# Patient Record
Sex: MALE | Race: WHITE | NOT HISPANIC OR LATINO | URBAN - METROPOLITAN AREA
[De-identification: names, ages, dates, MRNs, and addresses within clinical notes are randomized per-mention and may not be internally consistent; named-entity substitution may affect disease eponyms.]

---

## 2021-06-20 ENCOUNTER — INPATIENT (INPATIENT)
Facility: HOSPITAL | Age: 86
LOS: 3 days | Discharge: HOME HEALTH | DRG: 689 | End: 2021-06-24
Attending: INTERNAL MEDICINE | Admitting: INTERNAL MEDICINE
Payer: MEDICARE

## 2021-06-20 VITALS
HEIGHT: 67 IN | OXYGEN SATURATION: 96 % | HEART RATE: 117 BPM | TEMPERATURE: 98 F | SYSTOLIC BLOOD PRESSURE: 98 MMHG | RESPIRATION RATE: 18 BRPM | WEIGHT: 149.91 LBS | DIASTOLIC BLOOD PRESSURE: 64 MMHG

## 2021-06-20 DIAGNOSIS — J18.9 PNEUMONIA, UNSPECIFIED ORGANISM: ICD-10-CM

## 2021-06-20 DIAGNOSIS — Z29.9 ENCOUNTER FOR PROPHYLACTIC MEASURES, UNSPECIFIED: ICD-10-CM

## 2021-06-20 DIAGNOSIS — I50.9 HEART FAILURE, UNSPECIFIED: ICD-10-CM

## 2021-06-20 DIAGNOSIS — N39.0 URINARY TRACT INFECTION, SITE NOT SPECIFIED: ICD-10-CM

## 2021-06-20 DIAGNOSIS — I63.9 CEREBRAL INFARCTION, UNSPECIFIED: ICD-10-CM

## 2021-06-20 DIAGNOSIS — I48.91 UNSPECIFIED ATRIAL FIBRILLATION: ICD-10-CM

## 2021-06-20 DIAGNOSIS — N40.0 BENIGN PROSTATIC HYPERPLASIA WITHOUT LOWER URINARY TRACT SYMPTOMS: ICD-10-CM

## 2021-06-20 LAB
ALBUMIN SERPL ELPH-MCNC: 3.5 G/DL — SIGNIFICANT CHANGE UP (ref 3.3–5)
ALP SERPL-CCNC: 72 U/L — SIGNIFICANT CHANGE UP (ref 40–120)
ALT FLD-CCNC: 21 U/L — SIGNIFICANT CHANGE UP (ref 10–45)
ANION GAP SERPL CALC-SCNC: 11 MMOL/L — SIGNIFICANT CHANGE UP (ref 5–17)
ANION GAP SERPL CALC-SCNC: 12 MMOL/L — SIGNIFICANT CHANGE UP (ref 5–17)
APPEARANCE UR: ABNORMAL
APTT BLD: 46.1 SEC — HIGH (ref 27.5–35.5)
AST SERPL-CCNC: 76 U/L — HIGH (ref 10–40)
BACTERIA # UR AUTO: NEGATIVE — SIGNIFICANT CHANGE UP
BASE EXCESS BLDV CALC-SCNC: 1.3 MMOL/L — SIGNIFICANT CHANGE UP (ref -2–2)
BASE EXCESS BLDV CALC-SCNC: 1.8 MMOL/L — SIGNIFICANT CHANGE UP (ref -2–2)
BASOPHILS # BLD AUTO: 0 K/UL — SIGNIFICANT CHANGE UP (ref 0–0.2)
BASOPHILS NFR BLD AUTO: 0 % — SIGNIFICANT CHANGE UP (ref 0–2)
BILIRUB SERPL-MCNC: 1.8 MG/DL — HIGH (ref 0.2–1.2)
BILIRUB UR-MCNC: NEGATIVE — SIGNIFICANT CHANGE UP
BLD GP AB SCN SERPL QL: NEGATIVE — SIGNIFICANT CHANGE UP
BUN SERPL-MCNC: 16 MG/DL — SIGNIFICANT CHANGE UP (ref 7–23)
BUN SERPL-MCNC: 17 MG/DL — SIGNIFICANT CHANGE UP (ref 7–23)
CA-I SERPL-SCNC: 1.13 MMOL/L — SIGNIFICANT CHANGE UP (ref 1.12–1.3)
CA-I SERPL-SCNC: 1.14 MMOL/L — SIGNIFICANT CHANGE UP (ref 1.12–1.3)
CALCIUM SERPL-MCNC: 8.7 MG/DL — SIGNIFICANT CHANGE UP (ref 8.4–10.5)
CALCIUM SERPL-MCNC: 8.9 MG/DL — SIGNIFICANT CHANGE UP (ref 8.4–10.5)
CHLORIDE BLDV-SCNC: 103 MMOL/L — SIGNIFICANT CHANGE UP (ref 96–108)
CHLORIDE BLDV-SCNC: 105 MMOL/L — SIGNIFICANT CHANGE UP (ref 96–108)
CHLORIDE SERPL-SCNC: 101 MMOL/L — SIGNIFICANT CHANGE UP (ref 96–108)
CHLORIDE SERPL-SCNC: 98 MMOL/L — SIGNIFICANT CHANGE UP (ref 96–108)
CO2 BLDV-SCNC: 29 MMOL/L — SIGNIFICANT CHANGE UP (ref 22–30)
CO2 BLDV-SCNC: 30 MMOL/L — SIGNIFICANT CHANGE UP (ref 22–30)
CO2 SERPL-SCNC: 22 MMOL/L — SIGNIFICANT CHANGE UP (ref 22–31)
CO2 SERPL-SCNC: 23 MMOL/L — SIGNIFICANT CHANGE UP (ref 22–31)
COLOR SPEC: SIGNIFICANT CHANGE UP
CREAT SERPL-MCNC: 0.99 MG/DL — SIGNIFICANT CHANGE UP (ref 0.5–1.3)
CREAT SERPL-MCNC: 1 MG/DL — SIGNIFICANT CHANGE UP (ref 0.5–1.3)
DIFF PNL FLD: ABNORMAL
EOSINOPHIL # BLD AUTO: 0 K/UL — SIGNIFICANT CHANGE UP (ref 0–0.5)
EOSINOPHIL NFR BLD AUTO: 0 % — SIGNIFICANT CHANGE UP (ref 0–6)
EPI CELLS # UR: 1 — SIGNIFICANT CHANGE UP
GAS PNL BLDV: 132 MMOL/L — LOW (ref 135–145)
GAS PNL BLDV: 134 MMOL/L — LOW (ref 135–145)
GAS PNL BLDV: SIGNIFICANT CHANGE UP
GLUCOSE BLDV-MCNC: 130 MG/DL — HIGH (ref 70–99)
GLUCOSE BLDV-MCNC: 153 MG/DL — HIGH (ref 70–99)
GLUCOSE SERPL-MCNC: 126 MG/DL — HIGH (ref 70–99)
GLUCOSE SERPL-MCNC: 147 MG/DL — HIGH (ref 70–99)
GLUCOSE UR QL: NEGATIVE — SIGNIFICANT CHANGE UP
HCO3 BLDV-SCNC: 28 MMOL/L — SIGNIFICANT CHANGE UP (ref 21–29)
HCO3 BLDV-SCNC: 28 MMOL/L — SIGNIFICANT CHANGE UP (ref 21–29)
HCT VFR BLD CALC: 43.2 % — SIGNIFICANT CHANGE UP (ref 39–50)
HCT VFR BLDA CALC: 42 % — SIGNIFICANT CHANGE UP (ref 39–50)
HCT VFR BLDA CALC: 47 % — SIGNIFICANT CHANGE UP (ref 39–50)
HGB BLD CALC-MCNC: 13.5 G/DL — SIGNIFICANT CHANGE UP (ref 13–17)
HGB BLD CALC-MCNC: 15.5 G/DL — SIGNIFICANT CHANGE UP (ref 13–17)
HGB BLD-MCNC: 13.9 G/DL — SIGNIFICANT CHANGE UP (ref 13–17)
HYALINE CASTS # UR AUTO: 0 /LPF — SIGNIFICANT CHANGE UP (ref 0–2)
INR BLD: 4.09 RATIO — HIGH (ref 0.88–1.16)
KETONES UR-MCNC: NEGATIVE — SIGNIFICANT CHANGE UP
LACTATE BLDV-MCNC: 2.6 MMOL/L — HIGH (ref 0.7–2)
LACTATE BLDV-MCNC: 3 MMOL/L — HIGH (ref 0.7–2)
LEUKOCYTE ESTERASE UR-ACNC: ABNORMAL
LYMPHOCYTES # BLD AUTO: 0.63 K/UL — LOW (ref 1–3.3)
LYMPHOCYTES # BLD AUTO: 7 % — LOW (ref 13–44)
MAGNESIUM SERPL-MCNC: 1.9 MG/DL — SIGNIFICANT CHANGE UP (ref 1.6–2.6)
MANUAL SMEAR VERIFICATION: SIGNIFICANT CHANGE UP
MCHC RBC-ENTMCNC: 32 PG — SIGNIFICANT CHANGE UP (ref 27–34)
MCHC RBC-ENTMCNC: 32.2 GM/DL — SIGNIFICANT CHANGE UP (ref 32–36)
MCV RBC AUTO: 99.3 FL — SIGNIFICANT CHANGE UP (ref 80–100)
MONOCYTES # BLD AUTO: 0.7 K/UL — SIGNIFICANT CHANGE UP (ref 0–0.9)
MONOCYTES NFR BLD AUTO: 7.8 % — SIGNIFICANT CHANGE UP (ref 2–14)
NEUTROPHILS # BLD AUTO: 7.67 K/UL — HIGH (ref 1.8–7.4)
NEUTROPHILS NFR BLD AUTO: 79.1 % — HIGH (ref 43–77)
NEUTS BAND # BLD: 6.1 % — SIGNIFICANT CHANGE UP (ref 0–8)
NITRITE UR-MCNC: NEGATIVE — SIGNIFICANT CHANGE UP
OB PNL STL: NEGATIVE — SIGNIFICANT CHANGE UP
OTHER CELLS CSF MANUAL: 7 ML/DL — LOW (ref 18–22)
PCO2 BLDV: 50 MMHG — SIGNIFICANT CHANGE UP (ref 35–50)
PCO2 BLDV: 55 MMHG — HIGH (ref 35–50)
PH BLDV: 7.33 — LOW (ref 7.35–7.45)
PH BLDV: 7.36 — SIGNIFICANT CHANGE UP (ref 7.35–7.45)
PH UR: 6 — SIGNIFICANT CHANGE UP (ref 5–8)
PLAT MORPH BLD: NORMAL — SIGNIFICANT CHANGE UP
PLATELET # BLD AUTO: 101 K/UL — LOW (ref 150–400)
PO2 BLDV: 25 MMHG — SIGNIFICANT CHANGE UP (ref 25–45)
PO2 BLDV: 25 MMHG — SIGNIFICANT CHANGE UP (ref 25–45)
POTASSIUM BLDV-SCNC: 3.9 MMOL/L — SIGNIFICANT CHANGE UP (ref 3.5–5.3)
POTASSIUM BLDV-SCNC: 4 MMOL/L — SIGNIFICANT CHANGE UP (ref 3.5–5.3)
POTASSIUM SERPL-MCNC: 3.7 MMOL/L — SIGNIFICANT CHANGE UP (ref 3.5–5.3)
POTASSIUM SERPL-MCNC: 7.4 MMOL/L — CRITICAL HIGH (ref 3.5–5.3)
POTASSIUM SERPL-SCNC: 3.7 MMOL/L — SIGNIFICANT CHANGE UP (ref 3.5–5.3)
POTASSIUM SERPL-SCNC: 7.4 MMOL/L — CRITICAL HIGH (ref 3.5–5.3)
PROT SERPL-MCNC: 6.9 G/DL — SIGNIFICANT CHANGE UP (ref 6–8.3)
PROT UR-MCNC: ABNORMAL
PROTHROM AB SERPL-ACNC: 45.9 SEC — HIGH (ref 10.6–13.6)
RBC # BLD: 4.35 M/UL — SIGNIFICANT CHANGE UP (ref 4.2–5.8)
RBC # FLD: 11.9 % — SIGNIFICANT CHANGE UP (ref 10.3–14.5)
RBC BLD AUTO: NORMAL — SIGNIFICANT CHANGE UP
RBC CASTS # UR COMP ASSIST: >50 /HPF — HIGH (ref 0–4)
RH IG SCN BLD-IMP: POSITIVE — SIGNIFICANT CHANGE UP
SAO2 % BLDV: 36 % — LOW (ref 67–88)
SAO2 % BLDV: 40 % — LOW (ref 67–88)
SARS-COV-2 RNA SPEC QL NAA+PROBE: SIGNIFICANT CHANGE UP
SODIUM SERPL-SCNC: 131 MMOL/L — LOW (ref 135–145)
SODIUM SERPL-SCNC: 136 MMOL/L — SIGNIFICANT CHANGE UP (ref 135–145)
SP GR SPEC: 1.02 — SIGNIFICANT CHANGE UP (ref 1.01–1.02)
UROBILINOGEN FLD QL: ABNORMAL
WBC # BLD: 9 K/UL — SIGNIFICANT CHANGE UP (ref 3.8–10.5)
WBC # FLD AUTO: 9 K/UL — SIGNIFICANT CHANGE UP (ref 3.8–10.5)
WBC UR QL: >50 /HPF — HIGH (ref 0–5)

## 2021-06-20 PROCEDURE — 74177 CT ABD & PELVIS W/CONTRAST: CPT | Mod: 26

## 2021-06-20 PROCEDURE — 99285 EMERGENCY DEPT VISIT HI MDM: CPT

## 2021-06-20 PROCEDURE — 71260 CT THORAX DX C+: CPT | Mod: 26

## 2021-06-20 PROCEDURE — 71045 X-RAY EXAM CHEST 1 VIEW: CPT | Mod: 26

## 2021-06-20 RX ORDER — PIPERACILLIN AND TAZOBACTAM 4; .5 G/20ML; G/20ML
3.38 INJECTION, POWDER, LYOPHILIZED, FOR SOLUTION INTRAVENOUS EVERY 8 HOURS
Refills: 0 | Status: DISCONTINUED | OUTPATIENT
Start: 2021-06-20 | End: 2021-06-22

## 2021-06-20 RX ORDER — SODIUM CHLORIDE 9 MG/ML
1000 INJECTION INTRAMUSCULAR; INTRAVENOUS; SUBCUTANEOUS
Refills: 0 | Status: DISCONTINUED | OUTPATIENT
Start: 2021-06-20 | End: 2021-06-21

## 2021-06-20 RX ORDER — SODIUM CHLORIDE 9 MG/ML
1000 INJECTION INTRAMUSCULAR; INTRAVENOUS; SUBCUTANEOUS ONCE
Refills: 0 | Status: COMPLETED | OUTPATIENT
Start: 2021-06-20 | End: 2021-06-20

## 2021-06-20 RX ORDER — TAMSULOSIN HYDROCHLORIDE 0.4 MG/1
0.4 CAPSULE ORAL AT BEDTIME
Refills: 0 | Status: DISCONTINUED | OUTPATIENT
Start: 2021-06-20 | End: 2021-06-24

## 2021-06-20 RX ORDER — VANCOMYCIN HCL 1 G
1000 VIAL (EA) INTRAVENOUS EVERY 12 HOURS
Refills: 0 | Status: DISCONTINUED | OUTPATIENT
Start: 2021-06-20 | End: 2021-06-22

## 2021-06-20 RX ORDER — METOPROLOL TARTRATE 50 MG
5 TABLET ORAL ONCE
Refills: 0 | Status: COMPLETED | OUTPATIENT
Start: 2021-06-20 | End: 2021-06-20

## 2021-06-20 RX ORDER — SIMVASTATIN 20 MG/1
20 TABLET, FILM COATED ORAL AT BEDTIME
Refills: 0 | Status: DISCONTINUED | OUTPATIENT
Start: 2021-06-20 | End: 2021-06-24

## 2021-06-20 RX ORDER — AZITHROMYCIN 500 MG/1
500 TABLET, FILM COATED ORAL ONCE
Refills: 0 | Status: COMPLETED | OUTPATIENT
Start: 2021-06-20 | End: 2021-06-20

## 2021-06-20 RX ORDER — SODIUM CHLORIDE 9 MG/ML
500 INJECTION INTRAMUSCULAR; INTRAVENOUS; SUBCUTANEOUS ONCE
Refills: 0 | Status: COMPLETED | OUTPATIENT
Start: 2021-06-20 | End: 2021-06-20

## 2021-06-20 RX ORDER — ASPIRIN/CALCIUM CARB/MAGNESIUM 324 MG
81 TABLET ORAL DAILY
Refills: 0 | Status: DISCONTINUED | OUTPATIENT
Start: 2021-06-20 | End: 2021-06-24

## 2021-06-20 RX ORDER — CEFTRIAXONE 500 MG/1
1000 INJECTION, POWDER, FOR SOLUTION INTRAMUSCULAR; INTRAVENOUS ONCE
Refills: 0 | Status: COMPLETED | OUTPATIENT
Start: 2021-06-20 | End: 2021-06-20

## 2021-06-20 RX ORDER — METOPROLOL TARTRATE 50 MG
100 TABLET ORAL DAILY
Refills: 0 | Status: DISCONTINUED | OUTPATIENT
Start: 2021-06-20 | End: 2021-06-24

## 2021-06-20 RX ADMIN — SODIUM CHLORIDE 70 MILLILITER(S): 9 INJECTION INTRAMUSCULAR; INTRAVENOUS; SUBCUTANEOUS at 22:22

## 2021-06-20 RX ADMIN — SODIUM CHLORIDE 1000 MILLILITER(S): 9 INJECTION INTRAMUSCULAR; INTRAVENOUS; SUBCUTANEOUS at 13:17

## 2021-06-20 RX ADMIN — Medication 5 MILLIGRAM(S): at 15:58

## 2021-06-20 RX ADMIN — SODIUM CHLORIDE 166.67 MILLILITER(S): 9 INJECTION INTRAMUSCULAR; INTRAVENOUS; SUBCUTANEOUS at 18:41

## 2021-06-20 RX ADMIN — CEFTRIAXONE 100 MILLIGRAM(S): 500 INJECTION, POWDER, FOR SOLUTION INTRAMUSCULAR; INTRAVENOUS at 13:37

## 2021-06-20 RX ADMIN — AZITHROMYCIN 250 MILLIGRAM(S): 500 TABLET, FILM COATED ORAL at 15:39

## 2021-06-20 RX ADMIN — SIMVASTATIN 20 MILLIGRAM(S): 20 TABLET, FILM COATED ORAL at 22:22

## 2021-06-20 RX ADMIN — TAMSULOSIN HYDROCHLORIDE 0.4 MILLIGRAM(S): 0.4 CAPSULE ORAL at 22:22

## 2021-06-20 RX ADMIN — PIPERACILLIN AND TAZOBACTAM 25 GRAM(S): 4; .5 INJECTION, POWDER, LYOPHILIZED, FOR SOLUTION INTRAVENOUS at 22:22

## 2021-06-20 RX ADMIN — Medication 5 MILLIGRAM(S): at 22:22

## 2021-06-20 NOTE — H&P ADULT - HISTORY OF PRESENT ILLNESS
patient is a 87 y/o male with PMHx of HCHF, a-fib on coumadin, CVA with L side weakness, BPH, presenting to the ER from home for B/L lower ext weakness.     patient is a very poor historian called abdiel douglass in the chart no answer.  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 in the ED he was foudn ot have elevated lactate, positive UA nad ? pnemonia, broad spectrum IV antibitoics were given. on one ot one for agitation and dementia

## 2021-06-20 NOTE — ED ADULT NURSE REASSESSMENT NOTE - NS ED NURSE REASSESS COMMENT FT1
Pt placed on constant observation due to multiple attempts to get out of bed, pt unable to be verbally redirected. 1:1 at bedside at this time.

## 2021-06-20 NOTE — ED ADULT NURSE REASSESSMENT NOTE - NS ED NURSE REASSESS COMMENT FT1
Pt resting comfortably. lungs sounds clear on ausculation. vitals stable 1:1 maintained. pt in afib on monitor, -130s. MD galvan aware metoprolol to be ordered. pt admitted clicked RTM

## 2021-06-20 NOTE — ED PROVIDER NOTE - CARE PLAN
Principal Discharge DX:	Pneumonia  Secondary Diagnosis:	Urinary tract infection  Secondary Diagnosis:	Supratherapeutic INR

## 2021-06-20 NOTE — ED PROVIDER NOTE - PHYSICAL EXAMINATION
Attn - alert, NAD, no pallor or jaundice, PERRL 3 mm, dry mm, skin - warm and dry, Lungs - clear, no w/r/r, good BS bilaterally, Cor - rr, no M, no rub, Abdo - ND, soft, NT, no HSM, no CVAT, no guarding or rebound. Extremities - pitting edema ankles bilat  no calf tenderness, distal pulses intact and symmetrical, Neuro - no facial asymmetry, weakness on left - leg weaker than arm.

## 2021-06-20 NOTE — ED PROVIDER NOTE - OBJECTIVE STATEMENT
Attn - pt seen in G8 - pt BIB EMS from home - was able to walk and talk yesterday and not today.  Yi  used. pt denies any pain, except when voids and c/o bilat leg weakness.  NO: fever, chills, chest pain, abdo pain, ha.  pt has hx of CVA with weakness on left.  Pt has hx of AFib on warfarin, CHF, BPH.

## 2021-06-20 NOTE — ED ADULT NURSE NOTE - OBJECTIVE STATEMENT
88 year old male arrival by EMS for increasing lethargy, unable to ambulate, diarrhea x1week, and bloody urine. Pt alert and oriented x1-2 to self and place. pt unable to give history due to condition, Pakistani  attempted. Pt verbalizes difficulty ambulating since yesterday, pt states "my legs hurt". Upon exam, lungs clear, s1 s2 heart sounds auscultated, abdomen soft nontender, skin intact, green pasty stool noted, hematuria noted in diaper, bilateral lower extremity +2 edema present. Neuro exam intact, pupils equal and responsive, no facial droop noted.  Pt. denies pain, headache, chest pain, shortness of breath, abdominal pain, nausea, vomiting, and diarrhea. Labs drawn, IV #20g bilateral forearm placed, on cardiac monitor AFib in 110-120s, afebrile in ER at this time, rectal temp obtained 99.6F. Straight cath under sterile technique for UA/UC 2 RN at bedside, hematuria with sediment, cloudy, and clots noted approximately 200cc of urine removed. Fall socks in place, appropriate side rails up, bed in low position and doors open visible from RN station.

## 2021-06-20 NOTE — H&P ADULT - ASSESSMENT
patient is a very poor historian called abdiel douglass in the chart no answer.  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 in the ED he was foudn ot have elevated lactate, positive UA nad ? pnemonia, broad spectrum IV antibitoics were given. on one ot one for agitation and dementia

## 2021-06-20 NOTE — ED ADULT NURSE REASSESSMENT NOTE - NS ED NURSE REASSESS COMMENT FT1
repeat BMP drawn for hemolysis with elevated potassium and VBG redrawn for elevated lactate after normal saline bolus. 1:1 maintained

## 2021-06-20 NOTE — H&P ADULT - NSICDXPASTMEDICALHX_GEN_ALL_CORE_FT
PAST MEDICAL HISTORY:  Atrial fibrillation     BPH (benign prostatic hyperplasia)     CHF (congestive heart failure)     CVA (cerebral vascular accident)

## 2021-06-20 NOTE — ED PROVIDER NOTE - CLINICAL SUMMARY MEDICAL DECISION MAKING FREE TEXT BOX
Attn - change in walking and talking since yesterday, c/o weakness and dysuria - RRx - infection v bleed v lytes   labs, INR, EKG, UA

## 2021-06-20 NOTE — H&P ADULT - NSHPPHYSICALEXAM_GEN_ALL_CORE
Vital Signs Last 24 Hrs  T(C): 36.8 (20 Jun 2021 16:55), Max: 37.6 (20 Jun 2021 11:35)  T(F): 98.2 (20 Jun 2021 16:55), Max: 99.6 (20 Jun 2021 11:35)  HR: 114 (20 Jun 2021 18:28) (103 - 124)  BP: 125/82 (20 Jun 2021 16:55) (98/64 - 146/87)  BP(mean): --  RR: 16 (20 Jun 2021 18:28) (15 - 20)  SpO2: 98% (20 Jun 2021 18:28) (96% - 98%) Vital Signs Last 24 Hrs  T(C): 36.8 (20 Jun 2021 16:55), Max: 37.6 (20 Jun 2021 11:35)  T(F): 98.2 (20 Jun 2021 16:55), Max: 99.6 (20 Jun 2021 11:35)  HR: 114 (20 Jun 2021 18:28) (103 - 124)  BP: 125/82 (20 Jun 2021 16:55) (98/64 - 146/87)  BP(mean): --  RR: 16 (20 Jun 2021 18:28) (15 - 20)  SpO2: 98% (20 Jun 2021 18:28) (96% - 98%)    Appearance: awake, demented, follows simple commands   HEENT:   Normal oral mucosa, PERRL, EOMI	  Lymphatic: No lymphadenopathy , no edema  Cardiovascular: Normal S1 S2  Respiratory: Lungs clear to auscultation, normal effort 	  Gastrointestinal:  Soft, diapers   Skin: No rashes, No ecchymoses, No cyanosis, warm to touch  Musculoskeletal: Normal range of motion, normal strength  Psychiatry:  Mood & affect appropriate  Ext: No edema

## 2021-06-20 NOTE — ED PROVIDER NOTE - PMH
Atrial fibrillation    BPH (benign prostatic hyperplasia)    CHF (congestive heart failure)    CVA (cerebral vascular accident)

## 2021-06-21 LAB
A1C WITH ESTIMATED AVERAGE GLUCOSE RESULT: 5.2 % — SIGNIFICANT CHANGE UP (ref 4–5.6)
ALBUMIN SERPL ELPH-MCNC: 2.9 G/DL — LOW (ref 3.3–5)
ALP SERPL-CCNC: 70 U/L — SIGNIFICANT CHANGE UP (ref 40–120)
ALT FLD-CCNC: 12 U/L — SIGNIFICANT CHANGE UP (ref 10–45)
ANION GAP SERPL CALC-SCNC: 11 MMOL/L — SIGNIFICANT CHANGE UP (ref 5–17)
APTT BLD: 46.9 SEC — HIGH (ref 27.5–35.5)
AST SERPL-CCNC: 20 U/L — SIGNIFICANT CHANGE UP (ref 10–40)
BASOPHILS # BLD AUTO: 0 K/UL — SIGNIFICANT CHANGE UP (ref 0–0.2)
BASOPHILS NFR BLD AUTO: 0 % — SIGNIFICANT CHANGE UP (ref 0–2)
BILIRUB SERPL-MCNC: 1.3 MG/DL — HIGH (ref 0.2–1.2)
BUN SERPL-MCNC: 10 MG/DL — SIGNIFICANT CHANGE UP (ref 7–23)
CALCIUM SERPL-MCNC: 8.4 MG/DL — SIGNIFICANT CHANGE UP (ref 8.4–10.5)
CHLORIDE SERPL-SCNC: 105 MMOL/L — SIGNIFICANT CHANGE UP (ref 96–108)
CHOLEST SERPL-MCNC: 114 MG/DL — SIGNIFICANT CHANGE UP
CO2 SERPL-SCNC: 21 MMOL/L — LOW (ref 22–31)
COVID-19 SPIKE DOMAIN AB INTERP: POSITIVE
COVID-19 SPIKE DOMAIN ANTIBODY RESULT: 50.1 U/ML — HIGH
CREAT SERPL-MCNC: 0.75 MG/DL — SIGNIFICANT CHANGE UP (ref 0.5–1.3)
EOSINOPHIL # BLD AUTO: 0 K/UL — SIGNIFICANT CHANGE UP (ref 0–0.5)
EOSINOPHIL NFR BLD AUTO: 0 % — SIGNIFICANT CHANGE UP (ref 0–6)
ESTIMATED AVERAGE GLUCOSE: 103 MG/DL — SIGNIFICANT CHANGE UP (ref 68–114)
GLUCOSE SERPL-MCNC: 102 MG/DL — HIGH (ref 70–99)
HCT VFR BLD CALC: 39.1 % — SIGNIFICANT CHANGE UP (ref 39–50)
HDLC SERPL-MCNC: 40 MG/DL — LOW
HGB BLD-MCNC: 12.8 G/DL — LOW (ref 13–17)
INR BLD: 3.93 RATIO — HIGH (ref 0.88–1.16)
LACTATE SERPL-SCNC: 0.9 MMOL/L — SIGNIFICANT CHANGE UP (ref 0.7–2)
LACTATE SERPL-SCNC: 1.1 MMOL/L — SIGNIFICANT CHANGE UP (ref 0.7–2)
LIPID PNL WITH DIRECT LDL SERPL: 59 MG/DL — SIGNIFICANT CHANGE UP
LYMPHOCYTES # BLD AUTO: 0.3 K/UL — LOW (ref 1–3.3)
LYMPHOCYTES # BLD AUTO: 7 % — LOW (ref 13–44)
MCHC RBC-ENTMCNC: 32.5 PG — SIGNIFICANT CHANGE UP (ref 27–34)
MCHC RBC-ENTMCNC: 32.7 GM/DL — SIGNIFICANT CHANGE UP (ref 32–36)
MCV RBC AUTO: 99.2 FL — SIGNIFICANT CHANGE UP (ref 80–100)
MONOCYTES # BLD AUTO: 0.3 K/UL — SIGNIFICANT CHANGE UP (ref 0–0.9)
MONOCYTES NFR BLD AUTO: 7 % — SIGNIFICANT CHANGE UP (ref 2–14)
NEUTROPHILS # BLD AUTO: 3.74 K/UL — SIGNIFICANT CHANGE UP (ref 1.8–7.4)
NEUTROPHILS NFR BLD AUTO: 86 % — HIGH (ref 43–77)
NON HDL CHOLESTEROL: 73 MG/DL — SIGNIFICANT CHANGE UP
PLATELET # BLD AUTO: 88 K/UL — LOW (ref 150–400)
POTASSIUM SERPL-MCNC: 3.6 MMOL/L — SIGNIFICANT CHANGE UP (ref 3.5–5.3)
POTASSIUM SERPL-SCNC: 3.6 MMOL/L — SIGNIFICANT CHANGE UP (ref 3.5–5.3)
PROT SERPL-MCNC: 5.6 G/DL — LOW (ref 6–8.3)
PROTHROM AB SERPL-ACNC: 44.2 SEC — HIGH (ref 10.6–13.6)
RBC # BLD: 3.94 M/UL — LOW (ref 4.2–5.8)
RBC # FLD: 11.9 % — SIGNIFICANT CHANGE UP (ref 10.3–14.5)
SARS-COV-2 IGG+IGM SERPL QL IA: 50.1 U/ML — HIGH
SARS-COV-2 IGG+IGM SERPL QL IA: POSITIVE
SODIUM SERPL-SCNC: 137 MMOL/L — SIGNIFICANT CHANGE UP (ref 135–145)
TRIGL SERPL-MCNC: 74 MG/DL — SIGNIFICANT CHANGE UP
TSH SERPL-MCNC: 2.39 UIU/ML — SIGNIFICANT CHANGE UP (ref 0.27–4.2)
TSH SERPL-MCNC: 2.48 UIU/ML — SIGNIFICANT CHANGE UP (ref 0.27–4.2)
WBC # BLD: 4.35 K/UL — SIGNIFICANT CHANGE UP (ref 3.8–10.5)
WBC # FLD AUTO: 4.35 K/UL — SIGNIFICANT CHANGE UP (ref 3.8–10.5)

## 2021-06-21 RX ORDER — LANOLIN ALCOHOL/MO/W.PET/CERES
3 CREAM (GRAM) TOPICAL ONCE
Refills: 0 | Status: COMPLETED | OUTPATIENT
Start: 2021-06-21 | End: 2021-06-21

## 2021-06-21 RX ORDER — ACETAMINOPHEN 500 MG
650 TABLET ORAL ONCE
Refills: 0 | Status: COMPLETED | OUTPATIENT
Start: 2021-06-21 | End: 2021-06-21

## 2021-06-21 RX ADMIN — Medication 81 MILLIGRAM(S): at 11:25

## 2021-06-21 RX ADMIN — PIPERACILLIN AND TAZOBACTAM 25 GRAM(S): 4; .5 INJECTION, POWDER, LYOPHILIZED, FOR SOLUTION INTRAVENOUS at 06:24

## 2021-06-21 RX ADMIN — Medication 100 MILLIGRAM(S): at 06:24

## 2021-06-21 RX ADMIN — PIPERACILLIN AND TAZOBACTAM 25 GRAM(S): 4; .5 INJECTION, POWDER, LYOPHILIZED, FOR SOLUTION INTRAVENOUS at 13:50

## 2021-06-21 RX ADMIN — Medication 5 MILLIGRAM(S): at 06:24

## 2021-06-21 RX ADMIN — Medication 250 MILLIGRAM(S): at 18:48

## 2021-06-21 RX ADMIN — Medication 650 MILLIGRAM(S): at 21:20

## 2021-06-21 RX ADMIN — TAMSULOSIN HYDROCHLORIDE 0.4 MILLIGRAM(S): 0.4 CAPSULE ORAL at 21:19

## 2021-06-21 RX ADMIN — Medication 3 MILLIGRAM(S): at 21:20

## 2021-06-21 RX ADMIN — PIPERACILLIN AND TAZOBACTAM 25 GRAM(S): 4; .5 INJECTION, POWDER, LYOPHILIZED, FOR SOLUTION INTRAVENOUS at 21:20

## 2021-06-21 RX ADMIN — SIMVASTATIN 20 MILLIGRAM(S): 20 TABLET, FILM COATED ORAL at 21:19

## 2021-06-21 RX ADMIN — Medication 250 MILLIGRAM(S): at 06:24

## 2021-06-21 RX ADMIN — Medication 650 MILLIGRAM(S): at 21:50

## 2021-06-21 NOTE — CHART NOTE - NSCHARTNOTEFT_GEN_A_CORE
Patient with suspicious lesions on CT scan.  Patient will require a workup for these findings as an outpatient.  He may follow up at the [facility], [phone]

## 2021-06-22 LAB
-  AMIKACIN: SIGNIFICANT CHANGE UP
-  AMOXICILLIN/CLAVULANIC ACID: SIGNIFICANT CHANGE UP
-  AMPICILLIN/SULBACTAM: SIGNIFICANT CHANGE UP
-  AMPICILLIN: SIGNIFICANT CHANGE UP
-  AZTREONAM: SIGNIFICANT CHANGE UP
-  CEFAZOLIN: SIGNIFICANT CHANGE UP
-  CEFEPIME: SIGNIFICANT CHANGE UP
-  CEFOXITIN: SIGNIFICANT CHANGE UP
-  CEFTRIAXONE: SIGNIFICANT CHANGE UP
-  CIPROFLOXACIN: SIGNIFICANT CHANGE UP
-  ERTAPENEM: SIGNIFICANT CHANGE UP
-  GENTAMICIN: SIGNIFICANT CHANGE UP
-  IMIPENEM: SIGNIFICANT CHANGE UP
-  LEVOFLOXACIN: SIGNIFICANT CHANGE UP
-  MEROPENEM: SIGNIFICANT CHANGE UP
-  NITROFURANTOIN: SIGNIFICANT CHANGE UP
-  PIPERACILLIN/TAZOBACTAM: SIGNIFICANT CHANGE UP
-  TIGECYCLINE: SIGNIFICANT CHANGE UP
-  TOBRAMYCIN: SIGNIFICANT CHANGE UP
-  TRIMETHOPRIM/SULFAMETHOXAZOLE: SIGNIFICANT CHANGE UP
CULTURE RESULTS: SIGNIFICANT CHANGE UP
INR BLD: 3.41 RATIO — HIGH (ref 0.88–1.16)
METHOD TYPE: SIGNIFICANT CHANGE UP
ORGANISM # SPEC MICROSCOPIC CNT: SIGNIFICANT CHANGE UP
ORGANISM # SPEC MICROSCOPIC CNT: SIGNIFICANT CHANGE UP
PROTHROM AB SERPL-ACNC: 38.6 SEC — HIGH (ref 10.6–13.6)
SPECIMEN SOURCE: SIGNIFICANT CHANGE UP

## 2021-06-22 RX ORDER — CEFTRIAXONE 500 MG/1
1000 INJECTION, POWDER, FOR SOLUTION INTRAMUSCULAR; INTRAVENOUS EVERY 24 HOURS
Refills: 0 | Status: DISCONTINUED | OUTPATIENT
Start: 2021-06-22 | End: 2021-06-24

## 2021-06-22 RX ADMIN — Medication 250 MILLIGRAM(S): at 06:36

## 2021-06-22 RX ADMIN — PIPERACILLIN AND TAZOBACTAM 25 GRAM(S): 4; .5 INJECTION, POWDER, LYOPHILIZED, FOR SOLUTION INTRAVENOUS at 13:45

## 2021-06-22 RX ADMIN — Medication 100 MILLIGRAM(S): at 06:36

## 2021-06-22 RX ADMIN — TAMSULOSIN HYDROCHLORIDE 0.4 MILLIGRAM(S): 0.4 CAPSULE ORAL at 21:44

## 2021-06-22 RX ADMIN — Medication 250 MILLIGRAM(S): at 17:30

## 2021-06-22 RX ADMIN — Medication 5 MILLIGRAM(S): at 06:35

## 2021-06-22 RX ADMIN — PIPERACILLIN AND TAZOBACTAM 25 GRAM(S): 4; .5 INJECTION, POWDER, LYOPHILIZED, FOR SOLUTION INTRAVENOUS at 06:36

## 2021-06-22 RX ADMIN — CEFTRIAXONE 100 MILLIGRAM(S): 500 INJECTION, POWDER, FOR SOLUTION INTRAMUSCULAR; INTRAVENOUS at 21:44

## 2021-06-22 RX ADMIN — SIMVASTATIN 20 MILLIGRAM(S): 20 TABLET, FILM COATED ORAL at 21:42

## 2021-06-22 RX ADMIN — Medication 81 MILLIGRAM(S): at 12:36

## 2021-06-22 NOTE — PHYSICAL THERAPY INITIAL EVALUATION ADULT - ADDITIONAL COMMENTS
Pt. lives in apt. with spouse, 16 steps to enter.  Patient ambulated with straight cane + one person assist.

## 2021-06-22 NOTE — CONSULT NOTE ADULT - ASSESSMENT
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 Weakness  -likely 2/2 infectious process  -on abx for PNA and UTI   -t/t per primary team    2. Afib  -INR 3.41  -continue to hold coumadin     3. HTN  -controlled  -c/w enalapril and metoprolol  -continue to monitor BP     4. 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 Weakness  -likely 2/2 infectious process  -on abx for PNA and UTI   -t/t per primary team  - ? h/o CHF get echo     2. Afib  -INR 3.41  -continue to hold coumadin     3. HTN  -controlled  -c/w enalapril and metoprolol  -continue to monitor BP     4. DVT prophylaxis  -INR >2

## 2021-06-22 NOTE — CONSULT NOTE ADULT - SUBJECTIVE AND OBJECTIVE BOX
Huan Cunningham MD  Interventional Cardiology / Endovascular Specialist  [place] Office : [address] [place] [address] [place]. [address]  Tel:   [place] Office : [address] [facility] [place] 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 In the ED he was found to have elevated lactate,on abx for PNA and UTI.  	  MEDICATIONS:  aspirin enteric coated 81 milliGRAM(s) Oral daily  enalapril 5 milliGRAM(s) Oral daily  metoprolol succinate  milliGRAM(s) Oral daily  tamsulosin 0.4 milliGRAM(s) Oral at bedtime    piperacillin/tazobactam IVPB.. 3.375 Gram(s) IV Intermittent every 8 hours  vancomycin  IVPB 1000 milliGRAM(s) IV Intermittent every 12 hours          simvastatin 20 milliGRAM(s) Oral at bedtime        PAST MEDICAL/SURGICAL HISTORY  PAST MEDICAL & SURGICAL HISTORY:  CVA (cerebral vascular accident)    CHF (congestive heart failure)    Atrial fibrillation    BPH (benign prostatic hyperplasia)        SOCIAL HISTORY: Substance Use (street drugs): ( x ) never used  (  ) other:    FAMILY HISTORY:      REVIEW OF SYSTEMS:  unable to obtain    PHYSICAL EXAM:  T(C): 36.2 (06-22-21 @ 09:24), Max: 37.3 (06-21-21 @ 20:42)  HR: 86 (06-22-21 @ 09:24) (78 - 92)  BP: 148/91 (06-22-21 @ 09:24) (133/63 - 148/95)  RR: 18 (06-22-21 @ 09:24) (18 - 18)  SpO2: 98% (06-22-21 @ 09:24) (92% - 98%)  Wt(kg): --  I&O's Summary    21 Jun 2021 07:01  -  22 Jun 2021 07:00  --------------------------------------------------------  IN: 1640 mL / OUT: 0 mL / NET: 1640 mL    22 Jun 2021 07:01  -  22 Jun 2021 13:52  --------------------------------------------------------  IN: 240 mL / OUT: 1 mL / NET: 239 mL          GENERAL: NAD,  EYES: EOMI, PERRLA, conjunctiva and sclera clear  ENMT: No tonsillar erythema, exudates, or enlargement;  Cardiovascular: Normal S1 S2, No JVD, No murmurs, No edema  Respiratory: Lungs clear to auscultation	  Gastrointestinal:  Soft, Non-tender, + BS	  Extremities: No edema                                      12.8   4.35  )-----------( 88       ( 21 Jun 2021 07:04 )             39.1     06-21    137  |  105  |  10  ----------------------------<  102<H>  3.6   |  21<L>  |  0.75    Ca    8.4      21 Jun 2021 07:04  Mg     1.9     06-20    TPro  5.6<L>  /  Alb  2.9<L>  /  TBili  1.3<H>  /  DBili  x   /  AST  20  /  ALT  12  /  AlkPhos  70  06-21    proBNP:   Lipid Profile:   HgA1c:   TSH:     Consultant(s) Notes Reviewed:  [x ] YES  [ ] NO    Care Discussed with Consultants/Other Providers [ x] YES  [ ] NO    Imaging Personally Reviewed independently:  [x] YES  [ ] NO    All labs, radiologic studies, vitals, orders and medications list reviewed. Patient is seen and examined at bedside. Case discussed with medical team.                 Huan Cunningham MD  Interventional Cardiology / Endovascular Specialist  [place] Office : [address] [place] [address] [place]. [address]  Tel:   [place] Office : [address] [facility] [place] 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 In the ED he was found to have elevated lactate,on abx for PNA and UTI.  	  MEDICATIONS:  aspirin enteric coated 81 milliGRAM(s) Oral daily  enalapril 5 milliGRAM(s) Oral daily  metoprolol succinate  milliGRAM(s) Oral daily  tamsulosin 0.4 milliGRAM(s) Oral at bedtime  piperacillin/tazobactam IVPB.. 3.375 Gram(s) IV Intermittent every 8 hours  vancomycin  IVPB 1000 milliGRAM(s) IV Intermittent every 12 hours  simvastatin 20 milliGRAM(s) Oral at bedtime      PAST MEDICAL/SURGICAL HISTORY  PAST MEDICAL & SURGICAL HISTORY:  CVA (cerebral vascular accident)    CHF (congestive heart failure)    Atrial fibrillation    BPH (benign prostatic hyperplasia)        SOCIAL HISTORY: Substance Use (street drugs): ( x ) never used  (  ) other:    FAMILY HISTORY:      REVIEW OF SYSTEMS:  unable to obtain    PHYSICAL EXAM:  T(C): 36.2 (06-22-21 @ 09:24), Max: 37.3 (06-21-21 @ 20:42)  HR: 86 (06-22-21 @ 09:24) (78 - 92)  BP: 148/91 (06-22-21 @ 09:24) (133/63 - 148/95)  RR: 18 (06-22-21 @ 09:24) (18 - 18)  SpO2: 98% (06-22-21 @ 09:24) (92% - 98%)  Wt(kg): --  I&O's Summary    21 Jun 2021 07:01  -  22 Jun 2021 07:00  --------------------------------------------------------  IN: 1640 mL / OUT: 0 mL / NET: 1640 mL    22 Jun 2021 07:01  -  22 Jun 2021 13:52  --------------------------------------------------------  IN: 240 mL / OUT: 1 mL / NET: 239 mL          GENERAL: NAD,  EYES: EOMI, PERRLA, conjunctiva and sclera clear  ENMT: No tonsillar erythema, exudates, or enlargement;  Cardiovascular: Normal S1 S2, No JVD, No murmurs, No edema  Respiratory: Lungs clear to auscultation	  Gastrointestinal:  Soft, Non-tender, + BS	  Extremities: No edema                                      12.8   4.35  )-----------( 88       ( 21 Jun 2021 07:04 )             39.1     06-21    137  |  105  |  10  ----------------------------<  102<H>  3.6   |  21<L>  |  0.75    Ca    8.4      21 Jun 2021 07:04  Mg     1.9     06-20    TPro  5.6<L>  /  Alb  2.9<L>  /  TBili  1.3<H>  /  DBili  x   /  AST  20  /  ALT  12  /  AlkPhos  70  06-21    proBNP:   Lipid Profile:   HgA1c:   TSH:     Consultant(s) Notes Reviewed:  [x ] YES  [ ] NO    Care Discussed with Consultants/Other Providers [ x] YES  [ ] NO    Imaging Personally Reviewed independently:  [x] YES  [ ] NO    All labs, radiologic studies, vitals, orders and medications list reviewed. Patient is seen and examined at bedside. Case discussed with medical team.

## 2021-06-22 NOTE — PHYSICAL THERAPY INITIAL EVALUATION ADULT - PERTINENT HX OF CURRENT PROBLEM, REHAB EVAL
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 in the ED he was found to have elevated lactate, positive UA and ? pneumonia, broad spectrum IV antibitoics were given.

## 2021-06-22 NOTE — PHYSICAL THERAPY INITIAL EVALUATION ADULT - STANDING BALANCE: STATIC
History  Chief Complaint   Patient presents with    Chest Pain     Pt reports left sided CP beginning about ten minutes ago and "feeling hot"  Pt c/o left arm heaviness  History provided by:  Patient  Chest Pain   Pain location:  L chest  Pain quality: aching    Pain radiates to:  Does not radiate  Pain severity:  Moderate  Onset quality:  Gradual  Timing:  Intermittent  Progression:  Waxing and waning  Chronicity:  New  Relieved by:  Nothing  Worsened by:  Nothing tried  Ineffective treatments:  None tried  Associated symptoms: numbness    Associated symptoms: no abdominal pain, no cough, no dizziness, no dysphagia, no fever, no headache, no nausea, no shortness of breath and no weakness    Associated symptoms comment:  Patient also noted with some left arm and left leg tingling sensation that started earlier today has been on and off  Patient has some noted pain in the left upper thoracic area  Prior to Admission Medications   Prescriptions Last Dose Informant Patient Reported? Taking? cetirizine (ZyrTEC) oral solution   No No   Sig: Take 5 mL (5 mg total) by mouth daily   dextromethorphan-guaifenesin (MUCINEX DM)  MG per 12 hr tablet   No No   Sig: Take 1 tablet by mouth every 12 (twelve) hours as needed for cough   fluticasone (FLONASE) 50 mcg/act nasal spray   No No   Si spray into each nostril daily      Facility-Administered Medications: None       History reviewed  No pertinent past medical history  Past Surgical History:   Procedure Laterality Date    EYE SURGERY      HAND SURGERY      HYSTERECTOMY         History reviewed  No pertinent family history  I have reviewed and agree with the history as documented      Social History   Substance Use Topics    Smoking status: Current Every Day Smoker     Packs/day: 0 50     Types: Cigarettes    Smokeless tobacco: Never Used    Alcohol use Yes      Comment: "1 or 2" after work        Review of Systems   Constitutional: Negative for chills and fever  HENT: Negative for rhinorrhea, sore throat and trouble swallowing  Eyes: Negative for pain  Respiratory: Negative for cough, shortness of breath, wheezing and stridor  Cardiovascular: Positive for chest pain  Negative for leg swelling  Gastrointestinal: Negative for abdominal pain, diarrhea and nausea  Endocrine: Negative for polyuria  Genitourinary: Negative for dysuria, flank pain and urgency  Musculoskeletal: Negative for joint swelling, myalgias and neck stiffness  Skin: Negative for rash  Allergic/Immunologic: Negative for immunocompromised state  Neurological: Positive for numbness  Negative for dizziness, syncope, weakness and headaches  Psychiatric/Behavioral: Negative for confusion and suicidal ideas  All other systems reviewed and are negative  Physical Exam  Physical Exam   Constitutional: She is oriented to person, place, and time  She appears well-developed and well-nourished  HENT:   Head: Normocephalic and atraumatic  Eyes: Pupils are equal, round, and reactive to light  EOM are normal    Neck: Normal range of motion  Neck supple  Cardiovascular: Normal rate and regular rhythm  Exam reveals no friction rub  No murmur heard  Pulmonary/Chest: Breath sounds normal  No respiratory distress  She has no wheezes  She has no rales  Abdominal: Soft  Bowel sounds are normal  She exhibits no distension  There is no tenderness  Musculoskeletal: Normal range of motion  She exhibits no edema or tenderness  Neurological: She is alert and oriented to person, place, and time  GCS 15  AAOx4  No focal neuro deficits  CN II-XII intact  PERRL  EOMI  [place] No pronator drift   strength 5/5 bilaterally  B/L UE strength 5/5 throughout  Finger to nose normal  Cerebellar function normal  Ambulates without difficulty  B/L LE strength 5/5 throughout  Gross sensation to b/l upper and lower extremities intact  Skin: Skin is warm  No rash noted  Psychiatric: She has a normal mood and affect  Nursing note and vitals reviewed        Vital Signs  ED Triage Vitals [12/27/18 1442]   Temperature Pulse Respirations Blood Pressure SpO2   97 5 °F (36 4 °C) 91 18 (!) 184/109 99 %      Temp Source Heart Rate Source Patient Position - Orthostatic VS BP Location FiO2 (%)   Tympanic Monitor Sitting Right arm --      Pain Score       5           Vitals:    12/27/18 1732 12/27/18 1919 12/27/18 1943 12/27/18 2013   BP: (!) 176/101 (!) 182/106 (!) 168/112 152/100   Pulse: 80 83 81 69   Patient Position - Orthostatic VS: Sitting Sitting Sitting Lying       Visual Acuity      ED Medications  Medications   aspirin chewable tablet 324 mg (324 mg Oral Given 12/27/18 1454)   sodium chloride 0 9 % bolus 1,000 mL (0 mL Intravenous Stopped 12/27/18 1553)   Gadobutrol injection (SINGLE-DOSE) SOLN 10 mL (9 mL Intravenous Given 12/27/18 1854)   labetalol (NORMODYNE) injection 5 mg (5 mg Intravenous Given 12/27/18 1943)       Diagnostic Studies  Results Reviewed     Procedure Component Value Units Date/Time    Troponin I [[id number]]  (Normal) Collected:  12/27/18 1747    Lab Status:  Final result Specimen:  Blood from Arm, Left Updated:  12/27/18 1817     Troponin I 0 02 ng/mL     NT-BNP PRO [[id number]]  (Normal) Collected:  12/27/18 1455    Lab Status:  Final result Specimen:  Blood from Arm, Right Updated:  12/27/18 1534     NT-proBNP 29 4 pg/mL     Troponin I [[id number]]  (Normal) Collected:  12/27/18 1455    Lab Status:  Final result Specimen:  Blood from Arm, Right Updated:  12/27/18 1534     Troponin I 0 02 ng/mL     APTT [[id number]]  (Normal) Collected:  12/27/18 1455    Lab Status:  Final result Specimen:  Blood from Arm, Right Updated:  12/27/18 1519     PTT 29 seconds     Narrative:       PTT:      Protime-INR [[id number]]  (Normal) Collected:  12/27/18 1455    Lab Status:  Final result Specimen:  Blood from Arm, Right Updated:  12/27/18 1519     Protime 10 7 seconds      INR 1 01 Narrative:       INR:  ,PROTIME:      D-Dimer [[id number]]  (Normal) Collected:  12/27/18 1455    Lab Status:  Final result Specimen:  Blood from Arm, Right Updated:  12/27/18 1519     D-DIMER <0 19 mg/L     Narrative:       D-DIMER:      Basic metabolic panel [[id number]]  (Abnormal) Collected:  12/27/18 1455    Lab Status:  Final result Specimen:  Blood from Arm, Right Updated:  12/27/18 1515     Sodium 139 mmol/L      Potassium 4 3 mmol/L      Chloride 102 mmol/L      CO2 27 mmol/L      ANION GAP 10 mmol/L      BUN 13 mg/dL      Creatinine 0 78 mg/dL      Glucose 105 (H) mg/dL      Calcium 9 8 mg/dL      eGFR 90 ml/min/1 73sq m     Narrative:         National Kidney Disease Education Program recommendations are as follows:  GFR calculation is accurate only with a steady state creatinine  Chronic Kidney disease less than 60 ml/min/1 73 sq  meters  Kidney failure less than 15 ml/min/1 73 sq  meters      Lipase [[id number]]  (Normal) Collected:  12/27/18 1455    Lab Status:  Final result Specimen:  Blood from Arm, Right Updated:  12/27/18 1515     Lipase 73 u/L     CBC and differential [[id number]]  (Abnormal) Collected:  12/27/18 1454    Lab Status:  Final result Specimen:  Blood from Arm, Right Updated:  12/27/18 1503     WBC 9 80 Thousand/uL      RBC 5 13 Million/uL      Hemoglobin 16 2 (H) g/dL      Hematocrit 47 4 (H) %      MCV 93 fL      MCH 31 6 pg      MCHC 34 2 g/dL      RDW 13 0 %      MPV 7 9 (L) fL      Platelets 139 Thousands/uL      Neutrophils Relative 55 %      Lymphocytes Relative 36 %      Monocytes Relative 5 %      Eosinophils Relative 2 %      Basophils Relative 2 (H) %      Neutrophils Absolute 5 40 Thousands/µL      Lymphocytes Absolute 3 50 Thousands/µL      Monocytes Absolute 0 50 Thousand/µL      Eosinophils Absolute 0 20 Thousand/µL      Basophils Absolute 0 20 (H) Thousands/µL                  MRI brain w wo contrast   Final Result by Viola Eddy MD (12/27 1926)      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  No intracranial hemorrhage or vasogenic edema  Consultation with the [id number] [facility] and catheter angiography recommended for further    evaluation  The study was marked in [facility] for immediate notification  Workstation performed: [id number]         CT head without contrast   Final Result by Skye Allison MD (12/27 1656)      Right frontal hyperdensity  This could represent acute blood versus a brain lesion such as vascular malformation  A follow-up contrast-enhanced MRI is recommended  I personally discussed this study with RA TERAN on 12/27/2018 at 4:48 PM                         Workstation performed: [id number]         XR chest portable   Final Result by Angela Dumas MD (12/27 1513)      Elevation of the right hemidiaphragm with bibasilar atelectasis  Workstation performed: [id number]                    Procedures  Procedures       Phone Contacts  ED Phone Contact    ED Course  ED Course as of Dec 30 0056   Thu Dec 27, 2018   1653 Spoke with the radiologist at this point time there is an on defined lesion in the right frontal lobe  Recommend MRI with IV contrast    1822 2nd trop negative  Pt at [address] now       1927 Patient back from MRI, noted increased BP elevated  Labetalol to be given    1958 Spoke with neuroSX on call   Recommendation need follow up,           HEART Risk Score      Most Recent Value   History  1 Filed at: 12/27/2018 1928   ECG  0 Filed at: 12/27/2018 1928   Age  1 Filed at: 12/27/2018 1928   Risk Factors  1 Filed at: 12/27/2018 1928   Troponin  0 Filed at: 12/27/2018 1928   Heart Score Risk Calculator   History  1 Filed at: 12/27/2018 1928   ECG  0 Filed at: 12/27/2018 1928   Age  1 Filed at: 12/27/2018 1928   Risk Factors  1 Filed at: 12/27/2018 1928   Troponin  0 Filed at: 12/27/2018 1928   HEART Score  3 Filed at: 12/27/2018 1928   HEART Score  3 Filed at: 12/27/2018 1928                            [facility]  Number of Diagnoses or Management Options  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  Chest pain: new and requires workup  HTN (hypertension): new and requires workup  Nicotine dependence: new and requires workup  Diagnosis management comments: 55-year-old female with a history of un diagnosed hypertension as well as smoking history presents with noted left-sided chest discomfort intermittent that started earlier today as well as left arm tingling and left leg tingling  There is no objective findings on neurological examination  CT scan was performed of the head for these evaluations symptoms and noted with the possibility of abnormality  The recommendation was to get MRI with contrast   The MRI with contrast noted an AV fistula  I spoke with the neurosurgeon over at [facility] recommended the patient be discharged as an outpatient  Blood pressure is mildly elevated but some what improved with a dose of labetalol informed her that she can follow up as an outpatient with the neurosurgeon  He took down MRN number to make sure the patient has follow-up  Patient's neurological symptoms have since improved the plan will be for outpatient management followup given strict instructions when to return back to the emergency department 2 troponins done in the negative in the department with the low heart risk score  Pt re-examined and evaluated after testing and treatment  Spoke with the patient and feeling improved and sxs have resolved  Will discharge home with close f/u with pcp and instructed to return to the ED if sxs worsen or continue  Pt agrees with the plan for discharge and feels comfortable to go home with proper f/u  Advised to return for worsening or additional problems  Diagnostic tests were reviewed and questions answered  Diagnosis, care plan and treatment options were discussed   The patient understand instructions and will follow up as directed  Amount and/or Complexity of Data Reviewed  Clinical lab tests: ordered and reviewed  Tests in the radiology section of CPT®: ordered and reviewed  Review and summarize past medical records: yes  Independent visualization of images, tracings, or specimens: yes (All labs reviewed and utilized in the medical decision making process    All radiology studies independently viewed by me and interpreted by the radiologist )      CritCare Time    Disposition  Final diagnoses:   Chest pain   Cerebrovascular dural AV fistula   HTN (hypertension)   Nicotine dependence     Time reflects when diagnosis was documented in both MDM as applicable and the Disposition within this note     Time User Action Codes Description Comment    12/27/2018  8:30 PM Ronnald Alicea Add [R07 9] Chest pain     12/27/2018  8:30 PM Ronnald Alicea Add [I67 1] Cerebrovascular dural AV fistula     12/27/2018  8:30 PM Sharon PRATHER Add [I10] HTN (hypertension)     12/27/2018  8:30 PM Ronnald Alicea Add [F17 200] Nicotine dependence       ED Disposition     ED Disposition Condition Comment    Discharge  Yasmany Aldana discharge to home/self care      Condition at discharge: Stable        Follow-up Information     Follow up With Specialties Details Why Contact Info Joyce [address] Neurosurgery Call in 2 days If symptoms worsen Wes Hrut [phone]  [address] [place] [address], [address] [place], [place], [phone]          Discharge Medication List as of 12/27/2018  8:31 PM      CONTINUE these medications which have NOT CHANGED    Details   cetirizine (ZyrTEC) oral solution Take 5 mL (5 mg total) by mouth daily, Starting Sat 10/20/2018, Print      dextromethorphan-guaifenesin (MUCINEX DM)  MG per 12 hr tablet Take 1 tablet by mouth every 12 (twelve) hours as needed for cough, Starting Sat 10/20/2018, Print      fluticasone (FLONASE) 50 mcg/act nasal spray 1 spray into each nostril daily, Starting Sat 10/20/2018, Until Sun 10/20/2019, Print           No discharge procedures on file      ED Provider  Electronically Signed by           Samantha Cosme DO  12/30/18 [id number] fair minus

## 2021-06-23 LAB
ANION GAP SERPL CALC-SCNC: 12 MMOL/L — SIGNIFICANT CHANGE UP (ref 5–17)
BUN SERPL-MCNC: 9 MG/DL — SIGNIFICANT CHANGE UP (ref 7–23)
CALCIUM SERPL-MCNC: 9 MG/DL — SIGNIFICANT CHANGE UP (ref 8.4–10.5)
CHLORIDE SERPL-SCNC: 106 MMOL/L — SIGNIFICANT CHANGE UP (ref 96–108)
CO2 SERPL-SCNC: 20 MMOL/L — LOW (ref 22–31)
CREAT SERPL-MCNC: 0.76 MG/DL — SIGNIFICANT CHANGE UP (ref 0.5–1.3)
GLUCOSE SERPL-MCNC: 121 MG/DL — HIGH (ref 70–99)
HCT VFR BLD CALC: 41.1 % — SIGNIFICANT CHANGE UP (ref 39–50)
HGB BLD-MCNC: 13.4 G/DL — SIGNIFICANT CHANGE UP (ref 13–17)
INR BLD: 2.53 RATIO — HIGH (ref 0.88–1.16)
MCHC RBC-ENTMCNC: 32.6 GM/DL — SIGNIFICANT CHANGE UP (ref 32–36)
MCHC RBC-ENTMCNC: 32.7 PG — SIGNIFICANT CHANGE UP (ref 27–34)
MCV RBC AUTO: 100.2 FL — HIGH (ref 80–100)
MRSA PCR RESULT.: SIGNIFICANT CHANGE UP
NRBC # BLD: 0 /100 WBCS — SIGNIFICANT CHANGE UP (ref 0–0)
PLATELET # BLD AUTO: 114 K/UL — LOW (ref 150–400)
POTASSIUM SERPL-MCNC: 3.9 MMOL/L — SIGNIFICANT CHANGE UP (ref 3.5–5.3)
POTASSIUM SERPL-SCNC: 3.9 MMOL/L — SIGNIFICANT CHANGE UP (ref 3.5–5.3)
PROTHROM AB SERPL-ACNC: 29 SEC — HIGH (ref 10.6–13.6)
RBC # BLD: 4.1 M/UL — LOW (ref 4.2–5.8)
RBC # FLD: 11.9 % — SIGNIFICANT CHANGE UP (ref 10.3–14.5)
S AUREUS DNA NOSE QL NAA+PROBE: SIGNIFICANT CHANGE UP
SODIUM SERPL-SCNC: 138 MMOL/L — SIGNIFICANT CHANGE UP (ref 135–145)
WBC # BLD: 3.28 K/UL — LOW (ref 3.8–10.5)
WBC # FLD AUTO: 3.28 K/UL — LOW (ref 3.8–10.5)

## 2021-06-23 RX ORDER — WARFARIN SODIUM 2.5 MG/1
1 TABLET ORAL ONCE
Refills: 0 | Status: COMPLETED | OUTPATIENT
Start: 2021-06-23 | End: 2021-06-23

## 2021-06-23 RX ADMIN — CEFTRIAXONE 100 MILLIGRAM(S): 500 INJECTION, POWDER, FOR SOLUTION INTRAMUSCULAR; INTRAVENOUS at 21:48

## 2021-06-23 RX ADMIN — TAMSULOSIN HYDROCHLORIDE 0.4 MILLIGRAM(S): 0.4 CAPSULE ORAL at 21:48

## 2021-06-23 RX ADMIN — Medication 81 MILLIGRAM(S): at 12:17

## 2021-06-23 RX ADMIN — Medication 5 MILLIGRAM(S): at 05:50

## 2021-06-23 RX ADMIN — WARFARIN SODIUM 1 MILLIGRAM(S): 2.5 TABLET ORAL at 21:48

## 2021-06-23 RX ADMIN — Medication 100 MILLIGRAM(S): at 05:51

## 2021-06-23 RX ADMIN — SIMVASTATIN 20 MILLIGRAM(S): 20 TABLET, FILM COATED ORAL at 21:48

## 2021-06-23 NOTE — PROGRESS NOTE ADULT - PROBLEM SELECTOR PLAN 2
positive UA awaiting pan cx  [place] CT, noted, ? bladder lesions  urology eval called for evlauation, chart noted was writen by the team to follow up outpatient   vanc and zosyn, cont zosyn if negative MRSA   IV hydration
positive UA awaiting pan cx  [facility] CT, noted, ? bladder lesions  urology eval called for evlauation, chart noted was writen by the team to follow up outpatient   zosyn switched to rocephine, pan sensitive, total of 14 days   IV hydration
positive UA awaiting pan cx  [place] CT, noted, ? bladder lesions  urology eval called for evlauation, chart noted was writen by the team to follow up outpatient   zosyn switched to rocephine, pan sensitive, total of 14 days   IV hydration

## 2021-06-23 NOTE — PROGRESS NOTE ADULT - PROBLEM SELECTOR PLAN 4
L Side weakness,   fall precautions

## 2021-06-23 NOTE — PROGRESS NOTE ADULT - PROBLEM SELECTOR PLAN 5
monitor hemodynamics  not in fluid overload

## 2021-06-23 NOTE — PROGRESS NOTE ADULT - PROBLEM SELECTOR PLAN 1
seen on CXR  CT chest noted   vanc and zosyn for now, D/Ronaldo  vanco, switch to oral cipto for total of 14 days  monitor hemodynamics   monitor electrolytes
seen on CXR  CT chest  vanc and zosyn for now, D/C vanco if negativ MRSA on cultures   monitor hemodynamics   monitor electrolytes  likely due to UTI pending full culture results
seen on CXR  CT chest noted   vanc and zosyn for now, D/Ronaldo  vanco  monitor hemodynamics   monitor electrolytes

## 2021-06-23 NOTE — PROGRESS NOTE ADULT - PROBLEM SELECTOR PLAN 3
rate control   elevated INR  hold coumadin  resume BP medications

## 2021-06-24 VITALS
DIASTOLIC BLOOD PRESSURE: 92 MMHG | SYSTOLIC BLOOD PRESSURE: 154 MMHG | RESPIRATION RATE: 18 BRPM | HEART RATE: 108 BPM | OXYGEN SATURATION: 97 % | TEMPERATURE: 97 F

## 2021-06-24 LAB
INR BLD: 2.22 RATIO — HIGH (ref 0.88–1.16)
PROTHROM AB SERPL-ACNC: 25.6 SEC — HIGH (ref 10.6–13.6)

## 2021-06-24 PROCEDURE — 84132 ASSAY OF SERUM POTASSIUM: CPT

## 2021-06-24 PROCEDURE — 87086 URINE CULTURE/COLONY COUNT: CPT

## 2021-06-24 PROCEDURE — 36415 COLL VENOUS BLD VENIPUNCTURE: CPT

## 2021-06-24 PROCEDURE — 71045 X-RAY EXAM CHEST 1 VIEW: CPT

## 2021-06-24 PROCEDURE — 83605 ASSAY OF LACTIC ACID: CPT

## 2021-06-24 PROCEDURE — 82272 OCCULT BLD FECES 1-3 TESTS: CPT

## 2021-06-24 PROCEDURE — 93005 ELECTROCARDIOGRAM TRACING: CPT

## 2021-06-24 PROCEDURE — 83735 ASSAY OF MAGNESIUM: CPT

## 2021-06-24 PROCEDURE — 85025 COMPLETE CBC W/AUTO DIFF WBC: CPT

## 2021-06-24 PROCEDURE — 97162 PT EVAL MOD COMPLEX 30 MIN: CPT

## 2021-06-24 PROCEDURE — 87640 STAPH A DNA AMP PROBE: CPT

## 2021-06-24 PROCEDURE — 82435 ASSAY OF BLOOD CHLORIDE: CPT

## 2021-06-24 PROCEDURE — 84443 ASSAY THYROID STIM HORMONE: CPT

## 2021-06-24 PROCEDURE — 74177 CT ABD & PELVIS W/CONTRAST: CPT

## 2021-06-24 PROCEDURE — 85014 HEMATOCRIT: CPT

## 2021-06-24 PROCEDURE — U0005: CPT

## 2021-06-24 PROCEDURE — 86900 BLOOD TYPING SEROLOGIC ABO: CPT

## 2021-06-24 PROCEDURE — 97110 THERAPEUTIC EXERCISES: CPT

## 2021-06-24 PROCEDURE — 85018 HEMOGLOBIN: CPT

## 2021-06-24 PROCEDURE — 80053 COMPREHEN METABOLIC PANEL: CPT

## 2021-06-24 PROCEDURE — 97116 GAIT TRAINING THERAPY: CPT

## 2021-06-24 PROCEDURE — 85610 PROTHROMBIN TIME: CPT

## 2021-06-24 PROCEDURE — 80048 BASIC METABOLIC PNL TOTAL CA: CPT

## 2021-06-24 PROCEDURE — 82803 BLOOD GASES ANY COMBINATION: CPT

## 2021-06-24 PROCEDURE — 83036 HEMOGLOBIN GLYCOSYLATED A1C: CPT

## 2021-06-24 PROCEDURE — 82947 ASSAY GLUCOSE BLOOD QUANT: CPT

## 2021-06-24 PROCEDURE — 82330 ASSAY OF CALCIUM: CPT

## 2021-06-24 PROCEDURE — U0003: CPT

## 2021-06-24 PROCEDURE — 85027 COMPLETE CBC AUTOMATED: CPT

## 2021-06-24 PROCEDURE — 86901 BLOOD TYPING SEROLOGIC RH(D): CPT

## 2021-06-24 PROCEDURE — 71260 CT THORAX DX C+: CPT

## 2021-06-24 PROCEDURE — 99285 EMERGENCY DEPT VISIT HI MDM: CPT | Mod: 25

## 2021-06-24 PROCEDURE — 86769 SARS-COV-2 COVID-19 ANTIBODY: CPT

## 2021-06-24 PROCEDURE — 84295 ASSAY OF SERUM SODIUM: CPT

## 2021-06-24 PROCEDURE — 87186 SC STD MICRODIL/AGAR DIL: CPT

## 2021-06-24 PROCEDURE — 86850 RBC ANTIBODY SCREEN: CPT

## 2021-06-24 PROCEDURE — 82962 GLUCOSE BLOOD TEST: CPT

## 2021-06-24 PROCEDURE — 87641 MR-STAPH DNA AMP PROBE: CPT

## 2021-06-24 PROCEDURE — 81001 URINALYSIS AUTO W/SCOPE: CPT

## 2021-06-24 PROCEDURE — 85730 THROMBOPLASTIN TIME PARTIAL: CPT

## 2021-06-24 PROCEDURE — 80061 LIPID PANEL: CPT

## 2021-06-24 RX ORDER — LANOLIN ALCOHOL/MO/W.PET/CERES
5 CREAM (GRAM) TOPICAL ONCE
Refills: 0 | Status: DISCONTINUED | OUTPATIENT
Start: 2021-06-24 | End: 2021-06-24

## 2021-06-24 RX ORDER — PYRIDOXINE HCL (VITAMIN B6) 100 MG
0 TABLET ORAL
Qty: 0 | Refills: 0 | DISCHARGE

## 2021-06-24 RX ORDER — PREGABALIN 225 MG/1
0 CAPSULE ORAL
Qty: 0 | Refills: 0 | DISCHARGE

## 2021-06-24 RX ORDER — TAMSULOSIN HYDROCHLORIDE 0.4 MG/1
1 CAPSULE ORAL
Qty: 0 | Refills: 0 | DISCHARGE

## 2021-06-24 RX ORDER — CHOLECALCIFEROL (VITAMIN D3) 125 MCG
0 CAPSULE ORAL
Qty: 0 | Refills: 0 | DISCHARGE

## 2021-06-24 RX ORDER — CIPROFLOXACIN LACTATE 400MG/40ML
1 VIAL (ML) INTRAVENOUS
Qty: 24 | Refills: 0
Start: 2021-06-24 | End: 2021-07-05

## 2021-06-24 RX ORDER — TAMSULOSIN HYDROCHLORIDE 0.4 MG/1
1 CAPSULE ORAL
Qty: 0 | Refills: 0 | DISCHARGE
Start: 2021-06-24

## 2021-06-24 RX ORDER — SIMVASTATIN 20 MG/1
1 TABLET, FILM COATED ORAL
Qty: 0 | Refills: 0 | DISCHARGE
Start: 2021-06-24

## 2021-06-24 RX ORDER — SIMVASTATIN 20 MG/1
0.5 TABLET, FILM COATED ORAL
Qty: 0 | Refills: 0 | DISCHARGE

## 2021-06-24 RX ORDER — FUROSEMIDE 40 MG
1 TABLET ORAL
Qty: 0 | Refills: 0 | DISCHARGE

## 2021-06-24 RX ADMIN — Medication 81 MILLIGRAM(S): at 12:07

## 2021-06-24 RX ADMIN — Medication 5 MILLIGRAM(S): at 05:21

## 2021-06-24 RX ADMIN — Medication 100 MILLIGRAM(S): at 05:22

## 2021-06-24 NOTE — DISCHARGE NOTE NURSING/CASE MANAGEMENT/SOCIAL WORK - NSSCTYPOFSERV_GEN_ALL_CORE
Services Requested: RN, Physical Therapy & Social Work. A nurse will call to set up an appointment for the next day.

## 2021-06-24 NOTE — DISCHARGE NOTE PROVIDER - PROVIDER TOKENS
FREE:[LAST:[Primary care doctor],PHONE:[(   )    -],FAX:[(   )    -],FOLLOWUP:[1 week],ESTABLISHEDPATIENT:[T]]

## 2021-06-24 NOTE — DISCHARGE NOTE PROVIDER - NSDCMRMEDTOKEN_GEN_ALL_CORE_FT
Aspirin Enteric Coated 81 mg oral delayed release tablet: 1 tab(s) orally once a day  Cipro 500 mg oral tablet: 1 tab(s) orally every 12 hours   enalapril 5 mg oral tablet: 1 tab(s) orally once a day  furosemide 20 mg oral tablet: 1 tab(s) orally once a day    Metoprolol Succinate  mg oral tablet, extended release: 1 tab(s) orally once a day  simvastatin 20 mg oral tablet: 1 tab(s) orally once a day (at bedtime)  tamsulosin 0.4 mg oral capsule: 1 cap(s) orally once a day (at bedtime)  warfarin 2 mg oral tablet: 1 tab(s) orally Monday through Friday    NOTE: Pharmacy stated RX is written for Warfarin 5mg 1 tablet d; utd by doctor. Directions above written as per family member.   warfarin 2 mg oral tablet: 0.5 tab(s) orally Saturday and Sunday    NOTE: Pharmacy stated RX above is written for Warfairn 2mg 1 tablet daily;utd by doctor. Directions written as per family member

## 2021-06-24 NOTE — DISCHARGE NOTE PROVIDER - NSDCCPCAREPLAN_GEN_ALL_CORE_FT
PRINCIPAL DISCHARGE DIAGNOSIS  Diagnosis: Pneumonia  Assessment and Plan of Treatment: Complete course of oral antibiotic therapy      SECONDARY DISCHARGE DIAGNOSES  Diagnosis: Escherichia coli urinary tract infection  Assessment and Plan of Treatment: Complete course of oral antibiotic therapy    Diagnosis: Atrial fibrillation  Assessment and Plan of Treatment: Contine with coumadin  Follow up with your doctor for INR check.    Diagnosis: Lesion of urinary bladder  Assessment and Plan of Treatment: Suspicion for bladder lesions on CT scan.  Patient will require a workup for these findings as an outpatient.  Follow up at the [facility], [phone].

## 2021-06-24 NOTE — PROGRESS NOTE ADULT - SUBJECTIVE AND OBJECTIVE BOX
Name of Patient : KACI MERAZ  MRN: [id number]  DATE OF SERVICE: 21 @ 17:02    Subjective: Patient seen and examined. No new events except as noted.   feeling okay   non toxic  pan ct noted  marj mcnair     REVIEW OF SYSTEMS:    CONSTITUTIONAL: No weakness, fevers or chills  EYES/ENT: No visual changes;  No vertigo or throat pain   NECK: No pain or stiffness  RESPIRATORY: No cough, wheezing, hemoptysis; No shortness of breath  CARDIOVASCULAR: No chest pain or palpitations  GASTROINTESTINAL: No abdominal or epigastric pain. No nausea, vomiting, or hematemesis; No diarrhea or constipation. No melena or hematochezia.  GENITOURINARY: No dysuria, frequency or hematuria  NEUROLOGICAL: No numbness or weakness  SKIN: No itching, burning, rashes, or lesions   All other review of systems is negative unless indicated above.    MEDICATIONS:  MEDICATIONS  (STANDING):  aspirin enteric coated 81 milliGRAM(s) Oral daily  enalapril 5 milliGRAM(s) Oral daily  metoprolol succinate  milliGRAM(s) Oral daily  piperacillin/tazobactam IVPB.. 3.375 Gram(s) IV Intermittent every 8 hours  simvastatin 20 milliGRAM(s) Oral at bedtime  tamsulosin 0.4 milliGRAM(s) Oral at bedtime  vancomycin  IVPB 1000 milliGRAM(s) IV Intermittent every 12 hours      PHYSICAL EXAM:  T(C): 37.1 (21 @ 13:16), Max: 37.1 (21 @ 13:16)  HR: 90 (21 @ 10:47) (90 - 114)  BP: 127/82 (21 @ 13:16) (110/68 - 139/87)  RR: 18 (21 @ 13:16) (16 - 18)  SpO2: 92% (21 @ 13:16) (92% - 98%)  Wt(kg): --  I&O's Summary    2021 07:01  -  2021 07:00  --------------------------------------------------------  IN: 1250 mL / OUT: 0 mL / NET: 1250 mL    2021 07:01  -  2021 17:02  --------------------------------------------------------  IN: 700 mL / OUT: 0 mL / NET: 700 mL        Weight (kg): 68.8 ( @ 23:28)    Appearance: Normal	  HEENT:  PERRLA   Lymphatic: No lymphadenopathy   Cardiovascular: Normal S1 S2, no JVD  Respiratory: normal effort , clear  Gastrointestinal:  Soft, Non-tender  Skin: No rashes,  warm to touch  Psychiatry:  Mood & affect appropriate  Musculuskeletal: No edema      All labs, Imaging and EKGs personally reviewed         21 @ 07:01  -  21 @ 07:00  --------------------------------------------------------  IN: 1250 mL / OUT: 0 mL / NET: 1250 mL    21 @ 07:01  -  21 @ 17:02  --------------------------------------------------------  IN: 700 mL / OUT: 0 mL / NET: 700 mL                          12.8   4.35  )-----------( 88       ( 2021 07:04 )             39.1                   137  |  105  |  10  ----------------------------<  102<H>  3.6   |  21<L>  |  0.75    Ca    8.4      2021 07:04  Mg     1.9         TPro  5.6<L>  /  Alb  2.9<L>  /  TBili  1.3<H>  /  DBili  x   /  AST  20  /  ALT  12  /  AlkPhos  70  06-21    PT/INR - ( 2021 07:05 )   PT: 44.2 sec;   INR: 3.93 ratio         PTT - ( 2021 07:05 )  PTT:46.9 sec                   Urinalysis Basic - ( 2021 12:23 )    Color: brown / Appearance: Turbid / S.019 / pH: x  Gluc: x / Ketone: Negative  / Bili: Negative / Urobili: 3 mg/dL   Blood: x / Protein: 100 mg/dL / Nitrite: Negative   Leuk Esterase: Small / RBC: >50 /hpf / WBC >50 /HPF   Sq Epi: x / Non Sq Epi: 1 / Bacteria: Negative              
  Huan Cunningham MD  Interventional Cardiology / Endovascular Specialist  [place] Office : [address] [place] [address]. [address]  Tel:   [place] Office : [address] [facility] [place] [address]  Tel: [phone]  Cell : [phone]    Subjective/Overnight events: Patient lying in bed comfortably. No acute distress.  	  MEDICATIONS:  aspirin enteric coated 81 milliGRAM(s) Oral daily  enalapril 5 milliGRAM(s) Oral daily  metoprolol succinate  milliGRAM(s) Oral daily  tamsulosin 0.4 milliGRAM(s) Oral at bedtime    cefTRIAXone   IVPB 1000 milliGRAM(s) IV Intermittent every 24 hours      melatonin 5 milliGRAM(s) Oral once      simvastatin 20 milliGRAM(s) Oral at bedtime        PAST MEDICAL/SURGICAL HISTORY  PAST MEDICAL & SURGICAL HISTORY:  CVA (cerebral vascular accident)    CHF (congestive heart failure)    Atrial fibrillation    BPH (benign prostatic hyperplasia)        SOCIAL HISTORY: Substance Use (street drugs): ( x ) never used  (  ) other:    FAMILY HISTORY:      REVIEW OF SYSTEMS:  CONSTITUTIONAL: No fever, weight loss, or fatigue  EYES: No eye pain, visual disturbances, or discharge  ENMT:  No difficulty hearing, tinnitus, vertigo; No sinus or throat pain  BREASTS: No pain, masses, or nipple discharge  GASTROINTESTINAL: No abdominal or epigastric pain. No nausea, vomiting, or hematemesis; No diarrhea or constipation. No melena or hematochezia.  GENITOURINARY: No dysuria, frequency, hematuria, or incontinence  NEUROLOGICAL: No headaches, memory loss, loss of strength, numbness, or tremors  ENDOCRINE: No heat or cold intolerance; No hair loss  MUSCULOSKELETAL: No joint pain or swelling; No muscle, back, or extremity pain  PSYCHIATRIC: No depression, anxiety, mood swings, or difficulty sleeping  HEME/LYMPH: No easy bruising, or bleeding gums  All others negative    PHYSICAL EXAM:  T(C): 36.2 (06-24-21 @ 12:35), Max: 36.3 (06-23-21 @ 16:36)  HR: 108 (06-24-21 @ 12:35) (91 - 108)  BP: 154/92 (06-24-21 @ 12:35) (145/92 - 180/108)  RR: 18 (06-24-21 @ 12:35) (18 - 18)  SpO2: 97% (06-24-21 @ 12:35) (96% - 98%)  Wt(kg): --  I&O's Summary    23 Jun 2021 07:01  -  24 Jun 2021 07:00  --------------------------------------------------------  IN: 600 mL / OUT: 0 mL / NET: 600 mL    24 Jun 2021 07:01  -  24 Jun 2021 15:13  --------------------------------------------------------  IN: 240 mL / OUT: 0 mL / NET: 240 mL      GENERAL: NAD,  EYES: EOMI, PERRLA, conjunctiva and sclera clear  ENMT: No tonsillar erythema, exudates, or enlargement;  Cardiovascular: Normal S1 S2, No JVD, No murmurs, No edema  Respiratory: Lungs clear to auscultation	  Gastrointestinal:  Soft, Non-tender, + BS	  Extremities: No edema                                  13.4   3.28  )-----------( 114      ( 23 Jun 2021 07:09 )             41.1     06-23    138  |  106  |  9   ----------------------------<  121<H>  3.9   |  20<L>  |  0.76    Ca    9.0      23 Jun 2021 07:09      proBNP:   Lipid Profile:   HgA1c:   TSH:     Consultant(s) Notes Reviewed:  [x ] YES  [ ] NO    Care Discussed with Consultants/Other Providers [ x] YES  [ ] NO    Imaging Personally Reviewed independently:  [x] YES  [ ] NO    All labs, radiologic studies, vitals, orders and medications list reviewed. Patient is seen and examined at bedside. Case discussed with medical team.              
  Huan Cunningham MD  Interventional Cardiology / Endovascular Specialist  [place] Office : [address] [place] [address]. [address]  Tel:   [place] Office : [address] [facility] [place]. [address]  Tel: [phone]  Cell : [phone]    Subjective/Overnight events: Patient lying in bed comfortably. No acute distress.  	  MEDICATIONS:  aspirin enteric coated 81 milliGRAM(s) Oral daily  enalapril 5 milliGRAM(s) Oral daily  metoprolol succinate  milliGRAM(s) Oral daily  tamsulosin 0.4 milliGRAM(s) Oral at bedtime    cefTRIAXone   IVPB 1000 milliGRAM(s) IV Intermittent every 24 hours          simvastatin 20 milliGRAM(s) Oral at bedtime        PAST MEDICAL/SURGICAL HISTORY  PAST MEDICAL & SURGICAL HISTORY:  CVA (cerebral vascular accident)    CHF (congestive heart failure)    Atrial fibrillation    BPH (benign prostatic hyperplasia)        SOCIAL HISTORY: Substance Use (street drugs): ( x ) never used  (  ) other:    FAMILY HISTORY:      REVIEW OF SYSTEMS:  unable to obtain    PHYSICAL EXAM:  T(C): 36.7 (06-23-21 @ 09:12), Max: 36.9 (06-23-21 @ 00:29)  HR: 86 (06-23-21 @ 09:12) (86 - 90)  BP: 133/77 (06-23-21 @ 09:12) (133/77 - 154/93)  RR: 18 (06-23-21 @ 09:12) (18 - 18)  SpO2: 99% (06-23-21 @ 09:12) (98% - 99%)  Wt(kg): --  I&O's Summary    22 Jun 2021 07:01  -  23 Jun 2021 07:00  --------------------------------------------------------  IN: 710 mL / OUT: 0 mL / NET: 710 mL            GENERAL: NAD,  EYES: EOMI, PERRLA, conjunctiva and sclera clear  ENMT: No tonsillar erythema, exudates, or enlargement;  Cardiovascular: Normal S1 S2, No JVD, No murmurs, No edema  Respiratory: Lungs clear to auscultation	  Gastrointestinal:  Soft, Non-tender, + BS	  Extremities: No edema                                    13.4   3.28  )-----------( 114      ( 23 Jun 2021 07:09 )             41.1     06-23    138  |  106  |  9   ----------------------------<  121<H>  3.9   |  20<L>  |  0.76    Ca    9.0      23 Jun 2021 07:09      proBNP:   Lipid Profile:   HgA1c:   TSH:     Consultant(s) Notes Reviewed:  [x ] YES  [ ] NO    Care Discussed with Consultants/Other Providers [ x] YES  [ ] NO    Imaging Personally Reviewed independently:  [x] YES  [ ] NO    All labs, radiologic studies, vitals, orders and medications list reviewed. Patient is seen and examined at bedside. Case discussed with medical team.              
Name of Patient : KACI MERAZ  MRN: [id number]  DATE OF SERVICE: 06-23-21 @ 19:29    Subjective: Patient seen and examined. No new events except as noted.  doing okay      REVIEW OF SYSTEMS:    CONSTITUTIONAL: No weakness, fevers or chills  EYES/ENT: No visual changes;  No vertigo or throat pain   NECK: No pain or stiffness  RESPIRATORY: No cough, wheezing, hemoptysis; No shortness of breath  CARDIOVASCULAR: No chest pain or palpitations  GASTROINTESTINAL: No abdominal or epigastric pain.   GENITOURINARY: No dysuria, frequency or hematuria  NEUROLOGICAL: No numbness or weakness  SKIN: No itching, burning, rashes, or lesions   All other review of systems is negative unless indicated above.    MEDICATIONS:  MEDICATIONS  (STANDING):  aspirin enteric coated 81 milliGRAM(s) Oral daily  cefTRIAXone   IVPB 1000 milliGRAM(s) IV Intermittent every 24 hours  enalapril 5 milliGRAM(s) Oral daily  metoprolol succinate  milliGRAM(s) Oral daily  simvastatin 20 milliGRAM(s) Oral at bedtime  tamsulosin 0.4 milliGRAM(s) Oral at bedtime      PHYSICAL EXAM:  T(C): 36.3 (06-23-21 @ 16:36), Max: 36.9 (06-23-21 @ 00:29)  HR: 91 (06-23-21 @ 16:36) (86 - 91)  BP: 145/92 (06-23-21 @ 16:36) (133/77 - 148/87)  RR: 18 (06-23-21 @ 16:36) (18 - 18)  SpO2: 98% (06-23-21 @ 16:36) (98% - 99%)  Wt(kg): --  I&O's Summary    22 Jun 2021 07:01  -  23 Jun 2021 07:00  --------------------------------------------------------  IN: 710 mL / OUT: 0 mL / NET: 710 mL    23 Jun 2021 07:01  -  23 Jun 2021 19:29  --------------------------------------------------------  IN: 360 mL / OUT: 0 mL / NET: 360 mL          Appearance: Normal	  HEENT:  PERRLA   Lymphatic: No lymphadenopathy   Cardiovascular: Normal S1 S2, no JVD  Respiratory: normal effort , clear  Gastrointestinal:  Soft, Non-tender  Skin: No rashes,  warm to touch  Psychiatry:  Mood & affect appropriate  Musculuskeletal: No edema      All labs, Imaging and EKGs personally reviewed         06-22-21 @ 07:01  -  06-23-21 @ 07:00  --------------------------------------------------------  IN: 710 mL / OUT: 0 mL / NET: 710 mL    06-23-21 @ 07:01  -  06-23-21 @ 19:29  --------------------------------------------------------  IN: 360 mL / OUT: 0 mL / NET: 360 mL                          13.4   3.28  )-----------( 114      ( 23 Jun 2021 07:09 )             41.1               06-23    138  |  106  |  9   ----------------------------<  121<H>  3.9   |  20<L>  |  0.76    Ca    9.0      23 Jun 2021 07:09      PT/INR - ( 23 Jun 2021 10:10 )   PT: 29.0 sec;   INR: 2.53 ratio                                      
Name of Patient : KACI MERAZ  MRN: [id number]  DATE OF SERVICE: 06-22-21     Subjective: Patient seen and examined. No new events except as noted.   doing okay       REVIEW OF SYSTEMS:    CONSTITUTIONAL: No weakness, fevers or chills  EYES/ENT: No visual changes;  No vertigo or throat pain   NECK: No pain or stiffness  RESPIRATORY: No cough, wheezing, hemoptysis; No shortness of breath  CARDIOVASCULAR: No chest pain or palpitations  GASTROINTESTINAL: No abdominal or epigastric pain. No nausea, vomiting, or hematemesis; No diarrhea or constipation. No melena or hematochezia.  GENITOURINARY: No dysuria, frequency or hematuria  NEUROLOGICAL: No numbness or weakness  SKIN: No itching, burning, rashes, or lesions   All other review of systems is negative unless indicated above.    MEDICATIONS:  MEDICATIONS  (STANDING):  aspirin enteric coated 81 milliGRAM(s) Oral daily  cefTRIAXone   IVPB 1000 milliGRAM(s) IV Intermittent every 24 hours  enalapril 5 milliGRAM(s) Oral daily  metoprolol succinate  milliGRAM(s) Oral daily  simvastatin 20 milliGRAM(s) Oral at bedtime  tamsulosin 0.4 milliGRAM(s) Oral at bedtime      PHYSICAL EXAM:  T(C): 36.4 (06-22-21 @ 19:27), Max: 37.3 (06-21-21 @ 20:42)  HR: 86 (06-22-21 @ 09:24) (78 - 92)  BP: 148/91 (06-22-21 @ 09:24) (133/63 - 148/95)  RR: 18 (06-22-21 @ 19:27) (18 - 18)  SpO2: 98% (06-22-21 @ 19:27) (92% - 98%)  Wt(kg): --  I&O's Summary    21 Jun 2021 07:01  -  22 Jun 2021 07:00  --------------------------------------------------------  IN: 1640 mL / OUT: 0 mL / NET: 1640 mL    22 Jun 2021 07:01  -  22 Jun 2021 19:55  --------------------------------------------------------  IN: 710 mL / OUT: 0 mL / NET: 710 mL          Appearance: Normal	  HEENT:  PERRLA   Lymphatic: No lymphadenopathy   Cardiovascular: Normal S1 S2, no JVD  Respiratory: normal effort , clear  Gastrointestinal:  Soft, Non-tender  Skin: No rashes,  warm to touch  Psychiatry:  Mood & affect appropriate  Musculuskeletal: No edema      All labs, Imaging and EKGs personally reviewed       06-21-21 @ 07:01  -  06-22-21 @ 07:00  --------------------------------------------------------  IN: 1640 mL / OUT: 0 mL / NET: 1640 mL    06-22-21 @ 07:01  -  06-22-21 @ 19:55  --------------------------------------------------------  IN: 710 mL / OUT: 0 mL / NET: 710 mL                          12.8   4.35  )-----------( 88       ( 21 Jun 2021 07:04 )             39.1               06-21    137  |  105  |  10  ----------------------------<  102<H>  3.6   |  21<L>  |  0.75    Ca    8.4      21 Jun 2021 07:04    TPro  5.6<L>  /  Alb  2.9<L>  /  TBili  1.3<H>  /  DBili  x   /  AST  20  /  ALT  12  /  AlkPhos  70  06-21    PT/INR - ( 22 Jun 2021 06:55 )   PT: 38.6 sec;   INR: 3.41 ratio         PTT - ( 21 Jun 2021 07:05 )  PTT:46.9 sec

## 2021-06-24 NOTE — DISCHARGE NOTE PROVIDER - HOSPITAL COURSE
patient is a very poor historian called th modestaber in the chart no answer.  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 in the ED he was foudn ot have elevated lactate, positive UA nad ? pnemonia, broad spectrum IV antibitoics were given. on one ot one for agitation and dementia.     Pneumonia.  seen on CXR  CT chest noted   vanc and zosyn for now, D/Ronaldo  vanco, switch to oral cipto for total of 14 days    Urinary tract infection.  positive UA awaiting pan cx  F[place] CT, noted, ? bladder lesions  urology eval called for evlauation, chart noted was writen by the team to follow up outpatient   Pt medically cleared for discharge to home w/ home PT.

## 2021-06-24 NOTE — DISCHARGE NOTE NURSING/CASE MANAGEMENT/SOCIAL WORK - PATIENT PORTAL LINK FT
You can access the FollowMyHealth Patient Portal offered by [facility] by registering at the following website: [email or web address]. By joining [employer]’s FollowMyHealth portal, you will also be able to view your health information using other applications (apps) compatible with our system.

## 2021-06-24 NOTE — DISCHARGE NOTE PROVIDER - CARE PROVIDER_API CALL
Primary care doctor,   Phone: (   )    -  Fax: (   )    -  Established Patient  Follow Up Time: 1 week

## 2021-06-24 NOTE — CHART NOTE - NSCHARTNOTEFT_GEN_A_CORE
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  Due to Pt's condition, pt has a severe mobility limitation and requires a transport wheelchair to assist in daily ADLS. Patient cannot ambulate safely with a cane or a walker. Patient does not have sufficient upper body strength to self propel a standard wheelchair. Pt has a caregiver to assist with maneuvering the wheelchair. Pt has not expressed an unwillingness to use the wheelchair. Pt has ample room in the home for SAID wheelchair. Use of a transport wheelchair will significantly improve the pat's ability to participate in daily ADL's and the patient will use it on a regular basis in the home.

## 2021-06-24 NOTE — DISCHARGE NOTE PROVIDER - NSDCFUADDAPPT_GEN_ALL_CORE_FT
Follow up with [facility] for [facility], [phone]. Please call to make an appointment.

## 2022-04-20 ENCOUNTER — EMERGENCY (EMERGENCY)
Facility: HOSPITAL | Age: 87
LOS: 1 days | Discharge: HOME | End: 2022-04-20
Attending: EMERGENCY MEDICINE
Payer: MEDICARE

## 2022-04-20 VITALS
OXYGEN SATURATION: 98 % | HEIGHT: 63 IN | WEIGHT: 145.06 LBS | SYSTOLIC BLOOD PRESSURE: 133 MMHG | TEMPERATURE: 97 F | DIASTOLIC BLOOD PRESSURE: 66 MMHG | HEART RATE: 107 BPM | RESPIRATION RATE: 18 BRPM

## 2022-04-20 VITALS
SYSTOLIC BLOOD PRESSURE: 166 MMHG | HEART RATE: 98 BPM | RESPIRATION RATE: 18 BRPM | DIASTOLIC BLOOD PRESSURE: 100 MMHG | TEMPERATURE: 98 F | OXYGEN SATURATION: 96 %

## 2022-04-20 PROBLEM — I48.91 UNSPECIFIED ATRIAL FIBRILLATION: Chronic | Status: ACTIVE | Noted: 2021-06-20

## 2022-04-20 PROBLEM — N40.0 BENIGN PROSTATIC HYPERPLASIA WITHOUT LOWER URINARY TRACT SYMPTOMS: Chronic | Status: ACTIVE | Noted: 2021-06-20

## 2022-04-20 PROBLEM — I63.9 CEREBRAL INFARCTION, UNSPECIFIED: Chronic | Status: ACTIVE | Noted: 2021-06-20

## 2022-04-20 PROBLEM — I50.9 HEART FAILURE, UNSPECIFIED: Chronic | Status: ACTIVE | Noted: 2021-06-20

## 2022-04-20 LAB
APTT BLD: 45.2 SEC — HIGH (ref 27.5–35.5)
HCT VFR BLD CALC: 45.6 % — SIGNIFICANT CHANGE UP (ref 39–50)
HGB BLD-MCNC: 14.6 G/DL — SIGNIFICANT CHANGE UP (ref 13–17)
INR BLD: 3.38 RATIO — HIGH (ref 0.88–1.16)
MCHC RBC-ENTMCNC: 32 GM/DL — SIGNIFICANT CHANGE UP (ref 32–36)
MCHC RBC-ENTMCNC: 32.8 PG — SIGNIFICANT CHANGE UP (ref 27–34)
MCV RBC AUTO: 102.5 FL — HIGH (ref 80–100)
NRBC # BLD: 0 /100 WBCS — SIGNIFICANT CHANGE UP (ref 0–0)
PLATELET # BLD AUTO: 123 K/UL — LOW (ref 150–400)
PROTHROM AB SERPL-ACNC: 39.4 SEC — HIGH (ref 10.5–13.4)
RBC # BLD: 4.45 M/UL — SIGNIFICANT CHANGE UP (ref 4.2–5.8)
RBC # FLD: 12.4 % — SIGNIFICANT CHANGE UP (ref 10.3–14.5)
WBC # BLD: 3.35 K/UL — LOW (ref 3.8–10.5)
WBC # FLD AUTO: 3.35 K/UL — LOW (ref 3.8–10.5)

## 2022-04-20 PROCEDURE — 76377 3D RENDER W/INTRP POSTPROCES: CPT | Mod: 26

## 2022-04-20 PROCEDURE — 85027 COMPLETE CBC AUTOMATED: CPT

## 2022-04-20 PROCEDURE — 85730 THROMBOPLASTIN TIME PARTIAL: CPT

## 2022-04-20 PROCEDURE — 90715 TDAP VACCINE 7 YRS/> IM: CPT

## 2022-04-20 PROCEDURE — 85610 PROTHROMBIN TIME: CPT

## 2022-04-20 PROCEDURE — 99285 EMERGENCY DEPT VISIT HI MDM: CPT

## 2022-04-20 PROCEDURE — 72125 CT NECK SPINE W/O DYE: CPT | Mod: MA

## 2022-04-20 PROCEDURE — 70450 CT HEAD/BRAIN W/O DYE: CPT | Mod: MA

## 2022-04-20 PROCEDURE — 99284 EMERGENCY DEPT VISIT MOD MDM: CPT | Mod: 25

## 2022-04-20 PROCEDURE — 70486 CT MAXILLOFACIAL W/O DYE: CPT | Mod: MA

## 2022-04-20 PROCEDURE — 70450 CT HEAD/BRAIN W/O DYE: CPT | Mod: 26,MA

## 2022-04-20 PROCEDURE — 76377 3D RENDER W/INTRP POSTPROCES: CPT

## 2022-04-20 PROCEDURE — 70486 CT MAXILLOFACIAL W/O DYE: CPT | Mod: 26,MA

## 2022-04-20 PROCEDURE — 90471 IMMUNIZATION ADMIN: CPT

## 2022-04-20 PROCEDURE — 72125 CT NECK SPINE W/O DYE: CPT | Mod: 26,MA

## 2022-04-20 RX ORDER — TETANUS TOXOID, REDUCED DIPHTHERIA TOXOID AND ACELLULAR PERTUSSIS VACCINE, ADSORBED 5; 2.5; 8; 8; 2.5 [IU]/.5ML; [IU]/.5ML; UG/.5ML; UG/.5ML; UG/.5ML
0.5 SUSPENSION INTRAMUSCULAR ONCE
Refills: 0 | Status: COMPLETED | OUTPATIENT
Start: 2022-04-20 | End: 2022-04-20

## 2022-04-20 RX ORDER — ACETAMINOPHEN 500 MG
975 TABLET ORAL ONCE
Refills: 0 | Status: COMPLETED | OUTPATIENT
Start: 2022-04-20 | End: 2022-04-20

## 2022-04-20 RX ADMIN — TETANUS TOXOID, REDUCED DIPHTHERIA TOXOID AND ACELLULAR PERTUSSIS VACCINE, ADSORBED 0.5 MILLILITER(S): 5; 2.5; 8; 8; 2.5 SUSPENSION INTRAMUSCULAR at 17:15

## 2022-04-20 NOTE — ED PROVIDER NOTE - PROGRESS NOTE DETAILS
discussed findings and recs with patient and family at bedside with return precautions and explicit instruction not to take coumadin tonight and must follow up with pmd tomorrow for INR check - Almita Pizarro PA-C

## 2022-04-20 NOTE — ED PROVIDER NOTE - PATIENT PORTAL LINK FT
You can access the FollowMyHealth Patient Portal offered by [facility] by registering at the following website: [email or web address]. By joining [employer]’s FollowMyHealth portal, you will also be able to view your health information using other applications (apps) compatible with our system.

## 2022-04-20 NOTE — ED PROVIDER NOTE - EYES, MLM
Clear bilaterally, pupils equal, round and reactive to light. + left subconjunctival hemorrhage, EOMI, periorbital ecchymosis

## 2022-04-20 NOTE — ED PROVIDER NOTE - OBJECTIVE STATEMENT
89 y/o male accompanied by his daughter who translates ([employer] translation services) PMHx of HCHF, a-fib on coumadin, HTN, HLD, CVA with L side weakness walks with walker, BPH, presenting to the ER from home for a fall yesterday at 8 am. the patient was walking outside with cane, got to some uneven sidewalk and  tripped. fell onto the left  side of the face. no loc. went to PMD yesterday and was advised to put bacitracin on eyebrow abrasion. this morning ecchymosis was significant so returned to PMD and was advised to come to the ED. patient with no significant complaints at this time. denies headache or vomiting. no recent illness. per daughter behavior normal, has been walking since incident. eye now with swelling. no FB sensation.

## 2022-04-20 NOTE — ED PROVIDER NOTE - CLINICAL SUMMARY MEDICAL DECISION MAKING FREE TEXT BOX
Charly: 89 year old male with from at [facility] yesterday. walked on uneven sidewalk. fell on left side of face. will get ct hhead/ct face, labs, reassess.

## 2022-04-20 NOTE — ED PROVIDER NOTE - NS ED ATTENDING STATEMENT MOD
This was a shared visit with the TAHMINA. I reviewed and verified the documentation and independently performed the documented:

## 2022-04-20 NOTE — ED ADULT NURSE REASSESSMENT NOTE - NS ED NURSE REASSESS COMMENT FT1
RN sent into room by PA to provide d/c instructions to patient. Patient's family member at bedside requests to PA to use a Berm[place]ian , but states to RN "I can translate to the patient" and declined Bermudian . Patient discharged from ED. IV removed, discharge paperwork provided. Verbalized understanding of teaching. Vital signs stable, afebrile. Patient leaving unit in wheelchair, free from harm. RN offered to help dress patient, family member declined. RN sent into room by PA to provide d/c instructions to patient. Patient's family member at bedside requests to PA to use a Mauritanian , but states to RN "I can translate to the patient" and declined Mauritanian . Patient discharged from ED, told to not take coumadin tonight. IV removed, discharge paperwork provided. Verbalized understanding of teaching. Vital signs stable, afebrile. Patient leaving unit in wheelchair, free from harm. RN offered to help dress patient, family member declined.

## 2022-04-20 NOTE — ED PROVIDER NOTE - NSFOLLOWUPINSTRUCTIONS_ED_ALL_ED_FT
Follow up with your Primary Care Physician within the next 2-3 days  Bring a copy of your test results with you to your appointment  DO NOT TAKE YOUR COUMADIN TONIGHT - Call your doctor tomorrow for further recs  Return to the Emergency Room if you experience new or worsening symptoms Follow up with your Primary Care Physician within the next 2-3 days  Bring a copy of your test results with you to your appointment  DO NOT TAKE YOUR COUMADIN TONIGHT - Call your doctor tomorrow for further recs  Return to the Emergency Room if you experience new or worsening symptoms - severe headache, change in behavior, vomiting, inability to walk, weakness or trouble speaking

## 2022-04-20 NOTE — ED ADULT TRIAGE NOTE - CHIEF COMPLAINT QUOTE
mechanical fall on pavement yesterday. sent in by pcp, pt has swelling and ecchymosis to left eye, pt coumadin and asa, denies LOC,

## 2022-04-20 NOTE — ED PROVIDER NOTE - CARE PLAN
Principal Discharge DX:	Traumatic periorbital ecchymosis  Secondary Diagnosis:	Supratherapeutic INR   1

## 2022-04-20 NOTE — ED ADULT NURSE NOTE - OBJECTIVE STATEMENT
88 yo male A&OX4 Frisian speaking only family bedside to translate normally ambulatory lives at home presenting to ED s/p fall. Patient was walking and suffered a mechanical trip and fall. Patient fell forward on paving striking head, neg LOC. Patient denies pain, presents with ecchymosis to left orbital area and small ecchymosis to left flank and swelling to left hand. PMS x 4, full ROM. Patient denies SOB, chest pain, dizziness, vision changes. VS stable, patient does take coumadin, labs drawn and sent, Ct obtained, dispo pending results.

## 2022-09-10 ENCOUNTER — EMERGENCY (EMERGENCY)
Facility: HOSPITAL | Age: 87
LOS: 1 days | Discharge: HOME | End: 2022-09-10
Attending: EMERGENCY MEDICINE
Payer: MEDICARE

## 2022-09-10 VITALS
OXYGEN SATURATION: 99 % | SYSTOLIC BLOOD PRESSURE: 120 MMHG | DIASTOLIC BLOOD PRESSURE: 77 MMHG | HEART RATE: 96 BPM | RESPIRATION RATE: 20 BRPM | TEMPERATURE: 98 F | HEIGHT: 63 IN | WEIGHT: 145.06 LBS

## 2022-09-10 VITALS
SYSTOLIC BLOOD PRESSURE: 96 MMHG | RESPIRATION RATE: 20 BRPM | OXYGEN SATURATION: 94 % | DIASTOLIC BLOOD PRESSURE: 62 MMHG | HEART RATE: 106 BPM

## 2022-09-10 LAB
ALBUMIN SERPL ELPH-MCNC: 4.1 G/DL — SIGNIFICANT CHANGE UP (ref 3.3–5)
ALP SERPL-CCNC: 85 U/L — SIGNIFICANT CHANGE UP (ref 40–120)
ALT FLD-CCNC: 17 U/L — SIGNIFICANT CHANGE UP (ref 10–45)
ANION GAP SERPL CALC-SCNC: 15 MMOL/L — SIGNIFICANT CHANGE UP (ref 5–17)
ANISOCYTOSIS BLD QL: SLIGHT — SIGNIFICANT CHANGE UP
APTT BLD: 23.9 SEC — LOW (ref 27.5–35.5)
AST SERPL-CCNC: 29 U/L — SIGNIFICANT CHANGE UP (ref 10–40)
BASOPHILS # BLD AUTO: 0 K/UL — SIGNIFICANT CHANGE UP (ref 0–0.2)
BASOPHILS # BLD AUTO: 0 K/UL — SIGNIFICANT CHANGE UP (ref 0–0.2)
BASOPHILS NFR BLD AUTO: 0 % — SIGNIFICANT CHANGE UP (ref 0–2)
BASOPHILS NFR BLD AUTO: 0 % — SIGNIFICANT CHANGE UP (ref 0–2)
BILIRUB SERPL-MCNC: 1.2 MG/DL — SIGNIFICANT CHANGE UP (ref 0.2–1.2)
BLD GP AB SCN SERPL QL: NEGATIVE — SIGNIFICANT CHANGE UP
BUN SERPL-MCNC: 18 MG/DL — SIGNIFICANT CHANGE UP (ref 7–23)
CALCIUM SERPL-MCNC: 9.5 MG/DL — SIGNIFICANT CHANGE UP (ref 8.4–10.5)
CHLORIDE SERPL-SCNC: 105 MMOL/L — SIGNIFICANT CHANGE UP (ref 96–108)
CO2 SERPL-SCNC: 17 MMOL/L — LOW (ref 22–31)
CREAT SERPL-MCNC: 0.93 MG/DL — SIGNIFICANT CHANGE UP (ref 0.5–1.3)
EGFR: 78 ML/MIN/1.73M2 — SIGNIFICANT CHANGE UP
ELLIPTOCYTES BLD QL SMEAR: SLIGHT — SIGNIFICANT CHANGE UP
EOSINOPHIL # BLD AUTO: 0 K/UL — SIGNIFICANT CHANGE UP (ref 0–0.5)
EOSINOPHIL # BLD AUTO: 0 K/UL — SIGNIFICANT CHANGE UP (ref 0–0.5)
EOSINOPHIL NFR BLD AUTO: 0 % — SIGNIFICANT CHANGE UP (ref 0–6)
EOSINOPHIL NFR BLD AUTO: 0 % — SIGNIFICANT CHANGE UP (ref 0–6)
GIANT PLATELETS BLD QL SMEAR: PRESENT — SIGNIFICANT CHANGE UP
GLUCOSE SERPL-MCNC: 127 MG/DL — HIGH (ref 70–99)
HCT VFR BLD CALC: 48.1 % — SIGNIFICANT CHANGE UP (ref 39–50)
HCT VFR BLD CALC: 50.8 % — HIGH (ref 39–50)
HGB BLD-MCNC: 15.8 G/DL — SIGNIFICANT CHANGE UP (ref 13–17)
HGB BLD-MCNC: 16.5 G/DL — SIGNIFICANT CHANGE UP (ref 13–17)
INR BLD: 2.54 RATIO — HIGH (ref 0.88–1.16)
LYMPHOCYTES # BLD AUTO: 0.53 K/UL — LOW (ref 1–3.3)
LYMPHOCYTES # BLD AUTO: 0.77 K/UL — LOW (ref 1–3.3)
LYMPHOCYTES # BLD AUTO: 12 % — LOW (ref 13–44)
LYMPHOCYTES # BLD AUTO: 6.9 % — LOW (ref 13–44)
MACROCYTES BLD QL: SLIGHT — SIGNIFICANT CHANGE UP
MANUAL SMEAR VERIFICATION: SIGNIFICANT CHANGE UP
MANUAL SMEAR VERIFICATION: SIGNIFICANT CHANGE UP
MCHC RBC-ENTMCNC: 32.5 GM/DL — SIGNIFICANT CHANGE UP (ref 32–36)
MCHC RBC-ENTMCNC: 32.8 GM/DL — SIGNIFICANT CHANGE UP (ref 32–36)
MCHC RBC-ENTMCNC: 33.3 PG — SIGNIFICANT CHANGE UP (ref 27–34)
MCHC RBC-ENTMCNC: 33.4 PG — SIGNIFICANT CHANGE UP (ref 27–34)
MCV RBC AUTO: 101.3 FL — HIGH (ref 80–100)
MCV RBC AUTO: 102.8 FL — HIGH (ref 80–100)
MONOCYTES # BLD AUTO: 0.54 K/UL — SIGNIFICANT CHANGE UP (ref 0–0.9)
MONOCYTES # BLD AUTO: 0.65 K/UL — SIGNIFICANT CHANGE UP (ref 0–0.9)
MONOCYTES NFR BLD AUTO: 10.2 % — SIGNIFICANT CHANGE UP (ref 2–14)
MONOCYTES NFR BLD AUTO: 7 % — SIGNIFICANT CHANGE UP (ref 2–14)
NEUTROPHILS # BLD AUTO: 4.98 K/UL — SIGNIFICANT CHANGE UP (ref 1.8–7.4)
NEUTROPHILS # BLD AUTO: 6.67 K/UL — SIGNIFICANT CHANGE UP (ref 1.8–7.4)
NEUTROPHILS NFR BLD AUTO: 75.2 % — SIGNIFICANT CHANGE UP (ref 43–77)
NEUTROPHILS NFR BLD AUTO: 82.6 % — HIGH (ref 43–77)
NEUTS BAND # BLD: 2.6 % — SIGNIFICANT CHANGE UP (ref 0–8)
NEUTS BAND # BLD: 3.5 % — SIGNIFICANT CHANGE UP (ref 0–8)
PLAT MORPH BLD: NORMAL — SIGNIFICANT CHANGE UP
PLAT MORPH BLD: NORMAL — SIGNIFICANT CHANGE UP
PLATELET # BLD AUTO: 108 K/UL — LOW (ref 150–400)
PLATELET # BLD AUTO: 111 K/UL — LOW (ref 150–400)
POIKILOCYTOSIS BLD QL AUTO: SLIGHT — SIGNIFICANT CHANGE UP
POTASSIUM SERPL-MCNC: 4.7 MMOL/L — SIGNIFICANT CHANGE UP (ref 3.5–5.3)
POTASSIUM SERPL-SCNC: 4.7 MMOL/L — SIGNIFICANT CHANGE UP (ref 3.5–5.3)
PROT SERPL-MCNC: 7.2 G/DL — SIGNIFICANT CHANGE UP (ref 6–8.3)
PROTHROM AB SERPL-ACNC: 29.5 SEC — HIGH (ref 10.5–13.4)
RBC # BLD: 4.75 M/UL — SIGNIFICANT CHANGE UP (ref 4.2–5.8)
RBC # BLD: 4.94 M/UL — SIGNIFICANT CHANGE UP (ref 4.2–5.8)
RBC # FLD: 12.5 % — SIGNIFICANT CHANGE UP (ref 10.3–14.5)
RBC # FLD: 12.6 % — SIGNIFICANT CHANGE UP (ref 10.3–14.5)
RBC BLD AUTO: ABNORMAL
RBC BLD AUTO: SIGNIFICANT CHANGE UP
RH IG SCN BLD-IMP: POSITIVE — SIGNIFICANT CHANGE UP
SARS-COV-2 RNA SPEC QL NAA+PROBE: SIGNIFICANT CHANGE UP
SODIUM SERPL-SCNC: 137 MMOL/L — SIGNIFICANT CHANGE UP (ref 135–145)
WBC # BLD: 6.4 K/UL — SIGNIFICANT CHANGE UP (ref 3.8–10.5)
WBC # BLD: 7.75 K/UL — SIGNIFICANT CHANGE UP (ref 3.8–10.5)
WBC # FLD AUTO: 6.4 K/UL — SIGNIFICANT CHANGE UP (ref 3.8–10.5)
WBC # FLD AUTO: 7.75 K/UL — SIGNIFICANT CHANGE UP (ref 3.8–10.5)

## 2022-09-10 PROCEDURE — 93010 ELECTROCARDIOGRAM REPORT: CPT | Mod: 59

## 2022-09-10 PROCEDURE — 71260 CT THORAX DX C+: CPT | Mod: MA

## 2022-09-10 PROCEDURE — 99285 EMERGENCY DEPT VISIT HI MDM: CPT | Mod: 25

## 2022-09-10 PROCEDURE — 73030 X-RAY EXAM OF SHOULDER: CPT

## 2022-09-10 PROCEDURE — 23500 CLTX CLAVICULAR FX W/O MNPJ: CPT | Mod: 54

## 2022-09-10 PROCEDURE — 74177 CT ABD & PELVIS W/CONTRAST: CPT | Mod: MA

## 2022-09-10 PROCEDURE — 93005 ELECTROCARDIOGRAM TRACING: CPT

## 2022-09-10 PROCEDURE — 86900 BLOOD TYPING SEROLOGIC ABO: CPT

## 2022-09-10 PROCEDURE — 73000 X-RAY EXAM OF COLLAR BONE: CPT

## 2022-09-10 PROCEDURE — 71045 X-RAY EXAM CHEST 1 VIEW: CPT

## 2022-09-10 PROCEDURE — 73030 X-RAY EXAM OF SHOULDER: CPT | Mod: 26,LT

## 2022-09-10 PROCEDURE — 74177 CT ABD & PELVIS W/CONTRAST: CPT | Mod: 26,MA

## 2022-09-10 PROCEDURE — U0005: CPT

## 2022-09-10 PROCEDURE — 23570 CLTX SCAPULAR FX W/O MNPJ: CPT | Mod: 54

## 2022-09-10 PROCEDURE — 73000 X-RAY EXAM OF COLLAR BONE: CPT | Mod: 26,LT

## 2022-09-10 PROCEDURE — 85730 THROMBOPLASTIN TIME PARTIAL: CPT

## 2022-09-10 PROCEDURE — 36415 COLL VENOUS BLD VENIPUNCTURE: CPT

## 2022-09-10 PROCEDURE — 80053 COMPREHEN METABOLIC PANEL: CPT

## 2022-09-10 PROCEDURE — 85025 COMPLETE CBC W/AUTO DIFF WBC: CPT

## 2022-09-10 PROCEDURE — U0003: CPT

## 2022-09-10 PROCEDURE — 23540 CLTX ACROMCLAV DISLC WO MNPJ: CPT | Mod: 54

## 2022-09-10 PROCEDURE — 71260 CT THORAX DX C+: CPT | Mod: 26,MA

## 2022-09-10 PROCEDURE — 99285 EMERGENCY DEPT VISIT HI MDM: CPT | Mod: 25,57

## 2022-09-10 PROCEDURE — 85610 PROTHROMBIN TIME: CPT

## 2022-09-10 PROCEDURE — 86850 RBC ANTIBODY SCREEN: CPT

## 2022-09-10 PROCEDURE — 72125 CT NECK SPINE W/O DYE: CPT | Mod: MA

## 2022-09-10 PROCEDURE — 71045 X-RAY EXAM CHEST 1 VIEW: CPT | Mod: 26

## 2022-09-10 PROCEDURE — 72125 CT NECK SPINE W/O DYE: CPT | Mod: 26,MA

## 2022-09-10 PROCEDURE — 73060 X-RAY EXAM OF HUMERUS: CPT

## 2022-09-10 PROCEDURE — 70450 CT HEAD/BRAIN W/O DYE: CPT | Mod: MA

## 2022-09-10 PROCEDURE — 73060 X-RAY EXAM OF HUMERUS: CPT | Mod: 26,LT

## 2022-09-10 PROCEDURE — 86901 BLOOD TYPING SEROLOGIC RH(D): CPT

## 2022-09-10 PROCEDURE — 70450 CT HEAD/BRAIN W/O DYE: CPT | Mod: 26,MA

## 2022-09-10 PROCEDURE — 96374 THER/PROPH/DIAG INJ IV PUSH: CPT | Mod: XU

## 2022-09-10 RX ORDER — ACETAMINOPHEN 500 MG
1000 TABLET ORAL ONCE
Refills: 0 | Status: COMPLETED | OUTPATIENT
Start: 2022-09-10 | End: 2022-09-10

## 2022-09-10 RX ORDER — SODIUM CHLORIDE 9 MG/ML
250 INJECTION INTRAMUSCULAR; INTRAVENOUS; SUBCUTANEOUS ONCE
Refills: 0 | Status: COMPLETED | OUTPATIENT
Start: 2022-09-10 | End: 2022-09-10

## 2022-09-10 RX ORDER — NYSTATIN CREAM 100000 [USP'U]/G
1 CREAM TOPICAL
Qty: 30 | Refills: 0
Start: 2022-09-10 | End: 2022-09-16

## 2022-09-10 RX ADMIN — Medication 400 MILLIGRAM(S): at 15:43

## 2022-09-10 RX ADMIN — SODIUM CHLORIDE 250 MILLILITER(S): 9 INJECTION INTRAMUSCULAR; INTRAVENOUS; SUBCUTANEOUS at 17:34

## 2022-09-10 NOTE — ED PROVIDER NOTE - CARE PLAN
Principal Discharge DX:	Clavicle fracture  Secondary Diagnosis:	Separation of left acromioclavicular joint  Secondary Diagnosis:	Traumatic hematoma of buttock   1

## 2022-09-10 NOTE — ED ADULT NURSE NOTE - OBJECTIVE STATEMENT
pt fell in the bathroom this morning. Pt on coumadin for afib. Pt went to urgent care where xray showed collar bone fx. Pt with hx: dementia. No distress. Breathing easy and non labored. Pt able to walk with assist. Pt's daughter at bedside.

## 2022-09-10 NOTE — ED PROVIDER NOTE - NS ED ATTENDING STATEMENT MOD
Attending with This was a shared visit with the TAHMINA. I reviewed and verified the documentation and independently performed the documented:

## 2022-09-10 NOTE — ED ADULT NURSE NOTE - NSIMPLEMENTINTERV_GEN_ALL_ED
Implemented All Fall with Harm Risk Interventions:  [place] to call system. Call bell, personal items and telephone within reach. Instruct patient to call for assistance. Room bathroom lighting operational. Non-slip footwear when patient is off stretcher. Physically safe environment: no spills, clutter or unnecessary equipment. Stretcher in lowest position, wheels locked, appropriate side rails in place. Provide visual cue, wrist band, yellow gown, etc. Monitor gait and stability. Monitor for mental status changes and reorient to person, place, and time. Review medications for side effects contributing to fall risk. Reinforce activity limits and safety measures with patient and family. Provide visual clues: red socks.

## 2022-09-10 NOTE — ED PROVIDER NOTE - ATTENDING APP SHARED VISIT CONTRIBUTION OF CARE
Attending Statement (COLEEN Schulte MD):    HPI: 88y/o M with h/o dementia, CHF, Afib (on coumadin), HTN, HLD, prior CVA (w/ residual L sided weakness), presenting s/p fall today; fell in bathroom, primarily c/o left shoulder pain; no reported head injury or LOC (per patient, states slipped and hit onto left shoulder); no neck or back pain, no abdominal pain.  Last TDap-4/2022.    Review of Systems:  -limited (see above)  PSH/PMH as noted above    On Physical Exam:  General: well appearing, in NAD, speaking clearly in full sentences and without difficulty; cooperative with exam  HEENT: PERRL, MMM  Neck: no neck tenderness, no nuchal rigidity  Cardiac: normal s1, s2; RRR; no MGR  Lungs: CTABL  Chest: L deformity along distal clavicle/AC joint but no tenting of skin; lateral aspect of L shoulder nontender  Abdomen: soft nontender/nondistended  : no bladder tenderness or distension  Skin: intact, no rash  Extremities: no peripheral edema, no gross deformities (except as noted above); LUE warm/well-perfused, radial pulse palpable, sensatio intact throughout LUE  Neuro: GCS 15    MDM: 88y/o M with h/o dementia, CHF, Afib (on coumadin), HTN, HLD, prior CVA (w/ residual L sided weakness), presenting s/p fall, likely mechanical per patient; however, given age, question as to reliability given dementia, will check screening labs; cbc (to evaluate for leukocytosis or anemia) CMP (to evaluate for electrolyte abnormalities or renal/liver dysfunction) pt/inr (on coumadin); obtain CT head/c-spine/chest/abdomen/pelvis to evaluate for traumatic injuries.  Disposition pending review of imaging, orthopedic consult if warranted (if solely clavicle fracture or AC separation can likely place in sling and dc with outpatient ortho f/u recommendations). Please see above progress notes above for updates to medical decision making and the patient's clinical course.

## 2022-09-10 NOTE — ED PROVIDER NOTE - CARE PROVIDER_API CALL
Jonah Sanon)  Orthopaedic Surgery  [address] [place] [address], [address]  [place] [address]  Phone: [phone]  Fax: [phone]  Follow Up Time:

## 2022-09-10 NOTE — ED PROVIDER NOTE - PROGRESS NOTE DETAILS
Reassess pelvic/hip area; non tender, full ROMs without pain. No skin lesion or swelling. Will obtain CBC on 1740. Tachycardia with BP-99/55. NAD. Will obtain CBC now. Awaiting for ortho consult. Ortho at bedside. Ortho recommended; -NWB LUE in a sling, pain control, ice/cold compress, no acute ortho surgery at this time, and f/u outpt with Dr. Sanon in 1 week, call office for appt 2nd cbc stable. NAD. Hypotensive/Tachycardia now and checked last adm document; baseline VS (HR: 114 (20 Jun 2021 18:28) (103 - 124) and BP: 125/82 (20 Jun 2021 16:55) (98/64 - 146/87)).

## 2022-09-10 NOTE — ED PROVIDER NOTE - OBJECTIVE STATEMENT
89yo male pt accompanied by his daughter, Shelia, who translates (declines translation services) with PMHx of Dementia, CHF, A-fib on Coumadin (unknown last INR), HTN, HLD, CVA with L side weakness, BPH presents to ED with left shoulder pain s/p unwitnessed fall today. As per the daughter, pt was fell in his bathroom and pt's wife heard his fall. Unknown LOC. The family checked pt immediately and denies LOC and AMS. Pt c/o left shoulder pain only and went to . Pt and the family were told left clavicle fx on x-ray and sent to ED for further evaluation. Denies headache, dizziness, or N/V. Denies neck or lower back pain. Denies CP/SOB/ABD pain or pelvic/hip pain. Denies fever, chills, or recent sickness.  Last TDap-4/2022.

## 2022-09-10 NOTE — ED PROVIDER NOTE - SHIFT CHANGE DETAILS
Attending note (Eris): I have endorsed this patient to the incoming physician, including clinical history, physical exam, current results and assessment/plan.

## 2022-09-10 NOTE — ED PROVIDER NOTE - PATIENT PORTAL LINK FT
You can access the FollowMyHealth Patient Portal offered by [facility] by registering at the following website: [email or web address]. By joining [employer]’s FollowMyHealth portal, you will also be able to view your health information using other applications (apps) compatible with our system.

## 2022-09-10 NOTE — ED PROVIDER NOTE - PHYSICAL EXAMINATION
NAD. A&Ox2 (baseline as per family). VSS. Afebrile. No facial or scalp tender or lesions. Lungs clear. No spinal tender. +Left shoulder tender, deform and diminished ROMs. +Superficial abrasions on left scapular area. +Left upper chest wall tender. No mid chest tender. ABD soft, non tender. No pelvic or hip tender. +Known left weakness.

## 2022-09-10 NOTE — CONSULT NOTE ADULT - SUBJECTIVE AND OBJECTIVE BOX
HPI  89yMale R-hand dominant w/ dementia, CVA (residual L-sided weakness, minimal use of L arm at baseline), afib (on warfarin) c/o L shoulder pain s/p mechanical fall. Denies headstrike or LOC. Denies numbness/tingling in the LUE. Denies any other trauma/injuries at this time. Pt's family stating they would like to take him home.    ROS  Negative unless otherwise specified in HPI.    PAST MEDICAL & SURGICAL Hx  PAST MEDICAL & SURGICAL HISTORY:  CVA (cerebral vascular accident)      CHF (congestive heart failure)      Atrial fibrillation      BPH (benign prostatic hyperplasia)        MEDICATIONS  Home Medications:  Aspirin Enteric Coated 81 mg oral delayed release tablet: 1 tab(s) orally once a day (21 Jun 2021 09:58)  enalapril 5 mg oral tablet: 1 tab(s) orally once a day (24 Jun 2021 10:38)  furosemide 20 mg oral tablet: 1 tab(s) orally once a day   (24 Jun 2021 10:48)  Metoprolol Succinate  mg oral tablet, extended release: 1 tab(s) orally once a day (21 Jun 2021 09:58)  simvastatin 20 mg oral tablet: 1 tab(s) orally once a day (at bedtime) (24 Jun 2021 10:38)  tamsulosin 0.4 mg oral capsule: 1 cap(s) orally once a day (at bedtime) (24 Jun 2021 10:38)  warfarin 2 mg oral tablet: 1 tab(s) orally Monday through Friday    NOTE: Pharmacy stated RX is written for Warfarin 5mg 1 tablet d; utd by doctor. Directions above written as per family member.  (21 Jun 2021 09:58)  warfarin 2 mg oral tablet: 0.5 tab(s) orally Saturday and Sunday    NOTE: Pharmacy stated RX above is written for Warfairn 2mg 1 tablet daily;utd by doctor. Directions written as per family member (21 Jun 2021 09:58)      ALLERGIES  No Known Allergies      VITALS  Vital Signs Last 24 Hrs  T(C): 36.7 (10 Sep 2022 16:54), Max: 36.7 (10 Sep 2022 12:26)  T(F): 98 (10 Sep 2022 16:54), Max: 98 (10 Sep 2022 12:26)  HR: 111 (10 Sep 2022 16:54) (96 - 111)  BP: 99/66 (10 Sep 2022 16:54) (99/66 - 120/77)  RR: 20 (10 Sep 2022 16:54) (20 - 20)  SpO2: 96% (10 Sep 2022 16:54) (96% - 99%)  Parameters below as of 10 Sep 2022 16:54  Patient On (Oxygen Delivery Method): room air      PHYSICAL EXAM  Gen: Lying in bed, NAD  Resp: No increased WOB  LUE:  Skin intact, +edema and +ecchymosis over clavicle, no deformity or skin tenting  +TTP and +bony step-off over distal clavicle, no TTP along remainder of extremity; compartments soft  Limited ROM at shoulder 2/2 pain  Motor: Ax/Musc/Med/Rad/AIN/PIN/U intact  Sensory: Ax/Musc/Med/Rad/U SILT  +Rad pulse, WWP    Secondary survey:  No TTP along spine or other extremities, pelvis grossly stable, SILT and soft compartments throughout    LABS                        16.5   7.75  )-----------( 108      ( 10 Sep 2022 14:35 )             50.8     09-10    137  |  105  |  18  ----------------------------<  127<H>  4.7   |  17<L>  |  0.93    Ca    9.5      10 Sep 2022 14:35    TPro  7.2  /  Alb  4.1  /  TBili  1.2  /  DBili  x   /  AST  29  /  ALT  17  /  AlkPhos  85  09-10    PT/INR - ( 10 Sep 2022 14:35 )   PT: 29.5 sec;   INR: 2.54 ratio    PTT - ( 10 Sep 2022 14:35 )  PTT:23.9 sec    IMAGING  XRs: L distal clavicle fx and associated AC joint separation (personal read)    ASSESSMENT & PLAN  89yMale w/ L distal clavicle fx and associated AC joint separation fx s/p immobilization.  -NWB LUE in a sling  -pain control  -ice/cold compress  -no acute ortho surgery at this time  -f/u outpt with Dr. Sanon in 1 week, call office for appt HPI  89yMale R-hand dominant w/ dementia, CVA (residual L-sided weakness, minimal use of L arm at baseline), afib (on warfarin) c/o L shoulder pain s/p mechanical fall. Denies headstrike or LOC. Denies numbness/tingling in the LUE. Denies any other trauma/injuries at this time. Pt's family stating they would like to take him home. Uses a walker at baseline.    ROS  Negative unless otherwise specified in HPI.    PAST MEDICAL & SURGICAL Hx  PAST MEDICAL & SURGICAL HISTORY:  CVA (cerebral vascular accident)      CHF (congestive heart failure)      Atrial fibrillation      BPH (benign prostatic hyperplasia)        MEDICATIONS  Home Medications:  Aspirin Enteric Coated 81 mg oral delayed release tablet: 1 tab(s) orally once a day (21 Jun 2021 09:58)  enalapril 5 mg oral tablet: 1 tab(s) orally once a day (24 Jun 2021 10:38)  furosemide 20 mg oral tablet: 1 tab(s) orally once a day   (24 Jun 2021 10:48)  Metoprolol Succinate  mg oral tablet, extended release: 1 tab(s) orally once a day (21 Jun 2021 09:58)  simvastatin 20 mg oral tablet: 1 tab(s) orally once a day (at bedtime) (24 Jun 2021 10:38)  tamsulosin 0.4 mg oral capsule: 1 cap(s) orally once a day (at bedtime) (24 Jun 2021 10:38)  warfarin 2 mg oral tablet: 1 tab(s) orally Monday through Friday    NOTE: Pharmacy stated RX is written for Warfarin 5mg 1 tablet d; utd by doctor. Directions above written as per family member.  (21 Jun 2021 09:58)  warfarin 2 mg oral tablet: 0.5 tab(s) orally Saturday and Sunday    NOTE: Pharmacy stated RX above is written for Warfairn 2mg 1 tablet daily;utd by doctor. Directions written as per family member (21 Jun 2021 09:58)      ALLERGIES  No Known Allergies      VITALS  Vital Signs Last 24 Hrs  T(C): 36.7 (10 Sep 2022 16:54), Max: 36.7 (10 Sep 2022 12:26)  T(F): 98 (10 Sep 2022 16:54), Max: 98 (10 Sep 2022 12:26)  HR: 111 (10 Sep 2022 16:54) (96 - 111)  BP: 99/66 (10 Sep 2022 16:54) (99/66 - 120/77)  RR: 20 (10 Sep 2022 16:54) (20 - 20)  SpO2: 96% (10 Sep 2022 16:54) (96% - 99%)  Parameters below as of 10 Sep 2022 16:54  Patient On (Oxygen Delivery Method): room air      PHYSICAL EXAM  Gen: Lying in bed, NAD  Resp: No increased WOB  LUE:  Skin intact, +edema over distal clavicle/AC joint, no deformity or skin tenting  +TTP and +bony step-off over distal clavicle, no TTP along remainder of extremity; compartments soft  Limited ROM at shoulder 2/2 pain  Motor: Ax/Musc/Med/Rad/AIN/PIN/U intact  Sensory: Ax/Musc/Med/Rad/U SILT  +Rad pulse, WWP    Secondary survey:  No TTP along spine or other extremities, pelvis grossly stable, SILT and soft compartments throughout    LABS                        16.5   7.75  )-----------( 108      ( 10 Sep 2022 14:35 )             50.8     09-10    137  |  105  |  18  ----------------------------<  127<H>  4.7   |  17<L>  |  0.93    Ca    9.5      10 Sep 2022 14:35    TPro  7.2  /  Alb  4.1  /  TBili  1.2  /  DBili  x   /  AST  29  /  ALT  17  /  AlkPhos  85  09-10    PT/INR - ( 10 Sep 2022 14:35 )   PT: 29.5 sec;   INR: 2.54 ratio    PTT - ( 10 Sep 2022 14:35 )  PTT:23.9 sec    IMAGING  XRs: L distal clavicle fx and associated AC joint separation (personal read)    ASSESSMENT & PLAN  89yMale w/ L distal clavicle fx and associated AC joint separation fx s/p immobilization.  -NWB LUE in a sling  -pain control  -ice/cold compress  -no acute ortho surgery at this time  -f/u outpt with Dr. Sanon in 1 week, call office for appt

## 2022-09-10 NOTE — ED PROVIDER NOTE - NSFOLLOWUPINSTRUCTIONS_ED_ALL_ED_FT
Please see the information of Clavicle fracture, AC separation, Hematoma, sling instruction, and fall prevention.    Keep the sling.     Ice to pain area; every 2hours for 20minutes.    Keep continue your current medications as prescribed.    Take Tylenol (2 tablets of 500mg every 8hours) for pain.    Nystatin topical cream as prescribed to groin area for fungal infection.    Follow up with orthopedist Dr. Sanon in one week, call Monday for appointment.    Follow up with your primary DrFlynn for reevaluation, call Monday for appointment in 2-3days.    Return for any concerns, fever, altered metal status, bleeding, weakness, or worsening pain.

## 2023-01-05 ENCOUNTER — EMERGENCY (EMERGENCY)
Facility: HOSPITAL | Age: 88
LOS: 1 days | Discharge: HOME | End: 2023-01-05
Attending: EMERGENCY MEDICINE
Payer: COMMERCIAL

## 2023-01-05 VITALS — DIASTOLIC BLOOD PRESSURE: 94 MMHG | SYSTOLIC BLOOD PRESSURE: 159 MMHG | RESPIRATION RATE: 18 BRPM | HEART RATE: 119 BPM

## 2023-01-05 LAB
ALBUMIN SERPL ELPH-MCNC: 3.7 G/DL — SIGNIFICANT CHANGE UP (ref 3.3–5)
ALP SERPL-CCNC: 103 U/L — SIGNIFICANT CHANGE UP (ref 40–120)
ALT FLD-CCNC: 32 U/L — SIGNIFICANT CHANGE UP (ref 10–45)
ANION GAP SERPL CALC-SCNC: 11 MMOL/L — SIGNIFICANT CHANGE UP (ref 5–17)
AST SERPL-CCNC: 100 U/L — HIGH (ref 10–40)
BASE EXCESS BLDV CALC-SCNC: 3.4 MMOL/L — HIGH (ref -2–3)
BASOPHILS # BLD AUTO: 0.02 K/UL — SIGNIFICANT CHANGE UP (ref 0–0.2)
BASOPHILS NFR BLD AUTO: 0.2 % — SIGNIFICANT CHANGE UP (ref 0–2)
BILIRUB SERPL-MCNC: 0.8 MG/DL — SIGNIFICANT CHANGE UP (ref 0.2–1.2)
BLD GP AB SCN SERPL QL: NEGATIVE — SIGNIFICANT CHANGE UP
BUN SERPL-MCNC: 18 MG/DL — SIGNIFICANT CHANGE UP (ref 7–23)
CA-I SERPL-SCNC: 1.2 MMOL/L — SIGNIFICANT CHANGE UP (ref 1.15–1.33)
CALCIUM SERPL-MCNC: 9 MG/DL — SIGNIFICANT CHANGE UP (ref 8.4–10.5)
CHLORIDE BLDV-SCNC: 96 MMOL/L — SIGNIFICANT CHANGE UP (ref 96–108)
CHLORIDE SERPL-SCNC: 99 MMOL/L — SIGNIFICANT CHANGE UP (ref 96–108)
CO2 BLDV-SCNC: 33 MMOL/L — HIGH (ref 22–26)
CO2 SERPL-SCNC: 26 MMOL/L — SIGNIFICANT CHANGE UP (ref 22–31)
CREAT SERPL-MCNC: 0.77 MG/DL — SIGNIFICANT CHANGE UP (ref 0.5–1.3)
EGFR: 86 ML/MIN/1.73M2 — SIGNIFICANT CHANGE UP
EOSINOPHIL # BLD AUTO: 0 K/UL — SIGNIFICANT CHANGE UP (ref 0–0.5)
EOSINOPHIL NFR BLD AUTO: 0 % — SIGNIFICANT CHANGE UP (ref 0–6)
GAS PNL BLDV: 133 MMOL/L — LOW (ref 136–145)
GAS PNL BLDV: SIGNIFICANT CHANGE UP
GAS PNL BLDV: SIGNIFICANT CHANGE UP
GLUCOSE BLDV-MCNC: 133 MG/DL — HIGH (ref 70–99)
GLUCOSE SERPL-MCNC: 121 MG/DL — HIGH (ref 70–99)
HCO3 BLDV-SCNC: 32 MMOL/L — HIGH (ref 22–29)
HCT VFR BLD CALC: 51.6 % — HIGH (ref 39–50)
HCT VFR BLDA CALC: 53 % — HIGH (ref 39–51)
HGB BLD CALC-MCNC: 17.5 G/DL — HIGH (ref 12.6–17.4)
HGB BLD-MCNC: 16.8 G/DL — SIGNIFICANT CHANGE UP (ref 13–17)
IMM GRANULOCYTES NFR BLD AUTO: 0.6 % — SIGNIFICANT CHANGE UP (ref 0–0.9)
LACTATE BLDV-MCNC: 4.3 MMOL/L — CRITICAL HIGH (ref 0.5–2)
LYMPHOCYTES # BLD AUTO: 0.64 K/UL — LOW (ref 1–3.3)
LYMPHOCYTES # BLD AUTO: 7.4 % — LOW (ref 13–44)
MCHC RBC-ENTMCNC: 32.2 PG — SIGNIFICANT CHANGE UP (ref 27–34)
MCHC RBC-ENTMCNC: 32.6 GM/DL — SIGNIFICANT CHANGE UP (ref 32–36)
MCV RBC AUTO: 98.9 FL — SIGNIFICANT CHANGE UP (ref 80–100)
MONOCYTES # BLD AUTO: 0.61 K/UL — SIGNIFICANT CHANGE UP (ref 0–0.9)
MONOCYTES NFR BLD AUTO: 7.1 % — SIGNIFICANT CHANGE UP (ref 2–14)
NEUTROPHILS # BLD AUTO: 7.29 K/UL — SIGNIFICANT CHANGE UP (ref 1.8–7.4)
NEUTROPHILS NFR BLD AUTO: 84.7 % — HIGH (ref 43–77)
NRBC # BLD: 0 /100 WBCS — SIGNIFICANT CHANGE UP (ref 0–0)
OB PNL STL: NEGATIVE — SIGNIFICANT CHANGE UP
PCO2 BLDV: 60 MMHG — HIGH (ref 42–55)
PH BLDV: 7.33 — SIGNIFICANT CHANGE UP (ref 7.32–7.43)
PLATELET # BLD AUTO: 196 K/UL — SIGNIFICANT CHANGE UP (ref 150–400)
PO2 BLDV: 31 MMHG — SIGNIFICANT CHANGE UP (ref 25–45)
POTASSIUM BLDV-SCNC: 3.9 MMOL/L — SIGNIFICANT CHANGE UP (ref 3.5–5.1)
POTASSIUM SERPL-MCNC: 6.9 MMOL/L — CRITICAL HIGH (ref 3.5–5.3)
POTASSIUM SERPL-SCNC: 6.9 MMOL/L — CRITICAL HIGH (ref 3.5–5.3)
PROT SERPL-MCNC: 7.2 G/DL — SIGNIFICANT CHANGE UP (ref 6–8.3)
RBC # BLD: 5.22 M/UL — SIGNIFICANT CHANGE UP (ref 4.2–5.8)
RBC # FLD: 12.8 % — SIGNIFICANT CHANGE UP (ref 10.3–14.5)
RH IG SCN BLD-IMP: POSITIVE — SIGNIFICANT CHANGE UP
SAO2 % BLDV: 44.2 % — LOW (ref 67–88)
SODIUM SERPL-SCNC: 136 MMOL/L — SIGNIFICANT CHANGE UP (ref 135–145)
TROPONIN T, HIGH SENSITIVITY RESULT: 14 NG/L — SIGNIFICANT CHANGE UP (ref 0–51)
WBC # BLD: 8.61 K/UL — SIGNIFICANT CHANGE UP (ref 3.8–10.5)
WBC # FLD AUTO: 8.61 K/UL — SIGNIFICANT CHANGE UP (ref 3.8–10.5)

## 2023-01-05 PROCEDURE — 71045 X-RAY EXAM CHEST 1 VIEW: CPT | Mod: 26

## 2023-01-05 PROCEDURE — 74177 CT ABD & PELVIS W/CONTRAST: CPT | Mod: 26,MA

## 2023-01-05 PROCEDURE — 99285 EMERGENCY DEPT VISIT HI MDM: CPT

## 2023-01-05 RX ORDER — SODIUM CHLORIDE 9 MG/ML
500 INJECTION INTRAMUSCULAR; INTRAVENOUS; SUBCUTANEOUS ONCE
Refills: 0 | Status: COMPLETED | OUTPATIENT
Start: 2023-01-05 | End: 2023-01-05

## 2023-01-05 RX ORDER — PANTOPRAZOLE SODIUM 20 MG/1
40 TABLET, DELAYED RELEASE ORAL ONCE
Refills: 0 | Status: COMPLETED | OUTPATIENT
Start: 2023-01-05 | End: 2023-01-05

## 2023-01-05 RX ADMIN — PANTOPRAZOLE SODIUM 40 MILLIGRAM(S): 20 TABLET, DELAYED RELEASE ORAL at 20:28

## 2023-01-05 NOTE — ED PROVIDER NOTE - QRS
HPI: 7M with 2 days of worsening neck pain/decreased neck ROM and fever x 2 days. Mother reports symptoms started Friday early AM and have continued to worsen. Also reports neck swelling, poor PO intake and an episode of emesis this AM. No SOB, voice change or prior hx of similar symptoms. NPO since last night.    PMHX: multiple dental infections  PsurgHx: denies  Allergies: NKDA    Exam  Vital Signs Last 24 Hrs  T(C): 37.8 (20 Apr 2019 13:34), Max: 38.4 (20 Apr 2019 08:30)  T(F): 100 (20 Apr 2019 13:34), Max: 101.1 (20 Apr 2019 08:30)  HR: 130 (20 Apr 2019 13:34) (127 - 133)  BP: 111/64 (20 Apr 2019 13:34) (108/71 - 115/69)  BP(mean): --  RR: 20 (20 Apr 2019 13:34) (20 - 24)  SpO2: 97% (20 Apr 2019 05:52) (97% - 97%)  NAD, awake  Breathing comfortably on room air, no stridor, no stertor  Nose: nares patent anteriorly  OC/OP: no drooling, tongue wnl, FOM soft/flat, bilateral tonsillar erythema, tonsils 2-3+, OP clear  Neck soft, flat with R cervical LAD with TTP, neck held in neutral position with poor active ROM in all directions, particularly when turning to the right                          12.6   31.02 )-----------( 373      ( 20 Apr 2019 10:15 )             37.2     CT Neck with contrast: There is a hypodense rim-enhancing fluid collection within the   right parapharyngeal space, as (series 3, image 37), measuring 1.4 x 1.3 x   2.4 cm. (AP x TRV x CC) There is extension of edema into the retropharyngeal   space. The aerodigestive tract remains patent. Mild edema extends into the   right submandibular space as well.     Multiple enlarged right-sided cervical lymph nodes are noted which are   likely reactive.       A/P 7M with parapharyngeal/retropharyngeal abscess. Breathing comfortably on room air. Tm 101.1. WBC 31. CT showing 2.4 x 1.4 x 1.3 cm abscess with extension into RP space.   - NPO/IVF for possible OR (add on)  - IV clindamycin  - pain control PRN  - if any signs of respiratory distress or clinical deterioration, notify ORL  - Dr. Card to see   - discussed with attending
64

## 2023-01-05 NOTE — ED ADULT NURSE NOTE - OBJECTIVE STATEMENT
Pt bib EMS from home after he had a few episodes of dark emesis which started overnight.  He is on Coumadin.  Daughter reports that he looker pale earlier, but has returned to his normal color now.  Has hx dementia and is at his baseline behaviour. Pt bib EMS from home after he had a few episodes of dark emesis which started overnight.  He is on Coumadin.  Daughter reports that he looker pale earlier, but has returned to his normal color now.  gestured to ruq as being painful.  Has hx dementia and is at his baseline behaviour.

## 2023-01-05 NOTE — ED PROVIDER NOTE - OBJECTIVE STATEMENT
89 year old male with hx of Afib on Coumadin, CHF, CVA, HTN, dementia comes into the ED for eval of GI bleed. Pt is Kazakh speaking with daughter translating bedside. Yesterday night he had ~two episodes of dark coffee ground vomiting with multiple BM unclear if there was blood in the stool. Over the past ~3 hours he has had two more episodes of dark emesis. He reports some diffuse abd pain, mild chest pain, SOB, and HA. He has not had any bleeding in the past according to daughter. No fevers, chills, syncope, dizziness. 89 year old male with hx of Afib on Coumadin, CHF, CVA, HTN, dementia comes into the ED for eval of GI bleed. Pt is Amharic speaking with daughter translating bedside. Yesterday night he had ~two episodes of dark coffee ground vomiting with multiple BM unclear if there was blood in the stool. Over the past ~3 hours he has had two more episodes of dark emesis. He reports some diffuse abd pain, mild chest pain, SOB, and HA. He has not had any bleeding in the past according to daughter. No fevers, chills, syncope, dizziness. Pt has a healing left clavicular fracture.

## 2023-01-05 NOTE — ED PROVIDER NOTE - NSFOLLOWUPINSTRUCTIONS_ED_ALL_ED_FT
You were seen in the ED for your episodes of dark vomiting. We did not see any signs of GI bleeding on the stool test. Please follow up with your Primary Care Doctor and return to the ED if you have worsening symptoms.    Gastritis    Gastritis is soreness and swelling (inflammation) of the lining of the stomach. Gastritis can develop as a sudden onset (acute) or long-term (chronic) condition. If gastritis is not treated, it can lead to stomach bleeding and ulcers. Causes include viral and bacterial infections, excessive alcohol consumption, tobacco use, or certain medications. Symptoms include nausea, vomiting, or abdominal pain or burning especially after eating. Avoid foods or drinks that make your symptoms worse such as caffeine, chocolate, spicy foods, acidic foods, or alcohol.    SEEK IMMEDIATE MEDICAL CARE IF YOU HAVE ANY OF THE FOLLOWING SYMPTOMS: black or bloody stools, blood or coffee-ground-colored vomitus, worsening abdominal pain, fever, or inability to keep fluids down.

## 2023-01-05 NOTE — ED PROVIDER NOTE - PHYSICAL EXAMINATION
GENERAL: Not in acute distress, non-toxic appearing  HEAD: normocephalic, atraumatic  HEENT: EOMI, normal conjunctiva, oral mucosa moist, neck supple  CARDIAC: regular rate and rhythm, normal S1 and S2,  no appreciable murmurs, cap refill less than 2 seconds  PULM: clear to ascultation bilaterally, no crackles, rales, rhonchi, or wheezing  GI: abdomen nondistended, soft, nontender, no guarding or rebound tenderness  NEURO: alert and oriented x 3, normal speech, no focal motor or sensory deficits, no gross neurologic deficit  MSK: No visible deformities, + 2+ pitting edema to the lower extremities bilat, normal distal pulses to the lower extremities.  SKIN: No visible rashes, dry, well-perfused, lower extremities cool to the touch   PSYCH: appropriate mood and affect

## 2023-01-05 NOTE — ED PROVIDER NOTE - PATIENT PORTAL LINK FT
You can access the FollowMyHealth Patient Portal offered by [facility] by registering at the following website: [email or web address]. By joining [employer]’s FollowMyHealth portal, you will also be able to view your health information using other applications (apps) compatible with our system.

## 2023-01-05 NOTE — ED PROVIDER NOTE - PROGRESS NOTE DETAILS
Updated family on Pt's labs and CT scan. Pt has not had any episodes of emesis in the ED and has not complained of any pain. Discussed plan to PO challenge Pt and reassess for discharge. Pt's daughter and son in law feel comfortable with taking pt home and coming back to the ED if they feel he has any more symptoms. Pt ate an egg sandwich and tolerated. Spoke with daughter who is bedside. on board with DC and following up with PCP and cardiologist. Pt ate an egg sandwich and tolerated. Spoke with daughter who is bedside. on board with DC and following up with PCP and cardiologist. Offered to set up a non emergent transport but the daughter and son in law would rather take the pt themselves so they do not have to wait extra time in the ED.

## 2023-01-05 NOTE — ED ADULT NURSE NOTE - NSICDXPASTMEDICALHX_GEN_ALL_CORE_FT
PAST MEDICAL HISTORY:  Atrial fibrillation     BPH (benign prostatic hyperplasia)     CHF (congestive heart failure)     CVA (cerebral vascular accident)      PAST MEDICAL HISTORY:  Atrial fibrillation     BPH (benign prostatic hyperplasia)     CHF (congestive heart failure)     CVA (cerebral vascular accident)     Dementia

## 2023-01-06 VITALS
DIASTOLIC BLOOD PRESSURE: 81 MMHG | RESPIRATION RATE: 16 BRPM | OXYGEN SATURATION: 98 % | SYSTOLIC BLOOD PRESSURE: 118 MMHG | HEART RATE: 87 BPM | TEMPERATURE: 98 F

## 2023-01-06 LAB
ALBUMIN SERPL ELPH-MCNC: 3.4 G/DL — SIGNIFICANT CHANGE UP (ref 3.3–5)
ALP SERPL-CCNC: 100 U/L — SIGNIFICANT CHANGE UP (ref 40–120)
ALT FLD-CCNC: 26 U/L — SIGNIFICANT CHANGE UP (ref 10–45)
ANION GAP SERPL CALC-SCNC: 10 MMOL/L — SIGNIFICANT CHANGE UP (ref 5–17)
APTT BLD: 37.7 SEC — HIGH (ref 27.5–35.5)
AST SERPL-CCNC: 70 U/L — HIGH (ref 10–40)
BILIRUB SERPL-MCNC: 0.9 MG/DL — SIGNIFICANT CHANGE UP (ref 0.2–1.2)
BUN SERPL-MCNC: 17 MG/DL — SIGNIFICANT CHANGE UP (ref 7–23)
CALCIUM SERPL-MCNC: 8.7 MG/DL — SIGNIFICANT CHANGE UP (ref 8.4–10.5)
CHLORIDE SERPL-SCNC: 100 MMOL/L — SIGNIFICANT CHANGE UP (ref 96–108)
CO2 SERPL-SCNC: 26 MMOL/L — SIGNIFICANT CHANGE UP (ref 22–31)
CREAT SERPL-MCNC: 0.71 MG/DL — SIGNIFICANT CHANGE UP (ref 0.5–1.3)
EGFR: 88 ML/MIN/1.73M2 — SIGNIFICANT CHANGE UP
GLUCOSE SERPL-MCNC: 123 MG/DL — HIGH (ref 70–99)
INR BLD: 3.17 RATIO — HIGH (ref 0.88–1.16)
LACTATE SERPL-SCNC: 3 MMOL/L — HIGH (ref 0.5–2)
POTASSIUM SERPL-MCNC: 5.7 MMOL/L — HIGH (ref 3.5–5.3)
POTASSIUM SERPL-SCNC: 5.7 MMOL/L — HIGH (ref 3.5–5.3)
PROT SERPL-MCNC: 6.7 G/DL — SIGNIFICANT CHANGE UP (ref 6–8.3)
PROTHROM AB SERPL-ACNC: 37.2 SEC — HIGH (ref 10.5–13.4)
SARS-COV-2 RNA SPEC QL NAA+PROBE: SIGNIFICANT CHANGE UP
SODIUM SERPL-SCNC: 136 MMOL/L — SIGNIFICANT CHANGE UP (ref 135–145)

## 2023-01-06 PROCEDURE — 85014 HEMATOCRIT: CPT

## 2023-01-06 PROCEDURE — 86850 RBC ANTIBODY SCREEN: CPT

## 2023-01-06 PROCEDURE — 80053 COMPREHEN METABOLIC PANEL: CPT

## 2023-01-06 PROCEDURE — 84484 ASSAY OF TROPONIN QUANT: CPT

## 2023-01-06 PROCEDURE — 85018 HEMOGLOBIN: CPT

## 2023-01-06 PROCEDURE — U0005: CPT

## 2023-01-06 PROCEDURE — 82330 ASSAY OF CALCIUM: CPT

## 2023-01-06 PROCEDURE — 82947 ASSAY GLUCOSE BLOOD QUANT: CPT

## 2023-01-06 PROCEDURE — 82803 BLOOD GASES ANY COMBINATION: CPT

## 2023-01-06 PROCEDURE — 83605 ASSAY OF LACTIC ACID: CPT

## 2023-01-06 PROCEDURE — 86900 BLOOD TYPING SEROLOGIC ABO: CPT

## 2023-01-06 PROCEDURE — 85610 PROTHROMBIN TIME: CPT

## 2023-01-06 PROCEDURE — 99285 EMERGENCY DEPT VISIT HI MDM: CPT | Mod: 25

## 2023-01-06 PROCEDURE — 82272 OCCULT BLD FECES 1-3 TESTS: CPT

## 2023-01-06 PROCEDURE — 84295 ASSAY OF SERUM SODIUM: CPT

## 2023-01-06 PROCEDURE — 84132 ASSAY OF SERUM POTASSIUM: CPT

## 2023-01-06 PROCEDURE — 85025 COMPLETE CBC W/AUTO DIFF WBC: CPT

## 2023-01-06 PROCEDURE — 71045 X-RAY EXAM CHEST 1 VIEW: CPT

## 2023-01-06 PROCEDURE — 74177 CT ABD & PELVIS W/CONTRAST: CPT | Mod: MA

## 2023-01-06 PROCEDURE — 82435 ASSAY OF BLOOD CHLORIDE: CPT

## 2023-01-06 PROCEDURE — 93005 ELECTROCARDIOGRAM TRACING: CPT

## 2023-01-06 PROCEDURE — U0003: CPT

## 2023-01-06 PROCEDURE — 36415 COLL VENOUS BLD VENIPUNCTURE: CPT

## 2023-01-06 PROCEDURE — 86901 BLOOD TYPING SEROLOGIC RH(D): CPT

## 2023-01-06 PROCEDURE — 85730 THROMBOPLASTIN TIME PARTIAL: CPT

## 2023-01-06 PROCEDURE — 96374 THER/PROPH/DIAG INJ IV PUSH: CPT

## 2023-01-06 RX ADMIN — SODIUM CHLORIDE 500 MILLILITER(S): 9 INJECTION INTRAMUSCULAR; INTRAVENOUS; SUBCUTANEOUS at 00:20

## 2023-10-20 ENCOUNTER — INPATIENT (INPATIENT)
Facility: HOSPITAL | Age: 88
LOS: 4 days | Discharge: HOME HEALTH | DRG: 871 | End: 2023-10-25
Attending: INTERNAL MEDICINE | Admitting: INTERNAL MEDICINE
Payer: MEDICARE

## 2023-10-20 VITALS
RESPIRATION RATE: 27 BRPM | OXYGEN SATURATION: 100 % | SYSTOLIC BLOOD PRESSURE: 133 MMHG | TEMPERATURE: 103 F | DIASTOLIC BLOOD PRESSURE: 89 MMHG | HEART RATE: 144 BPM

## 2023-10-20 DIAGNOSIS — A41.9 SEPSIS, UNSPECIFIED ORGANISM: ICD-10-CM

## 2023-10-20 DIAGNOSIS — G93.41 METABOLIC ENCEPHALOPATHY: ICD-10-CM

## 2023-10-20 DIAGNOSIS — N39.0 URINARY TRACT INFECTION, SITE NOT SPECIFIED: ICD-10-CM

## 2023-10-20 DIAGNOSIS — R41.82 ALTERED MENTAL STATUS, UNSPECIFIED: ICD-10-CM

## 2023-10-20 DIAGNOSIS — I48.20 CHRONIC ATRIAL FIBRILLATION, UNSPECIFIED: ICD-10-CM

## 2023-10-20 DIAGNOSIS — E87.1 HYPO-OSMOLALITY AND HYPONATREMIA: ICD-10-CM

## 2023-10-20 DIAGNOSIS — G30.9 ALZHEIMER'S DISEASE, UNSPECIFIED: ICD-10-CM

## 2023-10-20 LAB
ALBUMIN SERPL ELPH-MCNC: 3.2 G/DL — LOW (ref 3.3–5)
ALBUMIN SERPL ELPH-MCNC: 3.2 G/DL — LOW (ref 3.3–5)
ALP SERPL-CCNC: 104 U/L — SIGNIFICANT CHANGE UP (ref 40–120)
ALP SERPL-CCNC: 104 U/L — SIGNIFICANT CHANGE UP (ref 40–120)
ALT FLD-CCNC: 21 U/L — SIGNIFICANT CHANGE UP (ref 10–45)
ALT FLD-CCNC: 21 U/L — SIGNIFICANT CHANGE UP (ref 10–45)
ANION GAP SERPL CALC-SCNC: 11 MMOL/L — SIGNIFICANT CHANGE UP (ref 5–17)
ANION GAP SERPL CALC-SCNC: 11 MMOL/L — SIGNIFICANT CHANGE UP (ref 5–17)
ANION GAP SERPL CALC-SCNC: 9 MMOL/L — SIGNIFICANT CHANGE UP (ref 5–17)
ANION GAP SERPL CALC-SCNC: 9 MMOL/L — SIGNIFICANT CHANGE UP (ref 5–17)
APPEARANCE UR: CLEAR — SIGNIFICANT CHANGE UP
APPEARANCE UR: CLEAR — SIGNIFICANT CHANGE UP
APTT BLD: 30.6 SEC — SIGNIFICANT CHANGE UP (ref 24.5–35.6)
APTT BLD: 30.6 SEC — SIGNIFICANT CHANGE UP (ref 24.5–35.6)
AST SERPL-CCNC: 67 U/L — HIGH (ref 10–40)
AST SERPL-CCNC: 67 U/L — HIGH (ref 10–40)
BACTERIA # UR AUTO: NEGATIVE — SIGNIFICANT CHANGE UP
BACTERIA # UR AUTO: NEGATIVE — SIGNIFICANT CHANGE UP
BASE EXCESS BLDV CALC-SCNC: -0.6 MMOL/L — SIGNIFICANT CHANGE UP (ref -2–3)
BASE EXCESS BLDV CALC-SCNC: -0.6 MMOL/L — SIGNIFICANT CHANGE UP (ref -2–3)
BASE EXCESS BLDV CALC-SCNC: 2.1 MMOL/L — SIGNIFICANT CHANGE UP (ref -2–3)
BASE EXCESS BLDV CALC-SCNC: 2.1 MMOL/L — SIGNIFICANT CHANGE UP (ref -2–3)
BASOPHILS # BLD AUTO: 0 K/UL — SIGNIFICANT CHANGE UP (ref 0–0.2)
BASOPHILS # BLD AUTO: 0 K/UL — SIGNIFICANT CHANGE UP (ref 0–0.2)
BASOPHILS NFR BLD AUTO: 0 % — SIGNIFICANT CHANGE UP (ref 0–2)
BASOPHILS NFR BLD AUTO: 0 % — SIGNIFICANT CHANGE UP (ref 0–2)
BILIRUB SERPL-MCNC: 0.9 MG/DL — SIGNIFICANT CHANGE UP (ref 0.2–1.2)
BILIRUB SERPL-MCNC: 0.9 MG/DL — SIGNIFICANT CHANGE UP (ref 0.2–1.2)
BILIRUB UR-MCNC: NEGATIVE — SIGNIFICANT CHANGE UP
BILIRUB UR-MCNC: NEGATIVE — SIGNIFICANT CHANGE UP
BUN SERPL-MCNC: 14 MG/DL — SIGNIFICANT CHANGE UP (ref 7–23)
BUN SERPL-MCNC: 14 MG/DL — SIGNIFICANT CHANGE UP (ref 7–23)
BUN SERPL-MCNC: 15 MG/DL — SIGNIFICANT CHANGE UP (ref 7–23)
BUN SERPL-MCNC: 15 MG/DL — SIGNIFICANT CHANGE UP (ref 7–23)
CA-I SERPL-SCNC: 1.17 MMOL/L — SIGNIFICANT CHANGE UP (ref 1.15–1.33)
CA-I SERPL-SCNC: 1.17 MMOL/L — SIGNIFICANT CHANGE UP (ref 1.15–1.33)
CA-I SERPL-SCNC: 1.23 MMOL/L — SIGNIFICANT CHANGE UP (ref 1.15–1.33)
CA-I SERPL-SCNC: 1.23 MMOL/L — SIGNIFICANT CHANGE UP (ref 1.15–1.33)
CALCIUM SERPL-MCNC: 7.7 MG/DL — LOW (ref 8.4–10.5)
CALCIUM SERPL-MCNC: 7.7 MG/DL — LOW (ref 8.4–10.5)
CALCIUM SERPL-MCNC: 9 MG/DL — SIGNIFICANT CHANGE UP (ref 8.4–10.5)
CALCIUM SERPL-MCNC: 9 MG/DL — SIGNIFICANT CHANGE UP (ref 8.4–10.5)
CHLORIDE BLDV-SCNC: 102 MMOL/L — SIGNIFICANT CHANGE UP (ref 96–108)
CHLORIDE BLDV-SCNC: 102 MMOL/L — SIGNIFICANT CHANGE UP (ref 96–108)
CHLORIDE BLDV-SCNC: 99 MMOL/L — SIGNIFICANT CHANGE UP (ref 96–108)
CHLORIDE BLDV-SCNC: 99 MMOL/L — SIGNIFICANT CHANGE UP (ref 96–108)
CHLORIDE SERPL-SCNC: 100 MMOL/L — SIGNIFICANT CHANGE UP (ref 96–108)
CHLORIDE SERPL-SCNC: 100 MMOL/L — SIGNIFICANT CHANGE UP (ref 96–108)
CHLORIDE SERPL-SCNC: 95 MMOL/L — LOW (ref 96–108)
CHLORIDE SERPL-SCNC: 95 MMOL/L — LOW (ref 96–108)
CO2 BLDV-SCNC: 28 MMOL/L — HIGH (ref 22–26)
CO2 BLDV-SCNC: 28 MMOL/L — HIGH (ref 22–26)
CO2 BLDV-SCNC: 29 MMOL/L — HIGH (ref 22–26)
CO2 BLDV-SCNC: 29 MMOL/L — HIGH (ref 22–26)
CO2 SERPL-SCNC: 21 MMOL/L — LOW (ref 22–31)
CO2 SERPL-SCNC: 21 MMOL/L — LOW (ref 22–31)
CO2 SERPL-SCNC: 24 MMOL/L — SIGNIFICANT CHANGE UP (ref 22–31)
CO2 SERPL-SCNC: 24 MMOL/L — SIGNIFICANT CHANGE UP (ref 22–31)
COLOR SPEC: SIGNIFICANT CHANGE UP
COLOR SPEC: SIGNIFICANT CHANGE UP
CREAT SERPL-MCNC: 0.79 MG/DL — SIGNIFICANT CHANGE UP (ref 0.5–1.3)
CREAT SERPL-MCNC: 0.79 MG/DL — SIGNIFICANT CHANGE UP (ref 0.5–1.3)
CREAT SERPL-MCNC: 0.88 MG/DL — SIGNIFICANT CHANGE UP (ref 0.5–1.3)
CREAT SERPL-MCNC: 0.88 MG/DL — SIGNIFICANT CHANGE UP (ref 0.5–1.3)
DIFF PNL FLD: NEGATIVE — SIGNIFICANT CHANGE UP
DIFF PNL FLD: NEGATIVE — SIGNIFICANT CHANGE UP
EGFR: 82 ML/MIN/1.73M2 — SIGNIFICANT CHANGE UP
EGFR: 82 ML/MIN/1.73M2 — SIGNIFICANT CHANGE UP
EGFR: 84 ML/MIN/1.73M2 — SIGNIFICANT CHANGE UP
EGFR: 84 ML/MIN/1.73M2 — SIGNIFICANT CHANGE UP
EOSINOPHIL # BLD AUTO: 0 K/UL — SIGNIFICANT CHANGE UP (ref 0–0.5)
EOSINOPHIL # BLD AUTO: 0 K/UL — SIGNIFICANT CHANGE UP (ref 0–0.5)
EOSINOPHIL NFR BLD AUTO: 0 % — SIGNIFICANT CHANGE UP (ref 0–6)
EOSINOPHIL NFR BLD AUTO: 0 % — SIGNIFICANT CHANGE UP (ref 0–6)
EPI CELLS # UR: 1 /HPF — SIGNIFICANT CHANGE UP
EPI CELLS # UR: 1 /HPF — SIGNIFICANT CHANGE UP
FLUAV AG NPH QL: SIGNIFICANT CHANGE UP
FLUAV AG NPH QL: SIGNIFICANT CHANGE UP
FLUBV AG NPH QL: SIGNIFICANT CHANGE UP
FLUBV AG NPH QL: SIGNIFICANT CHANGE UP
GAS PNL BLDV: 128 MMOL/L — LOW (ref 136–145)
GAS PNL BLDV: 128 MMOL/L — LOW (ref 136–145)
GAS PNL BLDV: 132 MMOL/L — LOW (ref 136–145)
GAS PNL BLDV: 132 MMOL/L — LOW (ref 136–145)
GAS PNL BLDV: SIGNIFICANT CHANGE UP
GLUCOSE BLDV-MCNC: 115 MG/DL — HIGH (ref 70–99)
GLUCOSE BLDV-MCNC: 115 MG/DL — HIGH (ref 70–99)
GLUCOSE BLDV-MCNC: 133 MG/DL — HIGH (ref 70–99)
GLUCOSE BLDV-MCNC: 133 MG/DL — HIGH (ref 70–99)
GLUCOSE SERPL-MCNC: 124 MG/DL — HIGH (ref 70–99)
GLUCOSE SERPL-MCNC: 124 MG/DL — HIGH (ref 70–99)
GLUCOSE SERPL-MCNC: 131 MG/DL — HIGH (ref 70–99)
GLUCOSE SERPL-MCNC: 131 MG/DL — HIGH (ref 70–99)
GLUCOSE UR QL: ABNORMAL
GLUCOSE UR QL: ABNORMAL
HCO3 BLDV-SCNC: 26 MMOL/L — SIGNIFICANT CHANGE UP (ref 22–29)
HCO3 BLDV-SCNC: 26 MMOL/L — SIGNIFICANT CHANGE UP (ref 22–29)
HCO3 BLDV-SCNC: 28 MMOL/L — SIGNIFICANT CHANGE UP (ref 22–29)
HCO3 BLDV-SCNC: 28 MMOL/L — SIGNIFICANT CHANGE UP (ref 22–29)
HCT VFR BLD CALC: 49.2 % — SIGNIFICANT CHANGE UP (ref 39–50)
HCT VFR BLD CALC: 49.2 % — SIGNIFICANT CHANGE UP (ref 39–50)
HCT VFR BLDA CALC: 43 % — SIGNIFICANT CHANGE UP (ref 39–51)
HCT VFR BLDA CALC: 43 % — SIGNIFICANT CHANGE UP (ref 39–51)
HCT VFR BLDA CALC: 45 % — SIGNIFICANT CHANGE UP (ref 39–51)
HCT VFR BLDA CALC: 45 % — SIGNIFICANT CHANGE UP (ref 39–51)
HGB BLD CALC-MCNC: 14.2 G/DL — SIGNIFICANT CHANGE UP (ref 12.6–17.4)
HGB BLD CALC-MCNC: 14.2 G/DL — SIGNIFICANT CHANGE UP (ref 12.6–17.4)
HGB BLD CALC-MCNC: 15 G/DL — SIGNIFICANT CHANGE UP (ref 12.6–17.4)
HGB BLD CALC-MCNC: 15 G/DL — SIGNIFICANT CHANGE UP (ref 12.6–17.4)
HGB BLD-MCNC: 16.8 G/DL — SIGNIFICANT CHANGE UP (ref 13–17)
HGB BLD-MCNC: 16.8 G/DL — SIGNIFICANT CHANGE UP (ref 13–17)
INR BLD: 1.56 RATIO — HIGH (ref 0.85–1.18)
INR BLD: 1.56 RATIO — HIGH (ref 0.85–1.18)
KETONES UR-MCNC: NEGATIVE — SIGNIFICANT CHANGE UP
KETONES UR-MCNC: NEGATIVE — SIGNIFICANT CHANGE UP
LACTATE BLDV-MCNC: 1.6 MMOL/L — SIGNIFICANT CHANGE UP (ref 0.5–2)
LACTATE BLDV-MCNC: 1.6 MMOL/L — SIGNIFICANT CHANGE UP (ref 0.5–2)
LACTATE BLDV-MCNC: 3.1 MMOL/L — HIGH (ref 0.5–2)
LACTATE BLDV-MCNC: 3.1 MMOL/L — HIGH (ref 0.5–2)
LEUKOCYTE ESTERASE UR-ACNC: ABNORMAL
LEUKOCYTE ESTERASE UR-ACNC: ABNORMAL
LYMPHOCYTES # BLD AUTO: 0.19 K/UL — LOW (ref 1–3.3)
LYMPHOCYTES # BLD AUTO: 0.19 K/UL — LOW (ref 1–3.3)
LYMPHOCYTES # BLD AUTO: 3.5 % — LOW (ref 13–44)
LYMPHOCYTES # BLD AUTO: 3.5 % — LOW (ref 13–44)
MANUAL SMEAR VERIFICATION: SIGNIFICANT CHANGE UP
MANUAL SMEAR VERIFICATION: SIGNIFICANT CHANGE UP
MCHC RBC-ENTMCNC: 33.1 PG — SIGNIFICANT CHANGE UP (ref 27–34)
MCHC RBC-ENTMCNC: 33.1 PG — SIGNIFICANT CHANGE UP (ref 27–34)
MCHC RBC-ENTMCNC: 34.1 GM/DL — SIGNIFICANT CHANGE UP (ref 32–36)
MCHC RBC-ENTMCNC: 34.1 GM/DL — SIGNIFICANT CHANGE UP (ref 32–36)
MCV RBC AUTO: 97 FL — SIGNIFICANT CHANGE UP (ref 80–100)
MCV RBC AUTO: 97 FL — SIGNIFICANT CHANGE UP (ref 80–100)
MONOCYTES # BLD AUTO: 0.51 K/UL — SIGNIFICANT CHANGE UP (ref 0–0.9)
MONOCYTES # BLD AUTO: 0.51 K/UL — SIGNIFICANT CHANGE UP (ref 0–0.9)
MONOCYTES NFR BLD AUTO: 9.6 % — SIGNIFICANT CHANGE UP (ref 2–14)
MONOCYTES NFR BLD AUTO: 9.6 % — SIGNIFICANT CHANGE UP (ref 2–14)
NEUTROPHILS # BLD AUTO: 4.61 K/UL — SIGNIFICANT CHANGE UP (ref 1.8–7.4)
NEUTROPHILS # BLD AUTO: 4.61 K/UL — SIGNIFICANT CHANGE UP (ref 1.8–7.4)
NEUTROPHILS NFR BLD AUTO: 82.6 % — HIGH (ref 43–77)
NEUTROPHILS NFR BLD AUTO: 82.6 % — HIGH (ref 43–77)
NEUTS BAND # BLD: 4.3 % — SIGNIFICANT CHANGE UP (ref 0–8)
NEUTS BAND # BLD: 4.3 % — SIGNIFICANT CHANGE UP (ref 0–8)
NITRITE UR-MCNC: NEGATIVE — SIGNIFICANT CHANGE UP
NITRITE UR-MCNC: NEGATIVE — SIGNIFICANT CHANGE UP
PCO2 BLDV: 46 MMHG — SIGNIFICANT CHANGE UP (ref 42–55)
PCO2 BLDV: 46 MMHG — SIGNIFICANT CHANGE UP (ref 42–55)
PCO2 BLDV: 51 MMHG — SIGNIFICANT CHANGE UP (ref 42–55)
PCO2 BLDV: 51 MMHG — SIGNIFICANT CHANGE UP (ref 42–55)
PH BLDV: 7.32 — SIGNIFICANT CHANGE UP (ref 7.32–7.43)
PH BLDV: 7.32 — SIGNIFICANT CHANGE UP (ref 7.32–7.43)
PH BLDV: 7.39 — SIGNIFICANT CHANGE UP (ref 7.32–7.43)
PH BLDV: 7.39 — SIGNIFICANT CHANGE UP (ref 7.32–7.43)
PH UR: 6 — SIGNIFICANT CHANGE UP (ref 5–8)
PH UR: 6 — SIGNIFICANT CHANGE UP (ref 5–8)
PLAT MORPH BLD: NORMAL — SIGNIFICANT CHANGE UP
PLAT MORPH BLD: NORMAL — SIGNIFICANT CHANGE UP
PLATELET # BLD AUTO: 246 K/UL — SIGNIFICANT CHANGE UP (ref 150–400)
PLATELET # BLD AUTO: 246 K/UL — SIGNIFICANT CHANGE UP (ref 150–400)
PO2 BLDV: 22 MMHG — LOW (ref 25–45)
PO2 BLDV: 22 MMHG — LOW (ref 25–45)
PO2 BLDV: 26 MMHG — SIGNIFICANT CHANGE UP (ref 25–45)
PO2 BLDV: 26 MMHG — SIGNIFICANT CHANGE UP (ref 25–45)
POTASSIUM BLDV-SCNC: 4.7 MMOL/L — SIGNIFICANT CHANGE UP (ref 3.5–5.1)
POTASSIUM BLDV-SCNC: 4.7 MMOL/L — SIGNIFICANT CHANGE UP (ref 3.5–5.1)
POTASSIUM BLDV-SCNC: 5 MMOL/L — SIGNIFICANT CHANGE UP (ref 3.5–5.1)
POTASSIUM BLDV-SCNC: 5 MMOL/L — SIGNIFICANT CHANGE UP (ref 3.5–5.1)
POTASSIUM SERPL-MCNC: 5 MMOL/L — SIGNIFICANT CHANGE UP (ref 3.5–5.3)
POTASSIUM SERPL-MCNC: 5 MMOL/L — SIGNIFICANT CHANGE UP (ref 3.5–5.3)
POTASSIUM SERPL-MCNC: 6.8 MMOL/L — CRITICAL HIGH (ref 3.5–5.3)
POTASSIUM SERPL-MCNC: 6.8 MMOL/L — CRITICAL HIGH (ref 3.5–5.3)
POTASSIUM SERPL-SCNC: 5 MMOL/L — SIGNIFICANT CHANGE UP (ref 3.5–5.3)
POTASSIUM SERPL-SCNC: 5 MMOL/L — SIGNIFICANT CHANGE UP (ref 3.5–5.3)
POTASSIUM SERPL-SCNC: 6.8 MMOL/L — CRITICAL HIGH (ref 3.5–5.3)
POTASSIUM SERPL-SCNC: 6.8 MMOL/L — CRITICAL HIGH (ref 3.5–5.3)
PROT SERPL-MCNC: 7.4 G/DL — SIGNIFICANT CHANGE UP (ref 6–8.3)
PROT SERPL-MCNC: 7.4 G/DL — SIGNIFICANT CHANGE UP (ref 6–8.3)
PROT UR-MCNC: NEGATIVE — SIGNIFICANT CHANGE UP
PROT UR-MCNC: NEGATIVE — SIGNIFICANT CHANGE UP
PROTHROM AB SERPL-ACNC: 16.2 SEC — HIGH (ref 9.5–13)
PROTHROM AB SERPL-ACNC: 16.2 SEC — HIGH (ref 9.5–13)
RBC # BLD: 5.07 M/UL — SIGNIFICANT CHANGE UP (ref 4.2–5.8)
RBC # BLD: 5.07 M/UL — SIGNIFICANT CHANGE UP (ref 4.2–5.8)
RBC # FLD: 12.7 % — SIGNIFICANT CHANGE UP (ref 10.3–14.5)
RBC # FLD: 12.7 % — SIGNIFICANT CHANGE UP (ref 10.3–14.5)
RBC BLD AUTO: SIGNIFICANT CHANGE UP
RBC BLD AUTO: SIGNIFICANT CHANGE UP
RBC CASTS # UR COMP ASSIST: 1 /HPF — SIGNIFICANT CHANGE UP (ref 0–4)
RBC CASTS # UR COMP ASSIST: 1 /HPF — SIGNIFICANT CHANGE UP (ref 0–4)
RSV RNA NPH QL NAA+NON-PROBE: SIGNIFICANT CHANGE UP
RSV RNA NPH QL NAA+NON-PROBE: SIGNIFICANT CHANGE UP
SAO2 % BLDV: 25.7 % — LOW (ref 67–88)
SAO2 % BLDV: 25.7 % — LOW (ref 67–88)
SAO2 % BLDV: 33.2 % — LOW (ref 67–88)
SAO2 % BLDV: 33.2 % — LOW (ref 67–88)
SARS-COV-2 RNA SPEC QL NAA+PROBE: SIGNIFICANT CHANGE UP
SARS-COV-2 RNA SPEC QL NAA+PROBE: SIGNIFICANT CHANGE UP
SODIUM SERPL-SCNC: 130 MMOL/L — LOW (ref 135–145)
SP GR SPEC: 1.01 — SIGNIFICANT CHANGE UP (ref 1.01–1.02)
SP GR SPEC: 1.01 — SIGNIFICANT CHANGE UP (ref 1.01–1.02)
TROPONIN T, HIGH SENSITIVITY RESULT: 24 NG/L — SIGNIFICANT CHANGE UP (ref 0–51)
TROPONIN T, HIGH SENSITIVITY RESULT: 24 NG/L — SIGNIFICANT CHANGE UP (ref 0–51)
TSH SERPL-MCNC: 2.14 UIU/ML — SIGNIFICANT CHANGE UP (ref 0.27–4.2)
TSH SERPL-MCNC: 2.14 UIU/ML — SIGNIFICANT CHANGE UP (ref 0.27–4.2)
UROBILINOGEN FLD QL: NEGATIVE — SIGNIFICANT CHANGE UP
UROBILINOGEN FLD QL: NEGATIVE — SIGNIFICANT CHANGE UP
WBC # BLD: 5.3 K/UL — SIGNIFICANT CHANGE UP (ref 3.8–10.5)
WBC # BLD: 5.3 K/UL — SIGNIFICANT CHANGE UP (ref 3.8–10.5)
WBC # FLD AUTO: 5.3 K/UL — SIGNIFICANT CHANGE UP (ref 3.8–10.5)
WBC # FLD AUTO: 5.3 K/UL — SIGNIFICANT CHANGE UP (ref 3.8–10.5)
WBC UR QL: 24 /HPF — HIGH (ref 0–5)
WBC UR QL: 24 /HPF — HIGH (ref 0–5)

## 2023-10-20 PROCEDURE — 99233 SBSQ HOSP IP/OBS HIGH 50: CPT

## 2023-10-20 PROCEDURE — 71045 X-RAY EXAM CHEST 1 VIEW: CPT | Mod: 26

## 2023-10-20 PROCEDURE — 70450 CT HEAD/BRAIN W/O DYE: CPT | Mod: 26,MA

## 2023-10-20 PROCEDURE — 99291 CRITICAL CARE FIRST HOUR: CPT

## 2023-10-20 RX ORDER — METOPROLOL TARTRATE 50 MG
100 TABLET ORAL DAILY
Refills: 0 | Status: DISCONTINUED | OUTPATIENT
Start: 2023-10-20 | End: 2023-10-21

## 2023-10-20 RX ORDER — SODIUM CHLORIDE 9 MG/ML
1000 INJECTION INTRAMUSCULAR; INTRAVENOUS; SUBCUTANEOUS
Refills: 0 | Status: DISCONTINUED | OUTPATIENT
Start: 2023-10-20 | End: 2023-10-22

## 2023-10-20 RX ORDER — VANCOMYCIN HCL 1 G
1000 VIAL (EA) INTRAVENOUS ONCE
Refills: 0 | Status: COMPLETED | OUTPATIENT
Start: 2023-10-20 | End: 2023-10-20

## 2023-10-20 RX ORDER — WARFARIN SODIUM 2.5 MG/1
0.5 TABLET ORAL
Qty: 0 | Refills: 0 | DISCHARGE

## 2023-10-20 RX ORDER — PIPERACILLIN AND TAZOBACTAM 4; .5 G/20ML; G/20ML
3.38 INJECTION, POWDER, LYOPHILIZED, FOR SOLUTION INTRAVENOUS ONCE
Refills: 0 | Status: COMPLETED | OUTPATIENT
Start: 2023-10-20 | End: 2023-10-20

## 2023-10-20 RX ORDER — DAPAGLIFLOZIN 10 MG/1
10 TABLET, FILM COATED ORAL EVERY 24 HOURS
Refills: 0 | Status: DISCONTINUED | OUTPATIENT
Start: 2023-10-20 | End: 2023-10-20

## 2023-10-20 RX ORDER — PANTOPRAZOLE SODIUM 20 MG/1
40 TABLET, DELAYED RELEASE ORAL
Refills: 0 | Status: DISCONTINUED | OUTPATIENT
Start: 2023-10-20 | End: 2023-10-24

## 2023-10-20 RX ORDER — ACETAMINOPHEN 500 MG
650 TABLET ORAL EVERY 6 HOURS
Refills: 0 | Status: DISCONTINUED | OUTPATIENT
Start: 2023-10-20 | End: 2023-10-25

## 2023-10-20 RX ORDER — SODIUM CHLORIDE 9 MG/ML
500 INJECTION INTRAMUSCULAR; INTRAVENOUS; SUBCUTANEOUS ONCE
Refills: 0 | Status: COMPLETED | OUTPATIENT
Start: 2023-10-20 | End: 2023-10-20

## 2023-10-20 RX ORDER — ATORVASTATIN CALCIUM 80 MG/1
40 TABLET, FILM COATED ORAL AT BEDTIME
Refills: 0 | Status: DISCONTINUED | OUTPATIENT
Start: 2023-10-20 | End: 2023-10-25

## 2023-10-20 RX ORDER — ASPIRIN/CALCIUM CARB/MAGNESIUM 324 MG
81 TABLET ORAL DAILY
Refills: 0 | Status: DISCONTINUED | OUTPATIENT
Start: 2023-10-20 | End: 2023-10-25

## 2023-10-20 RX ORDER — FUROSEMIDE 40 MG
1 TABLET ORAL
Qty: 0 | Refills: 0 | DISCHARGE

## 2023-10-20 RX ORDER — WARFARIN SODIUM 2.5 MG/1
2 TABLET ORAL ONCE
Refills: 0 | Status: COMPLETED | OUTPATIENT
Start: 2023-10-20 | End: 2023-10-20

## 2023-10-20 RX ORDER — PYRIDOXINE HCL (VITAMIN B6) 100 MG
100 TABLET ORAL DAILY
Refills: 0 | Status: DISCONTINUED | OUTPATIENT
Start: 2023-10-20 | End: 2023-10-25

## 2023-10-20 RX ORDER — ACETAMINOPHEN 500 MG
650 TABLET ORAL ONCE
Refills: 0 | Status: COMPLETED | OUTPATIENT
Start: 2023-10-20 | End: 2023-10-20

## 2023-10-20 RX ORDER — CHOLECALCIFEROL (VITAMIN D3) 125 MCG
1000 CAPSULE ORAL DAILY
Refills: 0 | Status: DISCONTINUED | OUTPATIENT
Start: 2023-10-20 | End: 2023-10-25

## 2023-10-20 RX ORDER — FINASTERIDE 5 MG/1
5 TABLET, FILM COATED ORAL DAILY
Refills: 0 | Status: DISCONTINUED | OUTPATIENT
Start: 2023-10-20 | End: 2023-10-25

## 2023-10-20 RX ORDER — TAMSULOSIN HYDROCHLORIDE 0.4 MG/1
0.4 CAPSULE ORAL AT BEDTIME
Refills: 0 | Status: DISCONTINUED | OUTPATIENT
Start: 2023-10-20 | End: 2023-10-25

## 2023-10-20 RX ORDER — PIPERACILLIN AND TAZOBACTAM 4; .5 G/20ML; G/20ML
3.38 INJECTION, POWDER, LYOPHILIZED, FOR SOLUTION INTRAVENOUS EVERY 8 HOURS
Refills: 0 | Status: DISCONTINUED | OUTPATIENT
Start: 2023-10-20 | End: 2023-10-25

## 2023-10-20 RX ORDER — ASPIRIN/CALCIUM CARB/MAGNESIUM 324 MG
1 TABLET ORAL
Qty: 0 | Refills: 0 | DISCHARGE

## 2023-10-20 RX ORDER — CALCIUM GLUCONATE 100 MG/ML
2 VIAL (ML) INTRAVENOUS ONCE
Refills: 0 | Status: COMPLETED | OUTPATIENT
Start: 2023-10-20 | End: 2023-10-20

## 2023-10-20 RX ORDER — WARFARIN SODIUM 2.5 MG/1
1 TABLET ORAL
Qty: 0 | Refills: 0 | DISCHARGE

## 2023-10-20 RX ADMIN — SODIUM CHLORIDE 500 MILLILITER(S): 9 INJECTION INTRAMUSCULAR; INTRAVENOUS; SUBCUTANEOUS at 18:21

## 2023-10-20 RX ADMIN — SODIUM CHLORIDE 60 MILLILITER(S): 9 INJECTION INTRAMUSCULAR; INTRAVENOUS; SUBCUTANEOUS at 22:52

## 2023-10-20 RX ADMIN — Medication 650 MILLIGRAM(S): at 17:15

## 2023-10-20 RX ADMIN — SODIUM CHLORIDE 500 MILLILITER(S): 9 INJECTION INTRAMUSCULAR; INTRAVENOUS; SUBCUTANEOUS at 23:21

## 2023-10-20 RX ADMIN — PIPERACILLIN AND TAZOBACTAM 25 GRAM(S): 4; .5 INJECTION, POWDER, LYOPHILIZED, FOR SOLUTION INTRAVENOUS at 22:33

## 2023-10-20 RX ADMIN — SODIUM CHLORIDE 500 MILLILITER(S): 9 INJECTION INTRAMUSCULAR; INTRAVENOUS; SUBCUTANEOUS at 20:55

## 2023-10-20 RX ADMIN — Medication 200 GRAM(S): at 18:21

## 2023-10-20 RX ADMIN — PIPERACILLIN AND TAZOBACTAM 200 GRAM(S): 4; .5 INJECTION, POWDER, LYOPHILIZED, FOR SOLUTION INTRAVENOUS at 18:21

## 2023-10-20 RX ADMIN — Medication 250 MILLIGRAM(S): at 18:15

## 2023-10-20 NOTE — ED PROVIDER NOTE - OBJECTIVE STATEMENT
89-year-old male history of atrial fibrillation on Coumadin, Alzheimer's, ANO timesx0-1 at [facility],  brought in from cardiologist office for altered mental status.  Patient is a brought in accompanied by daughter who is providing collateral information.   unable to obtain information from patient.  Daughter states that patient has history of Alzheimer's dementia, at baseline  is not alert or oriented, however has been increasingly lethargic relative to his baseline   For the past 2 to 3 days.  States that this morning, woke up with some chills/shaking so brought to see cardiologist. Had a cold 1 week ago, treated with amoxicillin. No known fevers at home or chills.

## 2023-10-20 NOTE — ED ADULT NURSE NOTE - NSFALLHARMRISKINTERV_ED_ALL_ED

## 2023-10-20 NOTE — H&P ADULT - HISTORY OF PRESENT ILLNESS
89-year-old male history of atrial fibrillation on Coumadin, Alzheimer's, ANO timesx0-1 at [facility],  brought in from cardiologist office for altered mental status.  Patient is a brought in accompanied by daughter who is providing collateral information.   unable to obtain information from patient.  Daughter states that patient has history of Alzheimer's dementia, at baseline  is not alert or oriented, however has been increasingly lethargic relative to his baseline   For the past 2 to 3 days.  States that this morning, woke up with some chills/shaking so brought to see cardiologist. Had a cold 1 week ago, treated with amoxicillin. No known fevers at home or chills.

## 2023-10-20 NOTE — H&P ADULT - TIME BILLING
Admission H&P including history ,examination, reviewing tests and treatement plan. Will consult ID . Renal consulted. D/W his two daughters over the phone. Admission H&P including history ,examination, reviewing tests and treatement plan. Will consult ID . Renal consulted. D/W his two daughters over the phone.   D/W Night ACP. Pt is full code

## 2023-10-20 NOTE — ED PROVIDER NOTE - ATTENDING CONTRIBUTION TO CARE
MD Hendrix:  patient seen and evaluated with the resident.  I was present for key portions of the History & Physical, and I agree with the Impression & Plan.    MD Hendrix: 89-year-old male brought in by daughter from PMD office for evaluation of lethargy, A-fib with RVR.  Daughter brought him to the primary doctor's office because he has been lethargic for the last 2 days.  She states that his primary doctor thought he might have pneumonia.    Baseline mental status is that of Alzheimer's dementia, typically alert though lately has been very sleepy.  Can typically feed himself.    ECG: Rate 151, WA not applicable, QRS 66, QTc 446, no ST elevations or depressions.    Vital signs: Heart rate 150s, blood pressure 130/70, rectal temperature 103.5 O2 sat 99% on room air  General: Adult male fatigued appearing  Normocephalic atraumatic  Cardiac: Irregular rhythm, tachycardic rate  Pulmonary: Clear to auscultation bilaterally  Abdomen: Soft, nondistended  Neuro: Appears sleepy, but moving all 4 extremities  Psychiatric: Not applicable    Medical decision making:   History and physical most concerning for sepsis of unclear source, most likely urine given history of UTIs.  Will panculture and administer vancomycin and Zosyn.  Will resuscitate as much as possible given medical history of heart failure.  We will not administer 30 cc/kg bolus at this time given documented medical history of heart failure.    Upon my evaluation, this patient had a high probability of imminent or life-threatening deterioration due to sepsis, afib w/RVRwhich required my direct attention, intervention, and personal management.  The patient has a medical condition that impairs on or more vital organ systems.  Frequent personal assessment and adjustment of medical interventions was performed.     I have personally provided 30 minutes of critical care time exclusive of time spent on separately billable procedures.  Time includes review of laboratory data, radiology results, discussion with consultants, patient and family; monitoring for potential decompensation, as well as time spent retrieving data and reviewing the chart and documenting the visit.  Interventions we performed as documented above.

## 2023-10-20 NOTE — ED PROVIDER NOTE - PHYSICAL EXAMINATION
Gen: lethargic  Head: NCAT  Eyes: EOMI, PERRLA, no conjunctival pallor, no scleral icterus  ENT: mucous membranes moist, no discharge  Neck: neck supple  Resp: CTAB, no W/R/R  CV: RRR, +S1/S2, no M/R/G  GI: Abdomen soft non-distended, NTTP, no masses  MSK: No open wounds, no bruising, no lower extremity edema  Neuro: A&Ox0, MAEx4  Ext: no edema, no deformity, warm and well-perfused  Skin: no rash or bruising 5

## 2023-10-20 NOTE — ED ADULT NURSE NOTE - OBJECTIVE STATEMENT
Pt is 89y M with PMH CVA, CHF, afib, complaining of AMS. Pt's family reports arriving to [facility] ED after cardiologist appointment for AMS. Upon arrival, HR tachy 140s afib with RVR, /89, lethargic, unable to stand independently, responsive to repetitive verbal stimuli. Febrile 103F rectally, SpO2 88% RA, 96% 4L NC. Belongings at bedside. Pt is 89y M with PMH CVA, CHF, afib, complaining of AMS. Pt's family reports arriving to [facility] ED after cardiologist appointment for AMS. Pt's family reports baseline A&Ox1, increasing lethargy x 2-3 days. Upon arrival, HR tachy 140s afib with RVR, /89, lethargic, unable to stand independently, responsive to repetitive verbal stimuli. Febrile 103F rectally, SpO2 88% RA, 96% 4L NC. Belongings at bedside.

## 2023-10-20 NOTE — ED PROVIDER NOTE - CLINICAL SUMMARY MEDICAL DECISION MAKING FREE TEXT BOX
Medical decision making:   History and physical most concerning for sepsis of unclear source, most likely urine given history of UTIs.  Will panculture and administer vancomycin and Zosyn.  Will resuscitate as much as possible given medical history of heart failure.  We will not administer 30 cc/kg bolus at this time given documented medical history of heart failure.    Upon my evaluation, this patient had a high probability of imminent or life-threatening deterioration due to sepsis, afib w/RVRwhich required my direct attention, intervention, and personal management.  The patient has a medical condition that impairs on or more vital organ systems.  Frequent personal assessment and adjustment of medical interventions was performed.     I have personally provided 30 minutes of critical care time exclusive of time spent on separately billable procedures.  Time includes review of laboratory data, radiology results, discussion with consultants, patient and family; monitoring for potential decompensation, as well as time spent retrieving data and reviewing the chart and documenting the visit.  Interventions we performed as documented above.

## 2023-10-20 NOTE — CHART NOTE - NSCHARTNOTEFT_GEN_A_CORE
CC: low BP      HPI:  Called by RN to evaluate patient for low BP, noted to be 85/61 electronically. Patient seen and assessed at bedside in [facility] section of ED; patient noted to be somnolent and easily arousable. Unable to assess ROS due to patient's underlying dementia.         ROS:  Unable to assess ROS due to patient's underlying dementia.           PAST MEDICAL & SURGICAL HISTORY:  CVA (cerebral vascular accident)  CHF (congestive heart failure)  Atrial fibrillation  BPH (benign prostatic hyperplasia)  Dementia            Vital Signs Last 24 Hrs  T(C): 36.7 (20 Oct 2023 22:21), Max: 39.6 (20 Oct 2023 16:37)  T(F): 98 (20 Oct 2023 22:21), Max: 103.2 (20 Oct 2023 16:37)  HR: 82 (20 Oct 2023 22:50) (82 - 144)  BP: 87/54 (20 Oct 2023 22:50) (82/50 - 133/89)  BP(mean): 70 (20 Oct 2023 20:50) (70 - 77)  RR: 15 (20 Oct 2023 22:50) (12 - 27)  SpO2: 100% (20 Oct 2023 22:50) (97% - 100%)    Parameters below as of 20 Oct 2023 22:42  Patient On (Oxygen Delivery Method): room air          Physical Exam:  General: Elderly male laying in bed, NAD, somnolent but easily arousable, nontoxic appearing  Head:  NC/AT  CV: RRR, S1S2   Respiratory: (+) decreased breath sounds at bilateral bases, otherwise CTA B/L, nonlabored on room air  Abdominal: (+) bowel sounds x4. Soft, NT, no guarding or rebound tenderness  MSK: (+) trace pedal edema, + peripheral pulses, FROM all 4 extremity  Skin: (+) warm, dry         Labs:                        16.8   5.30  )-----------( 246      ( 20 Oct 2023 17:16 )             49.2     10-20    130<L>  |  100  |  14  ----------------------------<  124<H>  5.0   |  21<L>  |  0.79    Ca    7.7<L>      20 Oct 2023 18:18    TPro  7.4  /  Alb  3.2<L>  /  TBili  0.9  /  DBili  x   /  AST  67<H>  /  ALT  21  /  AlkPhos  104  10-20      Urinalysis Basic - ( 10-20 @ 19:21 )  Color: Negative / Appearance: Negative / SG: -- / pH: 1000 mg/dL  Gluc: Negative / Ketone: Light Yellow  / Bili: -- / Urobili: --   Blood: -- / Protein: -- / Nitrite: Negative   Leuk Esterase: Large / RBC: 1.010 / WBC --   Sq Epi: Negative / Non Sq Epi: 1 / Bacteria: 6.0          Radiology:  < from: CT Head No Cont (10.20.23 @ 20:07) >  No acute intracranial hemorrhage or acute territorial infarction. Chronic   findings as above.  < end of copied text >    < from: Xray Chest 1 View- PORTABLE-Urgent (10.20.23 @ 17:45) >  Clear lungs.  < end of copied text >              Assessment & Plan:  89yoM PMHx AFib on Coumadin, Alzheimer's, A&O x0-1 at baseline,  brought in from cardiologist office for AMS. Patient is brought in accompanied by daughter who states that patient has history of Alzheimer's dementia, at baseline is not alert or oriented, however has been increasingly lethargic relative to his baseline for the past 2-3 days.      Patient now presenting acutely with low BP, noted to be 85/61 electronically. Repeat BP at bedside noted to be 87/54 electronically. Patient has received 1L IVNS as well as Vanco 1g IV x1 and Zosyn x1 since admission.   Chart review noted with TMax 103.2F in the ED and AFib RVR with HR 140s. SBP range  since admission. Patient was placed on 4LNC on admission but weaned to room air.         #Hypotension of unclear etiology ?secondary to infectious etiology  -Most recent vital signs notable for BP 87/57 with MAP 65; patient is afebrile  -Patient met SIRS/sepsis criteria on admission with tachycardia, fever, hypotension, elevated lactate  -Labs without leukocytosis (WBC 5.3k), lactate 3.1 >1.6  -UA with pyuria  -COVID/RVP negative  -BCx x2 sent 10/20  -UCx sent 10/20  -CXR (10/20) with "clear lungs"  -Will start on maintenance IVNS @60mL hr; cautious with IVF hydration given advanced age and unknown EF  -Will re-assess vital signs post initiation of IVF, discussed with RN  -Continue with empiric Zosyn as ordered   -Vital signs Q4h  -ID evaluation pending  -Low threshold for MICU evaluation if any signs of decompensation  -The above was discussed with attending Dr. Jacinto in detail  -Will continue to closely monitor patient/vitals  -Will discuss with day team, attending to follow       Dorcas Garces PA-C  Dept of Medicine  [id number]

## 2023-10-20 NOTE — ED PROVIDER NOTE - NSICDXPASTMEDICALHX_GEN_ALL_CORE_FT
PAST MEDICAL HISTORY:  Atrial fibrillation     BPH (benign prostatic hyperplasia)     CHF (congestive heart failure)     CVA (cerebral vascular accident)     Dementia

## 2023-10-20 NOTE — H&P ADULT - ASSESSMENT
89-year-old male history of atrial fibrillation on Coumadin, Alzheimer's, ANO timesx0-1 at [facility],  brought in from cardiologist office for altered mental status.  Patient is a brought in accompanied by daughter who is providing collateral information.   unable to obtain information from patient.  Daughter states that patient has history of Alzheimer's dementia, at baseline  is not alert or oriented, however has been increasingly lethargic relative to his baseline   For the past 2 to 3 days.  States that this morning, woke up with some chills/shaking so brought to see cardiologist. Had a cold 1 week ago, treated with amoxicillin. No known fevers at home or chills.

## 2023-10-20 NOTE — H&P ADULT - CARDIOVASCULAR
negative normal/regular rate and rhythm/S1 S2 present/no gallops/no rub/no murmur/Irregularly irregular rhythm

## 2023-10-20 NOTE — ED ADULT TRIAGE NOTE - CHIEF COMPLAINT QUOTE
Pt sent by PMD for EKG a-fib RVR in 160s. Pt lethargic on arrival, unable to obtain BP in triage. Recent viral illness

## 2023-10-21 PROBLEM — F03.90 UNSPECIFIED DEMENTIA, UNSPECIFIED SEVERITY, WITHOUT BEHAVIORAL DISTURBANCE, PSYCHOTIC DISTURBANCE, MOOD DISTURBANCE, AND ANXIETY: Chronic | Status: ACTIVE | Noted: 2023-01-05

## 2023-10-21 PROBLEM — F03.90 UNSPECIFIED DEMENTIA WITHOUT BEHAVIORAL DISTURBANCE: Chronic | Status: ACTIVE | Noted: 2023-01-05

## 2023-10-21 LAB
INR BLD: 1.8 RATIO — HIGH (ref 0.85–1.18)
INR BLD: 1.8 RATIO — HIGH (ref 0.85–1.18)
PROTHROM AB SERPL-ACNC: 19.5 SEC — HIGH (ref 9.5–13)
PROTHROM AB SERPL-ACNC: 19.5 SEC — HIGH (ref 9.5–13)
RAPID RVP RESULT: DETECTED
RAPID RVP RESULT: DETECTED
RV+EV RNA SPEC QL NAA+PROBE: DETECTED
RV+EV RNA SPEC QL NAA+PROBE: DETECTED
SARS-COV-2 RNA SPEC QL NAA+PROBE: SIGNIFICANT CHANGE UP
SARS-COV-2 RNA SPEC QL NAA+PROBE: SIGNIFICANT CHANGE UP

## 2023-10-21 PROCEDURE — 99222 1ST HOSP IP/OBS MODERATE 55: CPT

## 2023-10-21 PROCEDURE — 99223 1ST HOSP IP/OBS HIGH 75: CPT | Mod: GC

## 2023-10-21 RX ORDER — WARFARIN SODIUM 2.5 MG/1
1 TABLET ORAL ONCE
Refills: 0 | Status: COMPLETED | OUTPATIENT
Start: 2023-10-21 | End: 2023-10-21

## 2023-10-21 RX ORDER — SODIUM CHLORIDE 9 MG/ML
1000 INJECTION, SOLUTION INTRAVENOUS ONCE
Refills: 0 | Status: COMPLETED | OUTPATIENT
Start: 2023-10-21 | End: 2023-10-21

## 2023-10-21 RX ORDER — MIDODRINE HYDROCHLORIDE 2.5 MG/1
10 TABLET ORAL EVERY 8 HOURS
Refills: 0 | Status: DISCONTINUED | OUTPATIENT
Start: 2023-10-21 | End: 2023-10-25

## 2023-10-21 RX ORDER — MIDODRINE HYDROCHLORIDE 2.5 MG/1
10 TABLET ORAL ONCE
Refills: 0 | Status: COMPLETED | OUTPATIENT
Start: 2023-10-21 | End: 2023-10-21

## 2023-10-21 RX ADMIN — Medication 81 MILLIGRAM(S): at 12:50

## 2023-10-21 RX ADMIN — ATORVASTATIN CALCIUM 40 MILLIGRAM(S): 80 TABLET, FILM COATED ORAL at 21:27

## 2023-10-21 RX ADMIN — WARFARIN SODIUM 1 MILLIGRAM(S): 2.5 TABLET ORAL at 21:27

## 2023-10-21 RX ADMIN — PIPERACILLIN AND TAZOBACTAM 25 GRAM(S): 4; .5 INJECTION, POWDER, LYOPHILIZED, FOR SOLUTION INTRAVENOUS at 21:28

## 2023-10-21 RX ADMIN — MIDODRINE HYDROCHLORIDE 10 MILLIGRAM(S): 2.5 TABLET ORAL at 21:27

## 2023-10-21 RX ADMIN — MIDODRINE HYDROCHLORIDE 10 MILLIGRAM(S): 2.5 TABLET ORAL at 13:57

## 2023-10-21 RX ADMIN — FINASTERIDE 5 MILLIGRAM(S): 5 TABLET, FILM COATED ORAL at 12:50

## 2023-10-21 RX ADMIN — Medication 1000 UNIT(S): at 12:50

## 2023-10-21 RX ADMIN — PIPERACILLIN AND TAZOBACTAM 25 GRAM(S): 4; .5 INJECTION, POWDER, LYOPHILIZED, FOR SOLUTION INTRAVENOUS at 05:18

## 2023-10-21 RX ADMIN — Medication 100 MILLIGRAM(S): at 13:55

## 2023-10-21 RX ADMIN — TAMSULOSIN HYDROCHLORIDE 0.4 MILLIGRAM(S): 0.4 CAPSULE ORAL at 21:27

## 2023-10-21 RX ADMIN — MIDODRINE HYDROCHLORIDE 10 MILLIGRAM(S): 2.5 TABLET ORAL at 00:37

## 2023-10-21 RX ADMIN — SODIUM CHLORIDE 100 MILLILITER(S): 9 INJECTION INTRAMUSCULAR; INTRAVENOUS; SUBCUTANEOUS at 08:19

## 2023-10-21 RX ADMIN — SODIUM CHLORIDE 1000 MILLILITER(S): 9 INJECTION, SOLUTION INTRAVENOUS at 01:18

## 2023-10-21 RX ADMIN — MIDODRINE HYDROCHLORIDE 10 MILLIGRAM(S): 2.5 TABLET ORAL at 05:18

## 2023-10-21 RX ADMIN — PANTOPRAZOLE SODIUM 40 MILLIGRAM(S): 20 TABLET, DELAYED RELEASE ORAL at 05:19

## 2023-10-21 RX ADMIN — PIPERACILLIN AND TAZOBACTAM 25 GRAM(S): 4; .5 INJECTION, POWDER, LYOPHILIZED, FOR SOLUTION INTRAVENOUS at 13:57

## 2023-10-21 RX ADMIN — SODIUM CHLORIDE 100 MILLILITER(S): 9 INJECTION INTRAMUSCULAR; INTRAVENOUS; SUBCUTANEOUS at 00:38

## 2023-10-21 NOTE — PROGRESS NOTE ADULT - ASSESSMENT
89-year-old male history of atrial fibrillation on Coumadin, Alzheimer's, ANO timesx0-1 at [facility],  brought in from cardiologist office for altered mental status.  Patient is a brought in accompanied by daughter who is providing collateral information.   unable to obtain information from patient.  Daughter states that patient has history of Alzheimer's dementia, at baseline  is not alert or oriented, however has been increasingly lethargic relative to his baseline   For the past 2 to 3 days.  States that this morning, woke up with some chills/shaking so brought to see cardiologist. Had a cold 1 week ago, treated with amoxicillin. No known fevers at home or chills.       Problem/Plan - 1:  ·  Problem: Sepsis.   ·  Plan: Hemodynamically stable.   S/P Blood and Urine culture.    IV Abxs started.     Problem/Plan - 2:  ·  Problem: Acute UTI.   ·  Plan: S/P Culture and IV Abxs started.     Problem/Plan - 3:  ·  Problem: Metabolic encephalopathy.  ·  Plan: Secondary to infection and hyponatremia.  CT head negative.      Problem/Plan - 4:  ·  Problem: Chronic atrial fibrillation.  ·  Plan: ON AC and BB .     Problem/Plan - 5:  ·  Problem: Acute hyponatremia.   ·  Plan: Renal help appreciated.      Problem/Plan - 6:  ·  Problem: Alzheimer's dementia.   ·  Plan: Supportive care.

## 2023-10-21 NOTE — CONSULT NOTE ADULT - TIME BILLING
Reviewing the EMR, vitals, imaging, medication list, recent labs, prior records and coordinating care with medical providers. This time excludes procedures and teaching. POCUS

## 2023-10-21 NOTE — PATIENT PROFILE ADULT - FALL HARM RISK - HARM RISK INTERVENTIONS

## 2023-10-21 NOTE — CONSULT NOTE ADULT - ASSESSMENT
89-year-old male history of atrial fibrillation on Coumadin, Alzheimer's, AAO x0-1 at baseline,  brought in from cardiologist office for altered mental status admitted for sepsis w/u. MICU consulted for hypotension. On bedside POCUS, IVC appeared small, heart function appeared grossly normal.     #hypotension - at bedside BP was 105/61 followed by 101/65     Recommendations:  given small IVC, would administer another 1L fluids. if resp status is okay after, can give another liter  agree with zosyn   f/u blood cx, UCx   midodrine 10q8 - if concern for aspiration, would place NG tube for administration  consider discontinuing Toprol for now given hypotension     Pt does not require MICU level of care at this time. Please reconsult if condition changes

## 2023-10-21 NOTE — CONSULT NOTE ADULT - ASSESSMENT
Patient is a 89y Male with H/O alzeihmer's dementia CHF. Per family has been confused with fever and chills  for 2-3 days. was treated with amoxicillin.  Family took patient to be seen to cardiology, who sent pt to ED.  Found with UTI, hyponatremia  and hypotension ( s/p IV NS bolus with improvement of BP)   He is a poor historian sec to underlying dementia     # hypovolemic hyponatremia in light od sepsis and hypotension.  s/p isotonic fluid bolus with improvement of BP  cont NS @ 100CC/HR  will check urine lytes and osmolality  monitor BMP  # UTI on broad spectrum ABX.  cont    thanks for the consult     Dr Olmstead  [phone]

## 2023-10-21 NOTE — CONSULT NOTE ADULT - SUBJECTIVE AND OBJECTIVE BOX
CHIEF COMPLAINT: ams    HPI:    89-year-old male history of atrial fibrillation on Coumadin, Alzheimer's, AAO x0-1 at baseline,  brought in from cardiologist office for altered mental status. Was increasingly lethargic relative to his baseline   For the past 2 to 3 days.  Family noted that he woke up with some chills/shaking so brought to see cardiologist. Had a cold 1 week ago, treated with amoxicillin. No known fevers at home or chills.    MICU consulted for hypotension. Received 500 cc NS followed by maintenance  fluids 100 cc/hr     PAST MEDICAL & SURGICAL HISTORY:  CVA (cerebral vascular accident)      CHF (congestive heart failure)      Atrial fibrillation      BPH (benign prostatic hyperplasia)      Dementia          FAMILY HISTORY:      SOCIAL HISTORY:  unable to obtain    Allergies    No Known Allergies    Intolerances        HOME MEDICATIONS:    REVIEW OF SYSTEMS: Unable to assess ROS due to dementia    OBJECTIVE:  ICU Vital Signs Last 24 Hrs  T(C): 36.7 (20 Oct 2023 22:21), Max: 39.6 (20 Oct 2023 16:37)  T(F): 98 (20 Oct 2023 22:21), Max: 103.2 (20 Oct 2023 16:37)  HR: 86 (21 Oct 2023 01:00) (73 - 144)  BP: 94/54 (21 Oct 2023 01:00) (70/49 - 133/89)  BP(mean): 68 (21 Oct 2023 01:00) (57 - 78)  ABP: --  ABP(mean): --  RR: 18 (21 Oct 2023 01:00) (12 - 27)  SpO2: 100% (21 Oct 2023 01:00) (97% - 100%)    O2 Parameters below as of 21 Oct 2023 01:00  Patient On (Oxygen Delivery Method): room air        PHYSICAL EXAM:     GENERAL: NAD, lying in bed comfortably  HEAD:  Atraumatic, Normocephalic  NECK: Supple, No JVD  CHEST/LUNG: Clear to auscultation bilaterally; No rales, rhonchi, wheezing, or rubs. Unlabored respirations  HEART: Regular rate and rhythm; No murmurs, rubs, or gallops  ABDOMEN: BSx4; Soft, nontender, nondistended  EXTREMITIES:  2+ Peripheral Pulses, brisk capillary refill. No clubbing, cyanosis, or edema  NERVOUS SYSTEM:  unable to assess AAO   SKIN: No rashes or lesions    CAPILLARY BLOOD GLUCOSE      POCT Blood Glucose.: 118 mg/dL (20 Oct 2023 16:47)      PHYSICAL EXAM:      HOSPITAL MEDICATIONS:  MEDICATIONS  (STANDING):  aspirin  chewable 81 milliGRAM(s) Oral daily  atorvastatin 40 milliGRAM(s) Oral at bedtime  cholecalciferol 1000 Unit(s) Oral daily  finasteride 5 milliGRAM(s) Oral daily  metoprolol succinate  milliGRAM(s) Oral daily  midodrine. 10 milliGRAM(s) Oral every 8 hours  pantoprazole    Tablet 40 milliGRAM(s) Oral before breakfast  piperacillin/tazobactam IVPB.. 3.375 Gram(s) IV Intermittent every 8 hours  pyridoxine 100 milliGRAM(s) Oral daily  sodium chloride 0.9%. 1000 milliLiter(s) (100 mL/Hr) IV Continuous <Continuous>  tamsulosin 0.4 milliGRAM(s) Oral at bedtime    MEDICATIONS  (PRN):  acetaminophen     Tablet .. 650 milliGRAM(s) Oral every 6 hours PRN Temp greater or equal to 38.5C (101.3F), Moderate Pain (4 - 6)      LABS:                        16.8   5.30  )-----------( 246      ( 20 Oct 2023 17:16 )             49.2     10-20    130<L>  |  100  |  14  ----------------------------<  124<H>  5.0   |  21<L>  |  0.79    Ca    7.7<L>      20 Oct 2023 18:18    TPro  7.4  /  Alb  3.2<L>  /  TBili  0.9  /  DBili  x   /  AST  67<H>  /  ALT  21  /  AlkPhos  104  10-20    PT/INR - ( 20 Oct 2023 17:16 )   PT: 16.2 sec;   INR: 1.56 ratio         PTT - ( 20 Oct 2023 17:16 )  PTT:30.6 sec  Urinalysis Basic - ( 20 Oct 2023 19:21 )    Color: Light Yellow / Appearance: Clear / S.010 / pH: x  Gluc: x / Ketone: Negative  / Bili: Negative / Urobili: Negative   Blood: x / Protein: Negative / Nitrite: Negative   Leuk Esterase: Large / RBC: 1 /hpf / WBC 24 /HPF   Sq Epi: x / Non Sq Epi: x / Bacteria: Negative        Venous Blood Gas:  10-20 @ 22:15  7.32/51/26/26/33.2  VBG Lactate: 1.6  Venous Blood Gas:  10-20 @ 17:20  7.39/46/22/28/25.7  VBG Lactate: 3.1      MICROBIOLOGY:     RADIOLOGY:  [ ] Reviewed and interpreted by me    EKG:

## 2023-10-21 NOTE — CONSULT NOTE ADULT - ASSESSMENT
89 year old male with PMHx of afib, Alzheimer's (oriented X 0-1 at baseline), brought in from cardiologist office for altered mental status.     Febrile on presentation to 103.2 F  Hypotensive   No leukocytosis     Workup:  -UA: WBC 24, neg bacteria   -Urine Cx: pending   -Covid/RSV/Flu: pending   -CXR: clear lungs   -Blood Cx: pending   -CT head: no acute findings     Antimicrobials:   Vanc X1   Zosyn 10/20-->    #Acute encephalopathy   #Fever, hypotension     Recommendations:       PT TO BE SEEN. PRELIM NOTE  PENDING RECS. PLEASE WAIT FOR FINAL RECS AFTER DISCUSSION WITH ATTENDING#         89 year old male with PMHx of afib, Alzheimer's (oriented X 0-1 at baseline), brought in from cardiologist office for altered mental status.     Febrile on presentation to 103.2 F  Hypotensive   No leukocytosis     Workup:  -UA: WBC 24, neg bacteria   -Urine Cx: pending   -Covid/RSV/Flu: pending   -CXR: clear lungs   -Blood Cx: pending   -CT head: no acute findings     Antimicrobials:   Vanc X1   Zosyn 10/20-->    #Acute encephalopathy, fever, hypotension  #Pyuria with no bacteruria less likely source of sepsis, r/o other infectious foci, r/o non infectious etiologies     Recommendations:   Continue Zosyn 3.375 g IV q 8hrs   Get CT C/A/P   Send full RVP  F/up blood Cx   Non infectious workup per primary team   Monitor T curve and Bp trend     Discussed with Dr Dedrick Albrecht MD, PGY5  ID fellow  [employer] Teams Preferred  After 5pm/weekends call [phone]

## 2023-10-21 NOTE — PHYSICAL THERAPY INITIAL EVALUATION ADULT - ADDITIONAL COMMENTS
pt from home with wife and 24/7 HHA; ambulates with 3WW at baseline with assist. Family reports pt sometimes needs x2 person assist at baseline.

## 2023-10-21 NOTE — PHYSICAL THERAPY INITIAL EVALUATION ADULT - PERTINENT HX OF CURRENT PROBLEM, REHAB EVAL
89M with PMHx of afib, Alzheimer's (oriented X 0-1 at baseline), brought in from cardiologist office for altered mental status.  Patient is a brought in accompanied by daughter who is providing collateral information. Daughter states that patient has history of Alzheimer's dementia, at baseline is not alert or oriented, however has been increasingly lethargic relative to his baseline. For the past 2 to 3 days.  States that this morning, woke up with some chills/shaking so brought to see cardiologist. Had a cold 1 week ago, treated with amoxicillin. No known fevers at home or chills.

## 2023-10-21 NOTE — CONSULT NOTE ADULT - SUBJECTIVE AND OBJECTIVE BOX
HPI:    89 year old male with PMHx of afib, Alzheimer's (oriented X 0-1 at baseline), brought in from cardiologist office for altered mental status.  Patient is a brought in accompanied by daughter who is providing collateral information. Daughter states that patient has history of Alzheimer's dementia, at baseline is not alert or oriented, however has been increasingly lethargic relative to his baseline. For the past 2 to 3 days.  States that this morning, woke up with some chills/shaking so brought to see cardiologist. Had a cold 1 week ago, treated with amoxicillin. No known fevers at home or chills.      ID consulted for further recommendations.     REVIEW OF SYSTEMS  [  ] ROS unobtainable because:    [  ] All other systems negative except as noted below    Constitutional:  [ ] fever [ ] chills  [ ] weight loss  [ ]night sweat  [ ]poor appetite/PO intake [ ]fatigue   Skin:  [ ] rash [ ] phlebitis	  Eyes: [ ] icterus [ ] pain  [ ] discharge	  ENMT: [ ] sore throat  [ ] thrush [ ] ulcers [ ] exudates [ ]anosmia  Respiratory: [ ] dyspnea [ ] hemoptysis [ ] cough [ ] sputum	  Cardiovascular:  [ ] chest pain [ ] palpitations [ ] edema	  Gastrointestinal:  [ ] nausea [ ] vomiting [ ] diarrhea [ ] constipation [ ] pain	  Genitourinary:  [ ] dysuria [ ] frequency [ ] hematuria [ ] discharge [ ] flank pain  [ ] incontinence  Musculoskeletal:  [ ] myalgias [ ] arthralgias [ ] arthritis  [ ] back pain  Neurological:  [ ] headache [ ] weakness [ ] seizures  [ ] confusion/altered mental status    prior hospital charts reviewed [V]  primary team notes reviewed [V]  other consultant notes reviewed [V]    PAST MEDICAL & SURGICAL HISTORY:  CVA (cerebral vascular accident)    CHF (congestive heart failure)    Atrial fibrillation    BPH (benign prostatic hyperplasia)    Dementia    SOCIAL HISTORY:  - Denied smoking/vaping/alcohol/recreational drug use    FAMILY HISTORY:    Allergies  No Known Allergies    ANTIMICROBIALS:  piperacillin/tazobactam IVPB.. 3.375 every 8 hours    ANTIMICROBIALS (past 90 days):  MEDICATIONS  (STANDING):  piperacillin/tazobactam IVPB..   25 mL/Hr IV Intermittent (10-21-23 @ 13:57)   25 mL/Hr IV Intermittent (10-21-23 @ 05:18)   25 mL/Hr IV Intermittent (10-20-23 @ 22:33)    piperacillin/tazobactam IVPB...   200 mL/Hr IV Intermittent (10-20-23 @ 18:21)    vancomycin  IVPB.   250 mL/Hr IV Intermittent (10-20-23 @ 18:15)    OTHER MEDS:   MEDICATIONS  (STANDING):  acetaminophen     Tablet .. 650 every 6 hours PRN  aspirin  chewable 81 daily  atorvastatin 40 at bedtime  finasteride 5 daily  midodrine. 10 every 8 hours  pantoprazole    Tablet 40 before breakfast  tamsulosin 0.4 at bedtime    VITALS:  Vital Signs Last 24 Hrs  T(F): 97.6 (10-21-23 @ 13:17), Max: 103.2 (10-20-23 @ 16:37)    Vital Signs Last 24 Hrs  HR: 89 (10-21-23 @ 13:17) (73 - 144)  BP: 111/70 (10-21-23 @ 13:17) (70/49 - 133/89)  RR: 18 (10-21-23 @ 13:17)  SpO2: 96% (10-21-23 @ 13:17) (92% - 100%)  Wt(kg): --    EXAM:  Physical Exam:  Constitutional:  well preserved, comfortable  Head/Eyes: no icterus, PERRL, EOMI  ENT:  supple; no thrush  LUNGS:  CTA  CVS:  normal S1, S2, no murmur  Abd:  soft, non-tender; non-distended  Ext:  no edema  Vascular:  IV site no erythema tenderness or discharge  MSK:  joints without swelling  Neuro: AAO X 3, non- focal    Labs:                        16.8   5.30  )-----------( 246      ( 20 Oct 2023 17:16 )             49.2     10-20    130<L>  |  100  |  14  ----------------------------<  124<H>  5.0   |  21<L>  |  0.79    Ca    7.7<L>      20 Oct 2023 18:18    TPro  7.4  /  Alb  3.2<L>  /  TBili  0.9  /  DBili  x   /  AST  67<H>  /  ALT  21  /  AlkPhos  104  10-20    WBC Trend:  WBC Count: 5.30 (10-20-23 @ 17:16)    Auto Neutrophil #: 4.61 K/uL (10-20-23 @ 17:16)  Band Neutrophils %: 4.3 % (10-20-23 @ 17:16)  Auto Neutrophil #: 7.29 K/uL (23 @ 21:18)    Creatine Trend:  Creatinine: 0.79 (10-20)  Creatinine: 0.88 (10-20)    Liver Biochemical Testing Trend:  Alanine Aminotransferase (ALT/SGPT): 21 (10-20)  Alanine Aminotransferase (ALT/SGPT): 26 ()  Alanine Aminotransferase (ALT/SGPT): 32 ()  Aspartate Aminotransferase (AST/SGOT): 67 (10-20-23 @ 17:16)  Aspartate Aminotransferase (AST/SGOT): 70 (23 @ 23:42)  Aspartate Aminotransferase (AST/SGOT): 100 (23 @ 21:18)  Bilirubin Total: 0.9 (10-20)  Bilirubin Total, Serum: 0.9 ()  Bilirubin Total, Serum: 0.8 ()    Trend LDH    Auto Eosinophil %: 0.0 % (10-20-23 @ 17:16)    Urinalysis Basic - ( 20 Oct 2023 19:21 )    Color: Light Yellow / Appearance: Clear / S.010 / pH: x  Gluc: x / Ketone: Negative  / Bili: Negative / Urobili: Negative   Blood: x / Protein: Negative / Nitrite: Negative   Leuk Esterase: Large / RBC: 1 /hpf / WBC 24 /HPF   Sq Epi: x / Non Sq Epi: x / Bacteria: Negative    MICROBIOLOGY:    Troponin T, High Sensitivity Result: 24 (10-20)    Lactate, Blood: 1.7 (10-20 @ 22:15)  Blood Gas Venous - Lactate: 1.6 (10-20 @ 22:15)  Blood Gas Venous - Lactate: 3.1 (10-20 @ 17:20)    RADIOLOGY:  imaging below personally reviewed    < from: CT Head No Cont (10.20.23 @ 20:07) >  IMPRESSION:  No acute intracranial hemorrhage or acute territorial infarction. Chronic   findings as above.    --- End of Report ---    < end of copied text >  < from: Xray Chest 1 View- PORTABLE-Urgent (10.20.23 @ 17:45) >  IMPRESSION:  Clear lungs.    --- End of Report ---    < end of copied text >         HPI:    89 year old male with PMHx of afib, Alzheimer's (oriented X 0-1 at baseline), brought in from cardiologist office for altered mental status.  Patient is a brought in accompanied by daughter who is providing collateral information. Daughter states that patient has history of Alzheimer's dementia, at baseline is not alert or oriented, however has been increasingly lethargic relative to his baseline. For the past 2 to 3 days.  States that this morning, woke up with some chills/shaking so brought to see cardiologist. Had a cold 1 week ago, treated with amoxicillin. No known fevers at home or chills.      ID consulted for further recommendations.     REVIEW OF SYSTEMS  [X] ROS unobtainable because:  confused  [ ] All other systems negative except as noted below    Constitutional:  [ ] fever [ ] chills  [ ] weight loss  [ ]night sweat  [ ]poor appetite/PO intake [ ]fatigue   Skin:  [ ] rash [ ] phlebitis	  Eyes: [ ] icterus [ ] pain  [ ] discharge	  ENMT: [ ] sore throat  [ ] thrush [ ] ulcers [ ] exudates [ ]anosmia  Respiratory: [ ] dyspnea [ ] hemoptysis [ ] cough [ ] sputum	  Cardiovascular:  [ ] chest pain [ ] palpitations [ ] edema	  Gastrointestinal:  [ ] nausea [ ] vomiting [ ] diarrhea [ ] constipation [ ] pain	  Genitourinary:  [ ] dysuria [ ] frequency [ ] hematuria [ ] discharge [ ] flank pain  [ ] incontinence  Musculoskeletal:  [ ] myalgias [ ] arthralgias [ ] arthritis  [ ] back pain  Neurological:  [ ] headache [ ] weakness [ ] seizures  [ ] confusion/altered mental status    prior hospital charts reviewed [V]  primary team notes reviewed [V]  other consultant notes reviewed [V]    PAST MEDICAL & SURGICAL HISTORY:  CVA (cerebral vascular accident)    CHF (congestive heart failure)    Atrial fibrillation    BPH (benign prostatic hyperplasia)    Dementia    SOCIAL HISTORY:  - Denied smoking/vaping/alcohol/recreational drug use    FAMILY HISTORY:    Allergies  No Known Allergies    ANTIMICROBIALS:  piperacillin/tazobactam IVPB.. 3.375 every 8 hours    ANTIMICROBIALS (past 90 days):  MEDICATIONS  (STANDING):  piperacillin/tazobactam IVPB..   25 mL/Hr IV Intermittent (10-21-23 @ 13:57)   25 mL/Hr IV Intermittent (10-21-23 @ 05:18)   25 mL/Hr IV Intermittent (10-20-23 @ 22:33)    piperacillin/tazobactam IVPB...   200 mL/Hr IV Intermittent (10-20-23 @ 18:21)    vancomycin  IVPB.   250 mL/Hr IV Intermittent (10-20-23 @ 18:15)    OTHER MEDS:   MEDICATIONS  (STANDING):  acetaminophen     Tablet .. 650 every 6 hours PRN  aspirin  chewable 81 daily  atorvastatin 40 at bedtime  finasteride 5 daily  midodrine. 10 every 8 hours  pantoprazole    Tablet 40 before breakfast  tamsulosin 0.4 at bedtime    VITALS:  Vital Signs Last 24 Hrs  T(F): 97.6 (10-21-23 @ 13:17), Max: 103.2 (10-20-23 @ 16:37)    Vital Signs Last 24 Hrs  HR: 89 (10-21-23 @ 13:17) (73 - 144)  BP: 111/70 (10-21-23 @ 13:17) (70/49 - 133/89)  RR: 18 (10-21-23 @ 13:17)  SpO2: 96% (10-21-23 @ 13:17) (92% - 100%)  Wt(kg): --    EXAM:  Physical Exam:  Constitutional:  somnolent, confused   Head/Eyes: no icterus, PERRL, EOMI  ENT:  supple; no thrush  LUNGS:  CTA  CVS:  normal S1, S2, no murmur  Abd:  soft, non-tender; non-distended  Ext:  no edema  skin: stage 1 sacral ulcer   Vascular:  IV site no erythema tenderness or discharge  MSK:  joints without swelling  Neuro: Somnolent, confused     Labs:                        16.8   5.30  )-----------( 246      ( 20 Oct 2023 17:16 )             49.2     10-20    130<L>  |  100  |  14  ----------------------------<  124<H>  5.0   |  21<L>  |  0.79    Ca    7.7<L>      20 Oct 2023 18:18    TPro  7.4  /  Alb  3.2<L>  /  TBili  0.9  /  DBili  x   /  AST  67<H>  /  ALT  21  /  AlkPhos  104  10-20    WBC Trend:  WBC Count: 5.30 (10-20-23 @ 17:16)    Auto Neutrophil #: 4.61 K/uL (10-20-23 @ 17:16)  Band Neutrophils %: 4.3 % (10-20-23 @ 17:16)  Auto Neutrophil #: 7.29 K/uL (23 @ 21:18)    Creatine Trend:  Creatinine: 0.79 (10-20)  Creatinine: 0.88 (10-20)    Liver Biochemical Testing Trend:  Alanine Aminotransferase (ALT/SGPT): 21 (10-20)  Alanine Aminotransferase (ALT/SGPT): 26 ()  Alanine Aminotransferase (ALT/SGPT): 32 ()  Aspartate Aminotransferase (AST/SGOT): 67 (10-20-23 @ 17:16)  Aspartate Aminotransferase (AST/SGOT): 70 (23 @ 23:42)  Aspartate Aminotransferase (AST/SGOT): 100 (23 @ 21:18)  Bilirubin Total: 0.9 (10-20)  Bilirubin Total, Serum: 0.9 ()  Bilirubin Total, Serum: 0.8 ()    Trend LDH    Auto Eosinophil %: 0.0 % (10-20-23 @ 17:16)    Urinalysis Basic - ( 20 Oct 2023 19:21 )    Color: Light Yellow / Appearance: Clear / S.010 / pH: x  Gluc: x / Ketone: Negative  / Bili: Negative / Urobili: Negative   Blood: x / Protein: Negative / Nitrite: Negative   Leuk Esterase: Large / RBC: 1 /hpf / WBC 24 /HPF   Sq Epi: x / Non Sq Epi: x / Bacteria: Negative    MICROBIOLOGY:    Troponin T, High Sensitivity Result: 24 (10-20)    Lactate, Blood: 1.7 (10-20 @ 22:15)  Blood Gas Venous - Lactate: 1.6 (10-20 @ 22:15)  Blood Gas Venous - Lactate: 3.1 (10-20 @ 17:20)    RADIOLOGY:  imaging below personally reviewed    < from: CT Head No Cont (10.20.23 @ 20:07) >  IMPRESSION:  No acute intracranial hemorrhage or acute territorial infarction. Chronic   findings as above.    --- End of Report ---    < end of copied text >  < from: Xray Chest 1 View- PORTABLE-Urgent (10.20.23 @ 17:45) >  IMPRESSION:  Clear lungs.    --- End of Report ---    < end of copied text >         HPI:    89 year old male with PMHx of afib, Alzheimer's (oriented X 0-1 at baseline), brought in from cardiologist office for altered mental status.  Patient is a brought in accompanied by daughter who is providing collateral information. Daughter states that patient has history of Alzheimer's dementia, at baseline is not alert or oriented, however has been increasingly lethargic relative to his baseline. For the past 2 to 3 days.  States that this morning, woke up with some chills/shaking so brought to see cardiologist. Had a cold 1 week ago, treated with amoxicillin. No known fevers at home or chills.      ID consulted for further recommendations.     REVIEW OF SYSTEMS  [X] ROS unobtainable because:  confused  [ ] All other systems negative except as noted below    Constitutional:  [ ] fever [ ] chills  [ ] weight loss  [ ]night sweat  [ ]poor appetite/PO intake [ ]fatigue   Skin:  [ ] rash [ ] phlebitis	  Eyes: [ ] icterus [ ] pain  [ ] discharge	  ENMT: [ ] sore throat  [ ] thrush [ ] ulcers [ ] exudates [ ]anosmia  Respiratory: [ ] dyspnea [ ] hemoptysis [ ] cough [ ] sputum	  Cardiovascular:  [ ] chest pain [ ] palpitations [ ] edema	  Gastrointestinal:  [ ] nausea [ ] vomiting [ ] diarrhea [ ] constipation [ ] pain	  Genitourinary:  [ ] dysuria [ ] frequency [ ] hematuria [ ] discharge [ ] flank pain  [ ] incontinence  Musculoskeletal:  [ ] myalgias [ ] arthralgias [ ] arthritis  [ ] back pain  Neurological:  [ ] headache [ ] weakness [ ] seizures  [ ] confusion/altered mental status    prior hospital charts reviewed [V]  primary team notes reviewed [V]  other consultant notes reviewed [V]    PAST MEDICAL & SURGICAL HISTORY:  CVA (cerebral vascular accident)    CHF (congestive heart failure)    Atrial fibrillation    BPH (benign prostatic hyperplasia)    Dementia    SOCIAL HISTORY:  No known EtOH or illicit drug history    FAMILY HISTORY:  No known family history of dementia    Allergies  No Known Allergies    ANTIMICROBIALS:  piperacillin/tazobactam IVPB.. 3.375 every 8 hours    ANTIMICROBIALS (past 90 days):  MEDICATIONS  (STANDING):  piperacillin/tazobactam IVPB..   25 mL/Hr IV Intermittent (10-21-23 @ 13:57)   25 mL/Hr IV Intermittent (10-21-23 @ 05:18)   25 mL/Hr IV Intermittent (10-20-23 @ 22:33)    piperacillin/tazobactam IVPB...   200 mL/Hr IV Intermittent (10-20-23 @ 18:21)    vancomycin  IVPB.   250 mL/Hr IV Intermittent (10-20-23 @ 18:15)    OTHER MEDS:   MEDICATIONS  (STANDING):  acetaminophen     Tablet .. 650 every 6 hours PRN  aspirin  chewable 81 daily  atorvastatin 40 at bedtime  finasteride 5 daily  midodrine. 10 every 8 hours  pantoprazole    Tablet 40 before breakfast  tamsulosin 0.4 at bedtime    VITALS:  Vital Signs Last 24 Hrs  T(F): 97.6 (10-21-23 @ 13:17), Max: 103.2 (10-20-23 @ 16:37)    Vital Signs Last 24 Hrs  HR: 89 (10-21-23 @ 13:17) (73 - 144)  BP: 111/70 (10-21-23 @ 13:17) (70/49 - 133/89)  RR: 18 (10-21-23 @ 13:17)  SpO2: 96% (10-21-23 @ 13:17) (92% - 100%)  Wt(kg): --    EXAM:  Physical Exam:  Constitutional:  somnolent, confused   Head/Eyes: no icterus, PERRL, EOMI  ENT:  supple; no thrush  LUNGS:  CTA  CVS:  normal S1, S2, no murmur  Abd:  soft, non-tender; non-distended  Ext:  no edema  skin: stage 1 sacral ulcer   Vascular:  IV site no erythema tenderness or discharge  MSK:  joints without swelling  Neuro: Somnolent, confused     Labs:                        16.8   5.30  )-----------( 246      ( 20 Oct 2023 17:16 )             49.2     10-20    130<L>  |  100  |  14  ----------------------------<  124<H>  5.0   |  21<L>  |  0.79    Ca    7.7<L>      20 Oct 2023 18:18    TPro  7.4  /  Alb  3.2<L>  /  TBili  0.9  /  DBili  x   /  AST  67<H>  /  ALT  21  /  AlkPhos  104  10-20    WBC Trend:  WBC Count: 5.30 (10-20-23 @ 17:16)    Auto Neutrophil #: 4.61 K/uL (10-20-23 @ 17:16)  Band Neutrophils %: 4.3 % (10-20-23 @ 17:16)  Auto Neutrophil #: 7.29 K/uL (23 @ 21:18)    Creatine Trend:  Creatinine: 0.79 (10-20)  Creatinine: 0.88 (10-20)    Liver Biochemical Testing Trend:  Alanine Aminotransferase (ALT/SGPT): 21 (10-20)  Alanine Aminotransferase (ALT/SGPT): 26 ()  Alanine Aminotransferase (ALT/SGPT): 32 ()  Aspartate Aminotransferase (AST/SGOT): 67 (10-20-23 @ 17:16)  Aspartate Aminotransferase (AST/SGOT): 70 (23 @ 23:42)  Aspartate Aminotransferase (AST/SGOT): 100 (23 @ 21:18)  Bilirubin Total: 0.9 (10-20)  Bilirubin Total, Serum: 0.9 ()  Bilirubin Total, Serum: 0.8 ()    Trend LDH    Auto Eosinophil %: 0.0 % (10-20-23 @ 17:16)    Urinalysis Basic - ( 20 Oct 2023 19:21 )    Color: Light Yellow / Appearance: Clear / S.010 / pH: x  Gluc: x / Ketone: Negative  / Bili: Negative / Urobili: Negative   Blood: x / Protein: Negative / Nitrite: Negative   Leuk Esterase: Large / RBC: 1 /hpf / WBC 24 /HPF   Sq Epi: x / Non Sq Epi: x / Bacteria: Negative    MICROBIOLOGY:    Troponin T, High Sensitivity Result: 24 (10-20)    Lactate, Blood: 1.7 (10-20 @ 22:15)  Blood Gas Venous - Lactate: 1.6 (10-20 @ 22:15)  Blood Gas Venous - Lactate: 3.1 (10-20 @ 17:20)    RADIOLOGY:  imaging below personally reviewed    < from: CT Head No Cont (10.20.23 @ 20:07) >  IMPRESSION:  No acute intracranial hemorrhage or acute territorial infarction. Chronic   findings as above.    --- End of Report ---    < end of copied text >  < from: Xray Chest 1 View- PORTABLE-Urgent (10.20.23 @ 17:45) >  IMPRESSION:  Clear lungs.    --- End of Report ---    < end of copied text >

## 2023-10-21 NOTE — CONSULT NOTE ADULT - SUBJECTIVE AND OBJECTIVE BOX
[facility] : Progress Note :: [phone], (office [phone]),   Dr Olmstead / Dr Lemos / Dr Guzman / Dr Rondon / Dr Neeraj EAGLE / Dr Claros / Dr Louise / Dr George varghese  _____________________________________________________________________________________________  Patient is a 89y Male with H/O alzeihmer's dementia CHF. Per family has been confused with fever and chills  for 2-3 days. was treated with amoxicillin.  Family took patient to be seen to cardiology, who sent pt to ED.  Found with UTI, hyponatremia  and hypotension ( s/p IV NS bolus with improvement of BP)   He is a poor historian sec to underlying dementia     PAST MEDICAL & SURGICAL HISTORY:  CVA (cerebral vascular accident)      CHF (congestive heart failure)      Atrial fibrillation      BPH (benign prostatic hyperplasia)      Dementia        No Known Allergies    Home Medications Reviewed  Hospital Medications:   MEDICATIONS  (STANDING):  aspirin  chewable 81 milliGRAM(s) Oral daily  atorvastatin 40 milliGRAM(s) Oral at bedtime  cholecalciferol 1000 Unit(s) Oral daily  finasteride 5 milliGRAM(s) Oral daily  midodrine. 10 milliGRAM(s) Oral every 8 hours  pantoprazole    Tablet 40 milliGRAM(s) Oral before breakfast  piperacillin/tazobactam IVPB.. 3.375 Gram(s) IV Intermittent every 8 hours  pyridoxine 100 milliGRAM(s) Oral daily  sodium chloride 0.9%. 1000 milliLiter(s) (100 mL/Hr) IV Continuous <Continuous>  tamsulosin 0.4 milliGRAM(s) Oral at bedtime    SOCIAL HISTORY:  Denies ETOh,Smoking,   FAMILY HISTORY:    REVIEW OF SYSTEMS:  CONSTITUTIONAL: No weakness, fevers or chills  EYES/ENT: No visual changes;  No vertigo or throat pain   NECK: No pain or stiffness  RESPIRATORY: No cough, wheezing, hemoptysis; No shortness of breath  CARDIOVASCULAR: No chest pain or palpitations.  GASTROINTESTINAL: No abdominal or epigastric pain. No nausea, vomiting, or hematemesis; No diarrhea or constipation. No melena or hematochezia.  GENITOURINARY: No dysuria, frequency, foamy urine, urinary urgency, incontinence or hematuria  NEUROLOGICAL: No numbness or weakness  SKIN: No itching, burning, rashes, or lesions   VASCULAR: No bilateral lower extremity edema.   All other review of systems is negative unless indicated above.    VITALS:  T(F): 98.3 (10-21-23 @ 09:45), Max: 103.2 (10-20-23 @ 16:37)  HR: 127 (10-21-23 @ 09:45)  BP: 104/71 (10-21-23 @ 09:45)  RR: 18 (10-21-23 @ 09:45)  SpO2: 93% (10-21-23 @ 09:45)  Wt(kg): --      PHYSICAL EXAM:  Constitutional: NAD  HEENT: anicteric sclera, oropharynx clear  Neck: No JVD  Respiratory: CTAB, no wheezes, rales or rhonchi  Cardiovascular: S1, S2, RRR  Gastrointestinal: BS+, soft, NT/ND  Extremities: peripheral edema+  : No CVA tenderness. No brewster.       LABS:  10-20    130<L>  |  100  |  14  ----------------------------<  124<H>  5.0   |  21<L>  |  0.79    Ca    7.7<L>      20 Oct 2023 18:18    TPro  7.4  /  Alb  3.2<L>  /  TBili  0.9  /  DBili      /  AST  67<H>  /  ALT  21  /  AlkPhos  104  10-20    Creatinine Trend: 0.79 <--, 0.88 <--                        16.8   5.30  )-----------( 246      ( 20 Oct 2023 17:16 )             49.2     Urine Studies:  Urinalysis Basic - ( 20 Oct 2023 19:21 )    Color: Light Yellow / Appearance: Clear / S.010 / pH:   Gluc:  / Ketone: Negative  / Bili: Negative / Urobili: Negative   Blood:  / Protein: Negative / Nitrite: Negative   Leuk Esterase: Large / RBC: 1 /hpf / WBC 24 /HPF   Sq Epi:  / Non Sq Epi:  / Bacteria: Negative        RADIOLOGY & ADDITIONAL STUDIES:

## 2023-10-21 NOTE — CONSULT NOTE ADULT - ATTENDING COMMENTS
89-year-old male history of atrial fibrillation on Coumadin, Alzheimer's, AAO x0-1 at baseline,  brought in from cardiologist office for altered mental status.     Patient was found to be hypotensive. with AMS. Currently awake but not following commands. Called as patient BP was low in the 70-80. S/P 1.5 L of IVF. POCUS with normal LVSF. RV < LV and very small IVC. Currently being treated for an UTI. On room air was sats of 100. Clinically appears dry.     # UTI  # Hypotension  - Agree with broad spectrum abx, cultures are pending.   - Can continue with IVF, would give another 1 L LR, Labs improving since admission despite intermittently low BP  - Can start midodrine 10mg q8, currently BP with SBP of 110's.   - patient is currently not a MICU candidate at this time. Feel free with call back with questions, concerns or change in clinical status.
89-yo M w/ PMH of Alzheimer's disease and afib, admitted with AMS, found to have fever w/ hypotension w/o leukocytosis. COVID/RSV/Flu test neg. CXR w/ clear lungs. UA mild pyuria    #Fever  #AMS  #Hypotension  #Pyuria  - Tmax 103.2 on presentation. AMS from baseline A&O x0-1  - Physical exam grossly unremarkable. Stage 1 sacral ulcer on exam  - CXR neg. UCx pending. BCx pending.  - Please obtain a full RVP  - CT chest/abd/pelvis to look for infectious foci  - No objection continuing Zosyn at this time.   - Monitor WBC and fever curve    Discussed with ID fellow and primary team provider.  Thank you for this consult. Inpatient ID team will follow.    Klever Tse MD, PhD  Attending Physician  Division of Infectious Diseases  Department of Medicine    Please contact through MS Teams message.  Office: [phone] (after 5 PM or weekend)

## 2023-10-21 NOTE — CONSULT NOTE ADULT - SUBJECTIVE AND OBJECTIVE BOX
Cardiovascular Disease Initial Evaluation  Date of service: 10-21-23 @ 09:18    CHIEF COMPLAINT: Fever and chills    HISTORY OF PRESENT ILLNESS:  89-year-old male history of atrial fibrillation on Coumadin, Alzheimer's, dementia,  brought in from cardiologist office for altered mental status.  Unable to obtain information from patient.  History obtained from ED records and H and P. Patient has history of Alzheimer's dementia, at baseline  is not alert or oriented, however has been increasingly lethargic relative to his baseline for the past 2 to 3 days.  Family states that he had some  chills/shaking so brought to see cardiologist. Had a cold 1 week ago, treated with amoxicillin. Pt admitted for further management. Started on broad spec abx. Currently patient is in no distress.       Allergies    No Known Allergies    Intolerances    	    MEDICATIONS:  aspirin  chewable 81 milliGRAM(s) Oral daily  midodrine. 10 milliGRAM(s) Oral every 8 hours    piperacillin/tazobactam IVPB.. 3.375 Gram(s) IV Intermittent every 8 hours      acetaminophen     Tablet .. 650 milliGRAM(s) Oral every 6 hours PRN    pantoprazole    Tablet 40 milliGRAM(s) Oral before breakfast    atorvastatin 40 milliGRAM(s) Oral at bedtime  finasteride 5 milliGRAM(s) Oral daily    cholecalciferol 1000 Unit(s) Oral daily  pyridoxine 100 milliGRAM(s) Oral daily  sodium chloride 0.9%. 1000 milliLiter(s) IV Continuous <Continuous>  tamsulosin 0.4 milliGRAM(s) Oral at bedtime      PAST MEDICAL & SURGICAL HISTORY:  CVA (cerebral vascular accident)      CHF (congestive heart failure)      Atrial fibrillation      BPH (benign prostatic hyperplasia)      Dementia          FAMILY HISTORY:      SOCIAL HISTORY:    The patient is a nonsmoker       REVIEW OF SYSTEMS:  See HPI, otherwise complete 14 point review of systems negative    [ ] All others negative	  [x ] Unable to obtain    PHYSICAL EXAM:  T(C): 36.7 (10-20-23 @ 22:21), Max: 39.6 (10-20-23 @ 16:37)  HR: 87 (10-21-23 @ 04:18) (73 - 144)  BP: 116/82 (10-21-23 @ 04:18) (70/49 - 133/89)  RR: 18 (10-21-23 @ 04:18) (12 - 27)  SpO2: 94% (10-21-23 @ 04:18) (92% - 100%)  Wt(kg): --  I&O's Summary      Appearance: No Acute Distress; resting comfortably  HEENT:  Normal oral mucosa, PERRL, EOMI	  Cardiovascular: Normal S1 S2, No JVD, No murmurs/rubs/gallops  Respiratory: Normal respiratory effort; Lungs clear to auscultation bilaterally  Gastrointestinal:  Soft, Non-tender, + BS	  Skin: No rashes, No ecchymoses, No cyanosis	  Neurologic: Non-focal; no weakness  Extremities: No clubbing, cyanosis or edema  Vascular: Peripheral pulses palpable 2+ bilaterally  Psychiatry: A & O x 1, Mood & affect appropriate    Laboratory Data:	 	    CBC Full  -  ( 20 Oct 2023 17:16 )  WBC Count : 5.30 K/uL  Hemoglobin : 16.8 g/dL  Hematocrit : 49.2 %  Platelet Count - Automated : 246 K/uL  Mean Cell Volume : 97.0 fl  Mean Cell Hemoglobin : 33.1 pg  Mean Cell Hemoglobin Concentration : 34.1 gm/dL  Auto Neutrophil # : 4.61 K/uL  Auto Lymphocyte # : 0.19 K/uL  Auto Monocyte # : 0.51 K/uL  Auto Eosinophil # : 0.00 K/uL  Auto Basophil # : 0.00 K/uL  Auto Neutrophil % : 82.6 %  Auto Lymphocyte % : 3.5 %  Auto Monocyte % : 9.6 %  Auto Eosinophil % : 0.0 %  Auto Basophil % : 0.0 %    10-20    130<L>  |  100  |  14  ----------------------------<  124<H>  5.0   |  21<L>  |  0.79  10-20    130<L>  |  95<L>  |  15  ----------------------------<  131<H>  6.8<HH>   |  24  |  0.88    Ca    7.7<L>      20 Oct 2023 18:18  Ca    9.0      20 Oct 2023 17:16    TPro  7.4  /  Alb  3.2<L>  /  TBili  0.9  /  DBili  x   /  AST  67<H>  /  ALT  21  /  AlkPhos  104  10-20      proBNP:   Lipid Profile:   HgA1c:   TSH: Thyroid Stimulating Hormone, Serum: 2.14 uIU/mL (10-20 @ 17:16)        CARDIAC MARKERS:            Interpretation of Telemetry: Afib 90-110s  ECG:  Not in  chart  RADIOLOGY:  OTHER: 	    PREVIOUS DIAGNOSTIC TESTING:    [ ] Echocardiogram:  [ ] Catheterization:  [ ] Stress Test:  	    Assessment:  89-year-old male history of atrial fibrillation on Coumadin, Alzheimer's, dementia,  brought in from cardiologist office for altered mental status    Plan of Care:    #Encephalopathy  - Pt with baseline alzheimer's dementia which has acute worsened  - CT head negative for acute pathology  - Likely 2/2 infectious etiology  - Abx as per primary team      #Afib  - Pt on Coumadin  - INR goal 2-3  - Pt on BB at home however currently on hold 2/2 hypotension  - Recommend resuming Metoprolol Succinate 50mg daily as BP tolerates for rate control  - If unable to tolerate BB, can consider Digoxin    #Alzheimers  - Defer to primary team    #HLD  - Statin as ordered.       72 minutes spent on total encounter; more than 50% of the visit was spent counseling and/or coordinating care by the attending physician.   	  Orestes Hart DO [facility]  Cardiovascular Diseases  [phone]     Cardiovascular Disease Initial Evaluation  Date of service: 10-21-23 @ 09:18    CHIEF COMPLAINT: Fever and chills    HISTORY OF PRESENT ILLNESS:  89-year-old male history of atrial fibrillation on Coumadin, Alzheimer's, dementia,  brought in from cardiologist office for altered mental status.  Unable to obtain information from patient.  History obtained from ED records and H and P. Patient has history of Alzheimer's dementia, at baseline  is not alert or oriented, however has been increasingly lethargic relative to his baseline for the past 2 to 3 days.  Family states that he had some  chills/shaking so brought to see cardiologist. Had a cold 1 week ago, treated with amoxicillin. Pt admitted for further management. Started on broad spec abx. Currently patient is in no distress.       Allergies    No Known Allergies    Intolerances    	    MEDICATIONS:  aspirin  chewable 81 milliGRAM(s) Oral daily  midodrine. 10 milliGRAM(s) Oral every 8 hours    piperacillin/tazobactam IVPB.. 3.375 Gram(s) IV Intermittent every 8 hours      acetaminophen     Tablet .. 650 milliGRAM(s) Oral every 6 hours PRN    pantoprazole    Tablet 40 milliGRAM(s) Oral before breakfast    atorvastatin 40 milliGRAM(s) Oral at bedtime  finasteride 5 milliGRAM(s) Oral daily    cholecalciferol 1000 Unit(s) Oral daily  pyridoxine 100 milliGRAM(s) Oral daily  sodium chloride 0.9%. 1000 milliLiter(s) IV Continuous <Continuous>  tamsulosin 0.4 milliGRAM(s) Oral at bedtime      PAST MEDICAL & SURGICAL HISTORY:  CVA (cerebral vascular accident)      CHF (congestive heart failure)      Atrial fibrillation      BPH (benign prostatic hyperplasia)      Dementia          FAMILY HISTORY:      SOCIAL HISTORY:    The patient is a nonsmoker       REVIEW OF SYSTEMS:  See HPI, otherwise complete 14 point review of systems negative    [ ] All others negative	  [x ] Unable to obtain    PHYSICAL EXAM:  T(C): 36.7 (10-20-23 @ 22:21), Max: 39.6 (10-20-23 @ 16:37)  HR: 87 (10-21-23 @ 04:18) (73 - 144)  BP: 116/82 (10-21-23 @ 04:18) (70/49 - 133/89)  RR: 18 (10-21-23 @ 04:18) (12 - 27)  SpO2: 94% (10-21-23 @ 04:18) (92% - 100%)  Wt(kg): --  I&O's Summary      Appearance: No Acute Distress; resting comfortably  HEENT:  Normal oral mucosa, PERRL, EOMI	  Cardiovascular: Normal S1 S2, No JVD, No murmurs/rubs/gallops  Respiratory: Normal respiratory effort; Lungs clear to auscultation bilaterally  Gastrointestinal:  Soft, Non-tender, + BS	  Skin: No rashes, No ecchymoses, No cyanosis	  Neurologic: Non-focal; no weakness  Extremities: No clubbing, cyanosis or edema  Vascular: Peripheral pulses palpable 2+ bilaterally  Psychiatry: A & O x 1, Mood & affect appropriate    Laboratory Data:	 	    CBC Full  -  ( 20 Oct 2023 17:16 )  WBC Count : 5.30 K/uL  Hemoglobin : 16.8 g/dL  Hematocrit : 49.2 %  Platelet Count - Automated : 246 K/uL  Mean Cell Volume : 97.0 fl  Mean Cell Hemoglobin : 33.1 pg  Mean Cell Hemoglobin Concentration : 34.1 gm/dL  Auto Neutrophil # : 4.61 K/uL  Auto Lymphocyte # : 0.19 K/uL  Auto Monocyte # : 0.51 K/uL  Auto Eosinophil # : 0.00 K/uL  Auto Basophil # : 0.00 K/uL  Auto Neutrophil % : 82.6 %  Auto Lymphocyte % : 3.5 %  Auto Monocyte % : 9.6 %  Auto Eosinophil % : 0.0 %  Auto Basophil % : 0.0 %    10-20    130<L>  |  100  |  14  ----------------------------<  124<H>  5.0   |  21<L>  |  0.79  10-20    130<L>  |  95<L>  |  15  ----------------------------<  131<H>  6.8<HH>   |  24  |  0.88    Ca    7.7<L>      20 Oct 2023 18:18  Ca    9.0      20 Oct 2023 17:16    TPro  7.4  /  Alb  3.2<L>  /  TBili  0.9  /  DBili  x   /  AST  67<H>  /  ALT  21  /  AlkPhos  104  10-20      proBNP:   Lipid Profile:   HgA1c:   TSH: Thyroid Stimulating Hormone, Serum: 2.14 uIU/mL (10-20 @ 17:16)        CARDIAC MARKERS:            Interpretation of Telemetry: Afib 90-110s  ECG:  Not in  chart  RADIOLOGY:  OTHER: 	    PREVIOUS DIAGNOSTIC TESTING:    [ ] Echocardiogram:  [ ] Catheterization:  [ ] Stress Test:  	    Assessment:  89-year-old male history of atrial fibrillation on Coumadin, Alzheimer's, dementia,  brought in from cardiologist office for altered mental status    Plan of Care:    #Encephalopathy  - Pt with baseline alzheimer's dementia which has acute worsened  - CT head negative for acute pathology  - Likely 2/2 infectious etiology  - Abx as per primary team    #Sepsis  - Abx as per primary team  - MICU consulted. Pt not a candidate at this time   - Continue to monitor.       #Afib  - Pt on Coumadin  - INR goal 2-3  - Pt on BB at home however currently on hold 2/2 hypotension  - Recommend resuming Metoprolol Succinate 50mg daily as BP tolerates for rate control  - If unable to tolerate BB, can consider Digoxin    #Alzheimers  - Defer to primary team    #HLD  - Statin as ordered.       72 minutes spent on total encounter; more than 50% of the visit was spent counseling and/or coordinating care by the attending physician.   	  Orestes Hart,  [facility]  Cardiovascular Diseases  [phone]

## 2023-10-22 LAB
ANION GAP SERPL CALC-SCNC: 11 MMOL/L — SIGNIFICANT CHANGE UP (ref 5–17)
ANION GAP SERPL CALC-SCNC: 11 MMOL/L — SIGNIFICANT CHANGE UP (ref 5–17)
APTT BLD: 35.2 SEC — SIGNIFICANT CHANGE UP (ref 24.5–35.6)
APTT BLD: 35.2 SEC — SIGNIFICANT CHANGE UP (ref 24.5–35.6)
BUN SERPL-MCNC: 12 MG/DL — SIGNIFICANT CHANGE UP (ref 7–23)
BUN SERPL-MCNC: 12 MG/DL — SIGNIFICANT CHANGE UP (ref 7–23)
CALCIUM SERPL-MCNC: 8 MG/DL — LOW (ref 8.4–10.5)
CALCIUM SERPL-MCNC: 8 MG/DL — LOW (ref 8.4–10.5)
CHLORIDE SERPL-SCNC: 107 MMOL/L — SIGNIFICANT CHANGE UP (ref 96–108)
CHLORIDE SERPL-SCNC: 107 MMOL/L — SIGNIFICANT CHANGE UP (ref 96–108)
CO2 SERPL-SCNC: 19 MMOL/L — LOW (ref 22–31)
CO2 SERPL-SCNC: 19 MMOL/L — LOW (ref 22–31)
CREAT SERPL-MCNC: 0.9 MG/DL — SIGNIFICANT CHANGE UP (ref 0.5–1.3)
CREAT SERPL-MCNC: 0.9 MG/DL — SIGNIFICANT CHANGE UP (ref 0.5–1.3)
CULTURE RESULTS: SIGNIFICANT CHANGE UP
CULTURE RESULTS: SIGNIFICANT CHANGE UP
EGFR: 81 ML/MIN/1.73M2 — SIGNIFICANT CHANGE UP
EGFR: 81 ML/MIN/1.73M2 — SIGNIFICANT CHANGE UP
GLUCOSE BLDC GLUCOMTR-MCNC: 101 MG/DL — HIGH (ref 70–99)
GLUCOSE BLDC GLUCOMTR-MCNC: 101 MG/DL — HIGH (ref 70–99)
GLUCOSE BLDC GLUCOMTR-MCNC: 115 MG/DL — HIGH (ref 70–99)
GLUCOSE BLDC GLUCOMTR-MCNC: 115 MG/DL — HIGH (ref 70–99)
GLUCOSE SERPL-MCNC: 80 MG/DL — SIGNIFICANT CHANGE UP (ref 70–99)
GLUCOSE SERPL-MCNC: 80 MG/DL — SIGNIFICANT CHANGE UP (ref 70–99)
HCT VFR BLD CALC: 41.3 % — SIGNIFICANT CHANGE UP (ref 39–50)
HCT VFR BLD CALC: 41.3 % — SIGNIFICANT CHANGE UP (ref 39–50)
HGB BLD-MCNC: 13.4 G/DL — SIGNIFICANT CHANGE UP (ref 13–17)
HGB BLD-MCNC: 13.4 G/DL — SIGNIFICANT CHANGE UP (ref 13–17)
INR BLD: 2.29 RATIO — HIGH (ref 0.85–1.18)
INR BLD: 2.29 RATIO — HIGH (ref 0.85–1.18)
MCHC RBC-ENTMCNC: 32.4 GM/DL — SIGNIFICANT CHANGE UP (ref 32–36)
MCHC RBC-ENTMCNC: 32.4 GM/DL — SIGNIFICANT CHANGE UP (ref 32–36)
MCHC RBC-ENTMCNC: 32.6 PG — SIGNIFICANT CHANGE UP (ref 27–34)
MCHC RBC-ENTMCNC: 32.6 PG — SIGNIFICANT CHANGE UP (ref 27–34)
MCV RBC AUTO: 100.5 FL — HIGH (ref 80–100)
MCV RBC AUTO: 100.5 FL — HIGH (ref 80–100)
NRBC # BLD: 0 /100 WBCS — SIGNIFICANT CHANGE UP (ref 0–0)
NRBC # BLD: 0 /100 WBCS — SIGNIFICANT CHANGE UP (ref 0–0)
PLATELET # BLD AUTO: 187 K/UL — SIGNIFICANT CHANGE UP (ref 150–400)
PLATELET # BLD AUTO: 187 K/UL — SIGNIFICANT CHANGE UP (ref 150–400)
POTASSIUM SERPL-MCNC: 4.3 MMOL/L — SIGNIFICANT CHANGE UP (ref 3.5–5.3)
POTASSIUM SERPL-MCNC: 4.3 MMOL/L — SIGNIFICANT CHANGE UP (ref 3.5–5.3)
POTASSIUM SERPL-SCNC: 4.3 MMOL/L — SIGNIFICANT CHANGE UP (ref 3.5–5.3)
POTASSIUM SERPL-SCNC: 4.3 MMOL/L — SIGNIFICANT CHANGE UP (ref 3.5–5.3)
PROTHROM AB SERPL-ACNC: 24.6 SEC — HIGH (ref 9.5–13)
PROTHROM AB SERPL-ACNC: 24.6 SEC — HIGH (ref 9.5–13)
RBC # BLD: 4.11 M/UL — LOW (ref 4.2–5.8)
RBC # BLD: 4.11 M/UL — LOW (ref 4.2–5.8)
RBC # FLD: 12.8 % — SIGNIFICANT CHANGE UP (ref 10.3–14.5)
RBC # FLD: 12.8 % — SIGNIFICANT CHANGE UP (ref 10.3–14.5)
SODIUM SERPL-SCNC: 137 MMOL/L — SIGNIFICANT CHANGE UP (ref 135–145)
SODIUM SERPL-SCNC: 137 MMOL/L — SIGNIFICANT CHANGE UP (ref 135–145)
SPECIMEN SOURCE: SIGNIFICANT CHANGE UP
SPECIMEN SOURCE: SIGNIFICANT CHANGE UP
WBC # BLD: 3.42 K/UL — LOW (ref 3.8–10.5)
WBC # BLD: 3.42 K/UL — LOW (ref 3.8–10.5)
WBC # FLD AUTO: 3.42 K/UL — LOW (ref 3.8–10.5)
WBC # FLD AUTO: 3.42 K/UL — LOW (ref 3.8–10.5)

## 2023-10-22 PROCEDURE — 71260 CT THORAX DX C+: CPT | Mod: 26

## 2023-10-22 PROCEDURE — 74177 CT ABD & PELVIS W/CONTRAST: CPT | Mod: 26

## 2023-10-22 PROCEDURE — 93010 ELECTROCARDIOGRAM REPORT: CPT

## 2023-10-22 RX ORDER — METOPROLOL TARTRATE 50 MG
12.5 TABLET ORAL
Refills: 0 | Status: DISCONTINUED | OUTPATIENT
Start: 2023-10-22 | End: 2023-10-22

## 2023-10-22 RX ORDER — WARFARIN SODIUM 2.5 MG/1
1 TABLET ORAL ONCE
Refills: 0 | Status: COMPLETED | OUTPATIENT
Start: 2023-10-22 | End: 2023-10-22

## 2023-10-22 RX ORDER — METOPROLOL TARTRATE 50 MG
5 TABLET ORAL ONCE
Refills: 0 | Status: COMPLETED | OUTPATIENT
Start: 2023-10-22 | End: 2023-10-22

## 2023-10-22 RX ORDER — DIGOXIN 250 MCG
250 TABLET ORAL EVERY 6 HOURS
Refills: 0 | Status: COMPLETED | OUTPATIENT
Start: 2023-10-22 | End: 2023-10-23

## 2023-10-22 RX ORDER — METOPROLOL TARTRATE 50 MG
2.5 TABLET ORAL ONCE
Refills: 0 | Status: COMPLETED | OUTPATIENT
Start: 2023-10-22 | End: 2023-10-22

## 2023-10-22 RX ORDER — DIGOXIN 250 MCG
500 TABLET ORAL ONCE
Refills: 0 | Status: COMPLETED | OUTPATIENT
Start: 2023-10-22 | End: 2023-10-22

## 2023-10-22 RX ORDER — METOPROLOL TARTRATE 50 MG
25 TABLET ORAL
Refills: 0 | Status: DISCONTINUED | OUTPATIENT
Start: 2023-10-22 | End: 2023-10-23

## 2023-10-22 RX ADMIN — TAMSULOSIN HYDROCHLORIDE 0.4 MILLIGRAM(S): 0.4 CAPSULE ORAL at 22:11

## 2023-10-22 RX ADMIN — PANTOPRAZOLE SODIUM 40 MILLIGRAM(S): 20 TABLET, DELAYED RELEASE ORAL at 05:01

## 2023-10-22 RX ADMIN — Medication 500 MICROGRAM(S): at 18:21

## 2023-10-22 RX ADMIN — Medication 100 MILLIGRAM(S): at 12:37

## 2023-10-22 RX ADMIN — Medication 25 MILLIGRAM(S): at 15:04

## 2023-10-22 RX ADMIN — MIDODRINE HYDROCHLORIDE 10 MILLIGRAM(S): 2.5 TABLET ORAL at 22:11

## 2023-10-22 RX ADMIN — Medication 5 MILLIGRAM(S): at 16:55

## 2023-10-22 RX ADMIN — MIDODRINE HYDROCHLORIDE 10 MILLIGRAM(S): 2.5 TABLET ORAL at 05:00

## 2023-10-22 RX ADMIN — SODIUM CHLORIDE 100 MILLILITER(S): 9 INJECTION INTRAMUSCULAR; INTRAVENOUS; SUBCUTANEOUS at 08:07

## 2023-10-22 RX ADMIN — Medication 250 MICROGRAM(S): at 22:11

## 2023-10-22 RX ADMIN — PIPERACILLIN AND TAZOBACTAM 25 GRAM(S): 4; .5 INJECTION, POWDER, LYOPHILIZED, FOR SOLUTION INTRAVENOUS at 14:27

## 2023-10-22 RX ADMIN — Medication 1000 UNIT(S): at 12:37

## 2023-10-22 RX ADMIN — PIPERACILLIN AND TAZOBACTAM 25 GRAM(S): 4; .5 INJECTION, POWDER, LYOPHILIZED, FOR SOLUTION INTRAVENOUS at 05:00

## 2023-10-22 RX ADMIN — PIPERACILLIN AND TAZOBACTAM 25 GRAM(S): 4; .5 INJECTION, POWDER, LYOPHILIZED, FOR SOLUTION INTRAVENOUS at 22:11

## 2023-10-22 RX ADMIN — Medication 81 MILLIGRAM(S): at 12:36

## 2023-10-22 RX ADMIN — Medication 2.5 MILLIGRAM(S): at 15:01

## 2023-10-22 RX ADMIN — FINASTERIDE 5 MILLIGRAM(S): 5 TABLET, FILM COATED ORAL at 12:37

## 2023-10-22 RX ADMIN — Medication 2.5 MILLIGRAM(S): at 14:26

## 2023-10-22 RX ADMIN — WARFARIN SODIUM 1 MILLIGRAM(S): 2.5 TABLET ORAL at 22:11

## 2023-10-22 RX ADMIN — SODIUM CHLORIDE 100 MILLILITER(S): 9 INJECTION INTRAMUSCULAR; INTRAVENOUS; SUBCUTANEOUS at 00:12

## 2023-10-22 RX ADMIN — ATORVASTATIN CALCIUM 40 MILLIGRAM(S): 80 TABLET, FILM COATED ORAL at 22:11

## 2023-10-22 RX ADMIN — MIDODRINE HYDROCHLORIDE 10 MILLIGRAM(S): 2.5 TABLET ORAL at 14:28

## 2023-10-22 NOTE — DIETITIAN INITIAL EVALUATION ADULT - PERTINENT MEDS FT
MEDICATIONS  (STANDING):  aspirin  chewable 81 milliGRAM(s) Oral daily  atorvastatin 40 milliGRAM(s) Oral at bedtime  cholecalciferol 1000 Unit(s) Oral daily  finasteride 5 milliGRAM(s) Oral daily  metoprolol tartrate 12.5 milliGRAM(s) Oral two times a day  metoprolol tartrate Injectable 2.5 milliGRAM(s) IV Push once  midodrine. 10 milliGRAM(s) Oral every 8 hours  pantoprazole    Tablet 40 milliGRAM(s) Oral before breakfast  piperacillin/tazobactam IVPB.. 3.375 Gram(s) IV Intermittent every 8 hours  pyridoxine 100 milliGRAM(s) Oral daily  tamsulosin 0.4 milliGRAM(s) Oral at bedtime  warfarin 1 milliGRAM(s) Oral once    MEDICATIONS  (PRN):  acetaminophen     Tablet .. 650 milliGRAM(s) Oral every 6 hours PRN Temp greater or equal to 38.5C (101.3F), Moderate Pain (4 - 6)

## 2023-10-22 NOTE — DIETITIAN INITIAL EVALUATION ADULT - ORAL INTAKE PTA/DIET HISTORY
[employer]-Honey thickened liquids. farina/banana/milk for breakfast pt with dementia, speaks [place]. spoke with daughter over the phone. [employer]-Honey thickened liquids. farina/banana/milk for breakfast

## 2023-10-22 NOTE — DIETITIAN INITIAL EVALUATION ADULT - PERTINENT LABORATORY DATA
10-22    137  |  107  |  12  ----------------------------<  80  4.3   |  19<L>  |  0.90    Ca    8.0<L>      22 Oct 2023 07:22    TPro  7.4  /  Alb  3.2<L>  /  TBili  0.9  /  DBili  x   /  AST  67<H>  /  ALT  21  /  AlkPhos  104  10-20

## 2023-10-22 NOTE — CHART NOTE - NSCHARTNOTEFT_GEN_A_CORE
Patient is a 89y old  Male PMH Afib on coumadin, Alzheimers (A&O x 0-1 at baseline) admitted with AMS.  Stay c/b Afib w/RVR previously resolved with metoprolol held 2/2 sepsis.    Called by RN to evaluate pt. with Afib RVR on telemetry with HR as high as 150s.    Vital Signs Last 24 Hrs  T(C): 36.4 (22 Oct 2023 17:08), Max: 37.1 (22 Oct 2023 05:19)  T(F): 97.5 (22 Oct 2023 17:08), Max: 98.7 (22 Oct 2023 05:19)  HR: 105 (22 Oct 2023 18:36) (61 - 137)  BP: 131/85 (22 Oct 2023 18:36) (98/66 - 131/85)  BP(mean): --  RR: 18 (22 Oct 2023 17:08) (17 - 18)  SpO2: 94% (22 Oct 2023 17:08) (93% - 98%)    Parameters below as of 22 Oct 2023 17:08  Patient On (Oxygen Delivery Method): room air          PT/INR - ( 22 Oct 2023 07:29 )   PT: 24.6 sec;   INR: 2.29 ratio         PTT - ( 22 Oct 2023 07:29 )  PTT:35.2 sec                        13.4   3.42  )-----------( 187      ( 22 Oct 2023 07:29 )             41.3     10-22    137  |  107  |  12  ----------------------------<  80  4.3   |  19<L>  |  0.90    Ca    8.0<L>      22 Oct 2023 07:22                                CAPILLARY BLOOD GLUCOSE      POCT Blood Glucose.: 115 mg/dL (22 Oct 2023 17:12)    acetaminophen     Tablet .. 650 milliGRAM(s) Oral every 6 hours PRN  aspirin  chewable 81 milliGRAM(s) Oral daily  atorvastatin 40 milliGRAM(s) Oral at bedtime  cholecalciferol 1000 Unit(s) Oral daily  digoxin     Tablet 250 MICROGram(s) Oral every 6 hours  finasteride 5 milliGRAM(s) Oral daily  metoprolol tartrate 25 milliGRAM(s) Oral two times a day  midodrine. 10 milliGRAM(s) Oral every 8 hours  pantoprazole    Tablet 40 milliGRAM(s) Oral before breakfast  piperacillin/tazobactam IVPB.. 3.375 Gram(s) IV Intermittent every 8 hours  pyridoxine 100 milliGRAM(s) Oral daily  tamsulosin 0.4 milliGRAM(s) Oral at bedtime  warfarin 1 milliGRAM(s) Oral once      REVIEW OF SYSTEMS:    RESPIRATORY: Unable to assess  CARDIOVASCULAR: Unable to assess  GASTROINTESTINAL: Unable to assess  GENITOURINARY: Unable to assess  NEUROLOGICAL: Unable to assess    PHYSICAL EXAM:    GENERAL: NAD  NERVOUS SYSTEM:  Alert & O x 0  CHEST/LUNG: Clear to percussion bilaterally  HEART: Tachycardic.   ABDOMEN: Soft, Nontender, Nondistended; Bowel sounds present    RADIOLOGY :CT C/A/P from 10/22/23 with possible multifocal PNA and ?cystitis.    Assessment: Patient 89y with PMH as stated above admitted with Altered mental status 2/2 sepsis.  Patient with HR 130s-160s on telemetry.  Otherwise VSS.  HOme metoprolol held this admission 2/2 sepsis.      Plan:  - EKG confirmed Afib w/ RVR  - Lopressor 2.5mg IV x 2 ordered/administered with minimal improvement. Resumed metoprolol at half home dose (25mg BID stat)  - D/w Dr. Rosenthal attending Dr. Hart: another lopressor 5mg IV x 1 administered with minimal improvement in HR.  Dig loading started (500mcg x 1 IV then 250mg PO q6hr x 2).  - Will continue to monitor on telemetry for improvement.  -Will endorse to night ACP for close follow up.    -Madie Aaron ANP-C  [id number]

## 2023-10-22 NOTE — PROGRESS NOTE ADULT - ASSESSMENT
89y Male with H/O alzeihmer's dementia CHF. Per family has been confused with fever and chills  for 2-3 days. was treated with amoxicillin.  Family took patient to be seen to cardiology, who sent pt to ED.  Found with UTI, hyponatremia  and hypotension ( s/p IV NS bolus with improvement of BP)     # hypovolemic hyponatremia in light of sepsis and hypotension. improved  s/p isotonic fluid bolus with improvement of BP  SNa 130> 130>137    NS @ 100CC/HR -stopped earlier today  watch off ivf  no dietary K restriction  feeds w/assistance  avoid hypotonic fluids if able  monitor BMP    hypotension- bp better  on midodrine. 10 milliGRAM(s) Oral every 8 hours    # UTI on broad spectrum ABX.  per team/ID    will closely follow up.   labs, chart reviewed  For any question, call:  Cell # [phone]  Pager # [phone]  office # [phone]

## 2023-10-22 NOTE — PROGRESS NOTE ADULT - ASSESSMENT
89-year-old male history of atrial fibrillation on Coumadin, Alzheimer's, ANO timesx0-1 at [facility],  brought in from cardiologist office for altered mental status.  Patient is a brought in accompanied by daughter who is providing collateral information.   unable to obtain information from patient.  Daughter states that patient has history of Alzheimer's dementia, at baseline  is not alert or oriented, however has been increasingly lethargic relative to his baseline   For the past 2 to 3 days.  States that this morning, woke up with some chills/shaking so brought to see cardiologist. Had a cold 1 week ago, treated with amoxicillin. No known fevers at home or chills.       Problem/Plan - 1:  ·  Problem: Sepsis with Multifocal Pneumonia .   ·  Plan: Hemodynamically stable.   S/P Blood and Urine culture NGTD.   IV Abxs started.    < from: CT Abdomen and Pelvis w/ IV Cont (10.22.23 @ 12:13) >  IMPRESSION:  Extensive bilateral groundglass and consolidative opacities, suggestive   of multifocal pneumonia.    Right greater than left small pleural effusions.    Suspicion for large prepyloric gastric ulcer.    Circumferential bladder wall thickening with trace perivesicular fatty   stranding suggestive of cystitis.    < end of copied text >   Problem/Plan - 2:  ·  Problem: Acute UTI.   ·  Plan: S/P Culture and NGTD.  IV Abxs started.     Problem/Plan - 3:  ·  Problem: Metabolic encephalopathy.  ·  Plan: Resolved.  Secondary to infection and hyponatremia.  CT head negative.      Problem/Plan - 4:  ·  Problem: Chronic atrial fibrillation.  ·  Plan: ON AC and BB .     Problem/Plan - 5:  ·  Problem: Acute hyponatremia.   ·  Plan: Renal help appreciated.      Problem/Plan - 6:  ·  Problem: Alzheimer's dementia.   ·  Plan: Supportive care.     Problem/Plan - 7:  ·  Problem: Enterovirus .   ·  Plan: Supportive care.

## 2023-10-23 LAB
ANION GAP SERPL CALC-SCNC: 11 MMOL/L — SIGNIFICANT CHANGE UP (ref 5–17)
ANION GAP SERPL CALC-SCNC: 11 MMOL/L — SIGNIFICANT CHANGE UP (ref 5–17)
APTT BLD: 36.3 SEC — HIGH (ref 24.5–35.6)
APTT BLD: 36.3 SEC — HIGH (ref 24.5–35.6)
BUN SERPL-MCNC: 11 MG/DL — SIGNIFICANT CHANGE UP (ref 7–23)
BUN SERPL-MCNC: 11 MG/DL — SIGNIFICANT CHANGE UP (ref 7–23)
CALCIUM SERPL-MCNC: 8.4 MG/DL — SIGNIFICANT CHANGE UP (ref 8.4–10.5)
CALCIUM SERPL-MCNC: 8.4 MG/DL — SIGNIFICANT CHANGE UP (ref 8.4–10.5)
CHLORIDE SERPL-SCNC: 104 MMOL/L — SIGNIFICANT CHANGE UP (ref 96–108)
CHLORIDE SERPL-SCNC: 104 MMOL/L — SIGNIFICANT CHANGE UP (ref 96–108)
CO2 SERPL-SCNC: 21 MMOL/L — LOW (ref 22–31)
CO2 SERPL-SCNC: 21 MMOL/L — LOW (ref 22–31)
CREAT SERPL-MCNC: 0.89 MG/DL — SIGNIFICANT CHANGE UP (ref 0.5–1.3)
CREAT SERPL-MCNC: 0.89 MG/DL — SIGNIFICANT CHANGE UP (ref 0.5–1.3)
DIGOXIN SERPL-MCNC: 2.2 NG/ML — HIGH (ref 0.8–2)
DIGOXIN SERPL-MCNC: 2.2 NG/ML — HIGH (ref 0.8–2)
EGFR: 81 ML/MIN/1.73M2 — SIGNIFICANT CHANGE UP
EGFR: 81 ML/MIN/1.73M2 — SIGNIFICANT CHANGE UP
GLUCOSE SERPL-MCNC: 108 MG/DL — HIGH (ref 70–99)
GLUCOSE SERPL-MCNC: 108 MG/DL — HIGH (ref 70–99)
HCT VFR BLD CALC: 46.6 % — SIGNIFICANT CHANGE UP (ref 39–50)
HCT VFR BLD CALC: 46.6 % — SIGNIFICANT CHANGE UP (ref 39–50)
HGB BLD-MCNC: 15.3 G/DL — SIGNIFICANT CHANGE UP (ref 13–17)
HGB BLD-MCNC: 15.3 G/DL — SIGNIFICANT CHANGE UP (ref 13–17)
INR BLD: 2.15 RATIO — HIGH (ref 0.85–1.18)
INR BLD: 2.15 RATIO — HIGH (ref 0.85–1.18)
MCHC RBC-ENTMCNC: 32 PG — SIGNIFICANT CHANGE UP (ref 27–34)
MCHC RBC-ENTMCNC: 32 PG — SIGNIFICANT CHANGE UP (ref 27–34)
MCHC RBC-ENTMCNC: 32.8 GM/DL — SIGNIFICANT CHANGE UP (ref 32–36)
MCHC RBC-ENTMCNC: 32.8 GM/DL — SIGNIFICANT CHANGE UP (ref 32–36)
MCV RBC AUTO: 97.5 FL — SIGNIFICANT CHANGE UP (ref 80–100)
MCV RBC AUTO: 97.5 FL — SIGNIFICANT CHANGE UP (ref 80–100)
NRBC # BLD: 0 /100 WBCS — SIGNIFICANT CHANGE UP (ref 0–0)
NRBC # BLD: 0 /100 WBCS — SIGNIFICANT CHANGE UP (ref 0–0)
PLATELET # BLD AUTO: 196 K/UL — SIGNIFICANT CHANGE UP (ref 150–400)
PLATELET # BLD AUTO: 196 K/UL — SIGNIFICANT CHANGE UP (ref 150–400)
POTASSIUM SERPL-MCNC: 4.5 MMOL/L — SIGNIFICANT CHANGE UP (ref 3.5–5.3)
POTASSIUM SERPL-MCNC: 4.5 MMOL/L — SIGNIFICANT CHANGE UP (ref 3.5–5.3)
POTASSIUM SERPL-SCNC: 4.5 MMOL/L — SIGNIFICANT CHANGE UP (ref 3.5–5.3)
POTASSIUM SERPL-SCNC: 4.5 MMOL/L — SIGNIFICANT CHANGE UP (ref 3.5–5.3)
PROTHROM AB SERPL-ACNC: 23.1 SEC — HIGH (ref 9.5–13)
PROTHROM AB SERPL-ACNC: 23.1 SEC — HIGH (ref 9.5–13)
RBC # BLD: 4.78 M/UL — SIGNIFICANT CHANGE UP (ref 4.2–5.8)
RBC # BLD: 4.78 M/UL — SIGNIFICANT CHANGE UP (ref 4.2–5.8)
RBC # FLD: 12.7 % — SIGNIFICANT CHANGE UP (ref 10.3–14.5)
RBC # FLD: 12.7 % — SIGNIFICANT CHANGE UP (ref 10.3–14.5)
SODIUM SERPL-SCNC: 136 MMOL/L — SIGNIFICANT CHANGE UP (ref 135–145)
SODIUM SERPL-SCNC: 136 MMOL/L — SIGNIFICANT CHANGE UP (ref 135–145)
WBC # BLD: 4.52 K/UL — SIGNIFICANT CHANGE UP (ref 3.8–10.5)
WBC # BLD: 4.52 K/UL — SIGNIFICANT CHANGE UP (ref 3.8–10.5)
WBC # FLD AUTO: 4.52 K/UL — SIGNIFICANT CHANGE UP (ref 3.8–10.5)
WBC # FLD AUTO: 4.52 K/UL — SIGNIFICANT CHANGE UP (ref 3.8–10.5)

## 2023-10-23 PROCEDURE — 99232 SBSQ HOSP IP/OBS MODERATE 35: CPT

## 2023-10-23 RX ORDER — WARFARIN SODIUM 2.5 MG/1
1.5 TABLET ORAL ONCE
Refills: 0 | Status: COMPLETED | OUTPATIENT
Start: 2023-10-23 | End: 2023-10-23

## 2023-10-23 RX ORDER — METOPROLOL TARTRATE 50 MG
50 TABLET ORAL
Refills: 0 | Status: DISCONTINUED | OUTPATIENT
Start: 2023-10-23 | End: 2023-10-23

## 2023-10-23 RX ORDER — DIGOXIN 250 MCG
250 TABLET ORAL DAILY
Refills: 0 | Status: DISCONTINUED | OUTPATIENT
Start: 2023-10-24 | End: 2023-10-25

## 2023-10-23 RX ORDER — DIGOXIN 250 MCG
250 TABLET ORAL DAILY
Refills: 0 | Status: DISCONTINUED | OUTPATIENT
Start: 2023-10-23 | End: 2023-10-23

## 2023-10-23 RX ORDER — METOPROLOL TARTRATE 50 MG
50 TABLET ORAL
Refills: 0 | Status: DISCONTINUED | OUTPATIENT
Start: 2023-10-24 | End: 2023-10-25

## 2023-10-23 RX ORDER — METOPROLOL TARTRATE 50 MG
5 TABLET ORAL ONCE
Refills: 0 | Status: COMPLETED | OUTPATIENT
Start: 2023-10-23 | End: 2023-10-23

## 2023-10-23 RX ORDER — METOPROLOL TARTRATE 50 MG
25 TABLET ORAL ONCE
Refills: 0 | Status: COMPLETED | OUTPATIENT
Start: 2023-10-23 | End: 2023-10-23

## 2023-10-23 RX ADMIN — WARFARIN SODIUM 1.5 MILLIGRAM(S): 2.5 TABLET ORAL at 21:39

## 2023-10-23 RX ADMIN — Medication 25 MILLIGRAM(S): at 05:01

## 2023-10-23 RX ADMIN — MIDODRINE HYDROCHLORIDE 10 MILLIGRAM(S): 2.5 TABLET ORAL at 21:39

## 2023-10-23 RX ADMIN — PIPERACILLIN AND TAZOBACTAM 25 GRAM(S): 4; .5 INJECTION, POWDER, LYOPHILIZED, FOR SOLUTION INTRAVENOUS at 21:39

## 2023-10-23 RX ADMIN — PIPERACILLIN AND TAZOBACTAM 25 GRAM(S): 4; .5 INJECTION, POWDER, LYOPHILIZED, FOR SOLUTION INTRAVENOUS at 05:01

## 2023-10-23 RX ADMIN — Medication 81 MILLIGRAM(S): at 18:20

## 2023-10-23 RX ADMIN — FINASTERIDE 5 MILLIGRAM(S): 5 TABLET, FILM COATED ORAL at 18:20

## 2023-10-23 RX ADMIN — Medication 250 MICROGRAM(S): at 01:15

## 2023-10-23 RX ADMIN — Medication 1000 UNIT(S): at 18:20

## 2023-10-23 RX ADMIN — Medication 100 MILLIGRAM(S): at 18:20

## 2023-10-23 RX ADMIN — Medication 25 MILLIGRAM(S): at 18:20

## 2023-10-23 RX ADMIN — PANTOPRAZOLE SODIUM 40 MILLIGRAM(S): 20 TABLET, DELAYED RELEASE ORAL at 05:02

## 2023-10-23 RX ADMIN — TAMSULOSIN HYDROCHLORIDE 0.4 MILLIGRAM(S): 0.4 CAPSULE ORAL at 21:39

## 2023-10-23 RX ADMIN — Medication 25 MILLIGRAM(S): at 19:07

## 2023-10-23 RX ADMIN — MIDODRINE HYDROCHLORIDE 10 MILLIGRAM(S): 2.5 TABLET ORAL at 05:01

## 2023-10-23 RX ADMIN — Medication 5 MILLIGRAM(S): at 19:08

## 2023-10-23 RX ADMIN — PIPERACILLIN AND TAZOBACTAM 25 GRAM(S): 4; .5 INJECTION, POWDER, LYOPHILIZED, FOR SOLUTION INTRAVENOUS at 12:52

## 2023-10-23 RX ADMIN — ATORVASTATIN CALCIUM 40 MILLIGRAM(S): 80 TABLET, FILM COATED ORAL at 21:39

## 2023-10-23 NOTE — PROVIDER CONTACT NOTE (OTHER) - SITUATION
afib to 180's sustaining btwn 115-140's. Daughter at bedside, attempting to feed patient. Reports pt gets agitated when she tries to feed him.

## 2023-10-23 NOTE — CHART NOTE - NSCHARTNOTEFT_GEN_A_CORE
Briefly Patient is a 89y old  Male PMH Afib on coumadin, Alzheimers (A&O x 0-1 at baseline) admitted with AMS.  Stay c/b Afib w/RVR previously resolved with metoprolol held 2/2 sepsis.    Dig loaded last night with improvement in HR.  Informed by [place] tech that pt with HR 130s-180s.  Will give lopressor 5mg IV x 1 (bp permitting at this time).  After discussion with cards attending, agree with IV lopressor, will increase PO lopressor to 50mg BID and begin standing dig 250 mcg (starting tomorrw).    Will endorse to night ACP for follow up.    Madie Aaron, ANP-C  [phone]

## 2023-10-23 NOTE — PROVIDER CONTACT NOTE (OTHER) - ACTION/TREATMENT ORDERED:
5MG IVP and 25mg of oral metoprolol ordered, continue to monitor bp
Provider said its okay to do the iv contrast

## 2023-10-23 NOTE — PROGRESS NOTE ADULT - ASSESSMENT
89-year-old male history of atrial fibrillation on Coumadin, Alzheimer's, ANO timesx0-1 at [facility],  brought in from cardiologist office for altered mental status.  Patient is a brought in accompanied by daughter who is providing collateral information.   unable to obtain information from patient.  Daughter states that patient has history of Alzheimer's dementia, at baseline  is not alert or oriented, however has been increasingly lethargic relative to his baseline   For the past 2 to 3 days.  States that this morning, woke up with some chills/shaking so brought to see cardiologist. Had a cold 1 week ago, treated with amoxicillin. No known fevers at home or chills.       Problem/Plan - 1:  ·  Problem: Sepsis with Multifocal Pneumonia .   ·  Plan: Hemodynamically stable.   S/P Blood and Urine culture NGTD.   IV Abxs started.    < from: CT Abdomen and Pelvis w/ IV Cont (10.22.23 @ 12:13) >  IMPRESSION:  Extensive bilateral groundglass and consolidative opacities, suggestive   of multifocal pneumonia.    Right greater than left small pleural effusions.    Suspicion for large prepyloric gastric ulcer.    Circumferential bladder wall thickening with trace perivesicular fatty   stranding suggestive of cystitis.    < end of copied text >   Problem/Plan - 2:  ·  Problem: Acute UTI.   ·  Plan: S/P Culture and NGTD.  IV Abxs started.     Problem/Plan - 3:  ·  Problem: Metabolic encephalopathy.  ·  Plan: Resolved.  Secondary to infection and hyponatremia.  CT head negative.      Problem/Plan - 4:  ·  Problem: Chronic atrial fibrillation.  ·  Plan: ON AC and BB .     Problem/Plan - 5:  ·  Problem: Acute hyponatremia.   ·  Plan: Renal help appreciated.      Problem/Plan - 6:  ·  Problem: Alzheimer's dementia.   ·  Plan: Supportive care.     Problem/Plan - 7:  ·  Problem: Enterovirus .   ·  Plan: Supportive care.

## 2023-10-23 NOTE — PROGRESS NOTE ADULT - ASSESSMENT
Pt is a 89 year old Male with H/O alzeihmer's dementia CHF  Found with UTI, hyponatremia  and hypotension ( s/p IV NS bolus with improvement of BP)     # hypovolemic hyponatremia in light of sepsis and hypotension. improved  s/p isotonic fluid bolus with improvement of BP  SNa 130> 130>137>136  avoid hypotinic fluids; maintain appropriate solute intake  can add protein shakes with meals if able to tolerate   no dietary K restriction  monitor BMP    hypotension- bp better  currently on midodrine. 10 milliGRAM(s) Oral every 8 hours    # UTI on broad spectrum ABX.  per team/ID    If any questions, please feel free to contact me     Huan Saldana  Nephrology Attending  Cell # [phone]

## 2023-10-23 NOTE — PROGRESS NOTE ADULT - ASSESSMENT
89-yo M w/ PMH of Alzheimer's disease and afib, admitted with AMS, found to have fever w/ hypotension w/o leukocytosis. Entero/Rhinovirus+. CT C/A/P w/ multifocal pneumonia.    #Entero/Rhinovirus+  #Multifocal pneumonia  #Fever  #AMS  #Hypotension  #Pyuria  - Tmax 103.2 on presentation. AMS from baseline A&O x0-1. Now fever improved.  - Stage 1 sacral ulcer on exam  - RVP+ w/ Entero/Rhinovirus. CT chest/abd/pelvis revealing multifocal pneumonia. Possible bacterial superinfection.  - C/w Zosyn 3.375 g IV Q8H x 5-7 days.  - MRSA swab pending. If positive, would add MRSA coverage.  - Monitor WBC and fever curve    Plan discussed with primary team ACP.  Thank you for this consult. Inpatient ID team will follow.    Klever Tse MD, PhD  Attending Physician  Division of Infectious Diseases  Department of Medicine    Please contact through MS Teams message.  Office: [phone] (after 5 PM or weekend)     89-yo M w/ PMH of Alzheimer's disease and afib, admitted with AMS, found to have fever w/ hypotension w/o leukocytosis. Entero/Rhinovirus+. CT C/A/P w/ multifocal pneumonia.    #Entero/Rhinovirus+  #Multifocal pneumonia  #Fever  #AMS  #Pyuria  - Tmax 103.2 on presentation. AMS from baseline A&O x0-1. Now fever improved.  - Stage 1 sacral ulcer on exam  - RVP+ w/ Entero/Rhinovirus. CT chest/abd/pelvis revealing multifocal pneumonia. Possible bacterial superinfection.  - C/w Zosyn 3.375 g IV Q8H x 5-7 days.  - MRSA swab pending. If positive, consider adding MRSA coverage.  - Monitor WBC and fever curve    Plan discussed with primary team ACP.  Thank you for this consult. Inpatient ID team will follow.    Klever Tse MD, PhD  Attending Physician  Division of Infectious Diseases  Department of Medicine    Please contact through MS Teams message.  Office: [phone] (after 5 PM or weekend)

## 2023-10-23 NOTE — PROVIDER CONTACT NOTE (OTHER) - ASSESSMENT
pt is aox0, ra
Pt A&O X0/1, on room air, appears to agitated about getting fed by family. No s/s of chest pain or sob

## 2023-10-23 NOTE — SWALLOW BEDSIDE ASSESSMENT ADULT - ORAL PREPARATORY PHASE
Unable to bite/tear solids/Decreased mastication ability Anterior loss of bolus/Lateral loss of bolus Reduced oral grading Reduced oral grading/Anterior loss of bolus

## 2023-10-23 NOTE — SWALLOW BEDSIDE ASSESSMENT ADULT - SLP PERTINENT HISTORY OF CURRENT PROBLEM
89-year-old male history of atrial fibrillation on Coumadin, Alzheimer's, ANO timesx0-1 at [facility],  brought in from cardiologist office for altered mental status.  Patient is a brought in accompanied by daughter who is providing collateral information.   unable to obtain information from patient.  Daughter states that patient has history of Alzheimer's dementia, at baseline  is not alert or oriented, however has been increasingly lethargic relative to his baseline   For the past 2 to 3 days.  States that this morning, woke up with some chills/shaking so brought to see cardiologist. Had a cold 1 week ago, treated with amoxicillin.

## 2023-10-23 NOTE — SWALLOW BEDSIDE ASSESSMENT ADULT - SLP GENERAL OBSERVATIONS
Pt encountered bedside, AA&OxO. Language line  utilized in Welsh, ID#[id number]. Pt remained non-verbal throughout assessment; unable to follow simple commands or answer simple yes/no questions. +Ill-fitting upper dentures in place. Pt encountered bedside, AA&OxO. Language line  utilized in Telugu, ID#[id number]. Pt remained non-verbal throughout assessment; unable to follow simple commands or answer simple yes/no questions. +Ill-fitting upper dentures in place. Contacted pt's daughter Son re: baseline diet. Per discussion with the daughter, the patient had a swallow evaluation back in August of 2023 (unable to recall where) with a recommendation for moderately thick liquids. At home, "thick it" is added to all drinks per daughter. Will relay this to the team.

## 2023-10-23 NOTE — SWALLOW BEDSIDE ASSESSMENT ADULT - ADDITIONAL RECOMMENDATIONS
Monitor for s/s aspiration/laryngeal penetration. If noted:  D/C p.o. intake, provide non-oral nutrition/hydration/meds, and contact this service @ [phone]  Maintain good oral hygiene  LT. Pt/family/caregiver will demonstrate understanding and carryover of dysphagia management (safe swallow guidelines, compensatory strategies, dysphagia diet).  2. Pt will tolerate recommended diet with no overt, clinical s/s of aspiration.

## 2023-10-23 NOTE — SWALLOW BEDSIDE ASSESSMENT ADULT - SWALLOW EVAL: RECOMMENDED FEEDING/EATING TECHNIQUES
allow for swallow between intakes/check mouth frequently for oral residue/pocketing/crush medication (when feasible)/maintain upright posture during/after eating for 30 mins/no straws/position upright (90 degrees)/small sips/bites

## 2023-10-23 NOTE — SWALLOW BEDSIDE ASSESSMENT ADULT - PHARYNGEAL PHASE
Delayed pharyngeal swallow delayed throat clear x1/Delayed pharyngeal swallow/Delayed throat clear post oral intake

## 2023-10-23 NOTE — SWALLOW BEDSIDE ASSESSMENT ADULT - SWALLOW EVAL: DIAGNOSIS
89-year-old male h/o a-fib on Coumadin, Alzheimer's, AAO x0-1 at baseline, brought in from cardiologist office for AMS. 89-year-old male h/o a-fib on Coumadin, Alzheimer's, AAO x0-1 at baseline, brought in from cardiologist office for AMS. Pt presents with clinical signs of an oropharyngeal dysphagia. Swallow sequence is marked by inability to suck liquids via straw, minimal anterior and right lateral loss of liquids via cup, inability to bite/tear solids, prolonged mastication of minced/moist solids with right lateral sulcus pocketing. Improved oral management of purees. Delayed oral transit vs.  delay in swallow initiation upon palpation. No overt signs or symptoms of penetration or aspiration on purees and small cup sips of thin liquids. 89-year-old male h/o a-fib on Coumadin, Alzheimer's, AAO x0-1 at baseline, brought in from cardiologist office for AMS. Pt presents with clinical signs of an oropharyngeal dysphagia. Swallow sequence is marked by inability to suck liquids via straw, minimal anterior and right lateral loss of liquids via cup, inability to bite/tear solids, prolonged mastication of minced/moist solids with right lateral sulcus pocketing. Improved oral management of purees. Delayed oral transit vs.  delay in swallow initiation upon palpation. No overt signs or symptoms of penetration or aspiration on purees. Delayed cough noted post thin liquids. 89-year-old male h/o a-fib on Coumadin, Alzheimer's, AAO x0-1 at baseline, brought in from cardiologist office for AMS. Pt presents with clinical signs of an oropharyngeal dysphagia. Swallow sequence is marked by inability to suck liquids via straw, minimal anterior and right lateral loss of liquids via cup, inability to bite/tear solids, prolonged mastication of minced/moist solids with right lateral sulcus pocketing. Improved oral management of purees. Delayed oral transit vs.  delay in swallow initiation upon palpation. No overt signs or symptoms of penetration or aspiration on purees. Delayed cough noted post thin liquids x1. No overt signs or symptoms of penetration or aspiration on mildly thick or moderately thick liquids.

## 2023-10-23 NOTE — SWALLOW BEDSIDE ASSESSMENT ADULT - MODE OF PRESENTATION
unable to suck via straw/cup/straw/fed by clinician fed by clinician spoon/fed by clinician cup/fed by clinician

## 2023-10-24 LAB
ANION GAP SERPL CALC-SCNC: 11 MMOL/L — SIGNIFICANT CHANGE UP (ref 5–17)
ANION GAP SERPL CALC-SCNC: 11 MMOL/L — SIGNIFICANT CHANGE UP (ref 5–17)
APTT BLD: 34.7 SEC — SIGNIFICANT CHANGE UP (ref 24.5–35.6)
APTT BLD: 34.7 SEC — SIGNIFICANT CHANGE UP (ref 24.5–35.6)
BUN SERPL-MCNC: 15 MG/DL — SIGNIFICANT CHANGE UP (ref 7–23)
BUN SERPL-MCNC: 15 MG/DL — SIGNIFICANT CHANGE UP (ref 7–23)
CALCIUM SERPL-MCNC: 8.3 MG/DL — LOW (ref 8.4–10.5)
CALCIUM SERPL-MCNC: 8.3 MG/DL — LOW (ref 8.4–10.5)
CHLORIDE SERPL-SCNC: 102 MMOL/L — SIGNIFICANT CHANGE UP (ref 96–108)
CHLORIDE SERPL-SCNC: 102 MMOL/L — SIGNIFICANT CHANGE UP (ref 96–108)
CO2 SERPL-SCNC: 20 MMOL/L — LOW (ref 22–31)
CO2 SERPL-SCNC: 20 MMOL/L — LOW (ref 22–31)
CREAT SERPL-MCNC: 0.95 MG/DL — SIGNIFICANT CHANGE UP (ref 0.5–1.3)
CREAT SERPL-MCNC: 0.95 MG/DL — SIGNIFICANT CHANGE UP (ref 0.5–1.3)
EGFR: 76 ML/MIN/1.73M2 — SIGNIFICANT CHANGE UP
EGFR: 76 ML/MIN/1.73M2 — SIGNIFICANT CHANGE UP
GLUCOSE SERPL-MCNC: 102 MG/DL — HIGH (ref 70–99)
GLUCOSE SERPL-MCNC: 102 MG/DL — HIGH (ref 70–99)
INR BLD: 2.29 RATIO — HIGH (ref 0.85–1.18)
INR BLD: 2.29 RATIO — HIGH (ref 0.85–1.18)
MAGNESIUM SERPL-MCNC: 2 MG/DL — SIGNIFICANT CHANGE UP (ref 1.6–2.6)
MAGNESIUM SERPL-MCNC: 2 MG/DL — SIGNIFICANT CHANGE UP (ref 1.6–2.6)
MRSA PCR RESULT.: SIGNIFICANT CHANGE UP
MRSA PCR RESULT.: SIGNIFICANT CHANGE UP
POTASSIUM SERPL-MCNC: 4.3 MMOL/L — SIGNIFICANT CHANGE UP (ref 3.5–5.3)
POTASSIUM SERPL-MCNC: 4.3 MMOL/L — SIGNIFICANT CHANGE UP (ref 3.5–5.3)
POTASSIUM SERPL-SCNC: 4.3 MMOL/L — SIGNIFICANT CHANGE UP (ref 3.5–5.3)
POTASSIUM SERPL-SCNC: 4.3 MMOL/L — SIGNIFICANT CHANGE UP (ref 3.5–5.3)
PROTHROM AB SERPL-ACNC: 24.6 SEC — HIGH (ref 9.5–13)
PROTHROM AB SERPL-ACNC: 24.6 SEC — HIGH (ref 9.5–13)
S AUREUS DNA NOSE QL NAA+PROBE: SIGNIFICANT CHANGE UP
S AUREUS DNA NOSE QL NAA+PROBE: SIGNIFICANT CHANGE UP
SODIUM SERPL-SCNC: 133 MMOL/L — LOW (ref 135–145)
SODIUM SERPL-SCNC: 133 MMOL/L — LOW (ref 135–145)

## 2023-10-24 PROCEDURE — 99232 SBSQ HOSP IP/OBS MODERATE 35: CPT

## 2023-10-24 RX ORDER — PANTOPRAZOLE SODIUM 20 MG/1
40 TABLET, DELAYED RELEASE ORAL
Refills: 0 | Status: DISCONTINUED | OUTPATIENT
Start: 2023-10-24 | End: 2023-10-25

## 2023-10-24 RX ORDER — WARFARIN SODIUM 2.5 MG/1
1.5 TABLET ORAL ONCE
Refills: 0 | Status: COMPLETED | OUTPATIENT
Start: 2023-10-24 | End: 2023-10-24

## 2023-10-24 RX ORDER — PANTOPRAZOLE SODIUM 20 MG/1
40 TABLET, DELAYED RELEASE ORAL
Refills: 0 | Status: DISCONTINUED | OUTPATIENT
Start: 2023-10-24 | End: 2023-10-24

## 2023-10-24 RX ADMIN — PANTOPRAZOLE SODIUM 40 MILLIGRAM(S): 20 TABLET, DELAYED RELEASE ORAL at 17:00

## 2023-10-24 RX ADMIN — MIDODRINE HYDROCHLORIDE 10 MILLIGRAM(S): 2.5 TABLET ORAL at 05:26

## 2023-10-24 RX ADMIN — MIDODRINE HYDROCHLORIDE 10 MILLIGRAM(S): 2.5 TABLET ORAL at 13:06

## 2023-10-24 RX ADMIN — Medication 50 MILLIGRAM(S): at 17:00

## 2023-10-24 RX ADMIN — WARFARIN SODIUM 1.5 MILLIGRAM(S): 2.5 TABLET ORAL at 21:16

## 2023-10-24 RX ADMIN — FINASTERIDE 5 MILLIGRAM(S): 5 TABLET, FILM COATED ORAL at 11:10

## 2023-10-24 RX ADMIN — Medication 100 MILLIGRAM(S): at 11:10

## 2023-10-24 RX ADMIN — ATORVASTATIN CALCIUM 40 MILLIGRAM(S): 80 TABLET, FILM COATED ORAL at 21:16

## 2023-10-24 RX ADMIN — MIDODRINE HYDROCHLORIDE 10 MILLIGRAM(S): 2.5 TABLET ORAL at 21:17

## 2023-10-24 RX ADMIN — Medication 250 MICROGRAM(S): at 05:26

## 2023-10-24 RX ADMIN — PANTOPRAZOLE SODIUM 40 MILLIGRAM(S): 20 TABLET, DELAYED RELEASE ORAL at 06:43

## 2023-10-24 RX ADMIN — PIPERACILLIN AND TAZOBACTAM 25 GRAM(S): 4; .5 INJECTION, POWDER, LYOPHILIZED, FOR SOLUTION INTRAVENOUS at 05:26

## 2023-10-24 RX ADMIN — PIPERACILLIN AND TAZOBACTAM 25 GRAM(S): 4; .5 INJECTION, POWDER, LYOPHILIZED, FOR SOLUTION INTRAVENOUS at 13:07

## 2023-10-24 RX ADMIN — Medication 50 MILLIGRAM(S): at 05:28

## 2023-10-24 RX ADMIN — Medication 81 MILLIGRAM(S): at 11:09

## 2023-10-24 RX ADMIN — Medication 1000 UNIT(S): at 11:10

## 2023-10-24 RX ADMIN — TAMSULOSIN HYDROCHLORIDE 0.4 MILLIGRAM(S): 0.4 CAPSULE ORAL at 21:17

## 2023-10-24 RX ADMIN — PIPERACILLIN AND TAZOBACTAM 25 GRAM(S): 4; .5 INJECTION, POWDER, LYOPHILIZED, FOR SOLUTION INTRAVENOUS at 21:17

## 2023-10-24 NOTE — PROGRESS NOTE ADULT - ASSESSMENT
Pt is a 89 year old Male with H/O alzeihmer's dementia CHF  Found with UTI, hyponatremia  and hypotension ( s/p IV NS bolus with improvement of BP)     # hypovolemic hyponatremia in light of hypotension and low solute intake  s/p isotonic fluid bolus with improvement of BP  SNa 130> 130>137>136>133  avoid hypotonic fluids; maintain appropriate solute intake  can add protein shakes with meals if able to tolerate   Consider changing IV abx into NS solution rather than D5  no dietary K restriction  monitor BMP    hypotension- bp better  currently on midodrine. 10 milliGRAM(s) Oral every 8 hours    # UTI on broad spectrum ABX.  per team/ID    If any questions, please feel free to contact me     Huan Saldana  Nephrology Attending  Cell # [phone]

## 2023-10-24 NOTE — CONSULT NOTE ADULT - ASSESSMENT
multifactorial pneumonia   abnormal imaging   UTI multifactorial pneumonia   abnormal imaging   UTI    CT reviewed  ID following    on abx   PPI increased to BID  pureed diet as tolerated  encourage po intake   pt not a candidate for scope given age, pneumonia and other comorbidities   d/w medicine     I reviewed the overnight course of events on the unit, re-confirming the patient history. I discussed the care with the patient and their family  The plan of care was discussed with the physician assistant and modifications were made to the notation where appropriate.   Differential diagnosis and plan of care discussed with patient after the evaluation  50 minutes spent on total encounter of which more than fifty percent of the encounter was spent counseling and/or coordinating care by the attending physician.  Advanced care planning was discussed with patient and family.  Advanced care planning forms were reviewed and discussed.  Risks, benefits and alternatives of gastroenterologic procedures were discussed in detail and all questions were answered.

## 2023-10-24 NOTE — CONSULT NOTE ADULT - SUBJECTIVE AND OBJECTIVE BOX
[facility]  Romulo Torres PA-C  [address] [place] [id number]  [phone]      Chief Complaint:  Patient is a 89y old  Male who presents with a chief complaint of Fever with chills (24 Oct 2023 11:13)      HPI: 89-year-old male history of atrial fibrillation on Coumadin, Alzheimer's, ANO timesx0-1 at [facility],  brought in from cardiologist office for altered mental status.  Patient is a brought in accompanied by daughter who is providing collateral information.   unable to obtain information from patient.  Daughter states that patient has history of Alzheimer's dementia, at baseline  is not alert or oriented, however has been increasingly lethargic relative to his baseline   For the past 2 to 3 days.  States that this morning, woke up with some chills/shaking so brought to see cardiologist. Had a cold 1 week ago, treated with amoxicillin. No known fevers at home or chills.    GI asked to consult for prepyloric gastric ulcer as seen on CT.    Allergies:  No Known Allergies      Medications:  acetaminophen     Tablet .. 650 milliGRAM(s) Oral every 6 hours PRN  aspirin  chewable 81 milliGRAM(s) Oral daily  atorvastatin 40 milliGRAM(s) Oral at bedtime  cholecalciferol 1000 Unit(s) Oral daily  digoxin     Tablet 250 MICROGram(s) Oral daily  finasteride 5 milliGRAM(s) Oral daily  metoprolol tartrate 50 milliGRAM(s) Oral two times a day  midodrine. 10 milliGRAM(s) Oral every 8 hours  pantoprazole    Tablet 40 milliGRAM(s) Oral two times a day  piperacillin/tazobactam IVPB.. 3.375 Gram(s) IV Intermittent every 8 hours  pyridoxine 100 milliGRAM(s) Oral daily  tamsulosin 0.4 milliGRAM(s) Oral at bedtime      PMHX/PSHX:  CVA (cerebral vascular accident)    CHF (congestive heart failure)    Atrial fibrillation    BPH (benign prostatic hyperplasia)    Dementia        Family history:      Social History:     ROS:   unable to to obtain     PHYSICAL EXAM:   Vital Signs:  Vital Signs Last 24 Hrs  T(C): 36.3 (24 Oct 2023 04:04), Max: 36.6 (23 Oct 2023 16:40)  T(F): 97.4 (24 Oct 2023 04:04), Max: 97.8 (23 Oct 2023 16:40)  HR: 96 (24 Oct 2023 04:04) (80 - 123)  BP: 102/68 (24 Oct 2023 04:04) (98/58 - 125/63)  BP(mean): --  RR: 18 (24 Oct 2023 04:04) (18 - 18)  SpO2: 94% (24 Oct 2023 04:04) (92% - 94%)    Parameters below as of 24 Oct 2023 04:04  Patient On (Oxygen Delivery Method): room air      Daily     Daily     nad    confused  frail  non toxic  soft, nt  no edema    LABS:                        15.3   4.52  )-----------( 196      ( 23 Oct 2023 10:34 )             46.6     10-24    133<L>  |  102  |  15  ----------------------------<  102<H>  4.3   |  20<L>  |  0.95    Ca    8.3<L>      24 Oct 2023 06:54  Mg     2.0     10-24        PT/INR - ( 24 Oct 2023 06:54 )   PT: 24.6 sec;   INR: 2.29 ratio         PTT - ( 24 Oct 2023 06:54 )  PTT:34.7 sec  Urinalysis Basic - ( 24 Oct 2023 06:54 )    Color: x / Appearance: x / SG: x / pH: x  Gluc: 102 mg/dL / Ketone: x  / Bili: x / Urobili: x   Blood: x / Protein: x / Nitrite: x   Leuk Esterase: x / RBC: x / WBC x   Sq Epi: x / Non Sq Epi: x / Bacteria: x          Imaging:           [facility]  Romulo Torres PA-C  [address] [place] [id number]  [phone]      Chief Complaint:  Patient is a 89y old  Male who presents with a chief complaint of Fever with chills (24 Oct 2023 11:13)      HPI: 89-year-old male history of atrial fibrillation on Coumadin, Alzheimer's, ANO timesx0-1 at [facility],  brought in from cardiologist office for altered mental status.  Patient is a brought in accompanied by daughter who is providing collateral information.   unable to obtain information from patient.  Daughter states that patient has history of Alzheimer's dementia, at baseline  is not alert or oriented, however has been increasingly lethargic relative to his baseline   For the past 2 to 3 days.  States that this morning, woke up with some chills/shaking so brought to see cardiologist. Had a cold 1 week ago, treated with amoxicillin. No known fevers at home or chills.    GI asked to consult for prepyloric gastric ulcer as seen on CT.    Allergies:  No Known Allergies      Medications:  acetaminophen     Tablet .. 650 milliGRAM(s) Oral every 6 hours PRN  aspirin  chewable 81 milliGRAM(s) Oral daily  atorvastatin 40 milliGRAM(s) Oral at bedtime  cholecalciferol 1000 Unit(s) Oral daily  digoxin     Tablet 250 MICROGram(s) Oral daily  finasteride 5 milliGRAM(s) Oral daily  metoprolol tartrate 50 milliGRAM(s) Oral two times a day  midodrine. 10 milliGRAM(s) Oral every 8 hours  pantoprazole    Tablet 40 milliGRAM(s) Oral two times a day  piperacillin/tazobactam IVPB.. 3.375 Gram(s) IV Intermittent every 8 hours  pyridoxine 100 milliGRAM(s) Oral daily  tamsulosin 0.4 milliGRAM(s) Oral at bedtime      PMHX/PSHX:  CVA (cerebral vascular accident)    CHF (congestive heart failure)    Atrial fibrillation    BPH (benign prostatic hyperplasia)    Dementia        Family history:      Social History:     ROS:   unable to to obtain     PHYSICAL EXAM:   Vital Signs:  Vital Signs Last 24 Hrs  T(C): 36.3 (24 Oct 2023 04:04), Max: 36.6 (23 Oct 2023 16:40)  T(F): 97.4 (24 Oct 2023 04:04), Max: 97.8 (23 Oct 2023 16:40)  HR: 96 (24 Oct 2023 04:04) (80 - 123)  BP: 102/68 (24 Oct 2023 04:04) (98/58 - 125/63)  BP(mean): --  RR: 18 (24 Oct 2023 04:04) (18 - 18)  SpO2: 94% (24 Oct 2023 04:04) (92% - 94%)    Parameters below as of 24 Oct 2023 04:04  Patient On (Oxygen Delivery Method): room air      Daily     Daily     nad    confused  frail  non toxic  soft, nt  no edema    LABS:                        15.3   4.52  )-----------( 196      ( 23 Oct 2023 10:34 )             46.6     10-24    133<L>  |  102  |  15  ----------------------------<  102<H>  4.3   |  20<L>  |  0.95    Ca    8.3<L>      24 Oct 2023 06:54  Mg     2.0     10-24        PT/INR - ( 24 Oct 2023 06:54 )   PT: 24.6 sec;   INR: 2.29 ratio         PTT - ( 24 Oct 2023 06:54 )  PTT:34.7 sec  Urinalysis Basic - ( 24 Oct 2023 06:54 )    Color: x / Appearance: x / SG: x / pH: x  Gluc: 102 mg/dL / Ketone: x  / Bili: x / Urobili: x   Blood: x / Protein: x / Nitrite: x   Leuk Esterase: x / RBC: x / WBC x   Sq Epi: x / Non Sq Epi: x / Bacteria: x          Imaging:  < from: CT Abdomen and Pelvis w/ IV Cont (10.22.23 @ 12:13) >    ACC: [id number] EXAM:  CT ABDOMEN AND PELVIS IC   ORDERED BY: SANA ABDUL     ACC: [id number] EXAM:  CT CHEST IC   ORDERED BY: SANA ABDUL     PROCEDURE DATE:  10/22/2023          INTERPRETATION:  CLINICAL INFORMATION: Sepsis. Lethargy. Chills and   shakes.    COMPARISON: CT abdomen pelvis on 5/20/2023. CT chest 9/10/2022.    CONTRAST/COMPLICATIONS:  IV Contrast: Omnipaque 350  90 cc administered   10 cc discarded  Oral Contrast: NONE  Complications: None reported at time of study completion    PROCEDURE:  CT of the Chest, Abdomen and Pelvis was performed.  Sagittal and coronal reformats were performed.    FINDINGS:  CHEST:  LUNGS AND LARGE AIRWAYS: Tracheal secretions. Diffuse bilateral   groundglass and consolidative opacities, greater in the upper lobes.   Bibasilar passive atelectasis. No suspicious pulmonary nodule. Calcified   granuloma in the right middle lobe.  PLEURA: Right greater than left small pleural effusions.  VESSELS: Atherosclerotic changes of the aorta and coronary arteries.  HEART: Cardiomegaly with left atrial enlargement.. No pericardial   effusion.  MEDIASTINUM AND MADHAVI: Multiple enlarged hilar and mediastinal lymph nodes   which are worsened since September 10, 2022. For reference a right lower   paratracheal lymph node measures 1.6 x 2.6 cm (3; 56), previously 1.0 x   1.5 cm and a right hilar lymph node measures 2.3 x 2.5 cm (3; 61),   previously 1.3 x 1.5 cm.  CHEST WALL AND LOWER NECK: Within normal limits.    ABDOMEN AND PELVIS:  LIVER: Within normal limits.  BILE DUCTS: Normal caliber.  GALLBLADDER: Hyperdense layering material in the neck of the gallbladder.  SPLEEN: Within normal limits.  PANCREAS: Within normal limits.  ADRENALS: Within normal limits.  KIDNEYS/URETERS: No hydronephrosis. Bilateral small subcentimeter   hypodense foci too small to characterize. Unchanged right upper pole cyst.    BLADDER: Circumferential bladder wall thickening with trace perivesicular   fatty stranding.  REPRODUCTIVE ORGANS: Prostate within normal limits.    BOWEL: No bowel obstruction. Appendix is normal. Colonic underdistention.   Small hiatal hernia. Ulcer shaped mucosal discontinuity of the posterior   gastric wall in the antrum just proximal to the pylorus.  PERITONEUM: No ascites.  VESSELS: Atherosclerotic changes.  RETROPERITONEUM/LYMPH NODES: No lymphadenopathy.  ABDOMINAL WALL: Mild subcutaneous edema. Postsurgical changes in the   right groin.  BONES: Degenerative changes. Old right lower rib fractures.    IMPRESSION:  Extensive bilateral groundglass and consolidative opacities, suggestive   of multifocal pneumonia.    Right greater than left small pleural effusions.    Suspicion for large prepyloric gastric ulcer.    Circumferential bladder wall thickening with trace perivesicular fatty   stranding suggestive of cystitis.

## 2023-10-24 NOTE — PROGRESS NOTE ADULT - ASSESSMENT
89-year-old male history of atrial fibrillation on Coumadin, Alzheimer's, ANO timesx0-1 at [facility],  brought in from cardiologist office for altered mental status.  Patient is a brought in accompanied by daughter who is providing collateral information.   unable to obtain information from patient.  Daughter states that patient has history of Alzheimer's dementia, at baseline  is not alert or oriented, however has been increasingly lethargic relative to his baseline   For the past 2 to 3 days.  States that this morning, woke up with some chills/shaking so brought to see cardiologist. Had a cold 1 week ago, treated with amoxicillin. No known fevers at home or chills.       Problem/Plan - 1:  ·  Problem: Sepsis with Multifocal Pneumonia .   ·  Plan: Hemodynamically stable.   S/P Blood and Urine culture NGTD.   IV Abxs started.    < from: CT Abdomen and Pelvis w/ IV Cont (10.22.23 @ 12:13) >  IMPRESSION:  Extensive bilateral groundglass and consolidative opacities, suggestive   of multifocal pneumonia.    Right greater than left small pleural effusions.    Suspicion for large prepyloric gastric ulcer.    Circumferential bladder wall thickening with trace perivesicular fatty   stranding suggestive of cystitis.     Problem/Plan - 2:  ·  Problem: Acute UTI.   ·  Plan: S/P Culture and NGTD.  IV Abxs started.     Problem/Plan - 3:  ·  Problem: Metabolic encephalopathy.  ·  Plan: Resolved.  Secondary to infection and hyponatremia.  CT head negative.      Problem/Plan - 4:  ·  Problem: Chronic atrial fibrillation.  ·  Plan: ON AC and BB .     Problem/Plan - 5:  ·  Problem: Acute hyponatremia.   ·  Plan: Renal help appreciated.      Problem/Plan - 6:  ·  Problem: Alzheimer's dementia.   ·  Plan: Supportive care.     Problem/Plan - 7:  ·  Problem: Enterovirus .   ·  Plan: Supportive care.     89-year-old male history of atrial fibrillation on Coumadin, Alzheimer's, ANO timesx0-1 at [facility],  brought in from cardiologist office for altered mental status.  Patient is a brought in accompanied by daughter who is providing collateral information.   unable to obtain information from patient.  Daughter states that patient has history of Alzheimer's dementia, at baseline  is not alert or oriented, however has been increasingly lethargic relative to his baseline   For the past 2 to 3 days.  States that this morning, woke up with some chills/shaking so brought to see cardiologist. Had a cold 1 week ago, treated with amoxicillin. No known fevers at home or chills.       Problem/Plan - 1:  ·  Problem: Sepsis with Multifocal Pneumonia .   ·  Plan: Hemodynamically stable.   S/P Blood and Urine culture NGTD.   IV Abxs started.    < from: CT Abdomen and Pelvis w/ IV Cont (10.22.23 @ 12:13) >  IMPRESSION:  Extensive bilateral groundglass and consolidative opacities, suggestive   of multifocal pneumonia.    Right greater than left small pleural effusions.    Suspicion for large prepyloric gastric ulcer.    Circumferential bladder wall thickening with trace perivesicular fatty   stranding suggestive of cystitis.     Problem/Plan - 2:  ·  Problem: Acute UTI.   ·  Plan: S/P Culture and NGTD.  IV Abxs started.     Problem/Plan - 3:  ·  Problem: Metabolic encephalopathy.  ·  Plan: Resolved.  Secondary to infection and hyponatremia.  CT head negative.      Problem/Plan - 4:  ·  Problem: Chronic atrial fibrillation.  ·  Plan: ON AC and BB .     Problem/Plan - 5:  ·  Problem: Acute hyponatremia.   ·  Plan: Renal help appreciated.      Problem/Plan - 6:  ·  Problem: Alzheimer's dementia.   ·  Plan: Supportive care.     Problem/Plan - 7:  ·  Problem: Enterovirus .   ·  Plan: Supportive care.    D/W Daughter in detail.   Dispo : DC planning Friday . Want to take him home.

## 2023-10-24 NOTE — PROGRESS NOTE ADULT - ASSESSMENT
89-yo M w/ PMH of Alzheimer's disease and afib, admitted with AMS, found to have fever w/ hypotension w/o leukocytosis. Entero/Rhinovirus+. CT C/A/P w/ multifocal pneumonia.    #Entero/Rhinovirus+  #Multifocal pneumonia  #Fever  #AMS  #Pyuria  - Tmax 103.2 on presentation. AMS from baseline A&O x0-1. Now fever improved.  - Stage 1 sacral ulcer on exam  - RVP+ w/ Entero/Rhinovirus. CT chest/abd/pelvis revealing multifocal pneumonia. Possible bacterial superinfection.  - C/w Zosyn 3.375 g IV Q8H until 10/26. If discharged earlier than this, can d/c abx.  - MRSA swab neg.  - Monitor WBC and fever curve    Plan discussed with primary team ACP.  Thank you for this consult. Inpatient ID consult team will discontinue active follow-up for this patient.    Further changes in lab values, imaging studies, or clinical status will not be known to ID inpatient consultants unless specifically communicated by primary team.    Klever Tse MD, PhD  Attending Physician  Division of Infectious Diseases  Department of Medicine    Please contact through MS Teams message.  Office: [phone] (after 5 PM or weekend)   89-yo M w/ PMH of Alzheimer's disease and afib, admitted with AMS, found to have fever w/ hypotension w/o leukocytosis. Entero/Rhinovirus+. CT C/A/P w/ multifocal pneumonia.    #Entero/Rhinovirus+  #Multifocal pneumonia  #Fever  #AMS  #Pyuria  - Tmax 103.2 on presentation. AMS from baseline A&O x0-1. Now fever improved.  - Stage 1 sacral ulcer on exam  - RVP+ w/ Entero/Rhinovirus. CT chest/abd/pelvis revealing multifocal pneumonia. Possible bacterial superinfection.  - C/w Zosyn 3.375 g IV Q8H until 10/26.  - MRSA swab neg.  - Monitor WBC and fever curve    Plan discussed with primary team ACP.  Thank you for this consult. Inpatient ID consult team will discontinue active follow-up for this patient.    Further changes in lab values, imaging studies, or clinical status will not be known to ID inpatient consultants unless specifically communicated by primary team.    Klever Tse MD, PhD  Attending Physician  Division of Infectious Diseases  Department of Medicine    Please contact through MS Teams message.  Office: [phone] (after 5 PM or weekend)

## 2023-10-25 ENCOUNTER — TRANSCRIPTION ENCOUNTER (OUTPATIENT)
Age: 88
End: 2023-10-25

## 2023-10-25 VITALS
SYSTOLIC BLOOD PRESSURE: 107 MMHG | DIASTOLIC BLOOD PRESSURE: 62 MMHG | HEART RATE: 78 BPM | TEMPERATURE: 97 F | OXYGEN SATURATION: 92 % | RESPIRATION RATE: 18 BRPM

## 2023-10-25 LAB
ANION GAP SERPL CALC-SCNC: 8 MMOL/L — SIGNIFICANT CHANGE UP (ref 5–17)
ANION GAP SERPL CALC-SCNC: 8 MMOL/L — SIGNIFICANT CHANGE UP (ref 5–17)
APTT BLD: 23.2 SEC — LOW (ref 24.5–35.6)
APTT BLD: 23.2 SEC — LOW (ref 24.5–35.6)
BUN SERPL-MCNC: 10 MG/DL — SIGNIFICANT CHANGE UP (ref 7–23)
BUN SERPL-MCNC: 10 MG/DL — SIGNIFICANT CHANGE UP (ref 7–23)
CALCIUM SERPL-MCNC: 8.2 MG/DL — LOW (ref 8.4–10.5)
CALCIUM SERPL-MCNC: 8.2 MG/DL — LOW (ref 8.4–10.5)
CHLORIDE SERPL-SCNC: 101 MMOL/L — SIGNIFICANT CHANGE UP (ref 96–108)
CHLORIDE SERPL-SCNC: 101 MMOL/L — SIGNIFICANT CHANGE UP (ref 96–108)
CO2 SERPL-SCNC: 24 MMOL/L — SIGNIFICANT CHANGE UP (ref 22–31)
CO2 SERPL-SCNC: 24 MMOL/L — SIGNIFICANT CHANGE UP (ref 22–31)
CREAT SERPL-MCNC: 0.84 MG/DL — SIGNIFICANT CHANGE UP (ref 0.5–1.3)
CREAT SERPL-MCNC: 0.84 MG/DL — SIGNIFICANT CHANGE UP (ref 0.5–1.3)
CULTURE RESULTS: SIGNIFICANT CHANGE UP
EGFR: 83 ML/MIN/1.73M2 — SIGNIFICANT CHANGE UP
EGFR: 83 ML/MIN/1.73M2 — SIGNIFICANT CHANGE UP
GLUCOSE SERPL-MCNC: 139 MG/DL — HIGH (ref 70–99)
GLUCOSE SERPL-MCNC: 139 MG/DL — HIGH (ref 70–99)
INR BLD: 2.87 RATIO — HIGH (ref 0.85–1.18)
INR BLD: 2.87 RATIO — HIGH (ref 0.85–1.18)
POTASSIUM SERPL-MCNC: 4.1 MMOL/L — SIGNIFICANT CHANGE UP (ref 3.5–5.3)
POTASSIUM SERPL-MCNC: 4.1 MMOL/L — SIGNIFICANT CHANGE UP (ref 3.5–5.3)
POTASSIUM SERPL-SCNC: 4.1 MMOL/L — SIGNIFICANT CHANGE UP (ref 3.5–5.3)
POTASSIUM SERPL-SCNC: 4.1 MMOL/L — SIGNIFICANT CHANGE UP (ref 3.5–5.3)
PROTHROM AB SERPL-ACNC: 30.6 SEC — HIGH (ref 9.5–13)
PROTHROM AB SERPL-ACNC: 30.6 SEC — HIGH (ref 9.5–13)
SODIUM SERPL-SCNC: 133 MMOL/L — LOW (ref 135–145)
SODIUM SERPL-SCNC: 133 MMOL/L — LOW (ref 135–145)
SPECIMEN SOURCE: SIGNIFICANT CHANGE UP

## 2023-10-25 RX ORDER — DAPAGLIFLOZIN 10 MG/1
1 TABLET, FILM COATED ORAL
Refills: 0 | DISCHARGE

## 2023-10-25 RX ORDER — SPIRONOLACTONE 25 MG/1
0.5 TABLET, FILM COATED ORAL
Refills: 0 | DISCHARGE

## 2023-10-25 RX ORDER — MIDODRINE HYDROCHLORIDE 2.5 MG/1
1 TABLET ORAL
Qty: 90 | Refills: 0
Start: 2023-10-25 | End: 2023-11-23

## 2023-10-25 RX ORDER — DIGOXIN 250 MCG
1 TABLET ORAL
Qty: 30 | Refills: 0
Start: 2023-10-25 | End: 2023-11-23

## 2023-10-25 RX ORDER — METOPROLOL TARTRATE 50 MG
1 TABLET ORAL
Qty: 60 | Refills: 0
Start: 2023-10-25 | End: 2023-11-23

## 2023-10-25 RX ORDER — METOPROLOL TARTRATE 50 MG
1 TABLET ORAL
Qty: 0 | Refills: 0 | DISCHARGE

## 2023-10-25 RX ADMIN — Medication 250 MICROGRAM(S): at 05:07

## 2023-10-25 RX ADMIN — PIPERACILLIN AND TAZOBACTAM 25 GRAM(S): 4; .5 INJECTION, POWDER, LYOPHILIZED, FOR SOLUTION INTRAVENOUS at 12:55

## 2023-10-25 RX ADMIN — Medication 50 MILLIGRAM(S): at 05:07

## 2023-10-25 RX ADMIN — PANTOPRAZOLE SODIUM 40 MILLIGRAM(S): 20 TABLET, DELAYED RELEASE ORAL at 05:07

## 2023-10-25 RX ADMIN — Medication 81 MILLIGRAM(S): at 11:52

## 2023-10-25 RX ADMIN — FINASTERIDE 5 MILLIGRAM(S): 5 TABLET, FILM COATED ORAL at 11:32

## 2023-10-25 RX ADMIN — PIPERACILLIN AND TAZOBACTAM 25 GRAM(S): 4; .5 INJECTION, POWDER, LYOPHILIZED, FOR SOLUTION INTRAVENOUS at 05:07

## 2023-10-25 RX ADMIN — MIDODRINE HYDROCHLORIDE 10 MILLIGRAM(S): 2.5 TABLET ORAL at 12:54

## 2023-10-25 RX ADMIN — MIDODRINE HYDROCHLORIDE 10 MILLIGRAM(S): 2.5 TABLET ORAL at 05:07

## 2023-10-25 RX ADMIN — Medication 100 MILLIGRAM(S): at 12:54

## 2023-10-25 RX ADMIN — Medication 1000 UNIT(S): at 11:32

## 2023-10-25 NOTE — PROGRESS NOTE ADULT - PROVIDER SPECIALTY LIST ADULT
Cardiology
Nephrology
Cardiology
Cardiology
Infectious Disease
Cardiology
Infectious Disease
Internal Medicine
Nephrology
Nephrology
Gastroenterology
Internal Medicine
Nephrology

## 2023-10-25 NOTE — DISCHARGE NOTE NURSING/CASE MANAGEMENT/SOCIAL WORK - NSDCVIVACCINE_GEN_ALL_CORE_FT
Tdap; 20-Apr-2022 17:15; Claudy Norris (RN); [employer]; [id number] (Exp. Date: 18-Jan-2024); IntraMuscular; Deltoid Right.; 0.5 milliLiter(s); VIS (VIS Published: 09-May-2013, VIS Presented: 20-Apr-2022);

## 2023-10-25 NOTE — DISCHARGE NOTE NURSING/CASE MANAGEMENT/SOCIAL WORK - NSDCPEFALRISK_GEN_ALL_CORE
For information on Fall & Injury Prevention, visit: [email or web address] OR  [email or web address] OR  https://www.cdc.gov/steadi/patient.html

## 2023-10-25 NOTE — PROGRESS NOTE ADULT - SUBJECTIVE AND OBJECTIVE BOX
Follow Up:    Pneumonia    Interval History/ROS:  VSS ON. Patient was seen and examined at bedside. Sleeping - easily awaken. No fever or cough.    Allergies  No Known Allergies        ANTIMICROBIALS:  piperacillin/tazobactam IVPB.. 3.375 every 8 hours      OTHER MEDS:  MEDICATIONS  (STANDING):  acetaminophen     Tablet .. 650 every 6 hours PRN  aspirin  chewable 81 daily  atorvastatin 40 at bedtime  digoxin     Tablet 250 daily  finasteride 5 daily  metoprolol tartrate 50 two times a day  midodrine. 10 every 8 hours  pantoprazole  Injectable 40 two times a day  tamsulosin 0.4 at bedtime      Vital Signs Last 24 Hrs  T(C): 36.6 (24 Oct 2023 12:01), Max: 36.6 (23 Oct 2023 16:40)  T(F): 97.8 (24 Oct 2023 12:01), Max: 97.8 (23 Oct 2023 16:40)  HR: 72 (24 Oct 2023 12:01) (72 - 123)  BP: 104/71 (24 Oct 2023 12:01) (102/68 - 125/63)  BP(mean): --  RR: 18 (24 Oct 2023 12:01) (18 - 18)  SpO2: 97% (24 Oct 2023 12:01) (92% - 97%)    Parameters below as of 24 Oct 2023 12:01  Patient On (Oxygen Delivery Method): room air      PHYSICAL EXAM:  Constitutional: non-toxic, tolerating RA  Cardiovascular:   normal S1, S2, no murmur; RRR  Respiratory:  clear BS bilaterally, no wheezes, no rales  Neurologic: awake and alert, normal strength, no focal findings  Skin:  no rash, no erythema, no phlebitis                          15.3   4.52  )-----------( 196      ( 23 Oct 2023 10:34 )             46.6       10-24    133<L>  |  102  |  15  ----------------------------<  102<H>  4.3   |  20<L>  |  0.95    Ca    8.3<L>      24 Oct 2023 06:54  Mg     2.0     10-24        Urinalysis Basic - ( 24 Oct 2023 06:54 )    Color: x / Appearance: x / SG: x / pH: x  Gluc: 102 mg/dL / Ketone: x  / Bili: x / Urobili: x   Blood: x / Protein: x / Nitrite: x   Leuk Esterase: x / RBC: x / WBC x   Sq Epi: x / Non Sq Epi: x / Bacteria: x        MICROBIOLOGY:  v  Clean Catch Clean Catch (Midstream)  10-20-23   <10,000 CFU/mL Normal Urogenital Piedad  --  --      .Blood Blood-Peripheral  10-20-23   No growth at 72 Hours  --  --      .Blood Blood-Peripheral  10-20-23   No growth at 72 Hours  --  --          Rapid RVP Result: Detected (10-21 @ 20:04)        RADIOLOGY:  Imaging below independently reviewed.  < from: CT Abdomen and Pelvis w/ IV Cont (10.22.23 @ 12:13) >    IMPRESSION:  Extensive bilateral groundglass and consolidative opacities, suggestive   of multifocal pneumonia.    Right greater than left small pleural effusions.    Suspicion for large prepyloric gastric ulcer.    Circumferential bladder wall thickening with trace perivesicular fatty   stranding suggestive of cystitis.        < end of copied text >  
Cardiovascular Disease Progress Note  Date of service: 10-24-23 @ 13:45    Overnight events: No acute events overnight.  Mr. Kimbrough is in no distress.   Otherwise review of systems negative    Objective Findings:  T(C): 36.6 (10-24-23 @ 12:01), Max: 36.6 (10-23-23 @ 16:40)  HR: 72 (10-24-23 @ 12:01) (72 - 123)  BP: 104/71 (10-24-23 @ 12:01) (102/68 - 125/63)  RR: 18 (10-24-23 @ 12:01) (18 - 18)  SpO2: 97% (10-24-23 @ 12:01) (92% - 97%)  Wt(kg): --  Daily     Daily       Physical Exam:  Gen: NAD; Patient resting comfortably  HEENT: EOMI, Normocephalic/ atraumatic  CV: RRR, normal S1 + S2, no m/r/g  Lungs:  Normal respiratory effort; clear to auscultation bilaterally  Abd: soft, non-tender; bowel sounds present  Ext: No edema; warm and well perfused    Telemetry: Afib    Laboratory Data:                        15.3   4.52  )-----------( 196      ( 23 Oct 2023 10:34 )             46.6     10-24    133<L>  |  102  |  15  ----------------------------<  102<H>  4.3   |  20<L>  |  0.95    Ca    8.3<L>      24 Oct 2023 06:54  Mg     2.0     10-24      PT/INR - ( 24 Oct 2023 06:54 )   PT: 24.6 sec;   INR: 2.29 ratio         PTT - ( 24 Oct 2023 06:54 )  PTT:34.7 sec          Inpatient Medications:  MEDICATIONS  (STANDING):  aspirin  chewable 81 milliGRAM(s) Oral daily  atorvastatin 40 milliGRAM(s) Oral at bedtime  cholecalciferol 1000 Unit(s) Oral daily  digoxin     Tablet 250 MICROGram(s) Oral daily  finasteride 5 milliGRAM(s) Oral daily  metoprolol tartrate 50 milliGRAM(s) Oral two times a day  midodrine. 10 milliGRAM(s) Oral every 8 hours  pantoprazole  Injectable 40 milliGRAM(s) IV Push two times a day  piperacillin/tazobactam IVPB.. 3.375 Gram(s) IV Intermittent every 8 hours  pyridoxine 100 milliGRAM(s) Oral daily  tamsulosin 0.4 milliGRAM(s) Oral at bedtime      Assessment:  89-year-old male history of atrial fibrillation on Coumadin, Alzheimer's, dementia,  brought in from cardiologist office for altered mental status    Plan of Care:    #Encephalopathy  - Pt with baseline alzheimer's dementia which has acute worsened  - CT head negative for acute pathology  - Likely 2/2 infectious etiology  - Abx as per primary team    #Sepsis  - Abx as per primary team      #Afib  - HR improved  - Continue BB and Digoxin  - Continue Warfarin with INR goal 2-3      #Alzheimers  - Defer to primary team    #HLD  - Statin as ordered.               Over 25 minutes spent on total encounter; more than 50% of the visit was spent counseling and/or coordinating care by the attending physician.      Orestes Hart DO [facility]  Cardiovascular Disease  [phone]
Date of Service  : 10-22-23     INTERVAL HPI/OVERNIGHT EVENTS: Seen and examined with daughter in room. Feeling better. left eye congested.   Vital Signs Last 24 Hrs  T(C): 36.7 (22 Oct 2023 12:53), Max: 37.1 (22 Oct 2023 05:19)  T(F): 98 (22 Oct 2023 12:53), Max: 98.7 (22 Oct 2023 05:19)  HR: 63 (22 Oct 2023 12:53) (61 - 77)  BP: 110/66 (22 Oct 2023 12:53) (98/66 - 110/66)  BP(mean): --  RR: 17 (22 Oct 2023 12:53) (17 - 18)  SpO2: 98% (22 Oct 2023 12:53) (96% - 98%)    Parameters below as of 22 Oct 2023 12:53  Patient On (Oxygen Delivery Method): room air      I&O's Summary    21 Oct 2023 07:01  -  22 Oct 2023 07:00  --------------------------------------------------------  IN: 50 mL / OUT: 0 mL / NET: 50 mL      MEDICATIONS  (STANDING):  aspirin  chewable 81 milliGRAM(s) Oral daily  atorvastatin 40 milliGRAM(s) Oral at bedtime  cholecalciferol 1000 Unit(s) Oral daily  finasteride 5 milliGRAM(s) Oral daily  midodrine. 10 milliGRAM(s) Oral every 8 hours  pantoprazole    Tablet 40 milliGRAM(s) Oral before breakfast  piperacillin/tazobactam IVPB.. 3.375 Gram(s) IV Intermittent every 8 hours  pyridoxine 100 milliGRAM(s) Oral daily  tamsulosin 0.4 milliGRAM(s) Oral at bedtime  warfarin 1 milliGRAM(s) Oral once    MEDICATIONS  (PRN):  acetaminophen     Tablet .. 650 milliGRAM(s) Oral every 6 hours PRN Temp greater or equal to 38.5C (101.3F), Moderate Pain (4 - 6)    LABS:                        13.4   3.42  )-----------( 187      ( 22 Oct 2023 07:29 )             41.3     10-22    137  |  107  |  12  ----------------------------<  80  4.3   |  19<L>  |  0.90    Ca    8.0<L>      22 Oct 2023 07:22    TPro  7.4  /  Alb  3.2<L>  /  TBili  0.9  /  DBili  x   /  AST  67<H>  /  ALT  21  /  AlkPhos  104  10-20    PT/INR - ( 22 Oct 2023 07:29 )   PT: 24.6 sec;   INR: 2.29 ratio         PTT - ( 22 Oct 2023 07:29 )  PTT:35.2 sec  Urinalysis Basic - ( 22 Oct 2023 07:22 )    Color: x / Appearance: x / SG: x / pH: x  Gluc: 80 mg/dL / Ketone: x  / Bili: x / Urobili: x   Blood: x / Protein: x / Nitrite: x   Leuk Esterase: x / RBC: x / WBC x   Sq Epi: x / Non Sq Epi: x / Bacteria: x      CAPILLARY BLOOD GLUCOSE            Urinalysis Basic - ( 22 Oct 2023 07:22 )    Color: x / Appearance: x / SG: x / pH: x  Gluc: 80 mg/dL / Ketone: x  / Bili: x / Urobili: x   Blood: x / Protein: x / Nitrite: x   Leuk Esterase: x / RBC: x / WBC x   Sq Epi: x / Non Sq Epi: x / Bacteria: x      REVIEW OF SYSTEMS:  CONSTITUTIONAL: No fever, weight loss, or fatigue  EYES: No eye pain, visual disturbances, or discharge  ENMT:  No difficulty hearing, tinnitus, vertigo; No sinus or throat pain  NECK: No pain or stiffness  RESPIRATORY: No cough, wheezing, chills or hemoptysis; No shortness of breath  CARDIOVASCULAR: No chest pain, palpitations, dizziness, or leg swelling  GASTROINTESTINAL: No abdominal or epigastric pain. No nausea, vomiting, or hematemesis; No diarrhea or constipation. No melena or hematochezia.    RADIOLOGY & ADDITIONAL TESTS:    Consultant(s) Notes Reviewed:  [x ] YES  [ ] NO    PHYSICAL EXAM:  GENERAL: NAD, well-groomed, well-developed,not in any distress ,  HEAD:  Atraumatic, Normocephalic  EYES: L eye congested and mucus+  ENMT: No tonsillar erythema, exudates, or enlargement; Moist mucous membranes, Good dentition, No lesions  NECK: Supple, No JVD, Normal thyroid  NERVOUS SYSTEM:  Alert & Oriented X3, No focal deficit   CHEST/LUNG: Good air entry bilateral with no  rales, rhonchi, wheezing, or rubs  HEART: Regular rate and rhythm; No murmurs, rubs, or gallops  ABDOMEN: Soft, Nontender, Nondistended; Bowel sounds present  EXTREMITIES:  2+ Peripheral Pulses, No clubbing, cyanosis, or edema    Care Discussed with Consultants/Other Providers [ x] YES  [ ] NO
[facility] - S Dani / Nela S /D Harlan/ S Neeraj/ S Thomas/ Dru Lemos / M Aysha/ O Kaylie  service -[phone], office [phone]  ---------------------------------------------------------------------------------------------------------------    Patient seen and examined bedside    Subjective and Objective: No overnight events, pt calm, comfortable    Allergies: No Known Allergies      Hospital Medications:   MEDICATIONS  (STANDING):  aspirin  chewable 81 milliGRAM(s) Oral daily  atorvastatin 40 milliGRAM(s) Oral at bedtime  cholecalciferol 1000 Unit(s) Oral daily  finasteride 5 milliGRAM(s) Oral daily  metoprolol tartrate 25 milliGRAM(s) Oral two times a day  midodrine. 10 milliGRAM(s) Oral every 8 hours  pantoprazole    Tablet 40 milliGRAM(s) Oral before breakfast  piperacillin/tazobactam IVPB.. 3.375 Gram(s) IV Intermittent every 8 hours  pyridoxine 100 milliGRAM(s) Oral daily  tamsulosin 0.4 milliGRAM(s) Oral at bedtime  warfarin 1 milliGRAM(s) Oral once    VITALS:  T(F): 97.5 (10-22-23 @ 17:08), Max: 98.7 (10-22-23 @ 05:19)  HR: 112 (10-22-23 @ 17:08)  BP: 115/74 (10-22-23 @ 17:08)  RR: 18 (10-22-23 @ 17:08)  SpO2: 94% (10-22-23 @ 17:08)  Wt(kg): --    10-21 @ 07:01  -  10-22 @ 07:00  --------------------------------------------------------  IN: 50 mL / OUT: 0 mL / NET: 50 mL    PHYSICAL EXAM:  Constitutional: NAD  HEENT: anicteric sclera  Neck: No JVD  Respiratory: CTAB, no wheezes, rales or rhonchi  Cardiovascular: S1, S2, RRR  Gastrointestinal: BS+, soft, NT  Extremities: no peripheral edema b/l   Neurological: awake, alert, demented  Psychiatric: Normal mood, normal affect  : No brewster.     LABS:  10-22    137  |  107  |  12  ----------------------------<  80  4.3   |  19<L>  |  0.90    Ca    8.0<L>      22 Oct 2023 07:22      Creatinine Trend: 0.90 <--, 0.79 <--, 0.88 <--                        13.4   3.42  )-----------( 187      ( 22 Oct 2023 07:29 )             41.3     Urine Studies:  Urinalysis Basic - ( 22 Oct 2023 07:22 )    Color:  / Appearance:  / SG:  / pH:   Gluc: 80 mg/dL / Ketone:   / Bili:  / Urobili:    Blood:  / Protein:  / Nitrite:    Leuk Esterase:  / RBC:  / WBC    Sq Epi:  / Non Sq Epi:  / Bacteria:           RADIOLOGY & ADDITIONAL STUDIES:  
[facility] : Ans Serv [phone], Office [phone]  Dr Claros/Dr Louise  /Dr Sunny varghese /Dr FCO Pickard/Dr Sinan Guzman/Dr Dru Lemos /Dr PHIL Olmstead  _______________________________________________________________________________________________    Pt. seen and examined this morning. Unable to express concerns.   No acute issues noted overnight     ROS: Unable to obtain     VITALS:  T(F): 97.5 (10-23 @ 13:13), Max: 98.7 (10-22 @ 05:19)  HR: 107 (10-23 @ 13:13)  BP: 98/58 (10-23 @ 13:13)  ABP: --  RR: 18 (10-23 @ 13:13)  SpO2: 92% (10-23 @ 13:13)    Physical Exam :-  Constitutional: chronically ill appearing   Respiratory: decreased breath sounds  Cardiovascular: S1, S2 normal, positive Murmur  Gastrointestinal: Bowel Sounds present, soft  Extremities: no Edema Feet  Neurological: not awake or alert  Psychiatric: Normal mood, normal affect    Data:-  Allergies :   No Known Allergies    Hospital Medications:   MEDICATIONS  (STANDING):  aspirin  chewable 81 milliGRAM(s) Oral daily  atorvastatin 40 milliGRAM(s) Oral at bedtime  cholecalciferol 1000 Unit(s) Oral daily  finasteride 5 milliGRAM(s) Oral daily  metoprolol tartrate 25 milliGRAM(s) Oral two times a day  midodrine. 10 milliGRAM(s) Oral every 8 hours  pantoprazole    Tablet 40 milliGRAM(s) Oral before breakfast  piperacillin/tazobactam IVPB.. 3.375 Gram(s) IV Intermittent every 8 hours  pyridoxine 100 milliGRAM(s) Oral daily  tamsulosin 0.4 milliGRAM(s) Oral at bedtime    10-23    136  |  104  |  11  ----------------------------<  108<H>  4.5   |  21<L>  |  0.89    Ca    8.4      23 Oct 2023 10:34      Creatinine Trend: 0.89 <--, 0.90 <--, 0.79 <--, 0.88 <--  egfr trend : 81 <--, 81 <--, 84 <--, 82 <--                        15.3   4.52  )-----------( 196      ( 23 Oct 2023 10:34 )             46.6   
Cardiovascular Disease Progress Note  Date of service: 10-22-23 @ 12:05    Overnight events: No acute events overnight.  Mr. Kimbrough is no no distress.   Otherwise review of systems negative    Objective Findings:  T(C): 37.1 (10-22-23 @ 05:19), Max: 37.1 (10-22-23 @ 05:19)  HR: 61 (10-22-23 @ 05:19) (61 - 89)  BP: 98/66 (10-22-23 @ 05:19) (98/66 - 111/70)  RR: 18 (10-22-23 @ 05:19) (18 - 18)  SpO2: 96% (10-22-23 @ 05:19) (96% - 96%)  Wt(kg): --  Daily     Daily       Physical Exam:  Gen: NAD; Patient resting comfortably  HEENT: EOMI, Normocephalic/ atraumatic  CV: RRR, normal S1 + S2, no m/r/g  Lungs:  Normal respiratory effort; clear to auscultation bilaterally  Abd: soft, non-tender; bowel sounds present  Ext: No edema; warm and well perfused    Telemetry: afib    Laboratory Data:                        13.4   3.42  )-----------( 187      ( 22 Oct 2023 07:29 )             41.3     10-22    137  |  107  |  12  ----------------------------<  80  4.3   |  19<L>  |  0.90    Ca    8.0<L>      22 Oct 2023 07:22    TPro  7.4  /  Alb  3.2<L>  /  TBili  0.9  /  DBili  x   /  AST  67<H>  /  ALT  21  /  AlkPhos  104  10-20    PT/INR - ( 22 Oct 2023 07:29 )   PT: 24.6 sec;   INR: 2.29 ratio         PTT - ( 22 Oct 2023 07:29 )  PTT:35.2 sec          Inpatient Medications:  MEDICATIONS  (STANDING):  aspirin  chewable 81 milliGRAM(s) Oral daily  atorvastatin 40 milliGRAM(s) Oral at bedtime  cholecalciferol 1000 Unit(s) Oral daily  finasteride 5 milliGRAM(s) Oral daily  midodrine. 10 milliGRAM(s) Oral every 8 hours  pantoprazole    Tablet 40 milliGRAM(s) Oral before breakfast  piperacillin/tazobactam IVPB.. 3.375 Gram(s) IV Intermittent every 8 hours  pyridoxine 100 milliGRAM(s) Oral daily  sodium chloride 0.9%. 1000 milliLiter(s) (100 mL/Hr) IV Continuous <Continuous>  tamsulosin 0.4 milliGRAM(s) Oral at bedtime  warfarin 1 milliGRAM(s) Oral once      Assessment:  89-year-old male history of atrial fibrillation on Coumadin, Alzheimer's, dementia,  brought in from cardiologist office for altered mental status    Plan of Care:    #Encephalopathy  - Pt with baseline alzheimer's dementia which has acute worsened  - CT head negative for acute pathology  - Likely 2/2 infectious etiology  - Abx as per primary team    #Sepsis  - Abx as per primary team      #Afib  - Pt on Coumadin  - INR goal 2-3  - Pt on BB at home however currently on hold 2/2 hypotension  - If patient goes into RVR and is unable to tolerate BB 2/2 hypotension, can consider Digoxin    #Alzheimers  - Defer to primary team    #HLD  - Statin as ordered.     #ACP (advance care planning)-  Advanced care planning was discussed.  Risks, benefits and alternatives of medical treatment and procedures were addressed. 30 additional minutes spent addressing advance care plans.        Over 25 minutes spent on total encounter; more than 50% of the visit was spent counseling and/or coordinating care by the attending physician.      Orestes Hart DO [facility]  Cardiovascular Disease  [phone]
Date of Service  : 10-21-23     INTERVAL HPI/OVERNIGHT EVENTS: No new concerns. When can I go home ?  Vital Signs Last 24 Hrs  T(C): 36.4 (21 Oct 2023 13:17), Max: 39.6 (20 Oct 2023 16:37)  T(F): 97.6 (21 Oct 2023 13:17), Max: 103.2 (20 Oct 2023 16:37)  HR: 89 (21 Oct 2023 13:17) (73 - 144)  BP: 111/70 (21 Oct 2023 13:17) (70/49 - 133/89)  BP(mean): 76 (21 Oct 2023 03:15) (57 - 79)  RR: 18 (21 Oct 2023 13:17) (12 - 27)  SpO2: 96% (21 Oct 2023 13:17) (92% - 100%)    Parameters below as of 21 Oct 2023 13:17  Patient On (Oxygen Delivery Method): room air      I&O's Summary    21 Oct 2023 07:01  -  21 Oct 2023 14:57  --------------------------------------------------------  IN: 50 mL / OUT: 0 mL / NET: 50 mL      MEDICATIONS  (STANDING):  aspirin  chewable 81 milliGRAM(s) Oral daily  atorvastatin 40 milliGRAM(s) Oral at bedtime  cholecalciferol 1000 Unit(s) Oral daily  finasteride 5 milliGRAM(s) Oral daily  midodrine. 10 milliGRAM(s) Oral every 8 hours  pantoprazole    Tablet 40 milliGRAM(s) Oral before breakfast  piperacillin/tazobactam IVPB.. 3.375 Gram(s) IV Intermittent every 8 hours  pyridoxine 100 milliGRAM(s) Oral daily  sodium chloride 0.9%. 1000 milliLiter(s) (100 mL/Hr) IV Continuous <Continuous>  tamsulosin 0.4 milliGRAM(s) Oral at bedtime    MEDICATIONS  (PRN):  acetaminophen     Tablet .. 650 milliGRAM(s) Oral every 6 hours PRN Temp greater or equal to 38.5C (101.3F), Moderate Pain (4 - 6)    LABS:                        16.8   5.30  )-----------( 246      ( 20 Oct 2023 17:16 )             49.2     10-20    130<L>  |  100  |  14  ----------------------------<  124<H>  5.0   |  21<L>  |  0.79    Ca    7.7<L>      20 Oct 2023 18:18    TPro  7.4  /  Alb  3.2<L>  /  TBili  0.9  /  DBili  x   /  AST  67<H>  /  ALT  21  /  AlkPhos  104  10-20    PT/INR - ( 21 Oct 2023 11:57 )   PT: 19.5 sec;   INR: 1.80 ratio         PTT - ( 20 Oct 2023 17:16 )  PTT:30.6 sec  Urinalysis Basic - ( 20 Oct 2023 19:21 )    Color: Light Yellow / Appearance: Clear / S.010 / pH: x  Gluc: x / Ketone: Negative  / Bili: Negative / Urobili: Negative   Blood: x / Protein: Negative / Nitrite: Negative   Leuk Esterase: Large / RBC: 1 /hpf / WBC 24 /HPF   Sq Epi: x / Non Sq Epi: x / Bacteria: Negative      CAPILLARY BLOOD GLUCOSE      POCT Blood Glucose.: 118 mg/dL (20 Oct 2023 16:47)  POCT Blood Glucose.: 131 mg/dL (20 Oct 2023 16:36)        Urinalysis Basic - ( 20 Oct 2023 19:21 )    Color: Light Yellow / Appearance: Clear / S.010 / pH: x  Gluc: x / Ketone: Negative  / Bili: Negative / Urobili: Negative   Blood: x / Protein: Negative / Nitrite: Negative   Leuk Esterase: Large / RBC: 1 /hpf / WBC 24 /HPF   Sq Epi: x / Non Sq Epi: x / Bacteria: Negative          Consultant(s) Notes Reviewed:  [x ] YES  [ ] NO    PHYSICAL EXAM:  GENERAL: NAD, well-groomed, well-developed,not in any distress ,  HEAD:  Atraumatic, Normocephalic  EYES: EOMI, PERRLA, conjunctiva and sclera clear  ENMT: No tonsillar erythema, exudates, or enlargement; Moist mucous membranes, Good dentition, No lesions  NECK: Supple, No JVD, Normal thyroid  NERVOUS SYSTEM:  Alert &, No focal deficit   CHEST/LUNG: Good air entry bilateral with no  rales, rhonchi, wheezing, or rubs  HEART: Regular rate and rhythm; No murmurs, rubs, or gallops  ABDOMEN: Soft, Nontender, Nondistended; Bowel sounds present  EXTREMITIES:  2+ Peripheral Pulses, No clubbing, cyanosis, or edema    Care Discussed with Consultants/Other Providers [ x] YES  [ ] NO
Date of Service  : 10-24-23     INTERVAL HPI/OVERNIGHT EVENTS: I am ok.   Vital Signs Last 24 Hrs  T(C): 36.3 (24 Oct 2023 04:04), Max: 36.6 (23 Oct 2023 16:40)  T(F): 97.4 (24 Oct 2023 04:04), Max: 97.8 (23 Oct 2023 16:40)  HR: 96 (24 Oct 2023 04:04) (80 - 123)  BP: 102/68 (24 Oct 2023 04:04) (98/58 - 125/63)  BP(mean): --  RR: 18 (24 Oct 2023 04:04) (18 - 18)  SpO2: 94% (24 Oct 2023 04:04) (92% - 94%)    Parameters below as of 24 Oct 2023 04:04  Patient On (Oxygen Delivery Method): room air      I&O's Summary    23 Oct 2023 07:01  -  24 Oct 2023 07:00  --------------------------------------------------------  IN: 270 mL / OUT: 0 mL / NET: 270 mL      MEDICATIONS  (STANDING):  aspirin  chewable 81 milliGRAM(s) Oral daily  atorvastatin 40 milliGRAM(s) Oral at bedtime  cholecalciferol 1000 Unit(s) Oral daily  digoxin     Tablet 250 MICROGram(s) Oral daily  finasteride 5 milliGRAM(s) Oral daily  metoprolol tartrate 50 milliGRAM(s) Oral two times a day  midodrine. 10 milliGRAM(s) Oral every 8 hours  pantoprazole    Tablet 40 milliGRAM(s) Oral before breakfast  piperacillin/tazobactam IVPB.. 3.375 Gram(s) IV Intermittent every 8 hours  pyridoxine 100 milliGRAM(s) Oral daily  tamsulosin 0.4 milliGRAM(s) Oral at bedtime    MEDICATIONS  (PRN):  acetaminophen     Tablet .. 650 milliGRAM(s) Oral every 6 hours PRN Temp greater or equal to 38.5C (101.3F), Moderate Pain (4 - 6)    LABS:                        15.3   4.52  )-----------( 196      ( 23 Oct 2023 10:34 )             46.6     10-24    133<L>  |  102  |  15  ----------------------------<  102<H>  4.3   |  20<L>  |  0.95    Ca    8.3<L>      24 Oct 2023 06:54  Mg     2.0     10-24      PT/INR - ( 24 Oct 2023 06:54 )   PT: 24.6 sec;   INR: 2.29 ratio         PTT - ( 24 Oct 2023 06:54 )  PTT:34.7 sec  Urinalysis Basic - ( 24 Oct 2023 06:54 )    Color: x / Appearance: x / SG: x / pH: x  Gluc: 102 mg/dL / Ketone: x  / Bili: x / Urobili: x   Blood: x / Protein: x / Nitrite: x   Leuk Esterase: x / RBC: x / WBC x   Sq Epi: x / Non Sq Epi: x / Bacteria: x      CAPILLARY BLOOD GLUCOSE            Urinalysis Basic - ( 24 Oct 2023 06:54 )    Color: x / Appearance: x / SG: x / pH: x  Gluc: 102 mg/dL / Ketone: x  / Bili: x / Urobili: x   Blood: x / Protein: x / Nitrite: x   Leuk Esterase: x / RBC: x / WBC x   Sq Epi: x / Non Sq Epi: x / Bacteria: x          Consultant(s) Notes Reviewed:  [x ] YES  [ ] NO    PHYSICAL EXAM:  GENERAL: NAD, well-groomed, well-developed,not in any distress ,  HEAD:  Atraumatic, Normocephalic  NECK: Supple, No JVD, Normal thyroid  NERVOUS SYSTEM:  Alert & , No focal deficit   CHEST/LUNG: Good air entry bilateral with no  rales, rhonchi, wheezing, or rubs  HEART: Regular rate and rhythm; No murmurs, rubs, or gallops  ABDOMEN: Soft, Nontender, Nondistended; Bowel sounds present  EXTREMITIES:  2+ Peripheral Pulses, No clubbing, cyanosis, or edema    Care Discussed with Consultants/Other Providers [ x] YES  [ ] NO
Cardiovascular Disease Progress Note  Date of service: 10-23-23 @ 09:12    Overnight events: Mr. Kimbrough is in no distress.  Overnight, had episode of Afib with RVR, s/p Digoxin loading with marked improvement in rate.   Otherwise review of systems negative    Objective Findings:  T(C): 36.9 (10-23-23 @ 04:29), Max: 36.9 (10-22-23 @ 20:30)  HR: 116 (10-23-23 @ 04:29) (63 - 137)  BP: 112/74 (10-23-23 @ 04:29) (107/68 - 132/77)  RR: 18 (10-23-23 @ 04:29) (17 - 18)  SpO2: 91% (10-23-23 @ 04:29) (91% - 98%)  Wt(kg): --  Daily     Daily Weight in k.9 (23 Oct 2023 04:29)      Physical Exam:  Gen: NAD; Patient resting comfortably  HEENT: EOMI, Normocephalic/ atraumatic  CV: RRR, normal S1 + S2, no m/r/g  Lungs:  Normal respiratory effort; clear to auscultation bilaterally  Abd: soft, non-tender; bowel sounds present  Ext: No edema; warm and well perfused    Telemetry: Afib HR 70-80s    Laboratory Data:                        13.4   3.42  )-----------( 187      ( 22 Oct 2023 07:29 )             41.3     10-22    137  |  107  |  12  ----------------------------<  80  4.3   |  19<L>  |  0.90    Ca    8.0<L>      22 Oct 2023 07:22      PT/INR - ( 22 Oct 2023 07:29 )   PT: 24.6 sec;   INR: 2.29 ratio         PTT - ( 22 Oct 2023 07:29 )  PTT:35.2 sec          Inpatient Medications:  MEDICATIONS  (STANDING):  aspirin  chewable 81 milliGRAM(s) Oral daily  atorvastatin 40 milliGRAM(s) Oral at bedtime  cholecalciferol 1000 Unit(s) Oral daily  finasteride 5 milliGRAM(s) Oral daily  metoprolol tartrate 25 milliGRAM(s) Oral two times a day  midodrine. 10 milliGRAM(s) Oral every 8 hours  pantoprazole    Tablet 40 milliGRAM(s) Oral before breakfast  piperacillin/tazobactam IVPB.. 3.375 Gram(s) IV Intermittent every 8 hours  pyridoxine 100 milliGRAM(s) Oral daily  tamsulosin 0.4 milliGRAM(s) Oral at bedtime      Assessment: 89-year-old male history of atrial fibrillation on Coumadin, Alzheimer's, dementia,  brought in from cardiologist office for altered mental status    Plan of Care:    #Encephalopathy  - Pt with baseline alzheimer's dementia which has acute worsened  - CT head negative for acute pathology  - Likely 2/2 infectious etiology  - Abx as per primary team    #Sepsis  - Abx as per primary team      #Afib  - Pt on Coumadin, INR goal 2-3  - Episode of RVR 10/22   - Resolved with Digoxin loading  - May start on Digoxin PO 250mcg daily  - May resume PO BB as BP tolerates.       #Alzheimers  - Defer to primary team    #HLD  - Statin as ordered.             Over 25 minutes spent on total encounter; more than 50% of the visit was spent counseling and/or coordinating care by the attending physician.      Orestes Hart DO [facility]  Cardiovascular Disease  [phone]
Cardiovascular Disease Progress Note  Date of service: 10-25-23 @ 12:14    Overnight events: No acute events overnight.  Patient is in no distress.   Otherwise review of systems negative    Objective Findings:  T(C): 36.3 (10-25-23 @ 11:38), Max: 36.7 (10-24-23 @ 19:54)  HR: 78 (10-25-23 @ 11:38) (63 - 78)  BP: 107/62 (10-25-23 @ 11:38) (107/62 - 139/79)  RR: 18 (10-25-23 @ 11:38) (18 - 18)  SpO2: 92% (10-25-23 @ 11:38) (92% - 96%)  Wt(kg): --  Daily     Daily       Physical Exam:  Gen: NAD; Patient resting comfortably  HEENT: EOMI, Normocephalic/ atraumatic  CV: RRR, normal S1 + S2, no m/r/g  Lungs:  Normal respiratory effort; clear to auscultation bilaterally  Abd: soft, non-tender; bowel sounds present  Ext: No edema; warm and well perfused    Telemetry:  AFib    Laboratory Data:    10-25    133<L>  |  101  |  10  ----------------------------<  139<H>  4.1   |  24  |  0.84    Ca    8.2<L>      25 Oct 2023 10:35  Mg     2.0     10-24      PT/INR - ( 25 Oct 2023 10:35 )   PT: 30.6 sec;   INR: 2.87 ratio         PTT - ( 25 Oct 2023 10:35 )  PTT:23.2 sec          Inpatient Medications:  MEDICATIONS  (STANDING):  aspirin  chewable 81 milliGRAM(s) Oral daily  atorvastatin 40 milliGRAM(s) Oral at bedtime  cholecalciferol 1000 Unit(s) Oral daily  digoxin     Tablet 250 MICROGram(s) Oral daily  finasteride 5 milliGRAM(s) Oral daily  metoprolol tartrate 50 milliGRAM(s) Oral two times a day  midodrine. 10 milliGRAM(s) Oral every 8 hours  pantoprazole  Injectable 40 milliGRAM(s) IV Push two times a day  piperacillin/tazobactam IVPB.. 3.375 Gram(s) IV Intermittent every 8 hours  pyridoxine 100 milliGRAM(s) Oral daily  tamsulosin 0.4 milliGRAM(s) Oral at bedtime      Assessment:  89-year-old male history of atrial fibrillation on Coumadin, Alzheimer's, dementia,  brought in from cardiologist office for altered mental status    Plan of Care:    #Encephalopathy  - Pt with baseline alzheimer's dementia which has acute worsened  - CT head negative for acute pathology  - Likely 2/2 infectious etiology  - Abx as per primary team    #Sepsis  - Abx as per primary team      #Afib  - HR improved  - Continue BB and Digoxin  - Continue Warfarin with INR goal 2-3      #Alzheimers  - Defer to primary team    #HLD  - Statin as ordered.         Over 25 minutes spent on total encounter; more than 50% of the visit was spent counseling and/or coordinating care by the attending physician.      Orestes Hart DO [facility]  Cardiovascular Disease  [phone]
[facility]  Romulo Torres PA-C  [address] [place] [id number] [phone]      INTERVAL HPI/OVERNIGHT EVENTS:  pt seen and examined, no new events      MEDICATIONS  (STANDING):  aspirin  chewable 81 milliGRAM(s) Oral daily  atorvastatin 40 milliGRAM(s) Oral at bedtime  cholecalciferol 1000 Unit(s) Oral daily  digoxin     Tablet 250 MICROGram(s) Oral daily  finasteride 5 milliGRAM(s) Oral daily  metoprolol tartrate 50 milliGRAM(s) Oral two times a day  midodrine. 10 milliGRAM(s) Oral every 8 hours  pantoprazole  Injectable 40 milliGRAM(s) IV Push two times a day  piperacillin/tazobactam IVPB.. 3.375 Gram(s) IV Intermittent every 8 hours  pyridoxine 100 milliGRAM(s) Oral daily  tamsulosin 0.4 milliGRAM(s) Oral at bedtime    MEDICATIONS  (PRN):  acetaminophen     Tablet .. 650 milliGRAM(s) Oral every 6 hours PRN Temp greater or equal to 38.5C (101.3F), Moderate Pain (4 - 6)      Allergies    No Known Allergies    Intolerances        ROS:   unable to obtain     PHYSICAL EXAM:   Vital Signs:  Vital Signs Last 24 Hrs  T(C): 36.4 (25 Oct 2023 04:46), Max: 36.7 (24 Oct 2023 19:54)  T(F): 97.5 (25 Oct 2023 04:46), Max: 98.1 (24 Oct 2023 19:54)  HR: 74 (25 Oct 2023 04:46) (63 - 74)  BP: 139/79 (25 Oct 2023 04:46) (104/71 - 139/79)  BP(mean): --  RR: 18 (25 Oct 2023 04:46) (18 - 18)  SpO2: 96% (25 Oct 2023 04:46) (95% - 97%)    Parameters below as of 25 Oct 2023 04:46  Patient On (Oxygen Delivery Method): room air      Daily     Daily     nad    confused  frail  non toxic  soft, nt  no edema      LABS:                        15.3   4.52  )-----------( 196      ( 23 Oct 2023 10:34 )             46.6     10-24    133<L>  |  102  |  15  ----------------------------<  102<H>  4.3   |  20<L>  |  0.95    Ca    8.3<L>      24 Oct 2023 06:54  Mg     2.0     10-24      PT/INR - ( 24 Oct 2023 06:54 )   PT: 24.6 sec;   INR: 2.29 ratio         PTT - ( 24 Oct 2023 06:54 )  PTT:34.7 sec  Urinalysis Basic - ( 24 Oct 2023 06:54 )    Color: x / Appearance: x / SG: x / pH: x  Gluc: 102 mg/dL / Ketone: x  / Bili: x / Urobili: x   Blood: x / Protein: x / Nitrite: x   Leuk Esterase: x / RBC: x / WBC x   Sq Epi: x / Non Sq Epi: x / Bacteria: x        RADIOLOGY & ADDITIONAL TESTS:  
  Follow Up:    Pneumonia    Interval History/ROS:  Afebrile ON. Patient was seen and examined at bedside. Sleeping. NAD. No fever or cough    Allergies  No Known Allergies        ANTIMICROBIALS:  piperacillin/tazobactam IVPB.. 3.375 every 8 hours      OTHER MEDS:  MEDICATIONS  (STANDING):  acetaminophen     Tablet .. 650 every 6 hours PRN  aspirin  chewable 81 daily  atorvastatin 40 at bedtime  finasteride 5 daily  metoprolol tartrate 25 two times a day  midodrine. 10 every 8 hours  pantoprazole    Tablet 40 before breakfast  tamsulosin 0.4 at bedtime  warfarin 1.5 once      Vital Signs Last 24 Hrs  T(C): 36.4 (23 Oct 2023 13:13), Max: 36.9 (22 Oct 2023 20:30)  T(F): 97.5 (23 Oct 2023 13:13), Max: 98.5 (23 Oct 2023 04:29)  HR: 107 (23 Oct 2023 13:13) (89 - 116)  BP: 98/58 (23 Oct 2023 13:13) (98/58 - 132/77)  BP(mean): --  RR: 18 (23 Oct 2023 13:13) (18 - 18)  SpO2: 92% (23 Oct 2023 13:13) (91% - 94%)    Parameters below as of 23 Oct 2023 13:13  Patient On (Oxygen Delivery Method): room air        PHYSICAL EXAM:  Constitutional: non-toxic, tolerating RA  Cardiovascular:   normal S1, S2, no murmur; tachycardic  Respiratory:  clear BS bilaterally, no wheezes, no rales  Neurologic: awake and alert, normal strength, no focal findings  Skin:  no rash, no erythema, no phlebitis                                15.3   4.52  )-----------( 196      ( 23 Oct 2023 10:34 )             46.6       10-23    136  |  104  |  11  ----------------------------<  108<H>  4.5   |  21<L>  |  0.89    Ca    8.4      23 Oct 2023 10:34        Urinalysis Basic - ( 23 Oct 2023 10:34 )    Color: x / Appearance: x / SG: x / pH: x  Gluc: 108 mg/dL / Ketone: x  / Bili: x / Urobili: x   Blood: x / Protein: x / Nitrite: x   Leuk Esterase: x / RBC: x / WBC x   Sq Epi: x / Non Sq Epi: x / Bacteria: x        MICROBIOLOGY:  v  Clean Catch Clean Catch (Midstream)  10-20-23   <10,000 CFU/mL Normal Urogenital Piedad  --  --      .Blood Blood-Peripheral  10-20-23   No growth at 48 Hours  --  --      .Blood Blood-Peripheral  10-20-23   No growth at 48 Hours  --  --          Rapid RVP Result: Detected (10-21 @ 20:04)        RADIOLOGY:  Imaging below independently reviewed.  < from: CT Abdomen and Pelvis w/ IV Cont (10.22.23 @ 12:13) >  IMPRESSION:  Extensive bilateral groundglass and consolidative opacities, suggestive   of multifocal pneumonia.    Right greater than left small pleural effusions.    Suspicion for large prepyloric gastric ulcer.    Circumferential bladder wall thickening with trace perivesicular fatty   stranding suggestive of cystitis.      < end of copied text >  
  Patient seen and examined  no complaints    No Known Allergies    Hospital Medications:   MEDICATIONS  (STANDING):  aspirin  chewable 81 milliGRAM(s) Oral daily  atorvastatin 40 milliGRAM(s) Oral at bedtime  cholecalciferol 1000 Unit(s) Oral daily  digoxin     Tablet 250 MICROGram(s) Oral daily  finasteride 5 milliGRAM(s) Oral daily  metoprolol tartrate 50 milliGRAM(s) Oral two times a day  midodrine. 10 milliGRAM(s) Oral every 8 hours  pantoprazole  Injectable 40 milliGRAM(s) IV Push two times a day  piperacillin/tazobactam IVPB.. 3.375 Gram(s) IV Intermittent every 8 hours  pyridoxine 100 milliGRAM(s) Oral daily  tamsulosin 0.4 milliGRAM(s) Oral at bedtime        VITALS:  T(F): 97.4 (10-25-23 @ 11:38), Max: 98.1 (10-24-23 @ 19:54)  HR: 78 (10-25-23 @ 11:38)  BP: 107/62 (10-25-23 @ 11:38)  RR: 18 (10-25-23 @ 11:38)  SpO2: 92% (10-25-23 @ 11:38)  Wt(kg): --    10-24 @ 07:01  -  10-25 @ 07:00  --------------------------------------------------------  IN: 210 mL / OUT: 0 mL / NET: 210 mL    10-25 @ 07:01  -  10-25 @ 13:19  --------------------------------------------------------  IN: 80 mL / OUT: 0 mL / NET: 80 mL        Physical Exam :-  Constitutional: chronically ill appearing   Respiratory: decreased breath sounds  Cardiovascular: S1, S2 normal, positive Murmur  Gastrointestinal: Bowel Sounds present, soft  Extremities: no Edema Feet  Neurological: not awake or alert  Psychiatric: Normal mood, normal affect    LABS:  10-25    133<L>  |  101  |  10  ----------------------------<  139<H>  4.1   |  24  |  0.84    Ca    8.2<L>      25 Oct 2023 10:35  Mg     2.0     10-24      Creatinine Trend: 0.84 <--, 0.95 <--, 0.89 <--, 0.90 <--, 0.79 <--, 0.88 <--    Urine Studies:  Urinalysis Basic - ( 25 Oct 2023 10:35 )    Color:  / Appearance:  / SG:  / pH:   Gluc: 139 mg/dL / Ketone:   / Bili:  / Urobili:    Blood:  / Protein:  / Nitrite:    Leuk Esterase:  / RBC:  / WBC    Sq Epi:  / Non Sq Epi:  / Bacteria:         RADIOLOGY & ADDITIONAL STUDIES:  
Date of Service  : 10-23-23     INTERVAL HPI/OVERNIGHT EVENTS: per nurse did not eat much   Vital Signs Last 24 Hrs  T(C): 36.4 (23 Oct 2023 13:13), Max: 36.9 (22 Oct 2023 20:30)  T(F): 97.5 (23 Oct 2023 13:13), Max: 98.5 (23 Oct 2023 04:29)  HR: 107 (23 Oct 2023 13:13) (89 - 116)  BP: 98/58 (23 Oct 2023 13:13) (98/58 - 132/77)  BP(mean): --  RR: 18 (23 Oct 2023 13:13) (18 - 18)  SpO2: 92% (23 Oct 2023 13:13) (91% - 94%)    Parameters below as of 23 Oct 2023 13:13  Patient On (Oxygen Delivery Method): room air      I&O's Summary    MEDICATIONS  (STANDING):  aspirin  chewable 81 milliGRAM(s) Oral daily  atorvastatin 40 milliGRAM(s) Oral at bedtime  cholecalciferol 1000 Unit(s) Oral daily  finasteride 5 milliGRAM(s) Oral daily  metoprolol tartrate 25 milliGRAM(s) Oral two times a day  midodrine. 10 milliGRAM(s) Oral every 8 hours  pantoprazole    Tablet 40 milliGRAM(s) Oral before breakfast  piperacillin/tazobactam IVPB.. 3.375 Gram(s) IV Intermittent every 8 hours  pyridoxine 100 milliGRAM(s) Oral daily  tamsulosin 0.4 milliGRAM(s) Oral at bedtime    MEDICATIONS  (PRN):  acetaminophen     Tablet .. 650 milliGRAM(s) Oral every 6 hours PRN Temp greater or equal to 38.5C (101.3F), Moderate Pain (4 - 6)    LABS:                        15.3   4.52  )-----------( 196      ( 23 Oct 2023 10:34 )             46.6     10-23    136  |  104  |  11  ----------------------------<  108<H>  4.5   |  21<L>  |  0.89    Ca    8.4      23 Oct 2023 10:34      PT/INR - ( 23 Oct 2023 10:34 )   PT: 23.1 sec;   INR: 2.15 ratio         PTT - ( 23 Oct 2023 10:34 )  PTT:36.3 sec  Urinalysis Basic - ( 23 Oct 2023 10:34 )    Color: x / Appearance: x / SG: x / pH: x  Gluc: 108 mg/dL / Ketone: x  / Bili: x / Urobili: x   Blood: x / Protein: x / Nitrite: x   Leuk Esterase: x / RBC: x / WBC x   Sq Epi: x / Non Sq Epi: x / Bacteria: x      CAPILLARY BLOOD GLUCOSE      POCT Blood Glucose.: 101 mg/dL (22 Oct 2023 20:49)  POCT Blood Glucose.: 115 mg/dL (22 Oct 2023 17:12)        Urinalysis Basic - ( 23 Oct 2023 10:34 )    Color: x / Appearance: x / SG: x / pH: x  Gluc: 108 mg/dL / Ketone: x  / Bili: x / Urobili: x   Blood: x / Protein: x / Nitrite: x   Leuk Esterase: x / RBC: x / WBC x   Sq Epi: x / Non Sq Epi: x / Bacteria: x          RADIOLOGY & ADDITIONAL TESTS:    Consultant(s) Notes Reviewed:  [x ] YES  [ ] NO    PHYSICAL EXAM:  GENERAL: NAD, well-groomed, well-developed,not in any distress ,  HEAD:  Atraumatic, Normocephalic  NECK: Supple, No JVD, Normal thyroid  NERVOUS SYSTEM:  Alert   CHEST/LUNG: Good air entry bilateral with no  rales, rhonchi, wheezing, or rubs  HEART: Regular rate and rhythm; No murmurs, rubs, or gallops  ABDOMEN: Soft, Nontender, Nondistended; Bowel sounds present  EXTREMITIES:  2+ Peripheral Pulses, No clubbing, cyanosis, or edema    Care Discussed with Consultants/Other Providers [ x] YES  [ ] NO
[facility] : [facility] Serv [phone], Office [phone]  Dr. Saldana/Dr Claros/Dr Louise  /Dr Sunny varghese /Dr FCO Pickard/Dr Sinan Guzman/Dr Dru Lemos /Dr PHIL Olmstead  _______________________________________________________________________________________________    Pt. seen and examined this morning. Unable to express concerns.     ROS: Unable to obtain     VITALS:  T(F): 97.4 (10-24 @ 04:04), Max: 98.5 (10-23 @ 04:29)  HR: 96 (10-24 @ 04:04)  BP: 102/68 (10-24 @ 04:04)  ABP: --  RR: 18 (10-24 @ 04:04)  SpO2: 94% (10-24 @ 04:04)    10-23 @ 07:01  -  10-24 @ 07:00  --------------------------------------------------------  IN: 270 mL / OUT: 0 mL / NET: 270 mL      Physical Exam :-  Constitutional: chronically ill appearing   Respiratory: decreased breath sounds  Cardiovascular: S1, S2 normal, positive Murmur  Gastrointestinal: Bowel Sounds present, soft  Extremities: no Edema Feet  Neurological: not awake or alert  Psychiatric: Normal mood, normal affect      Data:-  Allergies :   No Known Allergies    Hospital Medications:   MEDICATIONS  (STANDING):  aspirin  chewable 81 milliGRAM(s) Oral daily  atorvastatin 40 milliGRAM(s) Oral at bedtime  cholecalciferol 1000 Unit(s) Oral daily  digoxin     Tablet 250 MICROGram(s) Oral daily  finasteride 5 milliGRAM(s) Oral daily  metoprolol tartrate 50 milliGRAM(s) Oral two times a day  midodrine. 10 milliGRAM(s) Oral every 8 hours  pantoprazole    Tablet 40 milliGRAM(s) Oral before breakfast  piperacillin/tazobactam IVPB.. 3.375 Gram(s) IV Intermittent every 8 hours  pyridoxine 100 milliGRAM(s) Oral daily  tamsulosin 0.4 milliGRAM(s) Oral at bedtime    10-24    133<L>  |  102  |  15  ----------------------------<  102<H>  4.3   |  20<L>  |  0.95    Ca    8.3<L>      24 Oct 2023 06:54  Mg     2.0     10-24      Creatinine Trend: 0.95 <--, 0.89 <--, 0.90 <--, 0.79 <--, 0.88 <--  egfr trend : 76 <--, 81 <--, 81 <--, 84 <--, 82 <--                        15.3   4.52  )-----------( 196      ( 23 Oct 2023 10:34 )             46.6

## 2023-10-25 NOTE — DISCHARGE NOTE NURSING/CASE MANAGEMENT/SOCIAL WORK - PATIENT PORTAL LINK FT
You can access the FollowMyHealth Patient Portal offered by [facility] by registering at the following website: [email or web address]. By joining [employer]’s FollowMyHealth portal, you will also be able to view your health information using other applications (apps) compatible with our system.

## 2023-10-25 NOTE — DISCHARGE NOTE PROVIDER - NSDCMRMEDTOKEN_GEN_ALL_CORE_FT
aspirin 81 mg oral tablet, chewable: 1 tab(s) chewed once a day  cholecalciferol 25 mcg (1000 intl units) oral tablet: 1 tab(s) orally once a day  Farxiga 10 mg oral tablet: 1 tab(s) orally once a day  finasteride 5 mg oral tablet: 1 tab(s) orally once a day  Metoprolol Succinate  mg oral tablet, extended release: 1 tab(s) orally once a day  pantoprazole 40 mg oral delayed release tablet: 1 tab(s) orally once a day  simvastatin 10 mg oral tablet: 1 tab(s) orally once a day  spironolactone 25 mg oral tablet: 0.5 tab(s) orally once a day  tamsulosin 0.4 mg oral capsule: 1 cap(s) orally once a day (at bedtime)  Vitamin B6 100 mg oral tablet: 1 tab(s) orally once a day  warfarin 1 mg oral tablet: 1.5 tab(s) orally 2 times a week Monday and Wednesday  warfarin 1 mg oral tablet: 1 tab(s) orally 5 times a week Tuesday, Thursday, Friday, Saturday and Sunday   amoxicillin-clavulanate 600 mg-42.9 mg/5 mL oral liquid: 7.3 milliliter(s) orally 2 times a day  aspirin 81 mg oral tablet, chewable: 1 tab(s) chewed once a day  cholecalciferol 25 mcg (1000 intl units) oral tablet: 1 tab(s) orally once a day  digoxin 250 mcg (0.25 mg) oral tablet: 1 tab(s) orally once a day  finasteride 5 mg oral tablet: 1 tab(s) orally once a day  metoprolol tartrate 50 mg oral tablet: 1 tab(s) orally 2 times a day  midodrine 10 mg oral tablet: 1 tab(s) orally every 8 hours  pantoprazole 40 mg oral delayed release tablet: 1 tab(s) orally 2 times a day  simvastatin 10 mg oral tablet: 1 tab(s) orally once a day  tamsulosin 0.4 mg oral capsule: 1 cap(s) orally once a day (at bedtime)  Vitamin B6 100 mg oral tablet: 1 tab(s) orally once a day  warfarin 1 mg oral tablet: 1.5 tab(s) orally 2 times a week Monday and Wednesday  warfarin 1 mg oral tablet: 1 tab(s) orally 5 times a week Tuesday, Thursday, Friday, Saturday and Sunday

## 2023-10-25 NOTE — PROGRESS NOTE ADULT - REASON FOR ADMISSION
Fever with chills

## 2023-10-25 NOTE — DISCHARGE NOTE PROVIDER - HOSPITAL COURSE
HPI:  89-year-old male history of atrial fibrillation on Coumadin, Alzheimer's, ANO timesx0-1 at [facility],  brought in from cardiologist office for altered mental status.  Patient is a brought in accompanied by daughter who is providing collateral information.   unable to obtain information from patient.  Daughter states that patient has history of Alzheimer's dementia, at baseline  is not alert or oriented, however has been increasingly lethargic relative to his baseline   For the past 2 to 3 days.  States that this morning, woke up with some chills/shaking so brought to see cardiologist. Had a cold 1 week ago, treated with amoxicillin. No known fevers at home or chills. (20 Oct 2023 20:15)    Hospital Course:  Pt admitted for sepsis 2/2 multifocal pna and UTI. CTAP findings of: "Extensive bilateral groundglass and consolidative opacities, suggestive of multifocal pneumonia. Right greater than left small pleural effusions. Suspicion for large prepyloric gastric ulcer. Circumferential bladder wall thickening with trace perivesicular fatty stranding suggestive of cystitis." UA positive. ID was consulted and pt was started on empiric zosyn, will transition to PO augmentin on discharge to complete course.   Final BCx and UCx negative. Pt also tested + for Entero/Rhinovirus on RVP, on symptomatic treatment. MRSA swab neg.   For AMS, likely encephalopathy 2/2 infectious etiology. CTH neg for acute pathology.  Nephrology consulted for hypovolemic hyponatremia, now improved s/p IVF.  GI was consulted for CTAP finding of large prepyloric gastric ulcer, recommended to start PPI. Pt is not a candidate for scope given age, pna and other comorbidities.  Course complicated by hypotension for which MICU was consulted, deemed not a candidate and was recommended for IVF and to start midodrine 10mg TID.   Course further complicated by Afib RVR , pt was loaded with Digoxin and will continue standing 250mcg QD. Pt was closely monitored on tele, HR now improved and stable.   Pt now medically stable to be discharged home. Discharge discussed with attending Dr. Jacinto.    Important Medication Changes and Reason:    Active or Pending Issues Requiring Follow-up:    Advanced Directives:   [x ] Full code  [ ] DNR  [ ] Hospice    Discharge Diagnoses:  Sepsis w/ multifocal pna  UTI  Afib  Enterovirus

## 2023-10-25 NOTE — PROGRESS NOTE ADULT - ASSESSMENT
Pt is a 89 year old Male with H/O alzeihmer's dementia CHF  Found with UTI, hyponatremia  and hypotension ( s/p IV NS bolus with improvement of BP)     # hypovolemic hyponatremia in light of hypotension and low solute intake  s/p isotonic fluid bolus with improvement of BP  SNa 130> 130>137>136>133>133  avoid hypotonic fluids; maintain appropriate solute intake  can add protein shakes with meals if able to tolerate   due to dec po intake=--> Start IV nacl @ 75cc x 24 hours  no dietary K restriction  monitor BMP    hypotension- bp better  currently on midodrine. 10 milliGRAM(s) Oral every 8 hours    # UTI on broad spectrum ABX.  per team/ID    Dr Pickard  [phone] Pt is a 89 year old Male with H/O alzeihmer's dementia CHF  Found with UTI, hyponatremia  and hypotension ( s/p IV NS bolus with improvement of BP)     # hypovolemic hyponatremia in light of hypotension and low solute intake  s/p isotonic fluid bolus with improvement of BP  SNa 130> 130>137>136>133>133  avoid hypotonic fluids; maintain appropriate solute intake  can add protein shakes with meals if able to tolerate   encourage po intake  no dietary K restriction  monitor BMP    hypotension- bp better  currently on midodrine. 10 milliGRAM(s) Oral every 8 hours    # UTI on broad spectrum ABX.  per team/ID    Dr Pickard  [phone]

## 2023-10-25 NOTE — PROGRESS NOTE ADULT - ASSESSMENT
multifactorial pneumonia   abnormal imaging   UTI    CT reviewed  ID following    on abx   PPI increased to BID  pureed diet as tolerated  encourage po intake   pt not a candidate for scope given age, pneumonia and other comorbidities   d/w medicine     I reviewed the overnight course of events on the unit, re-confirming the patient history. I discussed the care with the patient and their family  The plan of care was discussed with the physician assistant and modifications were made to the notation where appropriate.   Differential diagnosis and plan of care discussed with patient after the evaluation  50 minutes spent on total encounter of which more than fifty percent of the encounter was spent counseling and/or coordinating care by the attending physician.  Advanced care planning was discussed with patient and family.  Advanced care planning forms were reviewed and discussed.  Risks, benefits and alternatives of gastroenterologic procedures were discussed in detail and all questions were answered.

## 2023-10-25 NOTE — DISCHARGE NOTE PROVIDER - NSDCCPCAREPLAN_GEN_ALL_CORE_FT
PRINCIPAL DISCHARGE DIAGNOSIS  Diagnosis: Sepsis  Assessment and Plan of Treatment: Sepsis due to multifocal pneumonia seen on CT scan and UTI. You were given IV antibiotics during your admission. Complete oral antibiotics as directed, last day tomorrow.      SECONDARY DISCHARGE DIAGNOSES  Diagnosis: Acute UTI  Assessment and Plan of Treatment: You had a bladder and/or kidney infection.  If you are discharged on antibiotics, you will need to finish the medication as prescribed to reduced the likelihood that the infection will recur.  Avoid medications that will cause urinary retention such as benadryl whenever possible.  Drink adequate fluids - there is no harm in drinking cranberry juice.  Females should urinate right after sex.  Contact your doctor if you experience new symptoms or continued symptoms after treatment, such as pain or burning with urination, frequent urination, urinary urgency, blood in the urine, fever, back pain, and/or nausea vomiting.      Diagnosis: Chronic atrial fibrillation  Assessment and Plan of Treatment: Continue taking medications as directed. Follow up with your cardiologist outpatient.   Atrial fibrillation is a common heart rhythm problem which increases the risk of stroke and heat attack.  It helps if you control your blood pressure, avoid alcohol, cut down on caffeine, get treatment for your thyroid if it is overactive, and perform moderate exercise in consultation with your Primary Care Provider.  Call your doctor if you experience chest tightness/pain, lightheadedness, loss of consciousness, shortness of breath (especially with exercise), feel your heart racing or beating unusually, frequent or abnormal bleeding.  It is important to take all your heart medications as prescribed.

## 2023-10-25 NOTE — DISCHARGE NOTE PROVIDER - CARE PROVIDER_API CALL
Juan [facility]  Internal Medicine  [id number] [address]  Phone: [phone]  Fax: [phone]  Established Patient  Follow Up Time: 1 week

## 2023-10-25 NOTE — CHART NOTE - NSCHARTNOTEFT_GEN_A_CORE
Discussed with ID attending Dr Klever Tse, pt can transition to PO Augmentin 875mg BID to complete abx course, last day tomorrow. Cleared from ID for discharge.  Attending Dr. Jacinto aware and agrees with plan.    Kacy Collins PA-C  Dept. of Medicine  TEAMS

## 2023-10-27 RX ORDER — DIGOXIN 250 MCG
1 TABLET ORAL
Qty: 30 | Refills: 0
Start: 2023-10-27 | End: 2023-11-25

## 2023-11-13 PROCEDURE — 84295 ASSAY OF SERUM SODIUM: CPT

## 2023-11-13 PROCEDURE — 36415 COLL VENOUS BLD VENIPUNCTURE: CPT

## 2023-11-13 PROCEDURE — 71045 X-RAY EXAM CHEST 1 VIEW: CPT

## 2023-11-13 PROCEDURE — 74177 CT ABD & PELVIS W/CONTRAST: CPT

## 2023-11-13 PROCEDURE — 87637 SARSCOV2&INF A&B&RSV AMP PRB: CPT

## 2023-11-13 PROCEDURE — 85014 HEMATOCRIT: CPT

## 2023-11-13 PROCEDURE — 85018 HEMOGLOBIN: CPT

## 2023-11-13 PROCEDURE — 85730 THROMBOPLASTIN TIME PARTIAL: CPT

## 2023-11-13 PROCEDURE — 87040 BLOOD CULTURE FOR BACTERIA: CPT

## 2023-11-13 PROCEDURE — 87640 STAPH A DNA AMP PROBE: CPT

## 2023-11-13 PROCEDURE — 87086 URINE CULTURE/COLONY COUNT: CPT

## 2023-11-13 PROCEDURE — 83735 ASSAY OF MAGNESIUM: CPT

## 2023-11-13 PROCEDURE — 82330 ASSAY OF CALCIUM: CPT

## 2023-11-13 PROCEDURE — 83605 ASSAY OF LACTIC ACID: CPT

## 2023-11-13 PROCEDURE — 97161 PT EVAL LOW COMPLEX 20 MIN: CPT

## 2023-11-13 PROCEDURE — 99285 EMERGENCY DEPT VISIT HI MDM: CPT | Mod: 25

## 2023-11-13 PROCEDURE — 82947 ASSAY GLUCOSE BLOOD QUANT: CPT

## 2023-11-13 PROCEDURE — 80048 BASIC METABOLIC PNL TOTAL CA: CPT

## 2023-11-13 PROCEDURE — 82803 BLOOD GASES ANY COMBINATION: CPT

## 2023-11-13 PROCEDURE — 92610 EVALUATE SWALLOWING FUNCTION: CPT

## 2023-11-13 PROCEDURE — 80053 COMPREHEN METABOLIC PANEL: CPT

## 2023-11-13 PROCEDURE — 80162 ASSAY OF DIGOXIN TOTAL: CPT

## 2023-11-13 PROCEDURE — 0225U NFCT DS DNA&RNA 21 SARSCOV2: CPT

## 2023-11-13 PROCEDURE — 81001 URINALYSIS AUTO W/SCOPE: CPT

## 2023-11-13 PROCEDURE — 97530 THERAPEUTIC ACTIVITIES: CPT

## 2023-11-13 PROCEDURE — 93005 ELECTROCARDIOGRAM TRACING: CPT

## 2023-11-13 PROCEDURE — 71260 CT THORAX DX C+: CPT

## 2023-11-13 PROCEDURE — 96374 THER/PROPH/DIAG INJ IV PUSH: CPT

## 2023-11-13 PROCEDURE — 96375 TX/PRO/DX INJ NEW DRUG ADDON: CPT

## 2023-11-13 PROCEDURE — 84484 ASSAY OF TROPONIN QUANT: CPT

## 2023-11-13 PROCEDURE — 87641 MR-STAPH DNA AMP PROBE: CPT

## 2023-11-13 PROCEDURE — 70450 CT HEAD/BRAIN W/O DYE: CPT | Mod: MA

## 2023-11-13 PROCEDURE — 84132 ASSAY OF SERUM POTASSIUM: CPT

## 2023-11-13 PROCEDURE — 82435 ASSAY OF BLOOD CHLORIDE: CPT

## 2023-11-13 PROCEDURE — 85610 PROTHROMBIN TIME: CPT

## 2023-11-13 PROCEDURE — 85025 COMPLETE CBC W/AUTO DIFF WBC: CPT

## 2023-11-13 PROCEDURE — 82962 GLUCOSE BLOOD TEST: CPT

## 2023-11-13 PROCEDURE — 84443 ASSAY THYROID STIM HORMONE: CPT

## 2023-11-13 PROCEDURE — 85027 COMPLETE CBC AUTOMATED: CPT

## 2023-11-24 ENCOUNTER — INPATIENT (INPATIENT)
Facility: HOSPITAL | Age: 88
LOS: 5 days | Discharge: HOME HEALTH | DRG: 871 | End: 2023-11-30
Attending: INTERNAL MEDICINE | Admitting: INTERNAL MEDICINE
Payer: MEDICARE

## 2023-11-24 VITALS
SYSTOLIC BLOOD PRESSURE: 89 MMHG | WEIGHT: 160.06 LBS | DIASTOLIC BLOOD PRESSURE: 56 MMHG | RESPIRATION RATE: 20 BRPM | OXYGEN SATURATION: 98 % | HEART RATE: 92 BPM

## 2023-11-24 DIAGNOSIS — A41.9 SEPSIS, UNSPECIFIED ORGANISM: ICD-10-CM

## 2023-11-24 DIAGNOSIS — Z29.9 ENCOUNTER FOR PROPHYLACTIC MEASURES, UNSPECIFIED: ICD-10-CM

## 2023-11-24 DIAGNOSIS — I50.9 HEART FAILURE, UNSPECIFIED: ICD-10-CM

## 2023-11-24 DIAGNOSIS — Z86.73 PERSONAL HISTORY OF TRANSIENT ISCHEMIC ATTACK (TIA), AND CEREBRAL INFARCTION WITHOUT RESIDUAL DEFICITS: ICD-10-CM

## 2023-11-24 DIAGNOSIS — I48.20 CHRONIC ATRIAL FIBRILLATION, UNSPECIFIED: ICD-10-CM

## 2023-11-24 DIAGNOSIS — N39.0 URINARY TRACT INFECTION, SITE NOT SPECIFIED: ICD-10-CM

## 2023-11-24 DIAGNOSIS — N40.0 BENIGN PROSTATIC HYPERPLASIA WITHOUT LOWER URINARY TRACT SYMPTOMS: ICD-10-CM

## 2023-11-24 DIAGNOSIS — G30.9 ALZHEIMER'S DISEASE, UNSPECIFIED: ICD-10-CM

## 2023-11-24 DIAGNOSIS — F03.90 UNSPECIFIED DEMENTIA WITHOUT BEHAVIORAL DISTURBANCE: ICD-10-CM

## 2023-11-24 LAB
ALBUMIN SERPL ELPH-MCNC: 1.8 G/DL — LOW (ref 3.3–5)
ALBUMIN SERPL ELPH-MCNC: 1.8 G/DL — LOW (ref 3.3–5)
ALBUMIN SERPL ELPH-MCNC: 2.4 G/DL — LOW (ref 3.3–5)
ALBUMIN SERPL ELPH-MCNC: 2.4 G/DL — LOW (ref 3.3–5)
ALP SERPL-CCNC: 84 U/L — SIGNIFICANT CHANGE UP (ref 40–120)
ALP SERPL-CCNC: 84 U/L — SIGNIFICANT CHANGE UP (ref 40–120)
ALP SERPL-CCNC: 95 U/L — SIGNIFICANT CHANGE UP (ref 40–120)
ALP SERPL-CCNC: 95 U/L — SIGNIFICANT CHANGE UP (ref 40–120)
ALT FLD-CCNC: 26 U/L — SIGNIFICANT CHANGE UP (ref 10–45)
ALT FLD-CCNC: 26 U/L — SIGNIFICANT CHANGE UP (ref 10–45)
ALT FLD-CCNC: 38 U/L — SIGNIFICANT CHANGE UP (ref 10–45)
ALT FLD-CCNC: 38 U/L — SIGNIFICANT CHANGE UP (ref 10–45)
ANION GAP SERPL CALC-SCNC: 14 MMOL/L — SIGNIFICANT CHANGE UP (ref 5–17)
ANION GAP SERPL CALC-SCNC: 14 MMOL/L — SIGNIFICANT CHANGE UP (ref 5–17)
ANION GAP SERPL CALC-SCNC: 6 MMOL/L — SIGNIFICANT CHANGE UP (ref 5–17)
ANION GAP SERPL CALC-SCNC: 6 MMOL/L — SIGNIFICANT CHANGE UP (ref 5–17)
APPEARANCE UR: ABNORMAL
APPEARANCE UR: ABNORMAL
APTT BLD: 37.3 SEC — HIGH (ref 24.5–35.6)
APTT BLD: 37.3 SEC — HIGH (ref 24.5–35.6)
AST SERPL-CCNC: 110 U/L — HIGH (ref 10–40)
AST SERPL-CCNC: 110 U/L — HIGH (ref 10–40)
AST SERPL-CCNC: 32 U/L — SIGNIFICANT CHANGE UP (ref 10–40)
AST SERPL-CCNC: 32 U/L — SIGNIFICANT CHANGE UP (ref 10–40)
BACTERIA # UR AUTO: ABNORMAL /HPF
BACTERIA # UR AUTO: ABNORMAL /HPF
BASE EXCESS BLDMV CALC-SCNC: -0.8 MMOL/L — SIGNIFICANT CHANGE UP (ref -3–3)
BASE EXCESS BLDMV CALC-SCNC: -0.8 MMOL/L — SIGNIFICANT CHANGE UP (ref -3–3)
BASE EXCESS BLDV CALC-SCNC: -0.8 MMOL/L — SIGNIFICANT CHANGE UP (ref -2–3)
BASE EXCESS BLDV CALC-SCNC: -0.8 MMOL/L — SIGNIFICANT CHANGE UP (ref -2–3)
BASOPHILS # BLD AUTO: 0 K/UL — SIGNIFICANT CHANGE UP (ref 0–0.2)
BASOPHILS # BLD AUTO: 0 K/UL — SIGNIFICANT CHANGE UP (ref 0–0.2)
BASOPHILS NFR BLD AUTO: 0 % — SIGNIFICANT CHANGE UP (ref 0–2)
BASOPHILS NFR BLD AUTO: 0 % — SIGNIFICANT CHANGE UP (ref 0–2)
BILIRUB SERPL-MCNC: 0.9 MG/DL — SIGNIFICANT CHANGE UP (ref 0.2–1.2)
BILIRUB SERPL-MCNC: 0.9 MG/DL — SIGNIFICANT CHANGE UP (ref 0.2–1.2)
BILIRUB SERPL-MCNC: 1.2 MG/DL — SIGNIFICANT CHANGE UP (ref 0.2–1.2)
BILIRUB SERPL-MCNC: 1.2 MG/DL — SIGNIFICANT CHANGE UP (ref 0.2–1.2)
BILIRUB UR-MCNC: ABNORMAL
BILIRUB UR-MCNC: ABNORMAL
BLOOD GAS SOURCE: SIGNIFICANT CHANGE UP
BLOOD GAS SOURCE: SIGNIFICANT CHANGE UP
BUN SERPL-MCNC: 14 MG/DL — SIGNIFICANT CHANGE UP (ref 7–23)
BUN SERPL-MCNC: 14 MG/DL — SIGNIFICANT CHANGE UP (ref 7–23)
BUN SERPL-MCNC: 15 MG/DL — SIGNIFICANT CHANGE UP (ref 7–23)
BUN SERPL-MCNC: 15 MG/DL — SIGNIFICANT CHANGE UP (ref 7–23)
CA-I SERPL-SCNC: 1.13 MMOL/L — LOW (ref 1.15–1.33)
CA-I SERPL-SCNC: 1.13 MMOL/L — LOW (ref 1.15–1.33)
CALCIUM SERPL-MCNC: 7.8 MG/DL — LOW (ref 8.4–10.5)
CALCIUM SERPL-MCNC: 7.8 MG/DL — LOW (ref 8.4–10.5)
CALCIUM SERPL-MCNC: 8.4 MG/DL — SIGNIFICANT CHANGE UP (ref 8.4–10.5)
CALCIUM SERPL-MCNC: 8.4 MG/DL — SIGNIFICANT CHANGE UP (ref 8.4–10.5)
CAST: 18 /LPF — HIGH (ref 0–4)
CAST: 18 /LPF — HIGH (ref 0–4)
CHLORIDE BLDV-SCNC: 108 MMOL/L — SIGNIFICANT CHANGE UP (ref 96–108)
CHLORIDE BLDV-SCNC: 108 MMOL/L — SIGNIFICANT CHANGE UP (ref 96–108)
CHLORIDE SERPL-SCNC: 107 MMOL/L — SIGNIFICANT CHANGE UP (ref 96–108)
CO2 BLDMV-SCNC: 27 MMOL/L — SIGNIFICANT CHANGE UP (ref 21–29)
CO2 BLDMV-SCNC: 27 MMOL/L — SIGNIFICANT CHANGE UP (ref 21–29)
CO2 BLDV-SCNC: 27 MMOL/L — HIGH (ref 22–26)
CO2 BLDV-SCNC: 27 MMOL/L — HIGH (ref 22–26)
CO2 SERPL-SCNC: 22 MMOL/L — SIGNIFICANT CHANGE UP (ref 22–31)
CO2 SERPL-SCNC: 22 MMOL/L — SIGNIFICANT CHANGE UP (ref 22–31)
CO2 SERPL-SCNC: 26 MMOL/L — SIGNIFICANT CHANGE UP (ref 22–31)
CO2 SERPL-SCNC: 26 MMOL/L — SIGNIFICANT CHANGE UP (ref 22–31)
COHGB MFR BLDMV: 0 % — SIGNIFICANT CHANGE UP (ref 0–3)
COHGB MFR BLDMV: 0 % — SIGNIFICANT CHANGE UP (ref 0–3)
COHGB MFR BLDV: 3.6 % — HIGH
COHGB MFR BLDV: 3.6 % — HIGH
COLOR SPEC: SIGNIFICANT CHANGE UP
COLOR SPEC: SIGNIFICANT CHANGE UP
CREAT SERPL-MCNC: 0.89 MG/DL — SIGNIFICANT CHANGE UP (ref 0.5–1.3)
CREAT SERPL-MCNC: 0.89 MG/DL — SIGNIFICANT CHANGE UP (ref 0.5–1.3)
CREAT SERPL-MCNC: 0.91 MG/DL — SIGNIFICANT CHANGE UP (ref 0.5–1.3)
CREAT SERPL-MCNC: 0.91 MG/DL — SIGNIFICANT CHANGE UP (ref 0.5–1.3)
DIFF PNL FLD: ABNORMAL
DIFF PNL FLD: ABNORMAL
EGFR: 80 ML/MIN/1.73M2 — SIGNIFICANT CHANGE UP
EGFR: 80 ML/MIN/1.73M2 — SIGNIFICANT CHANGE UP
EGFR: 81 ML/MIN/1.73M2 — SIGNIFICANT CHANGE UP
EGFR: 81 ML/MIN/1.73M2 — SIGNIFICANT CHANGE UP
EOSINOPHIL # BLD AUTO: 0 K/UL — SIGNIFICANT CHANGE UP (ref 0–0.5)
EOSINOPHIL # BLD AUTO: 0 K/UL — SIGNIFICANT CHANGE UP (ref 0–0.5)
EOSINOPHIL NFR BLD AUTO: 0 % — SIGNIFICANT CHANGE UP (ref 0–6)
EOSINOPHIL NFR BLD AUTO: 0 % — SIGNIFICANT CHANGE UP (ref 0–6)
FLUAV AG NPH QL: SIGNIFICANT CHANGE UP
FLUAV AG NPH QL: SIGNIFICANT CHANGE UP
FLUBV AG NPH QL: SIGNIFICANT CHANGE UP
FLUBV AG NPH QL: SIGNIFICANT CHANGE UP
GAS PNL BLDV: 140 MMOL/L — SIGNIFICANT CHANGE UP (ref 136–145)
GAS PNL BLDV: 140 MMOL/L — SIGNIFICANT CHANGE UP (ref 136–145)
GAS PNL BLDV: SIGNIFICANT CHANGE UP
GAS PNL BLDV: SIGNIFICANT CHANGE UP
GIANT PLATELETS BLD QL SMEAR: PRESENT — SIGNIFICANT CHANGE UP
GIANT PLATELETS BLD QL SMEAR: PRESENT — SIGNIFICANT CHANGE UP
GLUCOSE BLDV-MCNC: 130 MG/DL — HIGH (ref 70–99)
GLUCOSE BLDV-MCNC: 130 MG/DL — HIGH (ref 70–99)
GLUCOSE SERPL-MCNC: 138 MG/DL — HIGH (ref 70–99)
GLUCOSE SERPL-MCNC: 138 MG/DL — HIGH (ref 70–99)
GLUCOSE SERPL-MCNC: 179 MG/DL — HIGH (ref 70–99)
GLUCOSE SERPL-MCNC: 179 MG/DL — HIGH (ref 70–99)
GLUCOSE UR QL: NEGATIVE MG/DL — SIGNIFICANT CHANGE UP
GLUCOSE UR QL: NEGATIVE MG/DL — SIGNIFICANT CHANGE UP
HCO3 BLDMV-SCNC: 26 MMOL/L — SIGNIFICANT CHANGE UP (ref 20–28)
HCO3 BLDMV-SCNC: 26 MMOL/L — SIGNIFICANT CHANGE UP (ref 20–28)
HCO3 BLDV-SCNC: 26 MMOL/L — SIGNIFICANT CHANGE UP (ref 22–29)
HCO3 BLDV-SCNC: 26 MMOL/L — SIGNIFICANT CHANGE UP (ref 22–29)
HCT VFR BLD CALC: 41.1 % — SIGNIFICANT CHANGE UP (ref 39–50)
HCT VFR BLD CALC: 41.1 % — SIGNIFICANT CHANGE UP (ref 39–50)
HCT VFR BLDA CALC: 36 % — LOW (ref 39–51)
HCT VFR BLDA CALC: 36 % — LOW (ref 39–51)
HGB BLD CALC-MCNC: 12 G/DL — LOW (ref 12.6–17.4)
HGB BLD CALC-MCNC: 12 G/DL — LOW (ref 12.6–17.4)
HGB BLD CALC-MCNC: 12.8 G/DL — SIGNIFICANT CHANGE UP (ref 12.6–17.4)
HGB BLD CALC-MCNC: 12.8 G/DL — SIGNIFICANT CHANGE UP (ref 12.6–17.4)
HGB BLD-MCNC: 12.9 G/DL — LOW (ref 13–17)
HGB BLD-MCNC: 12.9 G/DL — LOW (ref 13–17)
HGB FLD-MCNC: 11.9 G/DL — LOW (ref 12.6–17.4)
HGB FLD-MCNC: 11.9 G/DL — LOW (ref 12.6–17.4)
INR BLD: 3.74 RATIO — HIGH (ref 0.85–1.18)
INR BLD: 3.74 RATIO — HIGH (ref 0.85–1.18)
KETONES UR-MCNC: NEGATIVE MG/DL — SIGNIFICANT CHANGE UP
KETONES UR-MCNC: NEGATIVE MG/DL — SIGNIFICANT CHANGE UP
LACTATE BLDV-MCNC: 3.8 MMOL/L — HIGH (ref 0.5–2)
LACTATE BLDV-MCNC: 3.8 MMOL/L — HIGH (ref 0.5–2)
LEUKOCYTE ESTERASE UR-ACNC: ABNORMAL
LEUKOCYTE ESTERASE UR-ACNC: ABNORMAL
LYMPHOCYTES # BLD AUTO: 0.18 K/UL — LOW (ref 1–3.3)
LYMPHOCYTES # BLD AUTO: 0.18 K/UL — LOW (ref 1–3.3)
LYMPHOCYTES # BLD AUTO: 3.5 % — LOW (ref 13–44)
LYMPHOCYTES # BLD AUTO: 3.5 % — LOW (ref 13–44)
MANUAL SMEAR VERIFICATION: SIGNIFICANT CHANGE UP
MANUAL SMEAR VERIFICATION: SIGNIFICANT CHANGE UP
MCHC RBC-ENTMCNC: 31.4 GM/DL — LOW (ref 32–36)
MCHC RBC-ENTMCNC: 31.4 GM/DL — LOW (ref 32–36)
MCHC RBC-ENTMCNC: 32.5 PG — SIGNIFICANT CHANGE UP (ref 27–34)
MCHC RBC-ENTMCNC: 32.5 PG — SIGNIFICANT CHANGE UP (ref 27–34)
MCV RBC AUTO: 103.5 FL — HIGH (ref 80–100)
MCV RBC AUTO: 103.5 FL — HIGH (ref 80–100)
METHGB MFR BLDMV: 0.5 % — SIGNIFICANT CHANGE UP (ref 0–1.5)
METHGB MFR BLDMV: 0.5 % — SIGNIFICANT CHANGE UP (ref 0–1.5)
MONOCYTES # BLD AUTO: 0.09 K/UL — SIGNIFICANT CHANGE UP (ref 0–0.9)
MONOCYTES # BLD AUTO: 0.09 K/UL — SIGNIFICANT CHANGE UP (ref 0–0.9)
MONOCYTES NFR BLD AUTO: 1.7 % — LOW (ref 2–14)
MONOCYTES NFR BLD AUTO: 1.7 % — LOW (ref 2–14)
NEUTROPHILS # BLD AUTO: 4.95 K/UL — SIGNIFICANT CHANGE UP (ref 1.8–7.4)
NEUTROPHILS # BLD AUTO: 4.95 K/UL — SIGNIFICANT CHANGE UP (ref 1.8–7.4)
NEUTROPHILS NFR BLD AUTO: 88.7 % — HIGH (ref 43–77)
NEUTROPHILS NFR BLD AUTO: 88.7 % — HIGH (ref 43–77)
NEUTS BAND # BLD: 5.2 % — SIGNIFICANT CHANGE UP (ref 0–8)
NEUTS BAND # BLD: 5.2 % — SIGNIFICANT CHANGE UP (ref 0–8)
NITRITE UR-MCNC: POSITIVE
NITRITE UR-MCNC: POSITIVE
O2 CT VFR BLD CALC: 22 MMHG — LOW (ref 30–65)
O2 CT VFR BLD CALC: 22 MMHG — LOW (ref 30–65)
O2 CT VFR BLDMV CALC: 2.2 ML/DL — LOW (ref 18–22)
O2 CT VFR BLDMV CALC: 2.2 ML/DL — LOW (ref 18–22)
OXYHGB MFR BLDMV: 13 % — LOW (ref 90–95)
OXYHGB MFR BLDMV: 13 % — LOW (ref 90–95)
PCO2 BLDMV: 50 MMHG — SIGNIFICANT CHANGE UP (ref 30–65)
PCO2 BLDMV: 50 MMHG — SIGNIFICANT CHANGE UP (ref 30–65)
PCO2 BLDV: 50 MMHG — SIGNIFICANT CHANGE UP (ref 42–55)
PCO2 BLDV: 50 MMHG — SIGNIFICANT CHANGE UP (ref 42–55)
PH BLDMV: 7.32 — SIGNIFICANT CHANGE UP (ref 7.32–7.45)
PH BLDMV: 7.32 — SIGNIFICANT CHANGE UP (ref 7.32–7.45)
PH BLDV: 7.32 — SIGNIFICANT CHANGE UP (ref 7.32–7.43)
PH BLDV: 7.32 — SIGNIFICANT CHANGE UP (ref 7.32–7.43)
PH UR: 5.5 — SIGNIFICANT CHANGE UP (ref 5–8)
PH UR: 5.5 — SIGNIFICANT CHANGE UP (ref 5–8)
PLAT MORPH BLD: NORMAL — SIGNIFICANT CHANGE UP
PLAT MORPH BLD: NORMAL — SIGNIFICANT CHANGE UP
PLATELET # BLD AUTO: 181 K/UL — SIGNIFICANT CHANGE UP (ref 150–400)
PLATELET # BLD AUTO: 181 K/UL — SIGNIFICANT CHANGE UP (ref 150–400)
PO2 BLDV: 23 MMHG — LOW (ref 25–45)
PO2 BLDV: 23 MMHG — LOW (ref 25–45)
POTASSIUM BLDV-SCNC: 4 MMOL/L — SIGNIFICANT CHANGE UP (ref 3.5–5.1)
POTASSIUM BLDV-SCNC: 4 MMOL/L — SIGNIFICANT CHANGE UP (ref 3.5–5.1)
POTASSIUM SERPL-MCNC: 4.1 MMOL/L — SIGNIFICANT CHANGE UP (ref 3.5–5.3)
POTASSIUM SERPL-MCNC: 4.1 MMOL/L — SIGNIFICANT CHANGE UP (ref 3.5–5.3)
POTASSIUM SERPL-MCNC: 6.1 MMOL/L — HIGH (ref 3.5–5.3)
POTASSIUM SERPL-MCNC: 6.1 MMOL/L — HIGH (ref 3.5–5.3)
POTASSIUM SERPL-SCNC: 4.1 MMOL/L — SIGNIFICANT CHANGE UP (ref 3.5–5.3)
POTASSIUM SERPL-SCNC: 4.1 MMOL/L — SIGNIFICANT CHANGE UP (ref 3.5–5.3)
POTASSIUM SERPL-SCNC: 6.1 MMOL/L — HIGH (ref 3.5–5.3)
POTASSIUM SERPL-SCNC: 6.1 MMOL/L — HIGH (ref 3.5–5.3)
PROT SERPL-MCNC: 4.7 G/DL — LOW (ref 6–8.3)
PROT SERPL-MCNC: 4.7 G/DL — LOW (ref 6–8.3)
PROT SERPL-MCNC: 5.9 G/DL — LOW (ref 6–8.3)
PROT SERPL-MCNC: 5.9 G/DL — LOW (ref 6–8.3)
PROT UR-MCNC: 30 MG/DL
PROT UR-MCNC: 30 MG/DL
PROTHROM AB SERPL-ACNC: 37.8 SEC — HIGH (ref 9.5–13)
PROTHROM AB SERPL-ACNC: 37.8 SEC — HIGH (ref 9.5–13)
RBC # BLD: 3.97 M/UL — LOW (ref 4.2–5.8)
RBC # BLD: 3.97 M/UL — LOW (ref 4.2–5.8)
RBC # FLD: 14.7 % — HIGH (ref 10.3–14.5)
RBC # FLD: 14.7 % — HIGH (ref 10.3–14.5)
RBC BLD AUTO: SIGNIFICANT CHANGE UP
RBC BLD AUTO: SIGNIFICANT CHANGE UP
RBC CASTS # UR COMP ASSIST: 50 /HPF — HIGH (ref 0–4)
RBC CASTS # UR COMP ASSIST: 50 /HPF — HIGH (ref 0–4)
REVIEW: SIGNIFICANT CHANGE UP
REVIEW: SIGNIFICANT CHANGE UP
RSV RNA NPH QL NAA+NON-PROBE: SIGNIFICANT CHANGE UP
RSV RNA NPH QL NAA+NON-PROBE: SIGNIFICANT CHANGE UP
SAO2 % BLDMV: 13.1 % — LOW (ref 60–90)
SAO2 % BLDMV: 13.1 % — LOW (ref 60–90)
SAO2 % BLDV: 12.9 % — LOW (ref 67–88)
SAO2 % BLDV: 12.9 % — LOW (ref 67–88)
SARS-COV-2 RNA SPEC QL NAA+PROBE: SIGNIFICANT CHANGE UP
SARS-COV-2 RNA SPEC QL NAA+PROBE: SIGNIFICANT CHANGE UP
SODIUM SERPL-SCNC: 139 MMOL/L — SIGNIFICANT CHANGE UP (ref 135–145)
SODIUM SERPL-SCNC: 139 MMOL/L — SIGNIFICANT CHANGE UP (ref 135–145)
SODIUM SERPL-SCNC: 143 MMOL/L — SIGNIFICANT CHANGE UP (ref 135–145)
SODIUM SERPL-SCNC: 143 MMOL/L — SIGNIFICANT CHANGE UP (ref 135–145)
SP GR SPEC: 1.02 — SIGNIFICANT CHANGE UP (ref 1–1.03)
SP GR SPEC: 1.02 — SIGNIFICANT CHANGE UP (ref 1–1.03)
SQUAMOUS # UR AUTO: 4 /HPF — SIGNIFICANT CHANGE UP (ref 0–5)
SQUAMOUS # UR AUTO: 4 /HPF — SIGNIFICANT CHANGE UP (ref 0–5)
UROBILINOGEN FLD QL: 2 MG/DL (ref 0.2–1)
UROBILINOGEN FLD QL: 2 MG/DL (ref 0.2–1)
VARIANT LYMPHS # BLD: 0.9 % — SIGNIFICANT CHANGE UP (ref 0–6)
VARIANT LYMPHS # BLD: 0.9 % — SIGNIFICANT CHANGE UP (ref 0–6)
WBC # BLD: 5.27 K/UL — SIGNIFICANT CHANGE UP (ref 3.8–10.5)
WBC # BLD: 5.27 K/UL — SIGNIFICANT CHANGE UP (ref 3.8–10.5)
WBC # FLD AUTO: 5.27 K/UL — SIGNIFICANT CHANGE UP (ref 3.8–10.5)
WBC # FLD AUTO: 5.27 K/UL — SIGNIFICANT CHANGE UP (ref 3.8–10.5)
WBC UR QL: 224 /HPF — HIGH (ref 0–5)
WBC UR QL: 224 /HPF — HIGH (ref 0–5)
YEAST-LIKE CELLS: PRESENT
YEAST-LIKE CELLS: PRESENT

## 2023-11-24 PROCEDURE — 99285 EMERGENCY DEPT VISIT HI MDM: CPT

## 2023-11-24 PROCEDURE — 93010 ELECTROCARDIOGRAM REPORT: CPT

## 2023-11-24 PROCEDURE — 70450 CT HEAD/BRAIN W/O DYE: CPT | Mod: 26,MA

## 2023-11-24 PROCEDURE — 74177 CT ABD & PELVIS W/CONTRAST: CPT | Mod: 26,MA

## 2023-11-24 PROCEDURE — 71260 CT THORAX DX C+: CPT | Mod: 26,MA

## 2023-11-24 PROCEDURE — 99223 1ST HOSP IP/OBS HIGH 75: CPT

## 2023-11-24 RX ORDER — PANTOPRAZOLE SODIUM 20 MG/1
1 TABLET, DELAYED RELEASE ORAL
Qty: 0 | Refills: 0 | DISCHARGE

## 2023-11-24 RX ORDER — PIPERACILLIN AND TAZOBACTAM 4; .5 G/20ML; G/20ML
3.38 INJECTION, POWDER, LYOPHILIZED, FOR SOLUTION INTRAVENOUS ONCE
Refills: 0 | Status: COMPLETED | OUTPATIENT
Start: 2023-11-24 | End: 2023-11-24

## 2023-11-24 RX ORDER — PIPERACILLIN AND TAZOBACTAM 4; .5 G/20ML; G/20ML
3.38 INJECTION, POWDER, LYOPHILIZED, FOR SOLUTION INTRAVENOUS ONCE
Refills: 0 | Status: COMPLETED | OUTPATIENT
Start: 2023-11-25 | End: 2023-11-25

## 2023-11-24 RX ORDER — ASPIRIN/CALCIUM CARB/MAGNESIUM 324 MG
1 TABLET ORAL
Refills: 0 | DISCHARGE

## 2023-11-24 RX ORDER — SODIUM CHLORIDE 9 MG/ML
500 INJECTION INTRAMUSCULAR; INTRAVENOUS; SUBCUTANEOUS ONCE
Refills: 0 | Status: COMPLETED | OUTPATIENT
Start: 2023-11-24 | End: 2023-11-24

## 2023-11-24 RX ORDER — ASPIRIN/CALCIUM CARB/MAGNESIUM 324 MG
81 TABLET ORAL DAILY
Refills: 0 | Status: DISCONTINUED | OUTPATIENT
Start: 2023-11-24 | End: 2023-11-30

## 2023-11-24 RX ORDER — VANCOMYCIN HCL 1 G
1000 VIAL (EA) INTRAVENOUS ONCE
Refills: 0 | Status: COMPLETED | OUTPATIENT
Start: 2023-11-24 | End: 2023-11-24

## 2023-11-24 RX ORDER — ACETAMINOPHEN 500 MG
975 TABLET ORAL ONCE
Refills: 0 | Status: COMPLETED | OUTPATIENT
Start: 2023-11-24 | End: 2023-11-24

## 2023-11-24 RX ORDER — SIMVASTATIN 20 MG/1
10 TABLET, FILM COATED ORAL AT BEDTIME
Refills: 0 | Status: DISCONTINUED | OUTPATIENT
Start: 2023-11-24 | End: 2023-11-30

## 2023-11-24 RX ORDER — ACETAMINOPHEN 500 MG
650 TABLET ORAL EVERY 6 HOURS
Refills: 0 | Status: DISCONTINUED | OUTPATIENT
Start: 2023-11-24 | End: 2023-11-30

## 2023-11-24 RX ORDER — METOPROLOL TARTRATE 50 MG
50 TABLET ORAL
Refills: 0 | Status: DISCONTINUED | OUTPATIENT
Start: 2023-11-24 | End: 2023-11-30

## 2023-11-24 RX ORDER — PIPERACILLIN AND TAZOBACTAM 4; .5 G/20ML; G/20ML
3.38 INJECTION, POWDER, LYOPHILIZED, FOR SOLUTION INTRAVENOUS EVERY 8 HOURS
Refills: 0 | Status: DISCONTINUED | OUTPATIENT
Start: 2023-11-25 | End: 2023-11-26

## 2023-11-24 RX ORDER — PYRIDOXINE HCL (VITAMIN B6) 100 MG
100 TABLET ORAL DAILY
Refills: 0 | Status: DISCONTINUED | OUTPATIENT
Start: 2023-11-24 | End: 2023-11-30

## 2023-11-24 RX ORDER — FINASTERIDE 5 MG/1
1 TABLET, FILM COATED ORAL
Refills: 0 | DISCHARGE

## 2023-11-24 RX ORDER — SODIUM CHLORIDE 9 MG/ML
250 INJECTION, SOLUTION INTRAVENOUS ONCE
Refills: 0 | Status: COMPLETED | OUTPATIENT
Start: 2023-11-24 | End: 2023-11-24

## 2023-11-24 RX ORDER — FLUCONAZOLE 150 MG/1
100 TABLET ORAL EVERY 24 HOURS
Refills: 0 | Status: DISCONTINUED | OUTPATIENT
Start: 2023-11-24 | End: 2023-11-26

## 2023-11-24 RX ORDER — PANTOPRAZOLE SODIUM 20 MG/1
40 TABLET, DELAYED RELEASE ORAL
Refills: 0 | Status: DISCONTINUED | OUTPATIENT
Start: 2023-11-24 | End: 2023-11-30

## 2023-11-24 RX ORDER — TAMSULOSIN HYDROCHLORIDE 0.4 MG/1
0.4 CAPSULE ORAL AT BEDTIME
Refills: 0 | Status: DISCONTINUED | OUTPATIENT
Start: 2023-11-24 | End: 2023-11-30

## 2023-11-24 RX ORDER — SIMVASTATIN 20 MG/1
1 TABLET, FILM COATED ORAL
Refills: 0 | DISCHARGE

## 2023-11-24 RX ORDER — SODIUM CHLORIDE 9 MG/ML
1000 INJECTION, SOLUTION INTRAVENOUS ONCE
Refills: 0 | Status: COMPLETED | OUTPATIENT
Start: 2023-11-24 | End: 2023-11-24

## 2023-11-24 RX ORDER — DIGOXIN 250 MCG
250 TABLET ORAL DAILY
Refills: 0 | Status: DISCONTINUED | OUTPATIENT
Start: 2023-11-24 | End: 2023-11-30

## 2023-11-24 RX ORDER — FINASTERIDE 5 MG/1
5 TABLET, FILM COATED ORAL DAILY
Refills: 0 | Status: DISCONTINUED | OUTPATIENT
Start: 2023-11-24 | End: 2023-11-30

## 2023-11-24 RX ADMIN — Medication 975 MILLIGRAM(S): at 12:30

## 2023-11-24 RX ADMIN — PIPERACILLIN AND TAZOBACTAM 200 GRAM(S): 4; .5 INJECTION, POWDER, LYOPHILIZED, FOR SOLUTION INTRAVENOUS at 13:14

## 2023-11-24 RX ADMIN — Medication 250 MILLIGRAM(S): at 13:48

## 2023-11-24 RX ADMIN — FLUCONAZOLE 50 MILLIGRAM(S): 150 TABLET ORAL at 19:57

## 2023-11-24 RX ADMIN — SODIUM CHLORIDE 1000 MILLILITER(S): 9 INJECTION INTRAMUSCULAR; INTRAVENOUS; SUBCUTANEOUS at 13:13

## 2023-11-24 RX ADMIN — Medication 975 MILLIGRAM(S): at 12:59

## 2023-11-24 RX ADMIN — SODIUM CHLORIDE 1000 MILLILITER(S): 9 INJECTION, SOLUTION INTRAVENOUS at 17:08

## 2023-11-24 RX ADMIN — PIPERACILLIN AND TAZOBACTAM 200 GRAM(S): 4; .5 INJECTION, POWDER, LYOPHILIZED, FOR SOLUTION INTRAVENOUS at 21:49

## 2023-11-24 RX ADMIN — SODIUM CHLORIDE 1000 MILLILITER(S): 9 INJECTION INTRAMUSCULAR; INTRAVENOUS; SUBCUTANEOUS at 12:53

## 2023-11-24 NOTE — H&P ADULT - PROBLEM SELECTOR PLAN 3
- Unclear baseline EF as no TTE in system  - Currently w/o s/s of acute exacerbation  - Outpt cards f/u

## 2023-11-24 NOTE — H&P ADULT - PROBLEM SELECTOR PLAN 2
- CHADSVASc: 5  - c/w home Lopressor 50mg BID   - c/w home Digoxin 250mcg qd  - Holding home Coumadin i/s/o supratherapeutic INR (goal 2-3 and admission INR of 3.74)  - f/u coags in AM and dose appropriately

## 2023-11-24 NOTE — H&P ADULT - PROBLEM SELECTOR PLAN 7
DVT ppx: Holding Coumadin i/s/o supratherapeutic INR  Diet: Pending dysphagia screen  Dispo: pending clinical improvement

## 2023-11-24 NOTE — ED PROVIDER NOTE - ATTENDING CONTRIBUTION TO CARE
MD Hendrix:  patient seen and evaluated with the resident.  I was present for key portions of the History & Physical, and I agree with the Impression & Plan.    Patient is a 89-year-old male brought in by EMS for evaluation after the family realized that there was a gas leak in his domicile.    Medical decision making:  H&P concerning for potential car monoxide exposure although clinically well at this time, ECG is unremarkable and does not suggest cardiac ischemia.  As such troponin is not indicated at this time.  We can check Co. oximetry but given patient's overall well appearance we suspect that will be unremarkable. MD Hendrix:  patient seen and evaluated with the resident.  I was present for key portions of the History & Physical, and I agree with the Impression & Plan.    Patient is a 89-year-old male brought in by EMS for Evaluation of shaking episode.  Patient was recently admitted to [facility] for pneumonia UTI hypotension elevated lactate, hyponatremia    Daughter is also endorsing that there was a gas leak last night, EMS was called and the patient had hypoxia and cyanosis but was not brought to the hospital.    VS: hypotension appreciated;   General: Adult male fatigued-appearing, seems more alert with painful stimuli  Normocephalic atraumatic  Cardiac: Irregular rhythm, normal rate  Pulmonary: Clear to auscultation bilaterally  Abdomen: Soft, nondistended  Skin: no rash  Neuro: Appears sleepy, but moving all 4 extremities  Psychiatric: Not applicable      Medical decision making:  H&P concerning for recurrent infection (urine, pulmonary, or otherwise), potential car monoxide exposure although clinically well at this time, ECG is unremarkable and does not suggest cardiac ischemia.  As such troponin is not indicated at this time.    Patient may have had diffuse anoxia last night based upon the HPI    We can check Co. oximetry but given patient's overall well appearance we suspect that will be unremarkable.

## 2023-11-24 NOTE — ED PROVIDER NOTE - CLINICAL SUMMARY MEDICAL DECISION MAKING FREE TEXT BOX
Medical decision making:  H&P concerning for recurrent infection (urine, pulmonary, or otherwise), potential car monoxide exposure although clinically well at this time, ECG is unremarkable and does not suggest cardiac ischemia.  As such troponin is not indicated at this time.    Patient may have had diffuse anoxia last night based upon the HPI    We can check Co. oximetry but given patient's overall well appearance we suspect that will be unremarkable.

## 2023-11-24 NOTE — CONSULT NOTE ADULT - ASSESSMENT
89-year-old male with past medical history of A-fib on warfarin, CHF, CVA, dementia presents the ED with altered mental status with findings of a possible right renal abscess.     Urology consulted for findings of possible renal abscess. CT showing right ureteral enhancement as well as a 2.1cm thick walled lesion in the right kidney, previously 1.1cm one month ago suspicious for renal abscess in the setting of a UTI. Patient has had this cyst since 2021 and it has been stable in size up to today. WBC 5.27  / Hct 41.1  / SCr 0.89. Lactate on admission 6.3 now 3.8. Febrile to 101 in ED. UA positive and with yeast. Urine and blood cx pending. Given a dose of vanc/zosyn in ED. Patient on warfarin and ASA for afib.     Plan:  TO BE DISCUSSED WITH ATTENDING   89-year-old male with past medical history of A-fib on warfarin, CHF, CVA, dementia presents the ED with altered mental status with findings of a possible right renal abscess.     Urology consulted for findings of possible renal abscess. CT showing right ureteral enhancement as well as a 2.1cm thick walled lesion in the right kidney, previously 1.1cm one month ago suspicious for renal abscess in the setting of a UTI. Patient has had this cyst since 2021 and it has been stable in size up to today. WBC 5.27  / Hct 41.1  / SCr 0.89. Lactate on admission 6.3 now 3.8. Febrile to 101 in ED. UA positive and with yeast. Urine and blood cx pending. Given a dose of vanc/zosyn in ED. Patient on warfarin and ASA for afib.     Plan:  - No acute urologic intervention at this time  - Abscess too small to be drained  - Antibiotics  - Follow up urine and blood cultures  - Admit to medicine     Case discussed with Dr. Amelia Recinos [facility]  [address] [place] [address] [id number] [phone]

## 2023-11-24 NOTE — ED ADULT NURSE NOTE - NS ED NURSE REPORT GIVEN DT
Physical Therapy      Patient Name:  Apoorva Fernandez   MRN:  [id number]    Patient not seen today secondary to Pain, Other (Comment) (Attempted x2; pt awaiting pain meds then ambulated to BR via nurse and returned to bed.  Pt declined further gait, sched for discharge to home today.)   Ice machine secured (R) knee.         24-Nov-2023 20:35

## 2023-11-24 NOTE — H&P ADULT - PROBLEM SELECTOR PLAN 1
- Febrile w/ tachycardia and lactate w/ hypotension on arrival, improved w/ IV abx and fluids  - UA positive  - s/p Vanc/Zosyn and fluconazole given yeast   - c/w Zosyn and Fluconazole for now  - f/u blood and ur cx   - Trend fever curve  - f/u urology recs re R renal abscess noted on CT

## 2023-11-24 NOTE — H&P ADULT - ASSESSMENT
89yo M w/ PMH of Afib on Coumadin, CHF, CVA, BPH, dementia (A&Ox0-1 at baseline) pw worsening confusion and altered mental status x1 day found to be septic likely i/s/o UTI w/ c/f renal abscess as well, pending urology eval

## 2023-11-24 NOTE — PATIENT PROFILE ADULT - FUNCTIONAL ASSESSMENT - BASIC MOBILITY 6.
1-calculated by average/Not able to assess (calculate score using [facility] averaging method)

## 2023-11-24 NOTE — ED ADULT NURSE NOTE - OBJECTIVE STATEMENT
89 yo M pt PMHx of AFib, Dementia, and stroke p/w episode of shaking and lethargy. as per family at the bedside ambulance was called to pt's home last night as he had a gas leak and was hypoxic. pt was given supplemental 02 felt better and refused transport to ED. This morning pt was shaking and EMS was unable to get an 02 reading.   pt is A&Ox1 to person, MERCADO, skin warm dry intact/normal for race, b/l pedal edema +2 pitting, diaper soaked with dark urine, suprapubic tenderness noted to palpation.

## 2023-11-24 NOTE — ED ADULT TRIAGE NOTE - WEIGHT IN LBS
CSS please call patient to assist with scheduling appointment with PCP    Protocol failed for appointment only  90 day refill given on 11/20/23, appointment needed for further refills.     Requested Prescriptions   Pending Prescriptions Disp Refills    HYDROCHLOROTHIAZIDE 12.5 MG Oral Tab [Pharmacy Med Name: Hydrochlorothiazide 12.5 Mg Tab Acta] 90 tablet 0     Sig: TAKE ONE TABLET BY MOUTH ONE TIME DAILY       Hypertensive Medications Protocol Failed - 11/18/2023  2:45 PM        Failed - In person appointment or virtual visit in the past 6 months     Recent Outpatient Visits              4 months ago Postoperative examination    Nishi Noe MD    Office Visit    5 months ago Biliary colic    Nishi Noe MD    Office Visit    5 months ago RUQ pain    [address], Jaci Mcdaniels MD    Office Visit    8 months ago Wellness examination    [address]    Nurse Only    9 months ago Viral syndrome    Sonia Conley MD    Telemedicine                      Passed - In person appointment in the past 12 or next 3 months     Recent Outpatient Visits              4 months ago Postoperative examination    Nishi Noe MD    Office Visit    5 months ago Biliary colic    [address], Milo Kang MD    Office Visit    5 months ago RUQ pain    [address], Jaci Mcdaniels MD    Office Visit    8 months ago Wellness examination    [address]    Nurse Only    9 months ago Viral syndrome    [address], Lester Luciano MD    Telemedicine                      Passed - Last BP reading less than 140/90     BP Readings from Last 1 Encounters:   06/17/23 123/80               Passed - CMP or BMP in past 6 months     Recent Results (from the past 4392 hour(s))   Comp Metabolic Panel (14)    Collection Time: 06/08/23  2:42 PM   Result Value Ref Range    Glucose 78 70 - 99 mg/dL    Sodium 142 136 - 145 mmol/L    Potassium 3.7 3.5 - 5.1 mmol/L    Chloride 109 98 - 112 mmol/L    CO2 29.0 21.0 - 32.0 mmol/L    Anion Gap 4 0 - 18 mmol/L    BUN 13 7 - 18 mg/dL    Creatinine 0.99 0.55 - 1.02 mg/dL    BUN/CREA Ratio 13.1 10.0 - 20.0    Calcium, Total 9.4 8.5 - 10.1 mg/dL    Calculated Osmolality 293 275 - 295 mOsm/kg    eGFR-Cr 67 >=60 mL/min/1.73m2    ALT 33 13 - 56 U/L    AST 21 15 - 37 U/L    Alkaline Phosphatase 82 46 - 118 U/L    Bilirubin, Total 0.6 0.1 - 2.0 mg/dL    Total Protein 7.4 6.4 - 8.2 g/dL    Albumin 3.7 3.4 - 5.0 g/dL    Globulin  3.7 2.8 - 4.4 g/dL    A/G Ratio 1.0 1.0 - 2.0    Patient Fasting for CMP? No      *Note: Due to a large number of results and/or encounters for the requested time period, some results have not been displayed. A complete set of results can be found in Results Review.                Passed - EGFRCR or GFRAA > 50     GFR Evaluation  EGFRCR: 67 , resulted on 6/8/2023                Recent Outpatient Visits              4 months ago Postoperative examination    Phoenix Baron MD    Office Visit    5 months ago Biliary colic    Phoenix Baron MD    Office Visit    5 months ago RUQ pain    Jerry Packer MD    Office Visit    8 months ago Wellness examination    [address]    Nurse Only    9 months ago Viral syndrome    Nishi Clemons, [facility] Dustin Dominguez MD    Telemedicine 160

## 2023-11-24 NOTE — H&P ADULT - NSHPLABSRESULTS_GEN_ALL_CORE
LABS:                      12.9   5.27  )-----------( 181      ( 24 Nov 2023 12:35 )             41.1     11-24    139  |  107  |  14  ----------------------------<  138<H>  4.1   |  26  |  0.89    Ca    7.8<L>      24 Nov 2023 16:30    TPro  4.7<L>  /  Alb  1.8<L>  /  TBili  0.9  /  DBili  x   /  AST  32  /  ALT  26  /  AlkPhos  84  11-24    LIVER FUNCTIONS - ( 24 Nov 2023 16:30 )  Alb: 1.8 g/dL / Pro: 4.7 g/dL / ALK PHOS: 84 U/L / ALT: 26 U/L / AST: 32 U/L / GGT: x           PT/INR - ( 24 Nov 2023 12:35 )   PT: 37.8 sec;   INR: 3.74 ratio    PTT - ( 24 Nov 2023 12:35 )  PTT:37.3 sec    Urinalysis Basic - ( 24 Nov 2023 16:30 )  Color: x / Appearance: x / SG: x / pH: x  Gluc: 138 mg/dL / Ketone: x  / Bili: x / Urobili: x   Blood: x / Protein: x / Nitrite: x   Leuk Esterase: x / RBC: x / WBC x   Sq Epi: x / Non Sq Epi: x / Bacteria: x    IMAGING:  CT Chest Abdomen and Pelvis w/ IV Cont (11.24.23 @ 17:20)  IMPRESSION:  - Slightly decreased bilateral opacities in the lungs, suggesting resolving pneumonia.  - Similar urinary bladder wall thickening. New prominent right ureter with slight hyperenhancement, which raises the suspicion of ureteritis. Enlargement of 2.1 cm thick walled cystic lesion in the right kidney compared to one month ago, previously 1.1 cm. This would be suspicious for a developing renal abscess in the setting of UTI.    [X] Imaging personally reviewed by me- Dasia pna   [X] ECG personally interpreted by shayne Negron w/out acute ischemic changes

## 2023-11-24 NOTE — ED PROVIDER NOTE - OBJECTIVE STATEMENT
89-year-old male with past medical history of A-fib on warfarin, CHF, CVA, dementia presents the ED complaining of worsening confusion and altered mental status since yesterday.  Patient had a gas leak in his apartment approximately 10 PM where EMS was called as patient was having bilateral upper extremity tremor and was hypoxic with cyanosis and received oxygen from EMS was not brought to the hospital.  Patient then had similar symptoms this morning and EMS was called and brought here.  Patient's baseline is alert but conversational but has been diminished since recent admission for multifocal pneumonia and UTI approximate 1 month ago.  Patient is currently not able to participate in physical exam or interview.  History is obtained by both daughters who are bedside and patient only speaks Latvian.  Denies fever, nausea, vomiting, chest pain, shortness of breath, abdominal pain, diarrhea. Patient was hypotensive in the field in triage and brought directly back to the trauma room.

## 2023-11-24 NOTE — PATIENT PROFILE ADULT - FALL HARM RISK - FALLEN IN PAST
Detail Level: Simple Unable to determine Procedure To Be Performed At Next Visit: Biopsy by shave method Introduction Text (Please End With A Colon): The following procedure was deferred:

## 2023-11-24 NOTE — ED PROVIDER NOTE - PHYSICAL EXAMINATION
GEN: Patient moving spontaneously but responsive to painful stimuli   HEENT: normocephalic, atraumatic, EOMI, no scleral icterus, moist MM  CARDIAC: RRR, S1, S2, no murmur.   PULM: CTA B/L no wheeze, rhonchi, rales.   ABD: soft NT, ND, no rebound no guarding  MSK: Moving all extremities, no edema.   NEURO: A&Ox0, no focal neurological deficits,   SKIN: warm, dry, no rash.

## 2023-11-24 NOTE — ED ADULT NURSE REASSESSMENT NOTE - NS ED NURSE REASSESS COMMENT FT1
straight catheter inserted using sterile technique. Second RN present to confirm sterility. pt tolerated procedure well UA/UC sent to lab.

## 2023-11-24 NOTE — H&P ADULT - NSHPPHYSICALEXAM_GEN_ALL_CORE
Vital Signs Last 24 Hrs  T(C): 36.7 (24 Nov 2023 20:28), Max: 38.3 (24 Nov 2023 12:29)  T(F): 98 (24 Nov 2023 20:28), Max: 101 (24 Nov 2023 12:29)  HR: 60 (24 Nov 2023 20:28) (55 - 92)  BP: 118/62 (24 Nov 2023 20:28) (86/52 - 118/62)  BP(mean): 70 (24 Nov 2023 17:51) (64 - 76)  RR: 17 (24 Nov 2023 20:28) (15 - 20)  SpO2: 100% (24 Nov 2023 20:28) (97% - 100%)    Parameters below as of 24 Nov 2023 20:28  Patient On (Oxygen Delivery Method): nasal cannula O2 Flow (L/min): 4    CONSTITUTIONAL: Well-groomed, in no apparent distress  EYES: No conjunctival or scleral injection, non-icteric;   ENMT: No external nasal lesions; MMM  NECK: Trachea midline without palpable neck mass; thyroid not enlarged and non-tender  RESPIRATORY: Breathing comfortably; no dullness to percussion; lungs CTA without wheeze/rhonchi/rales  CARDIOVASCULAR: Irregularly irregular.+S1S2, no M/G/R; pedal pulses full and symmetric; no lower extremity edema  GASTROINTESTINAL: No palpable masses or tenderness, +BS throughout, no rebound/guarding; no hepatosplenomegaly; no hernia palpated  LYMPHATIC: No cervical LAD or tenderness  SKIN: No rashes or ulcers noted  NEUROLOGIC: sensation intact in LEs b/l to light touch  PSYCHIATRIC: A&Ox0

## 2023-11-24 NOTE — ED PROVIDER NOTE - PROGRESS NOTE DETAILS
Landon Calderon, DO PGY-3: Patient without leukocytosis, supratherapeutic INR, severe hemolyzed potassium, lactate of 6.3 but not acidemic.  Patient still pending further lab work-up including urine.  But will order CT chest/abdomen/pelvis to look for source of infection.

## 2023-11-24 NOTE — H&P ADULT - HISTORY OF PRESENT ILLNESS
Pt is a 89yo M w/ PMH of Afib on Coumadin, CHF, CVA, BPH, dementia (A&Ox0-1 at baseline) pw worsening confusion and altered mental status x1 day.    Pt unable to provide hx due to dementia and medical condition. Hx obtained from chart review as unable to reach daughters over the telephone on multiple attempts.     Per daughter, pt w/ gas leak yesterday for which EMS assessed pt in the field and administered O2 w/ improvement in hypoxia and cyanosis and thus was not brought to hospital for assessment. This morning they noted similar symptoms for which EMS was called and pt presented to [facility]. Mental status wise, report baseline alert and conversational but has been diminished since recent admission for PNA.    Otherwise no reported F/C, N/V, CP, SOB, diarrhea.    In the ED septic w/ hypotension and elevated lactate w/ positive UA s/p Vanc/Zosyn and Fluconazole w/ >2L IVF w/ improvement in hypotension.

## 2023-11-25 LAB
ALBUMIN SERPL ELPH-MCNC: 2.3 G/DL — LOW (ref 3.3–5)
ALBUMIN SERPL ELPH-MCNC: 2.3 G/DL — LOW (ref 3.3–5)
ALP SERPL-CCNC: 89 U/L — SIGNIFICANT CHANGE UP (ref 40–120)
ALP SERPL-CCNC: 89 U/L — SIGNIFICANT CHANGE UP (ref 40–120)
ALT FLD-CCNC: 26 U/L — SIGNIFICANT CHANGE UP (ref 10–45)
ALT FLD-CCNC: 26 U/L — SIGNIFICANT CHANGE UP (ref 10–45)
ANION GAP SERPL CALC-SCNC: 7 MMOL/L — SIGNIFICANT CHANGE UP (ref 5–17)
ANION GAP SERPL CALC-SCNC: 7 MMOL/L — SIGNIFICANT CHANGE UP (ref 5–17)
APTT BLD: 40.8 SEC — HIGH (ref 24.5–35.6)
APTT BLD: 40.8 SEC — HIGH (ref 24.5–35.6)
AST SERPL-CCNC: 33 U/L — SIGNIFICANT CHANGE UP (ref 10–40)
AST SERPL-CCNC: 33 U/L — SIGNIFICANT CHANGE UP (ref 10–40)
BASOPHILS # BLD AUTO: 0.01 K/UL — SIGNIFICANT CHANGE UP (ref 0–0.2)
BASOPHILS # BLD AUTO: 0.01 K/UL — SIGNIFICANT CHANGE UP (ref 0–0.2)
BASOPHILS NFR BLD AUTO: 0.2 % — SIGNIFICANT CHANGE UP (ref 0–2)
BASOPHILS NFR BLD AUTO: 0.2 % — SIGNIFICANT CHANGE UP (ref 0–2)
BILIRUB SERPL-MCNC: 1.1 MG/DL — SIGNIFICANT CHANGE UP (ref 0.2–1.2)
BILIRUB SERPL-MCNC: 1.1 MG/DL — SIGNIFICANT CHANGE UP (ref 0.2–1.2)
BUN SERPL-MCNC: 11 MG/DL — SIGNIFICANT CHANGE UP (ref 7–23)
BUN SERPL-MCNC: 11 MG/DL — SIGNIFICANT CHANGE UP (ref 7–23)
CALCIUM SERPL-MCNC: 8.3 MG/DL — LOW (ref 8.4–10.5)
CALCIUM SERPL-MCNC: 8.3 MG/DL — LOW (ref 8.4–10.5)
CHLORIDE SERPL-SCNC: 108 MMOL/L — SIGNIFICANT CHANGE UP (ref 96–108)
CHLORIDE SERPL-SCNC: 108 MMOL/L — SIGNIFICANT CHANGE UP (ref 96–108)
CO2 SERPL-SCNC: 26 MMOL/L — SIGNIFICANT CHANGE UP (ref 22–31)
CO2 SERPL-SCNC: 26 MMOL/L — SIGNIFICANT CHANGE UP (ref 22–31)
CREAT SERPL-MCNC: 0.85 MG/DL — SIGNIFICANT CHANGE UP (ref 0.5–1.3)
CREAT SERPL-MCNC: 0.85 MG/DL — SIGNIFICANT CHANGE UP (ref 0.5–1.3)
E COLI DNA BLD POS QL NAA+NON-PROBE: SIGNIFICANT CHANGE UP
E COLI DNA BLD POS QL NAA+NON-PROBE: SIGNIFICANT CHANGE UP
EGFR: 83 ML/MIN/1.73M2 — SIGNIFICANT CHANGE UP
EGFR: 83 ML/MIN/1.73M2 — SIGNIFICANT CHANGE UP
EOSINOPHIL # BLD AUTO: 0 K/UL — SIGNIFICANT CHANGE UP (ref 0–0.5)
EOSINOPHIL # BLD AUTO: 0 K/UL — SIGNIFICANT CHANGE UP (ref 0–0.5)
EOSINOPHIL NFR BLD AUTO: 0 % — SIGNIFICANT CHANGE UP (ref 0–6)
EOSINOPHIL NFR BLD AUTO: 0 % — SIGNIFICANT CHANGE UP (ref 0–6)
FOLATE SERPL-MCNC: 7.3 NG/ML — SIGNIFICANT CHANGE UP
FOLATE SERPL-MCNC: 7.3 NG/ML — SIGNIFICANT CHANGE UP
GLUCOSE SERPL-MCNC: 121 MG/DL — HIGH (ref 70–99)
GLUCOSE SERPL-MCNC: 121 MG/DL — HIGH (ref 70–99)
GRAM STN FLD: ABNORMAL
GRAM STN FLD: ABNORMAL
HCT VFR BLD CALC: 37.6 % — LOW (ref 39–50)
HCT VFR BLD CALC: 37.6 % — LOW (ref 39–50)
HGB BLD-MCNC: 12.1 G/DL — LOW (ref 13–17)
HGB BLD-MCNC: 12.1 G/DL — LOW (ref 13–17)
IMM GRANULOCYTES NFR BLD AUTO: 0.9 % — SIGNIFICANT CHANGE UP (ref 0–0.9)
IMM GRANULOCYTES NFR BLD AUTO: 0.9 % — SIGNIFICANT CHANGE UP (ref 0–0.9)
INR BLD: 4.57 RATIO — HIGH (ref 0.85–1.18)
INR BLD: 4.57 RATIO — HIGH (ref 0.85–1.18)
LYMPHOCYTES # BLD AUTO: 0.47 K/UL — LOW (ref 1–3.3)
LYMPHOCYTES # BLD AUTO: 0.47 K/UL — LOW (ref 1–3.3)
LYMPHOCYTES # BLD AUTO: 8.2 % — LOW (ref 13–44)
LYMPHOCYTES # BLD AUTO: 8.2 % — LOW (ref 13–44)
MAGNESIUM SERPL-MCNC: 1.9 MG/DL — SIGNIFICANT CHANGE UP (ref 1.6–2.6)
MAGNESIUM SERPL-MCNC: 1.9 MG/DL — SIGNIFICANT CHANGE UP (ref 1.6–2.6)
MCHC RBC-ENTMCNC: 32.2 GM/DL — SIGNIFICANT CHANGE UP (ref 32–36)
MCHC RBC-ENTMCNC: 32.2 GM/DL — SIGNIFICANT CHANGE UP (ref 32–36)
MCHC RBC-ENTMCNC: 32.6 PG — SIGNIFICANT CHANGE UP (ref 27–34)
MCHC RBC-ENTMCNC: 32.6 PG — SIGNIFICANT CHANGE UP (ref 27–34)
MCV RBC AUTO: 101.3 FL — HIGH (ref 80–100)
MCV RBC AUTO: 101.3 FL — HIGH (ref 80–100)
METHOD TYPE: SIGNIFICANT CHANGE UP
METHOD TYPE: SIGNIFICANT CHANGE UP
MONOCYTES # BLD AUTO: 0.24 K/UL — SIGNIFICANT CHANGE UP (ref 0–0.9)
MONOCYTES # BLD AUTO: 0.24 K/UL — SIGNIFICANT CHANGE UP (ref 0–0.9)
MONOCYTES NFR BLD AUTO: 4.2 % — SIGNIFICANT CHANGE UP (ref 2–14)
MONOCYTES NFR BLD AUTO: 4.2 % — SIGNIFICANT CHANGE UP (ref 2–14)
MRSA PCR RESULT.: SIGNIFICANT CHANGE UP
MRSA PCR RESULT.: SIGNIFICANT CHANGE UP
NEUTROPHILS # BLD AUTO: 4.95 K/UL — SIGNIFICANT CHANGE UP (ref 1.8–7.4)
NEUTROPHILS # BLD AUTO: 4.95 K/UL — SIGNIFICANT CHANGE UP (ref 1.8–7.4)
NEUTROPHILS NFR BLD AUTO: 86.5 % — HIGH (ref 43–77)
NEUTROPHILS NFR BLD AUTO: 86.5 % — HIGH (ref 43–77)
NRBC # BLD: 0 /100 WBCS — SIGNIFICANT CHANGE UP (ref 0–0)
NRBC # BLD: 0 /100 WBCS — SIGNIFICANT CHANGE UP (ref 0–0)
PHOSPHATE SERPL-MCNC: 2.7 MG/DL — SIGNIFICANT CHANGE UP (ref 2.5–4.5)
PHOSPHATE SERPL-MCNC: 2.7 MG/DL — SIGNIFICANT CHANGE UP (ref 2.5–4.5)
PLATELET # BLD AUTO: 188 K/UL — SIGNIFICANT CHANGE UP (ref 150–400)
PLATELET # BLD AUTO: 188 K/UL — SIGNIFICANT CHANGE UP (ref 150–400)
POTASSIUM SERPL-MCNC: 3.9 MMOL/L — SIGNIFICANT CHANGE UP (ref 3.5–5.3)
POTASSIUM SERPL-MCNC: 3.9 MMOL/L — SIGNIFICANT CHANGE UP (ref 3.5–5.3)
POTASSIUM SERPL-SCNC: 3.9 MMOL/L — SIGNIFICANT CHANGE UP (ref 3.5–5.3)
POTASSIUM SERPL-SCNC: 3.9 MMOL/L — SIGNIFICANT CHANGE UP (ref 3.5–5.3)
PROT SERPL-MCNC: 5.3 G/DL — LOW (ref 6–8.3)
PROT SERPL-MCNC: 5.3 G/DL — LOW (ref 6–8.3)
PROTHROM AB SERPL-ACNC: 48.1 SEC — HIGH (ref 9.5–13)
PROTHROM AB SERPL-ACNC: 48.1 SEC — HIGH (ref 9.5–13)
RBC # BLD: 3.71 M/UL — LOW (ref 4.2–5.8)
RBC # BLD: 3.71 M/UL — LOW (ref 4.2–5.8)
RBC # FLD: 14.6 % — HIGH (ref 10.3–14.5)
RBC # FLD: 14.6 % — HIGH (ref 10.3–14.5)
S AUREUS DNA NOSE QL NAA+PROBE: SIGNIFICANT CHANGE UP
S AUREUS DNA NOSE QL NAA+PROBE: SIGNIFICANT CHANGE UP
SODIUM SERPL-SCNC: 141 MMOL/L — SIGNIFICANT CHANGE UP (ref 135–145)
SODIUM SERPL-SCNC: 141 MMOL/L — SIGNIFICANT CHANGE UP (ref 135–145)
SPECIMEN SOURCE: SIGNIFICANT CHANGE UP
SPECIMEN SOURCE: SIGNIFICANT CHANGE UP
VIT B12 SERPL-MCNC: 307 PG/ML — SIGNIFICANT CHANGE UP (ref 232–1245)
VIT B12 SERPL-MCNC: 307 PG/ML — SIGNIFICANT CHANGE UP (ref 232–1245)
WBC # BLD: 5.72 K/UL — SIGNIFICANT CHANGE UP (ref 3.8–10.5)
WBC # BLD: 5.72 K/UL — SIGNIFICANT CHANGE UP (ref 3.8–10.5)
WBC # FLD AUTO: 5.72 K/UL — SIGNIFICANT CHANGE UP (ref 3.8–10.5)
WBC # FLD AUTO: 5.72 K/UL — SIGNIFICANT CHANGE UP (ref 3.8–10.5)

## 2023-11-25 RX ADMIN — Medication 100 MILLIGRAM(S): at 17:12

## 2023-11-25 RX ADMIN — PIPERACILLIN AND TAZOBACTAM 25 GRAM(S): 4; .5 INJECTION, POWDER, LYOPHILIZED, FOR SOLUTION INTRAVENOUS at 22:37

## 2023-11-25 RX ADMIN — PIPERACILLIN AND TAZOBACTAM 25 GRAM(S): 4; .5 INJECTION, POWDER, LYOPHILIZED, FOR SOLUTION INTRAVENOUS at 06:31

## 2023-11-25 RX ADMIN — PIPERACILLIN AND TAZOBACTAM 25 GRAM(S): 4; .5 INJECTION, POWDER, LYOPHILIZED, FOR SOLUTION INTRAVENOUS at 14:13

## 2023-11-25 RX ADMIN — PIPERACILLIN AND TAZOBACTAM 25 GRAM(S): 4; .5 INJECTION, POWDER, LYOPHILIZED, FOR SOLUTION INTRAVENOUS at 01:35

## 2023-11-25 RX ADMIN — FLUCONAZOLE 50 MILLIGRAM(S): 150 TABLET ORAL at 21:33

## 2023-11-25 RX ADMIN — FINASTERIDE 5 MILLIGRAM(S): 5 TABLET, FILM COATED ORAL at 17:11

## 2023-11-25 RX ADMIN — Medication 50 MILLIGRAM(S): at 17:19

## 2023-11-25 RX ADMIN — TAMSULOSIN HYDROCHLORIDE 0.4 MILLIGRAM(S): 0.4 CAPSULE ORAL at 21:35

## 2023-11-25 RX ADMIN — SIMVASTATIN 10 MILLIGRAM(S): 20 TABLET, FILM COATED ORAL at 21:34

## 2023-11-25 RX ADMIN — Medication 81 MILLIGRAM(S): at 17:11

## 2023-11-25 RX ADMIN — PANTOPRAZOLE SODIUM 40 MILLIGRAM(S): 20 TABLET, DELAYED RELEASE ORAL at 17:11

## 2023-11-25 RX ADMIN — Medication 250 MICROGRAM(S): at 17:11

## 2023-11-25 NOTE — SWALLOW BEDSIDE ASSESSMENT ADULT - SLP PERTINENT HISTORY OF CURRENT PROBLEM
89yo M w/ PMH of Afib on Coumadin, CHF, CVA, BPH, dementia (A&Ox0-1 at baseline) pw worsening confusion and altered mental status x1 day found to be septic likely i/s/o UTI w/ c/f renal abscess as well, pending urology eval. Pt w/ sepsis 2/2 UTI, fever and hypotension improved w/ IV abx and fluids. Chronic a-fib; CHADSVAC 5; cardiology following.

## 2023-11-25 NOTE — PHYSICAL THERAPY INITIAL EVALUATION ADULT - PERTINENT HX OF CURRENT PROBLEM, REHAB EVAL
89M w/ PMHx of Afib on Coumadin, CHF, CVA, BPH, dementia (A&Ox0-1 at baseline) p/w worsening confusion and AMS x1 day. CTH: No hydrocephalus, acute intracranial hemorrhage, mass effect, or brain   edema. dx with Sepsis d/t UTI  CTchest/Abd: Slightly decreased bilateral opacities in the lungs, suggesting resolving pneumonia. Similar urinary bladder wall thickening. New prominent right ureter with slight hyperenhancement, which raises the suspicion of ureteritis. Enlargement of 2.1 cm thick walled cystic lesion in the right kidney compared to one month ago, previously 1.1 cm. This would be suspicious   for a developing renal abscess in the setting of UTI.

## 2023-11-25 NOTE — SWALLOW BEDSIDE ASSESSMENT ADULT - MUCOSAL QUALITY
Unable to fully assess given poor oral aperture and command following, however, anterior oral cavity appears pink and moist

## 2023-11-25 NOTE — PHYSICAL THERAPY INITIAL EVALUATION ADULT - NSPTDISCHREC_GEN_A_CORE
To return home with aura. 24hrs HHA. Rec home PT to imrpove mobility, strength and balance. Pt has Rw and wheel chair at home. Rec. tub bench. Spoke to family. Dtr in agreement with d/c plan./Home PT

## 2023-11-25 NOTE — SWALLOW BEDSIDE ASSESSMENT ADULT - SWALLOW EVAL: DIAGNOSIS
Pt is a 89yoM w/ pmhx of CVA, dementia, p/w worsening confusion and AMS, found to be septic i/s/o UTI. Pt p/w oropharyngeal dysphagia. Oral Phase: reduced oral grading of tsp (initially biting), with trace loss on R labial side, prolonged oral transport. Pharyngeal Phase: delayed initiation of swallow, increasingly prolonged w/ mildly thick and thin liquids as compared to moderately thick liquids. No overt s/sx of aspiration/penetration with puree, moderately and mildly thick liquids. Delayed cough w/ tsp of thin liquids, suggestive of laryngeal penetration/aspiration. Will continue conservative management w/ monitoring at bedside.

## 2023-11-25 NOTE — PHYSICAL THERAPY INITIAL EVALUATION ADULT - ADDITIONAL COMMENTS
Pt's poor historian, As per the, family on the phone, Pt lives in an apt with his family with 24hrs HHA. flight of stairs inside, family/HHA provided assistance with all mobility and Adls. utilizes RW and w/c for mobility. limited household ambulator,

## 2023-11-25 NOTE — PROGRESS NOTE ADULT - PROBLEM SELECTOR PLAN 1
also associated with GNR bacteremia  continue piperacillin/tazobactam   awaiting blood cultures and urine culture  rot blood cultures tomorrow   if not clinically improving will consider ID eval

## 2023-11-25 NOTE — SWALLOW BEDSIDE ASSESSMENT ADULT - ORAL PREPARATORY PHASE
initially biting spoon; trace loss on R side (pt leaning toward R)/Reduced oral grading Reduced oral grading

## 2023-11-25 NOTE — SWALLOW BEDSIDE ASSESSMENT ADULT - SLP GENERAL OBSERVATIONS
Pt received upright at bedside, +4L NC, mildly sleepy, but able to maintain alertness for P.O. Aox0-1, did not follow any verbal directives, generally confused and not responding appropriately in English or Hong [place]er. [place]er language line  implemented (KASEY). Per prior bedside swallow exam, baseline liquids per daughter August 2023 was moderately thick liquids. Pt unable to give further report.

## 2023-11-25 NOTE — CHART NOTE - NSCHARTNOTEFT_GEN_A_CORE
Urine Microscopic-Add On (NC) (11.24.23 @ 13:07)   Bacteria: Many /HPF  Red Blood Cell - Urine: 50 /HPF  White Blood Cell - Urine: 224 /HPF  Epithelial Cells: 4 /HPF  Review: Reviewed  Cast: 18 /LPF  Yeast-like Cells: Presentulture - Blood (11.24.23 @ 12:22)   Gram Stain:   Growth in aerobic bottle: Gram Negative Rods  Specimen Source: .Blood Blood-Peripheral  Culture Results:   Growth in aerobic bottle: Gram Negative Rods   Direct identification is available within approximately 3-5   hours either by Blood Panel Multiplexed PCR or Direct   MALDI-TOF. Details: [email or web address]    Plan  c/w zosyn  c/w diflucan    Will endorse to primary day team. attending to follow    Eden Rodarte NP  [facility] [id number]

## 2023-11-25 NOTE — SWALLOW BEDSIDE ASSESSMENT ADULT - PHARYNGEAL PHASE
no overt s/sx of aspiration/penetration/Delayed pharyngeal swallow Delayed pharyngeal swallow/Delayed cough post oral intake slightly increasing delayed pharyngeal swallow trigger although no overt s/sx of aspiration/penetration/Delayed pharyngeal swallow

## 2023-11-25 NOTE — PHYSICAL THERAPY INITIAL EVALUATION ADULT - STRENGTHENING, PT EVAL
Pt will demonstrate 3+/5 strength in BLE/BUEs to improve limb stability during ADLs/mobility in 4 weeks

## 2023-11-25 NOTE — SWALLOW BEDSIDE ASSESSMENT ADULT - COMMENTS
DYSPHAGIA HX; 1x bedside swallow exam from 10/23/23 with recommendations for puree and moderately thick liquids.    CT A/P 11/24MPRESSION Slightly decreased bilateral opacities in the lungs, suggesting resolving   pneumonia.Similar urinary bladder wall thickening. New prominent right ureter with slight hyperenhancement, which raises the suspicion of ureteritis.Enlargement of 2.1 cm thick walled cystic lesion in the right kidney compared to one month ago, previously 1.1 cm. This would be suspicious for a developing renal abscess in the setting of UTI.

## 2023-11-25 NOTE — PROGRESS NOTE ADULT - ASSESSMENT
89yo M w/ PMH of Afib on Coumadin, CHF, CVA, BPH, dementia (A&Ox0-1 at baseline) pw worsening confusion and altered mental status x1 day found to be septic likely i/s/o UTI w/ c/f renal abscess as well, pending urology eval

## 2023-11-26 LAB
-  AMOXICILLIN/CLAVULANIC ACID: SIGNIFICANT CHANGE UP
-  AMOXICILLIN/CLAVULANIC ACID: SIGNIFICANT CHANGE UP
-  AMPICILLIN/SULBACTAM: SIGNIFICANT CHANGE UP
-  AMPICILLIN: SIGNIFICANT CHANGE UP
-  AZTREONAM: SIGNIFICANT CHANGE UP
-  CEFAZOLIN: SIGNIFICANT CHANGE UP
-  CEFEPIME: SIGNIFICANT CHANGE UP
-  CEFOXITIN: SIGNIFICANT CHANGE UP
-  CEFTRIAXONE: SIGNIFICANT CHANGE UP
-  CEFUROXIME: SIGNIFICANT CHANGE UP
-  CEFUROXIME: SIGNIFICANT CHANGE UP
-  CIPROFLOXACIN: SIGNIFICANT CHANGE UP
-  ERTAPENEM: SIGNIFICANT CHANGE UP
-  GENTAMICIN: SIGNIFICANT CHANGE UP
-  IMIPENEM: SIGNIFICANT CHANGE UP
-  LEVOFLOXACIN: SIGNIFICANT CHANGE UP
-  MEROPENEM: SIGNIFICANT CHANGE UP
-  NITROFURANTOIN: SIGNIFICANT CHANGE UP
-  NITROFURANTOIN: SIGNIFICANT CHANGE UP
-  PIPERACILLIN/TAZOBACTAM: SIGNIFICANT CHANGE UP
-  TOBRAMYCIN: SIGNIFICANT CHANGE UP
-  TRIMETHOPRIM/SULFAMETHOXAZOLE: SIGNIFICANT CHANGE UP
ALBUMIN SERPL ELPH-MCNC: 2.2 G/DL — LOW (ref 3.3–5)
ALBUMIN SERPL ELPH-MCNC: 2.2 G/DL — LOW (ref 3.3–5)
ALP SERPL-CCNC: 84 U/L — SIGNIFICANT CHANGE UP (ref 40–120)
ALP SERPL-CCNC: 84 U/L — SIGNIFICANT CHANGE UP (ref 40–120)
ALT FLD-CCNC: 22 U/L — SIGNIFICANT CHANGE UP (ref 10–45)
ALT FLD-CCNC: 22 U/L — SIGNIFICANT CHANGE UP (ref 10–45)
ANION GAP SERPL CALC-SCNC: 8 MMOL/L — SIGNIFICANT CHANGE UP (ref 5–17)
ANION GAP SERPL CALC-SCNC: 8 MMOL/L — SIGNIFICANT CHANGE UP (ref 5–17)
AST SERPL-CCNC: 23 U/L — SIGNIFICANT CHANGE UP (ref 10–40)
AST SERPL-CCNC: 23 U/L — SIGNIFICANT CHANGE UP (ref 10–40)
BASOPHILS # BLD AUTO: 0.01 K/UL — SIGNIFICANT CHANGE UP (ref 0–0.2)
BASOPHILS # BLD AUTO: 0.01 K/UL — SIGNIFICANT CHANGE UP (ref 0–0.2)
BASOPHILS NFR BLD AUTO: 0.2 % — SIGNIFICANT CHANGE UP (ref 0–2)
BASOPHILS NFR BLD AUTO: 0.2 % — SIGNIFICANT CHANGE UP (ref 0–2)
BILIRUB SERPL-MCNC: 1 MG/DL — SIGNIFICANT CHANGE UP (ref 0.2–1.2)
BILIRUB SERPL-MCNC: 1 MG/DL — SIGNIFICANT CHANGE UP (ref 0.2–1.2)
BUN SERPL-MCNC: 10 MG/DL — SIGNIFICANT CHANGE UP (ref 7–23)
BUN SERPL-MCNC: 10 MG/DL — SIGNIFICANT CHANGE UP (ref 7–23)
CALCIUM SERPL-MCNC: 8.3 MG/DL — LOW (ref 8.4–10.5)
CALCIUM SERPL-MCNC: 8.3 MG/DL — LOW (ref 8.4–10.5)
CHLORIDE SERPL-SCNC: 107 MMOL/L — SIGNIFICANT CHANGE UP (ref 96–108)
CHLORIDE SERPL-SCNC: 107 MMOL/L — SIGNIFICANT CHANGE UP (ref 96–108)
CO2 SERPL-SCNC: 27 MMOL/L — SIGNIFICANT CHANGE UP (ref 22–31)
CO2 SERPL-SCNC: 27 MMOL/L — SIGNIFICANT CHANGE UP (ref 22–31)
CREAT SERPL-MCNC: 0.8 MG/DL — SIGNIFICANT CHANGE UP (ref 0.5–1.3)
CREAT SERPL-MCNC: 0.8 MG/DL — SIGNIFICANT CHANGE UP (ref 0.5–1.3)
CULTURE RESULTS: ABNORMAL
EGFR: 84 ML/MIN/1.73M2 — SIGNIFICANT CHANGE UP
EGFR: 84 ML/MIN/1.73M2 — SIGNIFICANT CHANGE UP
EOSINOPHIL # BLD AUTO: 0 K/UL — SIGNIFICANT CHANGE UP (ref 0–0.5)
EOSINOPHIL # BLD AUTO: 0 K/UL — SIGNIFICANT CHANGE UP (ref 0–0.5)
EOSINOPHIL NFR BLD AUTO: 0 % — SIGNIFICANT CHANGE UP (ref 0–6)
EOSINOPHIL NFR BLD AUTO: 0 % — SIGNIFICANT CHANGE UP (ref 0–6)
GLUCOSE SERPL-MCNC: 116 MG/DL — HIGH (ref 70–99)
GLUCOSE SERPL-MCNC: 116 MG/DL — HIGH (ref 70–99)
HCT VFR BLD CALC: 39.3 % — SIGNIFICANT CHANGE UP (ref 39–50)
HCT VFR BLD CALC: 39.3 % — SIGNIFICANT CHANGE UP (ref 39–50)
HGB BLD-MCNC: 12.2 G/DL — LOW (ref 13–17)
HGB BLD-MCNC: 12.2 G/DL — LOW (ref 13–17)
IMM GRANULOCYTES NFR BLD AUTO: 1.2 % — HIGH (ref 0–0.9)
IMM GRANULOCYTES NFR BLD AUTO: 1.2 % — HIGH (ref 0–0.9)
INR BLD: 5.31 RATIO — CRITICAL HIGH (ref 0.85–1.18)
INR BLD: 5.31 RATIO — CRITICAL HIGH (ref 0.85–1.18)
LYMPHOCYTES # BLD AUTO: 0.59 K/UL — LOW (ref 1–3.3)
LYMPHOCYTES # BLD AUTO: 0.59 K/UL — LOW (ref 1–3.3)
LYMPHOCYTES # BLD AUTO: 13.9 % — SIGNIFICANT CHANGE UP (ref 13–44)
LYMPHOCYTES # BLD AUTO: 13.9 % — SIGNIFICANT CHANGE UP (ref 13–44)
MCHC RBC-ENTMCNC: 31 GM/DL — LOW (ref 32–36)
MCHC RBC-ENTMCNC: 31 GM/DL — LOW (ref 32–36)
MCHC RBC-ENTMCNC: 31.8 PG — SIGNIFICANT CHANGE UP (ref 27–34)
MCHC RBC-ENTMCNC: 31.8 PG — SIGNIFICANT CHANGE UP (ref 27–34)
MCV RBC AUTO: 102.3 FL — HIGH (ref 80–100)
MCV RBC AUTO: 102.3 FL — HIGH (ref 80–100)
METHOD TYPE: SIGNIFICANT CHANGE UP
MONOCYTES # BLD AUTO: 0.35 K/UL — SIGNIFICANT CHANGE UP (ref 0–0.9)
MONOCYTES # BLD AUTO: 0.35 K/UL — SIGNIFICANT CHANGE UP (ref 0–0.9)
MONOCYTES NFR BLD AUTO: 8.3 % — SIGNIFICANT CHANGE UP (ref 2–14)
MONOCYTES NFR BLD AUTO: 8.3 % — SIGNIFICANT CHANGE UP (ref 2–14)
NEUTROPHILS # BLD AUTO: 3.23 K/UL — SIGNIFICANT CHANGE UP (ref 1.8–7.4)
NEUTROPHILS # BLD AUTO: 3.23 K/UL — SIGNIFICANT CHANGE UP (ref 1.8–7.4)
NEUTROPHILS NFR BLD AUTO: 76.4 % — SIGNIFICANT CHANGE UP (ref 43–77)
NEUTROPHILS NFR BLD AUTO: 76.4 % — SIGNIFICANT CHANGE UP (ref 43–77)
NRBC # BLD: 0 /100 WBCS — SIGNIFICANT CHANGE UP (ref 0–0)
NRBC # BLD: 0 /100 WBCS — SIGNIFICANT CHANGE UP (ref 0–0)
ORGANISM # SPEC MICROSCOPIC CNT: ABNORMAL
PLATELET # BLD AUTO: 171 K/UL — SIGNIFICANT CHANGE UP (ref 150–400)
PLATELET # BLD AUTO: 171 K/UL — SIGNIFICANT CHANGE UP (ref 150–400)
POTASSIUM SERPL-MCNC: 3.7 MMOL/L — SIGNIFICANT CHANGE UP (ref 3.5–5.3)
POTASSIUM SERPL-MCNC: 3.7 MMOL/L — SIGNIFICANT CHANGE UP (ref 3.5–5.3)
POTASSIUM SERPL-SCNC: 3.7 MMOL/L — SIGNIFICANT CHANGE UP (ref 3.5–5.3)
POTASSIUM SERPL-SCNC: 3.7 MMOL/L — SIGNIFICANT CHANGE UP (ref 3.5–5.3)
PROT SERPL-MCNC: 5.2 G/DL — LOW (ref 6–8.3)
PROT SERPL-MCNC: 5.2 G/DL — LOW (ref 6–8.3)
PROTHROM AB SERPL-ACNC: 53.1 SEC — HIGH (ref 9.5–13)
PROTHROM AB SERPL-ACNC: 53.1 SEC — HIGH (ref 9.5–13)
RBC # BLD: 3.84 M/UL — LOW (ref 4.2–5.8)
RBC # BLD: 3.84 M/UL — LOW (ref 4.2–5.8)
RBC # FLD: 14.5 % — SIGNIFICANT CHANGE UP (ref 10.3–14.5)
RBC # FLD: 14.5 % — SIGNIFICANT CHANGE UP (ref 10.3–14.5)
SODIUM SERPL-SCNC: 142 MMOL/L — SIGNIFICANT CHANGE UP (ref 135–145)
SODIUM SERPL-SCNC: 142 MMOL/L — SIGNIFICANT CHANGE UP (ref 135–145)
SPECIMEN SOURCE: SIGNIFICANT CHANGE UP
WBC # BLD: 4.23 K/UL — SIGNIFICANT CHANGE UP (ref 3.8–10.5)
WBC # BLD: 4.23 K/UL — SIGNIFICANT CHANGE UP (ref 3.8–10.5)
WBC # FLD AUTO: 4.23 K/UL — SIGNIFICANT CHANGE UP (ref 3.8–10.5)
WBC # FLD AUTO: 4.23 K/UL — SIGNIFICANT CHANGE UP (ref 3.8–10.5)

## 2023-11-26 PROCEDURE — 99222 1ST HOSP IP/OBS MODERATE 55: CPT

## 2023-11-26 RX ORDER — PHYTONADIONE (VIT K1) 5 MG
2.5 TABLET ORAL ONCE
Refills: 0 | Status: COMPLETED | OUTPATIENT
Start: 2023-11-26 | End: 2023-11-26

## 2023-11-26 RX ORDER — CEFTRIAXONE 500 MG/1
1000 INJECTION, POWDER, FOR SOLUTION INTRAMUSCULAR; INTRAVENOUS EVERY 24 HOURS
Refills: 0 | Status: DISCONTINUED | OUTPATIENT
Start: 2023-11-26 | End: 2023-11-30

## 2023-11-26 RX ADMIN — TAMSULOSIN HYDROCHLORIDE 0.4 MILLIGRAM(S): 0.4 CAPSULE ORAL at 21:36

## 2023-11-26 RX ADMIN — Medication 81 MILLIGRAM(S): at 14:41

## 2023-11-26 RX ADMIN — Medication 100 MILLIGRAM(S): at 14:40

## 2023-11-26 RX ADMIN — Medication 50 MILLIGRAM(S): at 05:09

## 2023-11-26 RX ADMIN — Medication 250 MICROGRAM(S): at 05:10

## 2023-11-26 RX ADMIN — SIMVASTATIN 10 MILLIGRAM(S): 20 TABLET, FILM COATED ORAL at 21:36

## 2023-11-26 RX ADMIN — FINASTERIDE 5 MILLIGRAM(S): 5 TABLET, FILM COATED ORAL at 14:40

## 2023-11-26 RX ADMIN — CEFTRIAXONE 100 MILLIGRAM(S): 500 INJECTION, POWDER, FOR SOLUTION INTRAMUSCULAR; INTRAVENOUS at 14:45

## 2023-11-26 RX ADMIN — Medication 2.5 MILLIGRAM(S): at 18:35

## 2023-11-26 RX ADMIN — PANTOPRAZOLE SODIUM 40 MILLIGRAM(S): 20 TABLET, DELAYED RELEASE ORAL at 05:11

## 2023-11-26 RX ADMIN — PIPERACILLIN AND TAZOBACTAM 25 GRAM(S): 4; .5 INJECTION, POWDER, LYOPHILIZED, FOR SOLUTION INTRAVENOUS at 05:10

## 2023-11-26 RX ADMIN — Medication 50 MILLIGRAM(S): at 18:35

## 2023-11-26 NOTE — CONSULT NOTE ADULT - SUBJECTIVE AND OBJECTIVE BOX
Cardiovascular Disease Initial Evaluation  Date of service: 11-25-23 @ 08:43    CHIEF COMPLAINT:  UTI    HISTORY OF PRESENT ILLNESS:  89yo M w/ PMH of Afib on Coumadin, CHF, CVA, BPH, dementia (A&Ox0-1 at baseline) pw worsening confusion and altered mental status.  Pt unable to provide hx due to dementia and medical condition. Hx obtained from chart review.    Per records patient was SOB for which EMS assessed pt in the field and administered O2 w/ improvement in hypoxia and cyanosis and thus was not brought to hospital for assessment. The say after they noted similar symptoms for which EMS was called and pt presented to [facility]. Mental status wise, report baseline alert and conversational but has been diminished since recent admission for PNA.    Otherwise no reported F/C, N/V, CP, SOB, diarrhea.    In the ED septic w/ hypotension and elevated lactate w/ positive UA s/p Vanc/Zosyn and Fluconazole w/ >2L IVF w/ improvement in hypotension.      Allergies    No Known Allergies    Intolerances    	    MEDICATIONS:  aspirin enteric coated 81 milliGRAM(s) Oral daily  digoxin     Tablet 250 MICROGram(s) Oral daily  metoprolol tartrate 50 milliGRAM(s) Oral two times a day    fluconAZOLE IVPB 100 milliGRAM(s) IV Intermittent every 24 hours  piperacillin/tazobactam IVPB.. 3.375 Gram(s) IV Intermittent every 8 hours      acetaminophen     Tablet .. 650 milliGRAM(s) Oral every 6 hours PRN    pantoprazole    Tablet 40 milliGRAM(s) Oral before breakfast    finasteride 5 milliGRAM(s) Oral daily  simvastatin 10 milliGRAM(s) Oral at bedtime    pyridoxine 100 milliGRAM(s) Oral daily  tamsulosin 0.4 milliGRAM(s) Oral at bedtime      PAST MEDICAL & SURGICAL HISTORY:  CVA (cerebral vascular accident)      CHF (congestive heart failure)      Atrial fibrillation      BPH (benign prostatic hyperplasia)      Dementia          FAMILY HISTORY:      SOCIAL HISTORY:    The patient is a nonsmoker       REVIEW OF SYSTEMS:  See HPI, otherwise complete 14 point review of systems negative    [ x] All others negative	  [ ] Unable to obtain    PHYSICAL EXAM:  T(C): 36.4 (11-25-23 @ 05:13), Max: 38.3 (11-24-23 @ 12:29)  HR: 79 (11-25-23 @ 05:13) (55 - 92)  BP: 105/63 (11-25-23 @ 05:13) (86/52 - 120/81)  RR: 18 (11-25-23 @ 05:13) (15 - 20)  SpO2: 98% (11-25-23 @ 05:13) (97% - 100%)  Wt(kg): --  I&O's Summary      Appearance: No Acute Distress; resting comfortably  HEENT:  Normal oral mucosa, PERRL, EOMI	  Cardiovascular: Normal S1 S2, No JVD, No murmurs/rubs/gallops  Respiratory: Normal respiratory effort; Lungs clear to auscultation bilaterally  Gastrointestinal:  Soft, Non-tender, + BS	  Skin: No rashes, No ecchymoses, No cyanosis	  Neurologic: Non-focal; no weakness  Extremities: No clubbing, cyanosis or edema  Vascular: Peripheral pulses palpable 2+ bilaterally  Psychiatry: A & O x 3, Mood & affect appropriate    Laboratory Data:	 	    CBC Full  -  ( 24 Nov 2023 12:35 )  WBC Count : 5.27 K/uL  Hemoglobin : 12.9 g/dL  Hematocrit : 41.1 %  Platelet Count - Automated : 181 K/uL  Mean Cell Volume : 103.5 fl  Mean Cell Hemoglobin : 32.5 pg  Mean Cell Hemoglobin Concentration : 31.4 gm/dL  Auto Neutrophil # : 4.95 K/uL  Auto Lymphocyte # : 0.18 K/uL  Auto Monocyte # : 0.09 K/uL  Auto Eosinophil # : 0.00 K/uL  Auto Basophil # : 0.00 K/uL  Auto Neutrophil % : 88.7 %  Auto Lymphocyte % : 3.5 %  Auto Monocyte % : 1.7 %  Auto Eosinophil % : 0.0 %  Auto Basophil % : 0.0 %    11-24    139  |  107  |  14  ----------------------------<  138<H>  4.1   |  26  |  0.89  11-24    143  |  107  |  15  ----------------------------<  179<H>  6.1<H>   |  22  |  0.91    Ca    7.8<L>      24 Nov 2023 16:30  Ca    8.4      24 Nov 2023 12:35    TPro  4.7<L>  /  Alb  1.8<L>  /  TBili  0.9  /  DBili  x   /  AST  32  /  ALT  26  /  AlkPhos  84  11-24  TPro  5.9<L>  /  Alb  2.4<L>  /  TBili  1.2  /  DBili  x   /  AST  110<H>  /  ALT  38  /  AlkPhos  95  11-24      proBNP:   Lipid Profile:   HgA1c:   TSH:       CARDIAC MARKERS:            Interpretation of Telemetry: N/a	    ECG:  Not in chart  RADIOLOGY:  OTHER: 	    PREVIOUS DIAGNOSTIC TESTING:    [ ] Echocardiogram:  [ ] Catheterization:  [ ] Stress Test:  	    Assessment:  89yo M w/ PMH of Afib on Coumadin, CHF, CVA, BPH, dementia (A&Ox0-1 at baseline) pw worsening confusion and altered mental status.      Plan of Care:    #Afib  - Rate controlled on Digoxin and BB  - Pt on Coumadin with INR goal of 2-3  - AC on hold for supratherapeutic INR, Please resume once INR <3  - Continue current management.     #Sepsis  - 2/2 UTI  - Abx as per primary team    #HLD  - Statin as ordered          72 minutes spent on total encounter; more than 50% of the visit was spent counseling and/or coordinating care by the attending physician.   	  Orestes Hart DO [facility]  Cardiovascular Diseases  [phone]    
HPI  89-year-old male with past medical history of A-fib on warfarin, CHF, CVA, dementia presents the ED complaining of worsening confusion and altered mental status since yesterday.  Patient had a gas leak in his apartment approximately 10 PM where EMS was called as patient was having bilateral upper extremity tremor and was hypoxic with cyanosis and received oxygen from EMS was not brought to the hospital.  Patient then had similar symptoms this morning and EMS was called and brought here.  Patient's baseline is alert but conversational but has been diminished since recent admission for multifocal pneumonia and UTI approximate 1 month ago.  Patient is currently not able to participate in physical exam or interview.  History is obtained by both daughters who are bedside and patient only speaks Malagasy.  Denies fever, nausea, vomiting, chest pain, shortness of breath, abdominal pain, diarrhea. Patient was hypotensive in the field in triage and brought directly back to the trauma room.    Urology consulted for findings of possible renal abscess. CT showing right ureteral enhancement as well as a 2.1cm thick walled lesion in the right kidney, previously 1.1cm one month ago suspicious for renal abscess in the setting of a UTI. Patient has had this cyst since 2021 and it has been stable in size up to today. WBC 5.27  / Hct 41.1  / SCr 0.89. Lactate on admission 6.3 now 3.8. Febrile to 101 in ED. UA positive and with yeast. Urine and blood cx pending. Given a dose of vanc/zosyn in ED. Patient on warfarin and ASA for afib.     PAST MEDICAL & SURGICAL HISTORY:  CVA (cerebral vascular accident)      CHF (congestive heart failure)      Atrial fibrillation      BPH (benign prostatic hyperplasia)      Dementia          MEDICATIONS  (STANDING):  lactated ringers Bolus 250 milliLiter(s) IV Bolus once    MEDICATIONS  (PRN):      FAMILY HISTORY:      Allergies    No Known Allergies    Intolerances        SOCIAL HISTORY:    REVIEW OF SYSTEMS: Otherwise negative as stated in HPI    Physical Exam  Vital signs  T(C): 36.5 (11-24-23 @ 17:16), Max: 38.3 (11-24-23 @ 12:29)  HR: 55 (11-24-23 @ 17:51)  BP: 115/52 (11-24-23 @ 17:51)  SpO2: 100% (11-24-23 @ 17:51)  Wt(kg): --    Output      Gen: NAD  Pulm: No respiratory distress	  CV: RRR  GI: S/ND/NT  :   MSK: moves all 4 limbs spontaneously    LABS:      11-24 @ 16:30    WBC --    / Hct --    / SCr 0.89     11-24 @ 12:35    WBC 5.27  / Hct 41.1  / SCr 0.91     11-24    139  |  107  |  14  ----------------------------<  138<H>  4.1   |  26  |  0.89    Ca    7.8<L>      24 Nov 2023 16:30    TPro  4.7<L>  /  Alb  1.8<L>  /  TBili  0.9  /  DBili  x   /  AST  32  /  ALT  26  /  AlkPhos  84  11-24    PT/INR - ( 24 Nov 2023 12:35 )   PT: 37.8 sec;   INR: 3.74 ratio         PTT - ( 24 Nov 2023 12:35 )  PTT:37.3 sec  Urinalysis Basic - ( 24 Nov 2023 16:30 )    Color: x / Appearance: x / SG: x / pH: x  Gluc: 138 mg/dL / Ketone: x  / Bili: x / Urobili: x   Blood: x / Protein: x / Nitrite: x   Leuk Esterase: x / RBC: x / WBC x   Sq Epi: x / Non Sq Epi: x / Bacteria: x            Urine Cx: pending    Blood Cx: pending    RADIOLOGY:  < from: CT Abdomen and Pelvis w/ IV Cont (11.24.23 @ 17:20) >    ACC: [id number] EXAM:  CT CHEST IC   ORDERED BY: NEVILLE COLLADO     ACC: [id number] EXAM:  CT ABDOMEN AND PELVIS IC   ORDERED BY: NEVILLE COLLADO     PROCEDURE DATE:  11/24/2023          INTERPRETATION:  CLINICAL INFORMATION: Sepsis    COMPARISON: CT abdomen pelvis 1/5/2023, CT chest abdomen pelvis 10/22/2023    CONTRAST/COMPLICATIONS:  IV Contrast: Omnipaque 350  90 cc administered   10 cc discarded  Oral Contrast: NONE  Complications: None reported at time of study completion    PROCEDURE:  CT of the Chest, Abdomen and Pelvis was performed.  Sagittal and coronal reformats were performed.    FINDINGS:  CHEST:  LUNGS AND LARGE AIRWAYS: No central endobronchial lesions. Slightly   decreased bilateral opacities compared to 10/22/2023. Calcified granuloma   in the right middle lobe.  PLEURA: Small right pleural effusion, decreased from prior. Trace left   pleural effusion.  VESSELS: Atherosclerotic changes of the aorta and coronary arteries.  HEART: Similar cardiomegaly with left atrial enlargement. No pericardial   effusion.  MEDIASTINUM AND MADHAVI: Slightly decreased size of prominent mediastinal   lymph nodes.  CHEST WALL AND LOWER NECK: Within normal limits.    ABDOMEN AND PELVIS:  LIVER: Within normal limits.  BILE DUCTS: Normal caliber.  GALLBLADDER: Within normal limits.  SPLEEN: Within normal limits.  PANCREAS: Within normal limits.  ADRENALS: Mild thickening.  KIDNEYS/URETERS: No hydronephrosis. Bilateral renal cyst. Slightly   prominent right ureter, with slight hyperenhancement. Enlargement of 2.1   cm thick walled cystic lesion in the right kidney compared to one month   ago, previously 1.1 cm.    BLADDER: Thickened wall. Small air, which may be due to recent   instrumentation.  REPRODUCTIVE ORGANS: Prostatomegaly, with intravesical prostatic   protrusion.    BOWEL: No bowel obstruction. Appendix is normal.  PERITONEUM: No ascites.  VESSELS: Within normal limits.  RETROPERITONEUM/LYMPH NODES: Similar 2.9 x 2.0 cm soft tissue density   focus adjacent to the external iliacartery (4-165), likely postsurgical   changes from right inguinal hernia repair.  ABDOMINAL WALL: Postsurgical changes.  BONES: Degenerative changes. Scoliosis.    IMPRESSION:    Slightly decreased bilateral opacities in the lungs, suggesting resolving   pneumonia.  Similar urinary bladder wall thickening. New prominent right ureter with   slight hyperenhancement, which raises the suspicion of ureteritis.  Enlargement of 2.1 cm thick walled cystic lesion in the right kidney   compared to one month ago, previously 1.1 cm. This would be suspicious   for a developing renal abscess in the setting of UTI.    < end of copied text >      
"HPI:  Pt is a 89yo M w/ PMH of Afib on Coumadin, CHF, CVA, BPH, dementia (A&Ox0-1 at baseline) pw worsening confusion and altered mental status x1 day.    Pt unable to provide hx due to dementia and medical condition. Hx obtained from chart review as unable to reach daughters over the telephone on multiple attempts.     Per daughter, pt w/ gas leak yesterday for which EMS assessed pt in the field and administered O2 w/ improvement in hypoxia and cyanosis and thus was not brought to hospital for assessment. This morning they noted similar symptoms for which EMS was called and pt presented [facility]. Mental status wise, report baseline alert and conversational but has been diminished since recent admission for PNA.    Otherwise no reported F/C, N/V, CP, SOB, diarrhea.    In the ED septic w/ hypotension and elevated lactate w/ positive UA s/p Vanc/Zosyn and Fluconazole w/ >2L IVF w/ improvement in hypotension.   (24 Nov 2023 21:24)"    Above reviewed. 89 yo M A Fib, CHF, dementia with AMS  Here, patient difficult to wake up, and lethargic, due to baseline mental status?  Not able to provide ROS/HPI  History as above  Here found to have E coli bacteremia, possible due to UTI  ID called for further eval    PAST MEDICAL & SURGICAL HISTORY:  CVA (cerebral vascular accident)      CHF (congestive heart failure)      Atrial fibrillation      BPH (benign prostatic hyperplasia)      Dementia    Allergies    No Known Allergies    Intolerances    ANTIMICROBIALS:  fluconAZOLE IVPB 100 every 24 hours  piperacillin/tazobactam IVPB.. 3.375 every 8 hours    OTHER MEDS:  acetaminophen     Tablet .. 650 milliGRAM(s) Oral every 6 hours PRN  aspirin enteric coated 81 milliGRAM(s) Oral daily  digoxin     Tablet 250 MICROGram(s) Oral daily  finasteride 5 milliGRAM(s) Oral daily  metoprolol tartrate 50 milliGRAM(s) Oral two times a day  pantoprazole    Tablet 40 milliGRAM(s) Oral before breakfast  pyridoxine 100 milliGRAM(s) Oral daily  simvastatin 10 milliGRAM(s) Oral at bedtime  tamsulosin 0.4 milliGRAM(s) Oral at bedtime    SOCIAL HISTORY: Not able to provide due to dementia/AMS    FAMILY HISTORY: Not able to provide due to dementia/AMS    Drug Dosing Weight  Height (cm): 160 (10 Sep 2022 12:26)  Weight (kg): 72.6 (24 Nov 2023 11:33)  BMI (kg/m2): 28.4 (24 Nov 2023 11:33)  BSA (m2): 1.76 (24 Nov 2023 11:33)    PE:    Vital Signs Last 24 Hrs  T(C): 36.5 (26 Nov 2023 05:02), Max: 36.7 (25 Nov 2023 20:55)  T(F): 97.7 (26 Nov 2023 05:02), Max: 98.1 (25 Nov 2023 20:55)  HR: 82 (26 Nov 2023 05:02) (82 - 92)  BP: 159/81 (26 Nov 2023 05:02) (113/75 - 159/81)  RR: 18 (26 Nov 2023 05:02) (18 - 20)  SpO2: 98% (26 Nov 2023 05:02) (97% - 100%)      Gen: AOx0, poorly interactive  CV: S1+S2 normal, nontachycardic  Resp: Clear bilat, no resp distress, no crackles/wheezes  Abd: Soft, nontender, +BS  Ext: No LE edema, no wounds  : No [place]  IV/Skin: No thrombophlebitis  Msk: No low back pain, no arthralgias, no joint swelling  Neuro: No sensory deficits, no motor deficits    LABS:                        12.2   4.23  )-----------( 171      ( 26 Nov 2023 07:28 )             39.3     11-26    142  |  107  |  10  ----------------------------<  116<H>  3.7   |  27  |  0.80    Ca    8.3<L>      26 Nov 2023 07:28  Phos  2.7     11-25  Mg     1.9     11-25    TPro  5.2<L>  /  Alb  2.2<L>  /  TBili  1.0  /  DBili  x   /  AST  23  /  ALT  22  /  AlkPhos  84  11-26    Urinalysis Basic - ( 26 Nov 2023 07:28 )    Color: x / Appearance: x / SG: x / pH: x  Gluc: 116 mg/dL / Ketone: x  / Bili: x / Urobili: x   Blood: x / Protein: x / Nitrite: x   Leuk Esterase: x / RBC: x / WBC x   Sq Epi: x / Non Sq Epi: x / Bacteria: x    MICROBIOLOGY:    Clean Catch Clean Catch (Midstream)  11-24-23   >100,000 CFU/ml Escherichia coli  --  --    .Blood Blood-Peripheral  11-24-23   Growth in aerobic bottle: Escherichia coli  Direct identification is available within approximately 3-5  hours either by Blood Panel Multiplexed PCR or Direct  MALDI-TOF. Details: [email or web address]  --  Blood Culture PCR    .Blood Blood-Peripheral  11-24-23   No growth at 24 hours  --  --    RADIOLOGY:    11/24 CT:    IMPRESSION:    Slightly decreased bilateral opacities in the lungs, suggesting resolving   pneumonia.  Similar urinary bladder wall thickening. New prominent right ureter with   slight hyperenhancement, which raises the suspicion of ureteritis.  Enlargement of 2.1 cm thick walled cystic lesion in the right kidney   compared to one month ago, previously 1.1 cm. This would be suspicious   for a developing renal abscess in the setting of UTI.

## 2023-11-26 NOTE — DIETITIAN INITIAL EVALUATION ADULT - OTHER CALCULATIONS
Needs based on RD obtained wt 54 kG (11/26) with consideration for age. Fluid needs deferred to provider.

## 2023-11-26 NOTE — DIETITIAN INITIAL EVALUATION ADULT - ORAL NUTRITION SUPPLEMENTS
Add Ensure High Plus x1 daily Add Ensure High Plus x1 daily. Consistency deferred to SLP and provider.

## 2023-11-26 NOTE — CONSULT NOTE ADULT - ASSESSMENT
89 yo M A Fib, CHF, dementia with AMS  Fever, no leukocytosis  Initially with AMS  Suspected UTI  CT A/P with resolving pneumonia, bladder thickening, ureteritis?; renal lesion--abscess?  BCX E coli S pending  UA+, UCX E coli  Aware yeast like cells on UA, doubt significance  Overall, Fever, E coli bacteremia, UTI, renal abscess  - Ceftriaxone 1g q 24  - F/U pending BCX  - Check renal USG to better evaluate renal lesion  - DC Fluconazole, Zosyn  - F/U urology  - Monitor for further fevers    Claudy Godoy MD  Contact on TEAMS messaging from 9am - 5pm  From 5pm-9am, on weekends, or if no response call [phone]

## 2023-11-26 NOTE — DIETITIAN INITIAL EVALUATION ADULT - OTHER INFO
Wt Hx:   Dosing wt 72.6 kG/160 lbs. RD obtained wt: 54 kG (119 lbs). Dosing wt likely inaccurate   UBW unknown.    Ht per HIE: 61 inches    IBW: 112 lbs    IBW%: 106% (based on RD obtained wt 119 lbs)  Wt Hx per HIE (lbs): 145 (4/20/22), 145 (8/1/23), 120 (10/20/23).   Wt from 8/1/23 vs RD obtained wt indicates 18% wt loss x~4 months (However, ?accuracy of HIE wts). RD will continue to trend as new wts available/able.     Nutrition-Related Concerns:   - Sepsis due to urinary tract infection  - Continues on Vitamin B6  - Seen by SLP 11/25 - see note for full assessment.

## 2023-11-26 NOTE — PROGRESS NOTE ADULT - ASSESSMENT
_________________________________________________________________________________________  ========>>  M E D I C A L   A T T E N D I N G    F O L L O W  U P  N O T E  <<=========  -----------------------------------------------------------------------------------------------------    - Patient seen and examined by me earlier today. (covering today)   - In summary,  KACI MERAZ is a 89y year old man admitted with UTI, Bacteremia   - Patient today overall doing ok, comfortable    ==================>> REVIEW OF SYSTEM <<=================    limited ROS, as patient poor historian and sleepy.. dementia      appears comfortable and calm in no distress     ==================>> PHYSICAL EXAM <<=================    GEN: responsive, sleepy  , NAD , comfortable, pleasant, calm in bed, in fetal position   HEENT: NCAT, MMM, hearing intact  CVS: S1S2 , regular , No M/R/G appreciated  PULM: + mild rhinchi,, limited exam as not taking deep breaths   ABD.: soft. non tender, non distended,  bowel sounds present  Extrem: intact pulses , no edema          ( Note written / Date of service 11-26-23 ( This is certified to be the same as "ENTERED" date above ( for billing purposes)))    ==================>> MEDICATIONS <<====================    MEDICATIONS  (STANDING):  aspirin enteric coated 81 milliGRAM(s) Oral daily  cefTRIAXone   IVPB 1000 milliGRAM(s) IV Intermittent every 24 hours  digoxin     Tablet 250 MICROGram(s) Oral daily  finasteride 5 milliGRAM(s) Oral daily  metoprolol tartrate 50 milliGRAM(s) Oral two times a day  pantoprazole    Tablet 40 milliGRAM(s) Oral before breakfast  pyridoxine 100 milliGRAM(s) Oral daily  simvastatin 10 milliGRAM(s) Oral at bedtime  tamsulosin 0.4 milliGRAM(s) Oral at bedtime    MEDICATIONS  (PRN):  acetaminophen     Tablet .. 650 milliGRAM(s) Oral every 6 hours PRN Temp greater or equal to 38C (100.4F), Mild Pain (1 - 3)    ___________  Active diet:  Diet, Pureed:   Moderately Thick Liquids (MODTHICKLIQS)  ___________________    ==================>> VITAL SIGNS <<==================    T(C): 36.5 (11-26-23 @ 05:02), Max: 36.7 (11-25-23 @ 20:55)  HR: 82 (11-26-23 @ 05:02) (82 - 92)  BP: 159/81 (11-26-23 @ 05:02) (113/75 - 159/81)  RR: 18 (11-26-23 @ 05:02) (18 - 20)  SpO2: 98% (11-26-23 @ 05:02) (97% - 98%)     I&O's Summary    25 Nov 2023 07:01  -  26 Nov 2023 07:00  --------------------------------------------------------  IN: 250 mL / OUT: 0 mL / NET: 250 mL    26 Nov 2023 07:01  -  26 Nov 2023 13:44  --------------------------------------------------------  IN: 60 mL / OUT: 0 mL / NET: 60 mL       ==================>> LAB AND IMAGING <<==================                        12.2   4.23  )-----------( 171      ( 26 Nov 2023 07:28 )             39.3        11-26    142  |  107  |  10  ----------------------------<  116<H>  3.7   |  27  |  0.80    Ca    8.3<L>      26 Nov 2023 07:28  Phos  2.7     11-25  Mg     1.9     11-25    TPro  5.2<L>  /  Alb  2.2<L>  /  TBili  1.0  /  DBili  x   /  AST  23  /  ALT  22  /  AlkPhos  84  11-26    PT/INR - ( 26 Nov 2023 07:28 )   PT: 53.1 sec;   INR: 5.31 ratio    PTT - ( 25 Nov 2023 10:54 )  PTT:40.8 sec               Urinalysis:  11-24-23 @ 13:07  Leuk. Est.: Large  Nirite: Positive  WBC: 224  Blood: Large     salt Crystal: --     calcium crystal: --     Bili: Small     cast: 18  color: Dark Yellow  Bacteria: Many  Epith. cell: 4  Yeast: Present     Ketone: Negative     Protein: 30     Glucose: Negative     sperm: --     Spec.Gravity: 1.018    TSH:      2.14   (10-20-23)           Culture - Urine (collected 24 Nov 2023 13:07)  Source: Clean Catch Clean Catch (Midstream)  Preliminary Report (25 Nov 2023 16:49):    >100,000 CFU/ml Escherichia coli    Culture - Blood (collected 24 Nov 2023 12:22)  Source: .Blood Blood-Peripheral  Gram Stain (25 Nov 2023 06:35):    Growth in aerobic bottle: Gram Negative Rods  Final Report (26 Nov 2023 12:33):    Growth in aerobic bottle: Escherichia coli    Direct identification is available within approximately 3-5    hours either by Blood Panel Multiplexed PCR or Direct    MALDI-TOF. Details: [email or web address]  Organism: Blood Culture PCR  Escherichia coli (26 Nov 2023 12:33)  Organism: Escherichia coli (26 Nov 2023 12:33)    Sensitivities:      Method Type: DANN      -  Ampicillin: S <=8 These ampicillin results predict results for amoxicillin      -  Ampicillin/Sulbactam: S <=4/2      -  Aztreonam: S <=4      -  Cefazolin: S <=2      -  Cefepime: S <=2      -  Cefoxitin: S <=8      -  Ceftriaxone: S <=1      -  Ciprofloxacin: S <=0.25      -  Ertapenem: S <=0.5      -  Gentamicin: S <=2      -  Imipenem: S <=1      -  Levofloxacin: S <=0.5      -  Meropenem: S <=1      -  Piperacillin/Tazobactam: S <=8      -  Tobramycin: S <=2      -  Trimethoprim/Sulfamethoxazole: S <=0.5/9.5  Organism: Blood Culture PCR (26 Nov 2023 12:33)    Sensitivities:      Method Type: PCR      -  Escherichia coli: Detec    Culture - Blood (collected 24 Nov 2023 12:12)  Source: .Blood Blood-Peripheral  Preliminary Report (25 Nov 2023 19:02):    No growth at 24 hours    ___________________________________________________________________________________  ===============>>  A S S E S S M E N T   A N D   P L A N <<===============  ------------------------------------------------------------------------------------------    · Assessment	  89yo M w/ PMH of Afib on Coumadin, CHF, CVA, BPH, dementia (A&Ox0-1 at baseline) pw worsening confusion and altered mental status x1 day found to be septic likely i/s/o UTI w/ c/f renal abscess as well, pending urology eval    Problem/Plan - 1:  ·  Problem: Sepsis due to urinary tract infection, gram negative bacteremia    continue antibiotics per ID   follow blood cultures / sensitivities..  >> repeat bcx as per  ID recom..     Problem/Plan - 2:  ·  Problem: Chronic atrial fibrillation.   ·  Plan: - CHADSVASc: 5  hold warfarin today secondary to supra therapeutic INR     I N R :  5.31  <<==, 4.57  <<==, 3.74  <<==  heart rate controlled.  cardio on board    Problem/Plan - 3:  ·  Problem: Chronic CHF (congestive heart failure).   ·  Plan: - Unclear baseline EF as no TTE in system  - Currently w/o s/s of acute exacerbation  - cardio following, appreciated     Problem/Plan - 4:  ·  Problem: History of CVA (cerebrovascular accident).   ·  Plan: continue ASA 81mg qd.    Problem/Plan - 5:  ·  Problem: BPH (benign prostatic hyperplasia).   ·  Plan: - c/w home medications.    Problem/Plan - 6:  ·  Problem: Alzheimer's dementia.   ·  Plan: - Frequent reorientation  - Family at bedside when feasible.    Problem/Plan - 7:  ·  Problem: Prophylactic measure.   ·  Plan: DVT ppx: Holding Coumadin i/s/o supratherapeutic INR  Diet: Pending dysphagia screen  Dispo: pending clinical improvement.  discussions of goals of care with family ..    ___________________________  HFlynn Sung D.O. (covering today)   Pager: [phone]     _________________________________________________________________________________________  ========>>  M E D I C A L   A T T E N D I N G    F O L L O W  U P  N O T E  <<=========  -----------------------------------------------------------------------------------------------------    - Patient seen and examined by me earlier today. (covering today)   - In summary,  KACI MERAZ is a 89y year old man admitted with UTI, Bacteremia   - Patient today overall doing ok, comfortable    ==================>> REVIEW OF SYSTEM <<=================    limited ROS, as patient poor historian and sleepy.. dementia      appears comfortable and calm in no distress     ==================>> PHYSICAL EXAM <<=================    GEN: responsive, sleepy  , NAD , comfortable, pleasant, calm in bed, in fetal position   HEENT: NCAT, MMM, hearing intact  CVS: S1S2 , regular , No M/R/G appreciated  PULM: + mild rhinchi,, limited exam as not taking deep breaths   ABD.: soft. non tender, non distended,  bowel sounds present  Extrem: intact pulses , no edema          ( Note written / Date of service 11-26-23 ( This is certified to be the same as "ENTERED" date above ( for billing purposes)))    ==================>> MEDICATIONS <<====================    MEDICATIONS  (STANDING):  aspirin enteric coated 81 milliGRAM(s) Oral daily  cefTRIAXone   IVPB 1000 milliGRAM(s) IV Intermittent every 24 hours  digoxin     Tablet 250 MICROGram(s) Oral daily  finasteride 5 milliGRAM(s) Oral daily  metoprolol tartrate 50 milliGRAM(s) Oral two times a day  pantoprazole    Tablet 40 milliGRAM(s) Oral before breakfast  pyridoxine 100 milliGRAM(s) Oral daily  simvastatin 10 milliGRAM(s) Oral at bedtime  tamsulosin 0.4 milliGRAM(s) Oral at bedtime    MEDICATIONS  (PRN):  acetaminophen     Tablet .. 650 milliGRAM(s) Oral every 6 hours PRN Temp greater or equal to 38C (100.4F), Mild Pain (1 - 3)    ___________  Active diet:  Diet, Pureed:   Moderately Thick Liquids (MODTHICKLIQS)  ___________________    ==================>> VITAL SIGNS <<==================    T(C): 36.5 (11-26-23 @ 05:02), Max: 36.7 (11-25-23 @ 20:55)  HR: 82 (11-26-23 @ 05:02) (82 - 92)  BP: 159/81 (11-26-23 @ 05:02) (113/75 - 159/81)  RR: 18 (11-26-23 @ 05:02) (18 - 20)  SpO2: 98% (11-26-23 @ 05:02) (97% - 98%)     I&O's Summary    25 Nov 2023 07:01  -  26 Nov 2023 07:00  --------------------------------------------------------  IN: 250 mL / OUT: 0 mL / NET: 250 mL    26 Nov 2023 07:01  -  26 Nov 2023 13:44  --------------------------------------------------------  IN: 60 mL / OUT: 0 mL / NET: 60 mL       ==================>> LAB AND IMAGING <<==================                        12.2   4.23  )-----------( 171      ( 26 Nov 2023 07:28 )             39.3        11-26    142  |  107  |  10  ----------------------------<  116<H>  3.7   |  27  |  0.80    Ca    8.3<L>      26 Nov 2023 07:28  Phos  2.7     11-25  Mg     1.9     11-25    TPro  5.2<L>  /  Alb  2.2<L>  /  TBili  1.0  /  DBili  x   /  AST  23  /  ALT  22  /  AlkPhos  84  11-26    PT/INR - ( 26 Nov 2023 07:28 )   PT: 53.1 sec;   INR: 5.31 ratio    PTT - ( 25 Nov 2023 10:54 )  PTT:40.8 sec               Urinalysis:  11-24-23 @ 13:07  Leuk. Est.: Large  Nirite: Positive  WBC: 224  Blood: Large     salt Crystal: --     calcium crystal: --     Bili: Small     cast: 18  color: Dark Yellow  Bacteria: Many  Epith. cell: 4  Yeast: Present     Ketone: Negative     Protein: 30     Glucose: Negative     sperm: --     Spec.Gravity: 1.018    TSH:      2.14   (10-20-23)           Culture - Urine (collected 24 Nov 2023 13:07)  Source: Clean Catch Clean Catch (Midstream)  Preliminary Report (25 Nov 2023 16:49):    >100,000 CFU/ml Escherichia coli    Culture - Blood (collected 24 Nov 2023 12:22)  Source: .Blood Blood-Peripheral  Gram Stain (25 Nov 2023 06:35):    Growth in aerobic bottle: Gram Negative Rods  Final Report (26 Nov 2023 12:33):    Growth in aerobic bottle: Escherichia coli    Direct identification is available within approximately 3-5    hours either by Blood Panel Multiplexed PCR or Direct    MALDI-TOF. Details: [email or web address]  Organism: Blood Culture PCR  Escherichia coli (26 Nov 2023 12:33)  Organism: Escherichia coli (26 Nov 2023 12:33)    Sensitivities:      Method Type: DANN      -  Ampicillin: S <=8 These ampicillin results predict results for amoxicillin      -  Ampicillin/Sulbactam: S <=4/2      -  Aztreonam: S <=4      -  Cefazolin: S <=2      -  Cefepime: S <=2      -  Cefoxitin: S <=8      -  Ceftriaxone: S <=1      -  Ciprofloxacin: S <=0.25      -  Ertapenem: S <=0.5      -  Gentamicin: S <=2      -  Imipenem: S <=1      -  Levofloxacin: S <=0.5      -  Meropenem: S <=1      -  Piperacillin/Tazobactam: S <=8      -  Tobramycin: S <=2      -  Trimethoprim/Sulfamethoxazole: S <=0.5/9.5  Organism: Blood Culture PCR (26 Nov 2023 12:33)    Sensitivities:      Method Type: PCR      -  Escherichia coli: Detec    Culture - Blood (collected 24 Nov 2023 12:12)  Source: .Blood Blood-Peripheral  Preliminary Report (25 Nov 2023 19:02):    No growth at 24 hours      < from: CT Chest w/ IV Cont (11.24.23 @ 17:19) >  IMPRESSION:    Slightly decreased bilateral opacities in the lungs, suggesting resolving   pneumonia.  Similar urinary bladder wall thickening. New prominent right ureter with   slight hyperenhancement, which raises the suspicion of ureteritis.  Enlargement of 2.1 cm thick walled cystic lesion in the right kidney   compared to one month ago, previously 1.1 cm. This would be suspicious   for a developing renal abscess in the setting of UTI.  < end of copied text >  ___________________________________________________________________________________  ===============>>  A S S E S S M E N T   A N D   P L A N <<===============  ------------------------------------------------------------------------------------------    · Assessment	  89yo M w/ PMH of Afib on Coumadin, CHF, CVA, BPH, dementia (A&Ox0-1 at baseline) pw worsening confusion and altered mental status x1 day found to be septic likely i/s/o UTI w/ c/f renal abscess as well, pending urology eval    Problem/Plan - 1:  ·  Problem: Sepsis due to urinary tract infection, gram negative bacteremia    continue antibiotics per ID   follow blood cultures / sensitivities..  >> repeat bcx as per  ID recom..   >>  consultation for concerns about recurrent UTIS, enlarged / abnormal prostate on CT and possible renal abscess developing    Problem/Plan - 2:  ·  Problem: Chronic atrial fibrillation.   ·  Plan: - CHADSVASc: 5  hold warfarin today secondary to supra therapeutic INR     I N R :  5.31  <<==, 4.57  <<==, 3.74  <<==  heart rate controlled.  cardio on board    Problem/Plan - 3:  ·  Problem: Chronic CHF (congestive heart failure).   ·  Plan: - Unclear baseline EF as no TTE in system  - Currently w/o s/s of acute exacerbation  - cardio following, appreciated     Problem/Plan - 4:  ·  Problem: History of CVA (cerebrovascular accident).   ·  Plan: continue ASA 81mg qd.    Problem/Plan - 5:  ·  Problem: BPH (benign prostatic hyperplasia).   ·  Plan: - c/w home medications.    Problem/Plan - 6:  ·  Problem: Alzheimer's dementia.   ·  Plan: - Frequent reorientation  - Family at bedside when feasible.    Problem/Plan - 7:  ·  Problem: Prophylactic measure.   ·  Plan: DVT ppx: Holding Coumadin i/s/o supratherapeutic INR  Diet: Pending dysphagia screen  Dispo: pending clinical improvement.  discussions of goals of care with family ..    ___________________________  H. FRAN Sung (covering today)   Pager: [phone]     _________________________________________________________________________________________  ========>>  M E D I C A L   A T T E N D I N G    F O L L O W  U P  N O T E  <<=========  -----------------------------------------------------------------------------------------------------    - Patient seen and examined by me earlier today. (covering today)   - In summary,  KACI MERAZ is a 89y year old man admitted with UTI, Bacteremia   - Patient today overall doing ok, comfortable    ==================>> REVIEW OF SYSTEM <<=================    limited ROS, as patient poor historian and sleepy.. dementia      appears comfortable and calm in no distress     ==================>> PHYSICAL EXAM <<=================    GEN: responsive, sleepy  , NAD , comfortable, pleasant, calm in bed, in fetal position   HEENT: NCAT, MMM, hearing intact  CVS: S1S2 , regular , No M/R/G appreciated  PULM: + mild rhinchi,, limited exam as not taking deep breaths   ABD.: soft. non tender, non distended,  bowel sounds present  Extrem: intact pulses , no edema          ( Note written / Date of service 11-26-23 ( This is certified to be the same as "ENTERED" date above ( for billing purposes)))    ==================>> MEDICATIONS <<====================    MEDICATIONS  (STANDING):  aspirin enteric coated 81 milliGRAM(s) Oral daily  cefTRIAXone   IVPB 1000 milliGRAM(s) IV Intermittent every 24 hours  digoxin     Tablet 250 MICROGram(s) Oral daily  finasteride 5 milliGRAM(s) Oral daily  metoprolol tartrate 50 milliGRAM(s) Oral two times a day  pantoprazole    Tablet 40 milliGRAM(s) Oral before breakfast  pyridoxine 100 milliGRAM(s) Oral daily  simvastatin 10 milliGRAM(s) Oral at bedtime  tamsulosin 0.4 milliGRAM(s) Oral at bedtime    MEDICATIONS  (PRN):  acetaminophen     Tablet .. 650 milliGRAM(s) Oral every 6 hours PRN Temp greater or equal to 38C (100.4F), Mild Pain (1 - 3)    ___________  Active diet:  Diet, Pureed:   Moderately Thick Liquids (MODTHICKLIQS)  ___________________    ==================>> VITAL SIGNS <<==================    T(C): 36.5 (11-26-23 @ 05:02), Max: 36.7 (11-25-23 @ 20:55)  HR: 82 (11-26-23 @ 05:02) (82 - 92)  BP: 159/81 (11-26-23 @ 05:02) (113/75 - 159/81)  RR: 18 (11-26-23 @ 05:02) (18 - 20)  SpO2: 98% (11-26-23 @ 05:02) (97% - 98%)     I&O's Summary    25 Nov 2023 07:01  -  26 Nov 2023 07:00  --------------------------------------------------------  IN: 250 mL / OUT: 0 mL / NET: 250 mL    26 Nov 2023 07:01  -  26 Nov 2023 13:44  --------------------------------------------------------  IN: 60 mL / OUT: 0 mL / NET: 60 mL       ==================>> LAB AND IMAGING <<==================                        12.2   4.23  )-----------( 171      ( 26 Nov 2023 07:28 )             39.3        11-26    142  |  107  |  10  ----------------------------<  116<H>  3.7   |  27  |  0.80    Ca    8.3<L>      26 Nov 2023 07:28  Phos  2.7     11-25  Mg     1.9     11-25    TPro  5.2<L>  /  Alb  2.2<L>  /  TBili  1.0  /  DBili  x   /  AST  23  /  ALT  22  /  AlkPhos  84  11-26    PT/INR - ( 26 Nov 2023 07:28 )   PT: 53.1 sec;   INR: 5.31 ratio    PTT - ( 25 Nov 2023 10:54 )  PTT:40.8 sec               Urinalysis:  11-24-23 @ 13:07  Leuk. Est.: Large  Nirite: Positive  WBC: 224  Blood: Large     salt Crystal: --     calcium crystal: --     Bili: Small     cast: 18  color: Dark Yellow  Bacteria: Many  Epith. cell: 4  Yeast: Present     Ketone: Negative     Protein: 30     Glucose: Negative     sperm: --     Spec.Gravity: 1.018    TSH:      2.14   (10-20-23)           Culture - Urine (collected 24 Nov 2023 13:07)  Source: Clean Catch Clean Catch (Midstream)  Preliminary Report (25 Nov 2023 16:49):    >100,000 CFU/ml Escherichia coli    Culture - Blood (collected 24 Nov 2023 12:22)  Source: .Blood Blood-Peripheral  Gram Stain (25 Nov 2023 06:35):    Growth in aerobic bottle: Gram Negative Rods  Final Report (26 Nov 2023 12:33):    Growth in aerobic bottle: Escherichia coli    Direct identification is available within approximately 3-5    hours either by Blood Panel Multiplexed PCR or Direct    MALDI-TOF. Details: [email or web address]  Organism: Blood Culture PCR  Escherichia coli (26 Nov 2023 12:33)  Organism: Escherichia coli (26 Nov 2023 12:33)    Sensitivities:      Method Type: DANN      -  Ampicillin: S <=8 These ampicillin results predict results for amoxicillin      -  Ampicillin/Sulbactam: S <=4/2      -  Aztreonam: S <=4      -  Cefazolin: S <=2      -  Cefepime: S <=2      -  Cefoxitin: S <=8      -  Ceftriaxone: S <=1      -  Ciprofloxacin: S <=0.25      -  Ertapenem: S <=0.5      -  Gentamicin: S <=2      -  Imipenem: S <=1      -  Levofloxacin: S <=0.5      -  Meropenem: S <=1      -  Piperacillin/Tazobactam: S <=8      -  Tobramycin: S <=2      -  Trimethoprim/Sulfamethoxazole: S <=0.5/9.5  Organism: Blood Culture PCR (26 Nov 2023 12:33)    Sensitivities:      Method Type: PCR      -  Escherichia coli: Detec    Culture - Blood (collected 24 Nov 2023 12:12)  Source: .Blood Blood-Peripheral  Preliminary Report (25 Nov 2023 19:02):    No growth at 24 hours      < from: CT Chest w/ IV Cont (11.24.23 @ 17:19) >  IMPRESSION:    Slightly decreased bilateral opacities in the lungs, suggesting resolving   pneumonia.  Similar urinary bladder wall thickening. New prominent right ureter with   slight hyperenhancement, which raises the suspicion of ureteritis.  Enlargement of 2.1 cm thick walled cystic lesion in the right kidney   compared to one month ago, previously 1.1 cm. This would be suspicious   for a developing renal abscess in the setting of UTI.  < end of copied text >  ___________________________________________________________________________________  ===============>>  A S S E S S M E N T   A N D   P L A N <<===============  ------------------------------------------------------------------------------------------    · Assessment	  89yo M w/ PMH of Afib on Coumadin, CHF, CVA, BPH, dementia (A&Ox0-1 at baseline) pw worsening confusion and altered mental status x1 day found to be septic likely i/s/o UTI w/ c/f renal abscess as well, pending urology eval    Problem/Plan - 1:  ·  Problem: Sepsis due to urinary tract infection, gram negative bacteremia    continue antibiotics per ID   follow blood cultures / sensitivities..  >> repeat bcx as per  ID recom..   >>  close follow up for concerns about recurrent UTIS, enlarged / abnormal prostate on CT and possible renal abscess developing    Problem/Plan - 2:  ·  Problem: Chronic atrial fibrillation.   ·  Plan: - CHADSVASc: 5  hold warfarin today secondary to supra therapeutic INR     I N R :  5.31  <<==, 4.57  <<==, 3.74  <<==      small dose of Vitamin K ordered   heart rate controlled.  cardio on board    Problem/Plan - 3:  ·  Problem: Chronic CHF (congestive heart failure).   ·  Plan: - Unclear baseline EF as no TTE in system  - Currently w/o s/s of acute exacerbation  - cardio following, appreciated     Problem/Plan - 4:  ·  Problem: History of CVA (cerebrovascular accident).   ·  Plan: continue ASA 81mg qd.    Problem/Plan - 5:  ·  Problem: BPH (benign prostatic hyperplasia).   ·  Plan: - c/w home medications.    Problem/Plan - 6:  ·  Problem: Alzheimer's dementia.   ·  Plan: - Frequent reorientation  - Family at bedside when feasible.    Problem/Plan - 7:  ·  Problem: Prophylactic measure.   ·  Plan: DVT ppx: Holding Coumadin i/s/o supratherapeutic INR  Diet: Pending dysphagia screen  Dispo: pending clinical improvement.  discussions of goals of care with family ..    ___________________________  H. FRAN Sung (covering today)   Pager: [phone]

## 2023-11-26 NOTE — DIETITIAN INITIAL EVALUATION ADULT - REASON INDICATOR FOR ASSESSMENT
Consult for assessment and MST Score 2 or >  Source: Medical record and RN (pt too confused to participate in interview)

## 2023-11-26 NOTE — PROGRESS NOTE ADULT - CONVERSATION DETAILS
*** Advance directive /  goals of care discussion      I had a long discussion with patient ( and family) about patient's overall diagnosis, expected prognosis, and potential complications.       Discussed treatment options, comfort care / hospice as appropriate, and all other potential options of care.         Discussed risks, benefits, and alternatives of treatment as well.          Opportunity given for and all questions answered.            Reviewed available advance directives as available > pt's daughter is the HCP ( asked to bring copy to hospital     At this time the wishes of the family is for the patient to be full code, with continuation of current medical therapy.     Goal is for pt to return > home and follow up as outpatient with current doctors      will continue to discuss GOC with pt and family and update plan as needed.     Patient's family have my information and will contact me with questions.     Additional time spent on Goals of care: 22 min.

## 2023-11-26 NOTE — DIETITIAN INITIAL EVALUATION ADULT - PERTINENT MEDS FT
MEDICATIONS  (STANDING):  aspirin enteric coated 81 milliGRAM(s) Oral daily  cefTRIAXone   IVPB 1000 milliGRAM(s) IV Intermittent every 24 hours  digoxin     Tablet 250 MICROGram(s) Oral daily  finasteride 5 milliGRAM(s) Oral daily  metoprolol tartrate 50 milliGRAM(s) Oral two times a day  pantoprazole    Tablet 40 milliGRAM(s) Oral before breakfast  pyridoxine 100 milliGRAM(s) Oral daily  simvastatin 10 milliGRAM(s) Oral at bedtime  tamsulosin 0.4 milliGRAM(s) Oral at bedtime    MEDICATIONS  (PRN):  acetaminophen     Tablet .. 650 milliGRAM(s) Oral every 6 hours PRN Temp greater or equal to 38C (100.4F), Mild Pain (1 - 3)

## 2023-11-26 NOTE — DIETITIAN INITIAL EVALUATION ADULT - ORAL INTAKE PTA/DIET HISTORY
Unknown how pt was eating PTA. Per previous RD note 10/22/23: was on Pureed Foods with moderately thickened liquids. Took Ensure x1 daily. Per H&P, pt took cholecalciferol and Vitamin B6. Unknown if pt followed therapeutic diet. NKFA. Noted on coumadin PTA.

## 2023-11-26 NOTE — DIETITIAN INITIAL EVALUATION ADULT - ADD RECOMMEND
As medically feasible, consider multivitamin for micronutrient coverage. Continue to trend labs, weight, skin integrity, and intake.

## 2023-11-26 NOTE — DIETITIAN INITIAL EVALUATION ADULT - PERTINENT LABORATORY DATA
11-26    142  |  107  |  10  ----------------------------<  116<H>  3.7   |  27  |  0.80    Ca    8.3<L>      26 Nov 2023 07:28  Phos  2.7     11-25  Mg     1.9     11-25    TPro  5.2<L>  /  Alb  2.2<L>  /  TBili  1.0  /  DBili  x   /  AST  23  /  ALT  22  /  AlkPhos  84  11-26

## 2023-11-26 NOTE — PROVIDER CONTACT NOTE (CRITICAL VALUE NOTIFICATION) - SITUATION
Patient admitted with UTI has high INR
Pt has positive blood culture showing growth in aerobic bottle, gram negative rods.

## 2023-11-26 NOTE — PROVIDER CONTACT NOTE (CRITICAL VALUE NOTIFICATION) - ACTION/TREATMENT ORDERED:
no further orders
Contacted NP Stephanie Virk and notified; Per NP continue to hold coumadin; will follow up further.

## 2023-11-27 LAB
INR BLD: 1.9 RATIO — HIGH (ref 0.85–1.18)
INR BLD: 1.9 RATIO — HIGH (ref 0.85–1.18)
PROTHROM AB SERPL-ACNC: 20.5 SEC — HIGH (ref 9.5–13)
PROTHROM AB SERPL-ACNC: 20.5 SEC — HIGH (ref 9.5–13)

## 2023-11-27 PROCEDURE — 99232 SBSQ HOSP IP/OBS MODERATE 35: CPT

## 2023-11-27 RX ORDER — WARFARIN SODIUM 2.5 MG/1
1 TABLET ORAL ONCE
Refills: 0 | Status: COMPLETED | OUTPATIENT
Start: 2023-11-27 | End: 2023-11-27

## 2023-11-27 RX ADMIN — Medication 250 MICROGRAM(S): at 05:09

## 2023-11-27 RX ADMIN — FINASTERIDE 5 MILLIGRAM(S): 5 TABLET, FILM COATED ORAL at 12:23

## 2023-11-27 RX ADMIN — PANTOPRAZOLE SODIUM 40 MILLIGRAM(S): 20 TABLET, DELAYED RELEASE ORAL at 05:08

## 2023-11-27 RX ADMIN — Medication 100 MILLIGRAM(S): at 12:23

## 2023-11-27 RX ADMIN — Medication 50 MILLIGRAM(S): at 18:38

## 2023-11-27 RX ADMIN — WARFARIN SODIUM 1 MILLIGRAM(S): 2.5 TABLET ORAL at 21:22

## 2023-11-27 RX ADMIN — CEFTRIAXONE 100 MILLIGRAM(S): 500 INJECTION, POWDER, FOR SOLUTION INTRAMUSCULAR; INTRAVENOUS at 13:17

## 2023-11-27 RX ADMIN — Medication 50 MILLIGRAM(S): at 05:08

## 2023-11-27 RX ADMIN — SIMVASTATIN 10 MILLIGRAM(S): 20 TABLET, FILM COATED ORAL at 21:23

## 2023-11-27 RX ADMIN — TAMSULOSIN HYDROCHLORIDE 0.4 MILLIGRAM(S): 0.4 CAPSULE ORAL at 21:23

## 2023-11-27 NOTE — PROGRESS NOTE ADULT - ASSESSMENT
89 yo M A Fib, CHF, dementia with AMS  Fever, no leukocytosis  Initially with AMS  Suspected UTI  CT A/P with resolving pneumonia, bladder thickening, ureteritis?; renal lesion--abscess?  BCX E coli S pending  UA+, UCX E coli  Aware yeast like cells on UA, doubt significance  Overall, Fever, E coli bacteremia, UTI, renal abscess  - Ceftriaxone 1g q 24, when discharge planning, send out on Ceftin 500mg q 12 to complete 21 days (extended course for renal abscess)  - F/U pending BCX  - Defer any role for drainage abscess to Urology team  - Monitor for further fevers    Claudy Godoy MD  Contact on TEAMS messaging from 9am - 5pm  From 5pm-9am, on weekends, or if no response call [phone]

## 2023-11-27 NOTE — PROGRESS NOTE ADULT - ASSESSMENT
_________________________________________________________________________________________  ========>>  M E D I C A L   A T T E N D I N G    F O L L O W  U P  N O T E  <<=========  -----------------------------------------------------------------------------------------------------    - Patient seen and examined by me earlier today. (covering today)   - In summary,  KACI MERAZ is a 89y year old man admitted with UTI, Bacteremia   - Patient today overall doing ok, comfortable    ==================>> REVIEW OF SYSTEM <<=================    limited ROS, as patient poor historian and sleepy.. dementia      appears comfortable and calm in no distress     ==================>> PHYSICAL EXAM <<=================    GEN: responsive, sleepy  , NAD , comfortable, pleasant, calm in bed, in fetal position   HEENT: NCAT, MMM, hearing intact  CVS: S1S2 , regular , No M/R/G appreciated  PULM: + mild rhinchi,, limited exam as not taking deep breaths   ABD.: soft. non tender, non distended,  bowel sounds present  Extrem: intact pulses , no edema          ( Note written / Date of service 11-27-23 ( This is certified to be the same as "ENTERED" date above ( for billing purposes)))    ==================>> MEDICATIONS <<====================    aspirin enteric coated 81 milliGRAM(s) Oral daily  cefTRIAXone   IVPB 1000 milliGRAM(s) IV Intermittent every 24 hours  digoxin     Tablet 250 MICROGram(s) Oral daily  finasteride 5 milliGRAM(s) Oral daily  metoprolol tartrate 50 milliGRAM(s) Oral two times a day  pantoprazole    Tablet 40 milliGRAM(s) Oral before breakfast  pyridoxine 100 milliGRAM(s) Oral daily  simvastatin 10 milliGRAM(s) Oral at bedtime  tamsulosin 0.4 milliGRAM(s) Oral at bedtime    MEDICATIONS  (PRN):  acetaminophen     Tablet .. 650 milliGRAM(s) Oral every 6 hours PRN Temp greater or equal to 38C (100.4F), Mild Pain (1 - 3)    ___________  Active diet:  Diet, Pureed:   Moderately Thick Liquids (MODTHICKLIQS)  ___________________    ==================>> VITAL SIGNS <<==================    Vital Signs Last 24 HrsT(C): 36.7 (11-27-23 @ 12:38)  T(F): 98 (11-27-23 @ 12:38), Max: 98.3 (11-27-23 @ 04:39)  HR: 74 (11-27-23 @ 12:38) (74 - 79)  BP: 109/72 (11-27-23 @ 12:38)  RR: 18 (11-27-23 @ 04:39) (18 - 18)  SpO2: 96% (11-27-23 @ 04:39) (96% - 96%)      CAPILLARY BLOOD GLUCOSE         ==================>> LAB AND IMAGING <<==================                        12.2   4.23  )-----------( 171      ( 26 Nov 2023 07:28 )             39.3        11-26    142  |  107  |  10  ----------------------------<  116<H>  3.7   |  27  |  0.80    Ca    8.3<L>      26 Nov 2023 07:28    TPro  5.2<L>  /  Alb  2.2<L>  /  TBili  1.0  /  DBili  x   /  AST  23  /  ALT  22  /  AlkPhos  84  11-26    WBC count:   4.23 <<== ,  5.72 <<== ,  5.27 <<==   Hemoglobin:   12.2 <<==,  12.1 <<==,  12.9 <<==  platelets:  171 <==, 188 <==, 181 <==    Creatinine:  0.80  <<==, 0.85  <<==, 0.89  <<==, 0.91  <<==  Sodium:   142  <==, 141  <==, 139  <==, 143  <==       AST:          23 <== , 33 <== , 32 <== , 110 <==      ALT:        22  <== , 26  <== , 26  <== , 38  <==      AP:        84  <=, 89  <=, 84  <=, 95  <=     Bili:        1.0  <=, 1.1  <=, 0.9  <=, 1.2  <=    ____________________________    M I C R O B I O L O G Y :    Culture - Blood (collected 26 Nov 2023 07:31)  Source: .Blood Blood-Peripheral  Preliminary Report (27 Nov 2023 10:01):    No growth at 24 hours    Culture - Urine (collected 24 Nov 2023 13:07)  Source: Clean Catch Clean Catch (Midstream)  Final Report (26 Nov 2023 18:50):    >100,000 CFU/ml Escherichia coli  Organism: Escherichia coli (26 Nov 2023 18:50)  Organism: Escherichia coli (26 Nov 2023 18:50)    Sensitivities:      Method Type: DANN      -  Amoxicillin/Clavulanic Acid: S <=8/4      -  Ampicillin: S <=8 These ampicillin results predict results for amoxicillin      -  Ampicillin/Sulbactam: S <=4/2      -  Aztreonam: S <=4      -  Cefazolin: S <=2 For uncomplicated UTI with K. pneumoniae, E. coli, or P. mirablis: DANN <=16 is sensitive and DANN >=32 is resistant. This also predicts results for oral agents cefaclor, cefdinir, cefpodoxime, cefprozil, cefuroxime axetil, cephalexin and locarbef for uncomplicated UTI. Note that some isolates may be susceptible to these agents while testing resistant to cefazolin.      -  Cefepime: S <=2      -  Cefoxitin: S <=8      -  Ceftriaxone: S <=1      -  Cefuroxime: S <=4      -  Ciprofloxacin: S <=0.25      -  Ertapenem: S <=0.5      -  Gentamicin: S <=2      -  Imipenem: S <=1      -  Levofloxacin: S <=0.5      -  Meropenem: S <=1      -  Nitrofurantoin: S <=32 Should not be used to treat pyelonephritis      -  Piperacillin/Tazobactam: S <=8      -  Tobramycin: S <=2      -  Trimethoprim/Sulfamethoxazole: S <=0.5/9.5    Culture - Blood (collected 24 Nov 2023 12:22)  Source: .Blood Blood-Peripheral  Gram Stain (25 Nov 2023 06:35):    Growth in aerobic bottle: Gram Negative Rods  Final Report (26 Nov 2023 12:33):    Growth in aerobic bottle: Escherichia coli    Direct identification is available within approximately 3-5    hours either by Blood Panel Multiplexed PCR or Direct    MALDI-TOF. Details: [email or web address]  Organism: Blood Culture PCR  Escherichia coli (26 Nov 2023 12:33)  Organism: Escherichia coli (26 Nov 2023 12:33)    Sensitivities:      Method Type: DANN      -  Ampicillin: S <=8 These ampicillin results predict results for amoxicillin      -  Ampicillin/Sulbactam: S <=4/2      -  Aztreonam: S <=4      -  Cefazolin: S <=2      -  Cefepime: S <=2      -  Cefoxitin: S <=8      -  Ceftriaxone: S <=1      -  Ciprofloxacin: S <=0.25      -  Ertapenem: S <=0.5      -  Gentamicin: S <=2      -  Imipenem: S <=1      -  Levofloxacin: S <=0.5      -  Meropenem: S <=1      -  Piperacillin/Tazobactam: S <=8      -  Tobramycin: S <=2      -  Trimethoprim/Sulfamethoxazole: S <=0.5/9.5  Organism: Blood Culture PCR (26 Nov 2023 12:33)    Sensitivities:      Method Type: PCR      -  Escherichia coli: Detec    Culture - Blood (collected 24 Nov 2023 12:12)  Source: .Blood Blood-Peripheral  Preliminary Report (26 Nov 2023 19:02):    No growth at 48 Hours            < from: CT Chest w/ IV Cont (11.24.23 @ 17:19) >  IMPRESSION:  Slightly decreased bilateral opacities in the lungs, suggesting resolving   pneumonia.  Similar urinary bladder wall thickening. New prominent right ureter with   slight hyperenhancement, which raises the suspicion of ureteritis.  Enlargement of 2.1 cm thick walled cystic lesion in the right kidney   compared to one month ago, previously 1.1 cm. This would be suspicious   for a developing renal abscess in the setting of UTI.  < end of copied text >  ___________________________________________________________________________________  ===============>>  A S S E S S M E N T   A N D   P L A N <<===============  ------------------------------------------------------------------------------------------    · Assessment	  89yo M w/ PMH of Afib on Coumadin, CHF, CVA, BPH, dementia (A&Ox0-1 at baseline) pw worsening confusion and altered mental status x1 day found to be septic likely i/s/o UTI w/ c/f renal abscess as well, pending urology eval    Problem/Plan - 1:  ·  Problem: Sepsis due to urinary tract infection, gram negative bacteremia    continue antibiotics per ID   follow blood cultures / sensitivities..  >> repeat bcx as per  ID recom..   >>  close follow up for concerns about recurrent UTIS, enlarged / abnormal prostate on CT and possible renal abscess developing    Problem/Plan - 2:  ·  Problem: Chronic atrial fibrillation.   ·  Plan: - CHADSVASc: 5  hold warfarin today secondary to supra therapeutic INR     I N R :  5.31  <<==, 4.57  <<==, 3.74  <<==      small dose of Vitamin K ordered   heart rate controlled.  cardio on board    Problem/Plan - 3:  ·  Problem: Chronic CHF (congestive heart failure).   ·  Plan: - Unclear baseline EF as no TTE in system  - Currently w/o s/s of acute exacerbation  - cardio following, appreciated     Problem/Plan - 4:  ·  Problem: History of CVA (cerebrovascular accident).   ·  Plan: continue ASA 81mg qd.    Problem/Plan - 5:  ·  Problem: BPH (benign prostatic hyperplasia).   ·  Plan: - c/w home medications.    Problem/Plan - 6:  ·  Problem: Alzheimer's dementia.   ·  Plan: - Frequent reorientation  - Family at bedside when feasible.    Problem/Plan - 7:  ·  Problem: Prophylactic measure.   ·  Plan: DVT ppx: Holding Coumadin i/s/o supratherapeutic INR  Diet: Pending dysphagia screen  Dispo: pending clinical improvement.  discussions of goals of care with family ..    ___________________________  H. FRAN Sung (covering today)   Pager: [phone]     _________________________________________________________________________________________  ========>>  M E D I C A L   A T T E N D I N G    F O L L O W  U P  N O T E  <<=========  -----------------------------------------------------------------------------------------------------    - Patient seen and examined by me earlier today. (covering today)   - In summary,  KACI MERAZ is a 89y year old man admitted with UTI, Bacteremia   - Patient today overall doing ok, comfortable.. As per staff, patient eating well with assistance    ==================>> REVIEW OF SYSTEM <<=================    limited ROS, as patient poor historian and sleepy.. dementia      appears comfortable and calm in no distress     ==================>> PHYSICAL EXAM <<=================    GEN: responsive, sleepy  , NAD , comfortable, pleasant, calm in bed, in fetal position   HEENT: NCAT, MMM, hearing intact  CVS: S1S2 , regular , No M/R/G appreciated  PULM: + mild rhinchi,, limited exam as not taking deep breaths   ABD.: soft. non tender, non distended,  bowel sounds present  Extrem: intact pulses , no edema          ( Note written / Date of service 11-27-23 ( This is certified to be the same as "ENTERED" date above ( for billing purposes)))    ==================>> MEDICATIONS <<====================    aspirin enteric coated 81 milliGRAM(s) Oral daily  cefTRIAXone   IVPB 1000 milliGRAM(s) IV Intermittent every 24 hours  digoxin     Tablet 250 MICROGram(s) Oral daily  finasteride 5 milliGRAM(s) Oral daily  metoprolol tartrate 50 milliGRAM(s) Oral two times a day  pantoprazole    Tablet 40 milliGRAM(s) Oral before breakfast  pyridoxine 100 milliGRAM(s) Oral daily  simvastatin 10 milliGRAM(s) Oral at bedtime  tamsulosin 0.4 milliGRAM(s) Oral at bedtime    MEDICATIONS  (PRN):  acetaminophen     Tablet .. 650 milliGRAM(s) Oral every 6 hours PRN Temp greater or equal to 38C (100.4F), Mild Pain (1 - 3)    ___________  Active diet:  Diet, Pureed:   Moderately Thick Liquids (MODTHICKLIQS)  ___________________    ==================>> VITAL SIGNS <<==================    Vital Signs Last 24 HrsT(C): 36.7 (11-27-23 @ 12:38)  T(F): 98 (11-27-23 @ 12:38), Max: 98.3 (11-27-23 @ 04:39)  HR: 74 (11-27-23 @ 12:38) (74 - 79)  BP: 109/72 (11-27-23 @ 12:38)  RR: 18 (11-27-23 @ 04:39) (18 - 18)  SpO2: 96% (11-27-23 @ 04:39) (96% - 96%)       ==================>> LAB AND IMAGING <<==================                        12.2   4.23  )-----------( 171      ( 26 Nov 2023 07:28 )             39.3        11-26    142  |  107  |  10  ----------------------------<  116<H>  3.7   |  27  |  0.80    Ca    8.3<L>      26 Nov 2023 07:28    TPro  5.2<L>  /  Alb  2.2<L>  /  TBili  1.0  /  DBili  x   /  AST  23  /  ALT  22  /  AlkPhos  84  11-26    WBC count:   4.23 <<== ,  5.72 <<== ,  5.27 <<==   Hemoglobin:   12.2 <<==,  12.1 <<==,  12.9 <<==  platelets:  171 <==, 188 <==, 181 <==    Creatinine:  0.80  <<==, 0.85  <<==, 0.89  <<==, 0.91  <<==  Sodium:   142  <==, 141  <==, 139  <==, 143  <==       AST:          23 <== , 33 <== , 32 <== , 110 <==      ALT:        22  <== , 26  <== , 26  <== , 38  <==      AP:        84  <=, 89  <=, 84  <=, 95  <=     Bili:        1.0  <=, 1.1  <=, 0.9  <=, 1.2  <=    ____________________________    M I C R O B I O L O G Y :    Culture - Blood (collected 26 Nov 2023 07:31)  Source: .Blood Blood-Peripheral  Preliminary Report (27 Nov 2023 10:01):    No growth at 24 hours    Culture - Urine (collected 24 Nov 2023 13:07)  Source: Clean Catch Clean Catch (Midstream)  Final Report (26 Nov 2023 18:50):    >100,000 CFU/ml Escherichia coli  Organism: Escherichia coli (26 Nov 2023 18:50)  Organism: Escherichia coli (26 Nov 2023 18:50)    Sensitivities:      Method Type: DANN      -  Amoxicillin/Clavulanic Acid: S <=8/4      -  Ampicillin: S <=8 These ampicillin results predict results for amoxicillin      -  Ampicillin/Sulbactam: S <=4/2      -  Aztreonam: S <=4      -  Cefazolin: S <=2 For uncomplicated UTI with K. pneumoniae, E. coli, or P. mirablis: DANN <=16 is sensitive and DANN >=32 is resistant. This also predicts results for oral agents cefaclor, cefdinir, cefpodoxime, cefprozil, cefuroxime axetil, cephalexin and locarbef for uncomplicated UTI. Note that some isolates may be susceptible to these agents while testing resistant to cefazolin.      -  Cefepime: S <=2      -  Cefoxitin: S <=8      -  Ceftriaxone: S <=1      -  Cefuroxime: S <=4      -  Ciprofloxacin: S <=0.25      -  Ertapenem: S <=0.5      -  Gentamicin: S <=2      -  Imipenem: S <=1      -  Levofloxacin: S <=0.5      -  Meropenem: S <=1      -  Nitrofurantoin: S <=32 Should not be used to treat pyelonephritis      -  Piperacillin/Tazobactam: S <=8      -  Tobramycin: S <=2      -  Trimethoprim/Sulfamethoxazole: S <=0.5/9.5    Culture - Blood (collected 24 Nov 2023 12:22)  Source: .Blood Blood-Peripheral  Gram Stain (25 Nov 2023 06:35):    Growth in aerobic bottle: Gram Negative Rods  Final Report (26 Nov 2023 12:33):    Growth in aerobic bottle: Escherichia coli    Direct identification is available within approximately 3-5    hours either by Blood Panel Multiplexed PCR or Direct    MALDI-TOF. Details: [email or web address]  Organism: Blood Culture PCR  Escherichia coli (26 Nov 2023 12:33)  Organism: Escherichia coli (26 Nov 2023 12:33)    Sensitivities:      Method Type: DANN      -  Ampicillin: S <=8 These ampicillin results predict results for amoxicillin      -  Ampicillin/Sulbactam: S <=4/2      -  Aztreonam: S <=4      -  Cefazolin: S <=2      -  Cefepime: S <=2      -  Cefoxitin: S <=8      -  Ceftriaxone: S <=1      -  Ciprofloxacin: S <=0.25      -  Ertapenem: S <=0.5      -  Gentamicin: S <=2      -  Imipenem: S <=1      -  Levofloxacin: S <=0.5      -  Meropenem: S <=1      -  Piperacillin/Tazobactam: S <=8      -  Tobramycin: S <=2      -  Trimethoprim/Sulfamethoxazole: S <=0.5/9.5  Organism: Blood Culture PCR (26 Nov 2023 12:33)    Sensitivities:      Method Type: PCR      -  Escherichia coli: Detec    Culture - Blood (collected 24 Nov 2023 12:12)  Source: .Blood Blood-Peripheral  Preliminary Report (26 Nov 2023 19:02):    No growth at 48 Hours            < from: CT Chest w/ IV Cont (11.24.23 @ 17:19) >  IMPRESSION:  Slightly decreased bilateral opacities in the lungs, suggesting resolving   pneumonia.  Similar urinary bladder wall thickening. New prominent right ureter with   slight hyperenhancement, which raises the suspicion of ureteritis.  Enlargement of 2.1 cm thick walled cystic lesion in the right kidney   compared to one month ago, previously 1.1 cm. This would be suspicious   for a developing renal abscess in the setting of UTI.  < end of copied text >  ___________________________________________________________________________________  ===============>>  A S S E S S M E N T   A N D   P L A N <<===============  ------------------------------------------------------------------------------------------    · Assessment	  89yo M w/ PMH of Afib on Coumadin, CHF, CVA, BPH, dementia (A&Ox0-1 at baseline) pw worsening confusion and altered mental status x1 day found to be septic likely i/s/o UTI w/ c/f renal abscess as well, pending urology eval    Problem/Plan - 1:  ·  Problem: Sepsis due to urinary tract infection, gram negative bacteremia    continue antibiotics per ID   follow blood cultures / sensitivities..  >> repeat bcx as per  ID recom..   >>  close follow up for concerns about recurrent UTIS, enlarged / abnormal prostate on CT and possible renal abscess developing ( As per , too small to drain)    Problem/Plan - 2:  ·  Problem: Chronic atrial fibrillation.   ·  Plan: - CHADSVASc: 5  hold warfarin today secondary to supra therapeutic INR    I N R :  1.90  <<==, 5.31  <<==, 4.57  <<==, 3.74  <<==      small dose of Vitamin K ordered   heart rate controlled.  cardio on board    Problem/Plan - 3:  ·  Problem: Chronic CHF (congestive heart failure).   ·  Plan: - Unclear baseline EF as no TTE in system  - Currently w/o s/s of acute exacerbation  - cardio following, appreciated     Problem/Plan - 4:  ·  Problem: History of CVA (cerebrovascular accident).   ·  Plan: continue ASA 81mg qd.    Problem/Plan - 5:  ·  Problem: BPH (benign prostatic hyperplasia).   ·  Plan: - c/w home medications.    Problem/Plan - 6:  ·  Problem: Alzheimer's dementia.   ·  Plan: - Frequent reorientation  - Family at bedside when feasible.    Problem/Plan - 7:  ·  Problem: Prophylactic measure.   ·  Plan: DVT ppx: Holding Coumadin i/s/o supratherapeutic INR  Diet: Pending dysphagia screen  Dispo: pending clinical improvement.  code status: full   ___________________________  H. FRAN Sung (covering today)   Pager: [phone]

## 2023-11-28 LAB
APTT BLD: 29 SEC — SIGNIFICANT CHANGE UP (ref 24.5–35.6)
APTT BLD: 29 SEC — SIGNIFICANT CHANGE UP (ref 24.5–35.6)
DIGITOXIN SERPL-MCNC: <5 NG/ML — LOW (ref 10–25)
DIGITOXIN SERPL-MCNC: <5 NG/ML — LOW (ref 10–25)
INR BLD: 1.21 RATIO — HIGH (ref 0.85–1.18)
INR BLD: 1.21 RATIO — HIGH (ref 0.85–1.18)
PROTHROM AB SERPL-ACNC: 12.6 SEC — SIGNIFICANT CHANGE UP (ref 9.5–13)
PROTHROM AB SERPL-ACNC: 12.6 SEC — SIGNIFICANT CHANGE UP (ref 9.5–13)

## 2023-11-28 PROCEDURE — 99232 SBSQ HOSP IP/OBS MODERATE 35: CPT

## 2023-11-28 RX ORDER — WARFARIN SODIUM 2.5 MG/1
1.5 TABLET ORAL ONCE
Refills: 0 | Status: COMPLETED | OUTPATIENT
Start: 2023-11-28 | End: 2023-11-28

## 2023-11-28 RX ADMIN — SIMVASTATIN 10 MILLIGRAM(S): 20 TABLET, FILM COATED ORAL at 21:28

## 2023-11-28 RX ADMIN — Medication 50 MILLIGRAM(S): at 05:23

## 2023-11-28 RX ADMIN — PANTOPRAZOLE SODIUM 40 MILLIGRAM(S): 20 TABLET, DELAYED RELEASE ORAL at 05:22

## 2023-11-28 RX ADMIN — Medication 81 MILLIGRAM(S): at 14:47

## 2023-11-28 RX ADMIN — Medication 50 MILLIGRAM(S): at 18:38

## 2023-11-28 RX ADMIN — WARFARIN SODIUM 1.5 MILLIGRAM(S): 2.5 TABLET ORAL at 21:28

## 2023-11-28 RX ADMIN — Medication 250 MICROGRAM(S): at 05:23

## 2023-11-28 RX ADMIN — TAMSULOSIN HYDROCHLORIDE 0.4 MILLIGRAM(S): 0.4 CAPSULE ORAL at 21:30

## 2023-11-28 RX ADMIN — CEFTRIAXONE 100 MILLIGRAM(S): 500 INJECTION, POWDER, FOR SOLUTION INTRAMUSCULAR; INTRAVENOUS at 14:48

## 2023-11-28 RX ADMIN — FINASTERIDE 5 MILLIGRAM(S): 5 TABLET, FILM COATED ORAL at 14:47

## 2023-11-28 RX ADMIN — Medication 100 MILLIGRAM(S): at 14:47

## 2023-11-28 NOTE — PROGRESS NOTE ADULT - ASSESSMENT
89 yo M A Fib, CHF, dementia with AMS  Fever, no leukocytosis  Initially with AMS  Suspected UTI  CT A/P with resolving pneumonia, bladder thickening, ureteritis?; renal lesion--abscess?  BCX E coli S pending  UA+, UCX E coli  Aware yeast like cells on UA, doubt significance  Overall, Fever, E coli bacteremia, UTI, renal abscess  - Ceftriaxone 1g q 24, when discharge planning, send out on Ceftin 500mg q 12 to complete 21 days (extended course for renal abscess)  - F/U pending BCX  - Defer any role for drainage abscess to Urology team  - Monitor for further fevers    My colleagues will be covering this patient starting on 11/29/23, I will return 11/30/23. Please call [phone] or on call fellow with any questions or change in status.     Claudy Godoy MD  Contact on TEAMS messaging from 9am - 5pm  From 5pm-9am, on weekends, or if no response call [phone]

## 2023-11-28 NOTE — PROGRESS NOTE ADULT - ASSESSMENT
89yo M w/ PMH of Afib on Coumadin, CHF, CVA, BPH, dementia (A&Ox0-1 at baseline) pw worsening confusion and altered mental status x1 day found to be septic likely i/s/o UTI w/ c/f renal abscess as well, pending urology eval     Problem/Plan - 1:  ·  Problem: Sepsis due to urinary tract infection, E Coli  bacteremia    continue antibiotics per ID   follow blood cultures / sensitivities..  >> repeat bcx NGTD .   >>  close follow up for concerns about recurrent UTIS, enlarged / abnormal prostate on CT and possible renal abscess developing ( As per , too small to drain)     Problem/Plan - 2:  ·  Problem: Chronic atrial fibrillation.   ·  Plan: - CHADSVASc: 5  Restarting coumadin now.   heart rate controlled.  cardio on board     Problem/Plan - 3:  ·  Problem: Chronic CHF (congestive heart failure).   ·  Plan: - Unclear baseline EF as no TTE in system  - Currently w/o s/s of acute exacerbation  - cardio following, appreciated      Problem/Plan - 4:  ·  Problem: History of CVA (cerebrovascular accident).   ·  Plan: continue ASA 81mg qd.     Problem/Plan - 5:  ·  Problem: BPH (benign prostatic hyperplasia).   ·  Plan: - c/w home medications.     Problem/Plan - 6:  ·  Problem: Alzheimer's dementia.   ·  Plan: - Frequent reorientation  - Family at bedside when feasible.     Problem/Plan - 7:  ·  Problem: Prophylactic measure.   ·  Plan: DVT ppx: Holding Coumadin i/s/o supratherapeutic INR  Diet: Pending dysphagia screen  Dispo: pending clinical improvement.  code status: full

## 2023-11-29 ENCOUNTER — TRANSCRIPTION ENCOUNTER (OUTPATIENT)
Age: 88
End: 2023-11-29

## 2023-11-29 LAB
CULTURE RESULTS: SIGNIFICANT CHANGE UP
CULTURE RESULTS: SIGNIFICANT CHANGE UP
INR BLD: 1.21 RATIO — HIGH (ref 0.85–1.18)
INR BLD: 1.21 RATIO — HIGH (ref 0.85–1.18)
PROTHROM AB SERPL-ACNC: 13.2 SEC — HIGH (ref 9.5–13)
PROTHROM AB SERPL-ACNC: 13.2 SEC — HIGH (ref 9.5–13)
SPECIMEN SOURCE: SIGNIFICANT CHANGE UP
SPECIMEN SOURCE: SIGNIFICANT CHANGE UP

## 2023-11-29 RX ORDER — WARFARIN SODIUM 2.5 MG/1
1.5 TABLET ORAL ONCE
Refills: 0 | Status: DISCONTINUED | OUTPATIENT
Start: 2023-11-29 | End: 2023-11-29

## 2023-11-29 RX ORDER — WARFARIN SODIUM 2.5 MG/1
3 TABLET ORAL ONCE
Refills: 0 | Status: COMPLETED | OUTPATIENT
Start: 2023-11-29 | End: 2023-11-29

## 2023-11-29 RX ADMIN — Medication 50 MILLIGRAM(S): at 05:25

## 2023-11-29 RX ADMIN — FINASTERIDE 5 MILLIGRAM(S): 5 TABLET, FILM COATED ORAL at 11:42

## 2023-11-29 RX ADMIN — TAMSULOSIN HYDROCHLORIDE 0.4 MILLIGRAM(S): 0.4 CAPSULE ORAL at 21:17

## 2023-11-29 RX ADMIN — SIMVASTATIN 10 MILLIGRAM(S): 20 TABLET, FILM COATED ORAL at 21:18

## 2023-11-29 RX ADMIN — Medication 81 MILLIGRAM(S): at 11:43

## 2023-11-29 RX ADMIN — Medication 100 MILLIGRAM(S): at 11:42

## 2023-11-29 RX ADMIN — PANTOPRAZOLE SODIUM 40 MILLIGRAM(S): 20 TABLET, DELAYED RELEASE ORAL at 05:25

## 2023-11-29 RX ADMIN — Medication 250 MICROGRAM(S): at 05:25

## 2023-11-29 RX ADMIN — CEFTRIAXONE 100 MILLIGRAM(S): 500 INJECTION, POWDER, FOR SOLUTION INTRAMUSCULAR; INTRAVENOUS at 13:11

## 2023-11-29 RX ADMIN — Medication 50 MILLIGRAM(S): at 17:01

## 2023-11-29 RX ADMIN — WARFARIN SODIUM 3 MILLIGRAM(S): 2.5 TABLET ORAL at 15:46

## 2023-11-29 NOTE — DISCHARGE NOTE NURSING/CASE MANAGEMENT/SOCIAL WORK - NSDCPEPTCOWAR_GEN_ALL_CORE
Unknown n/a Warfarin/Coumadin - Compliance/Warfarin/Coumadin - Dietary Advice/Warfarin/Coumadin - Follow up monitoring/Warfarin/Coumadin - Potential for adverse drug reactions and interactions

## 2023-11-29 NOTE — PROGRESS NOTE ADULT - ASSESSMENT
89yo M w/ PMH of Afib on Coumadin, CHF, CVA, BPH, dementia (A&Ox0-1 at baseline) pw worsening confusion and altered mental status x1 day found to be septic likely i/s/o UTI w/ c/f renal abscess as well, pending urology eval     Problem/Plan - 1:  ·  Problem: Sepsis due to urinary tract infection, E Coli  bacteremia    continue antibiotics per ID   follow blood cultures / sensitivities..  >> repeat bcx NGTD .   >>  close follow up for concerns about recurrent UTIS, enlarged / abnormal prostate on CT and possible renal abscess developing ( As per , too small to drain)     Problem/Plan - 2:  ·  Problem: Chronic atrial fibrillation.   ·  Plan: - CHADSVASc: 5  Dosing  coumadin as INR low.    heart rate controlled.  cardio on board     Problem/Plan - 3:  ·  Problem: Chronic CHF (congestive heart failure).   ·  Plan: - Unclear baseline EF as no TTE in system  - Currently w/o s/s of acute exacerbation  - cardio following, appreciated      Problem/Plan - 4:  ·  Problem: History of CVA (cerebrovascular accident).   ·  Plan: continue ASA 81mg qd.     Problem/Plan - 5:  ·  Problem: BPH (benign prostatic hyperplasia).   ·  Plan: - c/w home medications.     Problem/Plan - 6:  ·  Problem: Alzheimer's dementia.   ·  Plan: - Frequent reorientation  - Family at bedside when feasible.     Problem/Plan - 7:  ·  Problem: Prophylactic measure.   ·  Plan: DVT ppx: On  Coumadin .    Dispo: pending clinical improvement.  code status: full

## 2023-11-29 NOTE — DISCHARGE NOTE NURSING/CASE MANAGEMENT/SOCIAL WORK - NSDCVIVACCINE_GEN_ALL_CORE_FT
Tdap; 20-Apr-2022 17:15; Claudy Norris (RN); [employer]; [id number] (Exp. Date: 18-Jan-2024); IntraMuscular; Deltoid Right.; 0.5 milliLiter(s); VIS (VIS Published: 09-May-2013, VIS Presented: 20-Apr-2022);

## 2023-11-29 NOTE — DISCHARGE NOTE NURSING/CASE MANAGEMENT/SOCIAL WORK - PATIENT PORTAL LINK FT
You can access the FollowMyHealth Patient Portal offered by [facility] by registering at the following website: [email or web address]. By joining [email or web address]’s FollowMyHealth portal, you will also be able to view your health information using other applications (apps) compatible with our system.

## 2023-11-30 ENCOUNTER — TRANSCRIPTION ENCOUNTER (OUTPATIENT)
Age: 88
End: 2023-11-30

## 2023-11-30 VITALS
DIASTOLIC BLOOD PRESSURE: 66 MMHG | TEMPERATURE: 98 F | OXYGEN SATURATION: 97 % | HEART RATE: 66 BPM | SYSTOLIC BLOOD PRESSURE: 105 MMHG | RESPIRATION RATE: 18 BRPM

## 2023-11-30 LAB
INR BLD: 1.84 RATIO — HIGH (ref 0.85–1.18)
INR BLD: 1.84 RATIO — HIGH (ref 0.85–1.18)
PROTHROM AB SERPL-ACNC: 19 SEC — HIGH (ref 9.5–13)
PROTHROM AB SERPL-ACNC: 19 SEC — HIGH (ref 9.5–13)

## 2023-11-30 PROCEDURE — 87077 CULTURE AEROBIC IDENTIFY: CPT

## 2023-11-30 PROCEDURE — 82607 VITAMIN B-12: CPT

## 2023-11-30 PROCEDURE — 82746 ASSAY OF FOLIC ACID SERUM: CPT

## 2023-11-30 PROCEDURE — 87637 SARSCOV2&INF A&B&RSV AMP PRB: CPT

## 2023-11-30 PROCEDURE — 92526 ORAL FUNCTION THERAPY: CPT

## 2023-11-30 PROCEDURE — 87040 BLOOD CULTURE FOR BACTERIA: CPT

## 2023-11-30 PROCEDURE — 87186 SC STD MICRODIL/AGAR DIL: CPT

## 2023-11-30 PROCEDURE — 82330 ASSAY OF CALCIUM: CPT

## 2023-11-30 PROCEDURE — 82803 BLOOD GASES ANY COMBINATION: CPT

## 2023-11-30 PROCEDURE — 93005 ELECTROCARDIOGRAM TRACING: CPT

## 2023-11-30 PROCEDURE — 99232 SBSQ HOSP IP/OBS MODERATE 35: CPT

## 2023-11-30 PROCEDURE — 96375 TX/PRO/DX INJ NEW DRUG ADDON: CPT

## 2023-11-30 PROCEDURE — 82435 ASSAY OF BLOOD CHLORIDE: CPT

## 2023-11-30 PROCEDURE — 97162 PT EVAL MOD COMPLEX 30 MIN: CPT

## 2023-11-30 PROCEDURE — 80053 COMPREHEN METABOLIC PANEL: CPT

## 2023-11-30 PROCEDURE — 85610 PROTHROMBIN TIME: CPT

## 2023-11-30 PROCEDURE — 92610 EVALUATE SWALLOWING FUNCTION: CPT

## 2023-11-30 PROCEDURE — 82375 ASSAY CARBOXYHB QUANT: CPT

## 2023-11-30 PROCEDURE — 84132 ASSAY OF SERUM POTASSIUM: CPT

## 2023-11-30 PROCEDURE — 87150 DNA/RNA AMPLIFIED PROBE: CPT

## 2023-11-30 PROCEDURE — 99285 EMERGENCY DEPT VISIT HI MDM: CPT | Mod: 25

## 2023-11-30 PROCEDURE — 70450 CT HEAD/BRAIN W/O DYE: CPT | Mod: MA

## 2023-11-30 PROCEDURE — 81001 URINALYSIS AUTO W/SCOPE: CPT

## 2023-11-30 PROCEDURE — 96374 THER/PROPH/DIAG INJ IV PUSH: CPT

## 2023-11-30 PROCEDURE — 87640 STAPH A DNA AMP PROBE: CPT

## 2023-11-30 PROCEDURE — 87086 URINE CULTURE/COLONY COUNT: CPT

## 2023-11-30 PROCEDURE — 83735 ASSAY OF MAGNESIUM: CPT

## 2023-11-30 PROCEDURE — 71260 CT THORAX DX C+: CPT | Mod: MA

## 2023-11-30 PROCEDURE — 82805 BLOOD GASES W/O2 SATURATION: CPT

## 2023-11-30 PROCEDURE — 80299 QUANTITATIVE ASSAY DRUG: CPT

## 2023-11-30 PROCEDURE — 85014 HEMATOCRIT: CPT

## 2023-11-30 PROCEDURE — 84295 ASSAY OF SERUM SODIUM: CPT

## 2023-11-30 PROCEDURE — 85025 COMPLETE CBC W/AUTO DIFF WBC: CPT

## 2023-11-30 PROCEDURE — 74177 CT ABD & PELVIS W/CONTRAST: CPT | Mod: MA

## 2023-11-30 PROCEDURE — 85730 THROMBOPLASTIN TIME PARTIAL: CPT

## 2023-11-30 PROCEDURE — 87641 MR-STAPH DNA AMP PROBE: CPT

## 2023-11-30 PROCEDURE — 85018 HEMOGLOBIN: CPT

## 2023-11-30 PROCEDURE — 82947 ASSAY GLUCOSE BLOOD QUANT: CPT

## 2023-11-30 PROCEDURE — 36415 COLL VENOUS BLD VENIPUNCTURE: CPT

## 2023-11-30 PROCEDURE — 84100 ASSAY OF PHOSPHORUS: CPT

## 2023-11-30 PROCEDURE — 83605 ASSAY OF LACTIC ACID: CPT

## 2023-11-30 RX ORDER — CEFUROXIME AXETIL 250 MG
1 TABLET ORAL
Qty: 34 | Refills: 0
Start: 2023-11-30 | End: 2023-12-16

## 2023-11-30 RX ADMIN — Medication 100 MILLIGRAM(S): at 13:30

## 2023-11-30 RX ADMIN — Medication 81 MILLIGRAM(S): at 13:30

## 2023-11-30 RX ADMIN — PANTOPRAZOLE SODIUM 40 MILLIGRAM(S): 20 TABLET, DELAYED RELEASE ORAL at 05:26

## 2023-11-30 RX ADMIN — Medication 250 MICROGRAM(S): at 05:26

## 2023-11-30 RX ADMIN — CEFTRIAXONE 100 MILLIGRAM(S): 500 INJECTION, POWDER, FOR SOLUTION INTRAMUSCULAR; INTRAVENOUS at 13:31

## 2023-11-30 RX ADMIN — FINASTERIDE 5 MILLIGRAM(S): 5 TABLET, FILM COATED ORAL at 13:30

## 2023-11-30 RX ADMIN — Medication 50 MILLIGRAM(S): at 05:25

## 2023-11-30 NOTE — PROGRESS NOTE ADULT - ASSESSMENT
89 yo M A Fib, CHF, dementia with AMS  Fever, no leukocytosis  Initially with AMS  Suspected UTI  CT A/P with resolving pneumonia, bladder thickening, ureteritis?; renal lesion--abscess?  BCX E coli S pending  UA+, UCX E coli  Aware yeast like cells on UA, doubt significance  Overall, Fever, E coli bacteremia, UTI, renal abscess  - Ceftriaxone 1g q 24, when discharge planning, send out on Ceftin 500mg q 12 to complete 21 days (extended course for renal abscess)  - F/U pending BCX  - Defer any role for drainage abscess to Urology team  - Monitor for further fevers    Claudy Godoy MD  Contact on TEAMS messaging from 9am - 5pm  From 5pm-9am, on weekends, or if no response call [phone]

## 2023-11-30 NOTE — DISCHARGE NOTE PROVIDER - CARE PROVIDER_API CALL
Shakeel Jacinto   Internal Medicine  [address] [place] [id number]  Phone: [phone]  Fax: [phone]  Follow Up Time: 2 weeks   Shakeel Jacinto   Internal Medicine  [address] [place] [address]  Phone: [phone]  Fax: [phone]  Follow Up Time: 2 weeks    Orestes Hart  Cardiology  [address] [place] [id number]  Phone: [phone]  Follow Up Time: 1-3 days   Shakeel Jacinto   Internal Medicine  [address] [place] [address]  Phone: [phone]  Fax: [phone]  Follow Up Time: 2 weeks    Orestes Hart  Cardiology  [id number] [place] [id number]  Phone: [phone]  Follow Up Time: 1-3 days    Amari Cisneros  Urology  [address] [facility], [address] [address]  Phone: [phone]  Fax: [phone]  Follow Up Time: 2 months

## 2023-11-30 NOTE — DISCHARGE NOTE PROVIDER - NSDCMRMEDTOKEN_GEN_ALL_CORE_FT
aspirin 81 mg oral delayed release tablet: 1 tab(s) orally once a day  cholecalciferol 25 mcg (1000 intl units) oral tablet: 1 tab(s) orally once a day  Digox 250 mcg (0.25 mg) oral tablet: 1 tab(s) orally once a day  finasteride 5 mg oral tablet: 1 tab(s) orally once a day  metoprolol tartrate 50 mg oral tablet: 1 tab(s) orally 2 times a day  pantoprazole 40 mg oral delayed release tablet: 1 tab(s) orally 2 times a day  simvastatin 10 mg oral tablet: 1 tab(s) orally once a day (at bedtime)  tamsulosin 0.4 mg oral capsule: 1 cap(s) orally once a day (at bedtime)  Vitamin B6 100 mg oral tablet: 1 tab(s) orally once a day  warfarin 1 mg oral tablet: 1.5 tab(s) orally 2 times a week Monday and Wednesday  warfarin 1 mg oral tablet: 1 tab(s) orally 5 times a week Tuesday, Thursday, Friday, Saturday and Sunday   aspirin 81 mg oral delayed release tablet: 1 tab(s) orally once a day  cefuroxime 500 mg oral tablet: 1 tab(s) orally 2 times a day  cholecalciferol 25 mcg (1000 intl units) oral tablet: 1 tab(s) orally once a day  Digox 250 mcg (0.25 mg) oral tablet: 1 tab(s) orally once a day  finasteride 5 mg oral tablet: 1 tab(s) orally once a day  metoprolol tartrate 50 mg oral tablet: 1 tab(s) orally 2 times a day  pantoprazole 40 mg oral delayed release tablet: 1 tab(s) orally 2 times a day  simvastatin 10 mg oral tablet: 1 tab(s) orally once a day (at bedtime)  tamsulosin 0.4 mg oral capsule: 1 cap(s) orally once a day (at bedtime)  Vitamin B6 100 mg oral tablet: 1 tab(s) orally once a day  warfarin 1 mg oral tablet: 1.5 tab(s) orally 2 times a week Monday and Wednesday  warfarin 1 mg oral tablet: 1 tab(s) orally 5 times a week Tuesday, Thursday, Friday, Saturday and Sunday

## 2023-11-30 NOTE — DISCHARGE NOTE PROVIDER - HOSPITAL COURSE
89yo M w/ PMH of Afib on Coumadin, CHF, CVA, BPH, dementia (A&Ox0-1 at baseline) pw worsening confusion and altered mental status x1 day found to be septic likely i/s/o UTI w/ c/f renal abscess as well, pending urology eval     Problem/Plan - 1:  ·  Problem: Sepsis due to urinary tract infection, E Coli  bacteremia    continue antibiotics per ID   follow blood cultures / sensitivities..  >> repeat bcx NGTD .   >>  close follow up for concerns about recurrent UTIS, enlarged / abnormal prostate on CT and possible renal abscess developing ( As per , too small to drain)    BCX E coli S pending  UA+, UCX E coli  Aware yeast like cells on UA, doubt significance  Overall, Fever, E coli bacteremia, UTI, renal abscess  - Ceftriaxone 1g q 24, when discharge planning, send out on Ceftin 500mg q 12 to complete 21 days (extended course for renal abscess)  - F/U pending BCX     Problem/Plan - 2:  ·  Problem: Chronic atrial fibrillation.   ·  Plan: - CHADSVASc: 5  Dosing  coumadin as INR low.    heart rate controlled.  cardio on board     Problem/Plan - 3:  ·  Problem: Chronic CHF (congestive heart failure).   ·  Plan: - Unclear baseline EF as no TTE in system  - Currently w/o s/s of acute exacerbation  - cardio following, appreciated      Problem/Plan - 4:  ·  Problem: History of CVA (cerebrovascular accident).   ·  Plan: continue ASA 81mg qd.     Problem/Plan - 5:  ·  Problem: BPH (benign prostatic hyperplasia).   ·  Plan: - c/w home medications.     Problem/Plan - 6:  ·  Problem: Alzheimer's dementia.   ·  Plan: - Frequent reorientation  - Family at bedside when feasible.     Problem/Plan - 7:  ·  Problem: Prophylactic measure.   ·  Plan: DVT ppx: On  Coumadin      89yo M w/ PMH of Afib on Coumadin, CHF, CVA, BPH, dementia (A&Ox0-1 at baseline) pw worsening confusion and altered mental status x1 day found to be septic likely i/s/o UTI w/ c/f renal abscess as well, pending urology eval     Problem/Plan - 1:  ·  Problem: Sepsis due to urinary tract infection, E Coli  bacteremia    continue antibiotics per ID   follow blood cultures / sensitivities..  >> repeat bcx NGTD .   >>  close follow up for concerns about recurrent UTIS, enlarged / abnormal prostate on CT and possible renal abscess developing ( As per , too small to drain)    BCX E coli   UA+, UCX E coli  Aware yeast like cells on UA, doubt significance  Overall, Fever, E coli bacteremia, UTI, renal abscess  - Ceftriaxone 1g q 24, when discharge planning, send out on Ceftin 500mg q 12 to complete 21 days (extended course for renal abscess)       Problem/Plan - 2:  ·  Problem: Chronic atrial fibrillation.   ·  Plan: - CHADSVASc: 5  Dosing  coumadin as INR low.    heart rate controlled.  cardio on board     Problem/Plan - 3:  ·  Problem: Chronic CHF (congestive heart failure).   ·  Plan: - Unclear baseline EF as no TTE in system  - Currently w/o s/s of acute exacerbation  - cardio following, appreciated      Problem/Plan - 4:  ·  Problem: History of CVA (cerebrovascular accident).   ·  Plan: continue ASA 81mg qd.     Problem/Plan - 5:  ·  Problem: BPH (benign prostatic hyperplasia).   ·  Plan: - c/w home medications.     Problem/Plan - 6:  ·  Problem: Alzheimer's dementia.   ·  Plan: - Frequent reorientation  - Family at bedside when feasible.     Problem/Plan - 7:  ·  Problem: Prophylactic measure.   ·  Plan: DVT ppx: On  Coumadin      89yo M w/ PMH of Afib on Coumadin, CHF, CVA, BPH, dementia (A&Ox0-1 at baseline) pw worsening confusion and altered mental status x1 day found to be septic likely i/s/o UTI w/ c/f renal abscess as well, pending urology eval     Problem/Plan - 1:  ·  Problem: Sepsis due to urinary tract infection, E Coli  bacteremia    continue antibiotics per ID   follow blood cultures / sensitivities..  >> repeat bcx NGTD .   >>  close follow up for concerns about recurrent UTIS, enlarged / abnormal prostate on CT and possible renal abscess developing ( As per , too small to drain)    BCX E coli   UA+, UCX E coli  Aware yeast like cells on UA, doubt significance  Overall, Fever, E coli bacteremia, UTI, renal abscess  - Ceftriaxone 1g q 24, when discharge planning, send out on Ceftin 500mg q 12 to complete 21 days (extended course for renal abscess)       Problem/Plan - 2:  ·  Problem: Chronic atrial fibrillation.   ·  Plan: - CHADSVASc: 5  Dosing  coumadin as INR low 1.84  heart rate controlled.  f/u cardio in 2 days for f/u INR     Problem/Plan - 3:  ·  Problem: Chronic CHF (congestive heart failure).   ·  Plan: - Unclear baseline EF as no TTE in system  - Currently w/o s/s of acute exacerbation  - cardio following, appreciated      Problem/Plan - 4:  ·  Problem: History of CVA (cerebrovascular accident).   ·  Plan: continue ASA 81mg qd.     Problem/Plan - 5:  ·  Problem: BPH (benign prostatic hyperplasia).   ·  Plan: - c/w home medications.     Problem/Plan - 6:  ·  Problem: Alzheimer's dementia.   ·  Plan: - Frequent reorientation  - Family at bedside when feasible.     Problem/Plan - 7:  ·  Problem: Prophylactic measure.   ·  Plan: DVT ppx: On  Coumadin      89yo M w/ PMH of Afib on Coumadin, CHF, CVA, BPH, dementia (A&Ox0-1 at baseline) pw worsening confusion and altered mental status x1 day found to be septic likely i/s/o UTI w/ c/f renal abscess as well, pending urology eval     Problem/Plan - 1:  ·  Problem: Sepsis due to urinary tract infection, E Coli  bacteremia    continue antibiotics per ID   follow blood cultures / sensitivities..  >> repeat bcx NGTD .   >>  close follow up for concerns about recurrent UTIS, enlarged / abnormal prostate on CT and possible renal abscess developing ( As per , too small to drain)    BCX E coli   UA+, UCX E coli  Aware yeast like cells on UA, doubt significance  Overall, Fever, E coli bacteremia, UTI, renal abscess  - Ceftriaxone 1g q 24, when discharge planning, send out on Ceftin 500mg q 12 to complete 21 days (extended course for renal abscess)  F/U Urology For RPT US renal in 3 months     Problem/Plan - 2:  ·  Problem: Chronic atrial fibrillation.   ·  Plan: - CHADSVASc: 5  Dosing  coumadin as INR low 1.84  heart rate controlled.  f/u cardio in 2 days for f/u INR     Problem/Plan - 3:  ·  Problem: Chronic CHF (congestive heart failure).   ·  Plan: - Unclear baseline EF as no TTE in system  - Currently w/o s/s of acute exacerbation  - cardio following, appreciated      Problem/Plan - 4:  ·  Problem: History of CVA (cerebrovascular accident).   ·  Plan: continue ASA 81mg qd.     Problem/Plan - 5:  ·  Problem: BPH (benign prostatic hyperplasia).   ·  Plan: - c/w home medications.     Problem/Plan - 6:  ·  Problem: Alzheimer's dementia.   ·  Plan: - Frequent reorientation  - Family at bedside when feasible.     Problem/Plan - 7:  ·  Problem: Prophylactic measure.   ·  Plan: DVT ppx: On  Coumadin

## 2023-11-30 NOTE — PROGRESS NOTE ADULT - PROVIDER SPECIALTY LIST ADULT
Cardiology
Infectious Disease
Internal Medicine

## 2023-11-30 NOTE — DISCHARGE NOTE PROVIDER - CARE PROVIDERS DIRECT ADDRESSES
,[email or web address] ,[email or web address]

## 2023-11-30 NOTE — DISCHARGE NOTE PROVIDER - NSDCCPCAREPLAN_GEN_ALL_CORE_FT
PRINCIPAL DISCHARGE DIAGNOSIS  Diagnosis: Acute UTI  Assessment and Plan of Treatment: s/p Ceftriaxone, continue Ceftin 17 more days      SECONDARY DISCHARGE DIAGNOSES  Diagnosis: Bacteremia  Assessment and Plan of Treatment: cont antibiotics

## 2023-11-30 NOTE — PROGRESS NOTE ADULT - SUBJECTIVE AND OBJECTIVE BOX
CC: F/U for Renal abscess    Saw/spoke to patient. No fevers, no chills. No new complaints.    Allergies  No Known Allergies    ANTIMICROBIALS:  cefTRIAXone   IVPB 1000 every 24 hours    PE:    Vital Signs Last 24 Hrs  T(C): 36.6 (30 Nov 2023 12:14), Max: 36.9 (29 Nov 2023 19:46)  T(F): 97.8 (30 Nov 2023 12:14), Max: 98.4 (29 Nov 2023 19:46)  HR: 66 (30 Nov 2023 12:14) (66 - 89)  BP: 105/66 (30 Nov 2023 12:14) (105/66 - 120/80)  RR: 18 (30 Nov 2023 12:14) (18 - 18)  SpO2: 97% (30 Nov 2023 12:14) (96% - 97%)    Gen: AOx3, NAD, non-toxic  CV: Nontachycardic  Resp: Breathing comfortably, RA  Abd: Soft, nontender  IV/Skin: No thrombophlebitis    LABS:      MICROBIOLOGY:    .Blood Blood-Peripheral  11-26-23   No growth at 4 days  --  --    Clean Catch Clean Catch (Midstream)  11-24-23   >100,000 CFU/ml Escherichia coli  --  Escherichia coli    .Blood Blood-Peripheral  11-24-23   Growth in aerobic bottle: Escherichia coli  Direct identification is available within approximately 3-5  hours either by Blood Panel Multiplexed PCR or Direct  MALDI-TOF. Details: [email or web address]  --  Blood Culture PCR  Escherichia coli    .Blood Blood-Peripheral  11-24-23   No growth at 5 days  --  --    RADIOLOGY:    11/24 CT    IMPRESSION:    Slightly decreased bilateral opacities in the lungs, suggesting resolving   pneumonia.  Similar urinary bladder wall thickening. New prominent right ureter with   slight hyperenhancement, which raises the suspicion of ureteritis.  Enlargement of 2.1 cm thick walled cystic lesion in the right kidney   compared to one month ago, previously 1.1 cm. This would be suspicious   for a developing renal abscess in the setting of UTI.
CC: F/U for Renal abscess    Saw/spoke to patient. No fevers, no chills. No new complaints.    Allergies  No Known Allergies    ANTIMICROBIALS:  cefTRIAXone   IVPB 1000 every 24 hours    PE:    Vital Signs Last 24 Hrs  T(C): 36.9 (28 Nov 2023 11:57), Max: 36.9 (28 Nov 2023 11:57)  T(F): 98.4 (28 Nov 2023 11:57), Max: 98.4 (28 Nov 2023 11:57)  HR: 88 (28 Nov 2023 11:57) (66 - 90)  BP: 125/78 (28 Nov 2023 11:57) (109/63 - 132/60)  RR: 18 (28 Nov 2023 11:57) (18 - 18)  SpO2: 95% (28 Nov 2023 11:57) (95% - 99%)    Gen: AOx3, NAD, non-toxic  CV: Nontachycardic  Resp: Breathing comfortably, RA  Abd: Soft, nontender  IV/Skin: No thrombophlebitis    LABS:    No new available    MICROBIOLOGY:    .Blood Blood-Peripheral  11-26-23   No growth at 48 Hours  --  --    Clean Catch Clean Catch (Midstream)  11-24-23   >100,000 CFU/ml Escherichia coli  --  Escherichia coli    .Blood Blood-Peripheral  11-24-23   Growth in aerobic bottle: Escherichia coli  Direct identification is available within approximately 3-5  hours either by Blood Panel Multiplexed PCR or Direct  MALDI-TOF. Details: [email or web address]  --  Blood Culture PCR  Escherichia coli    .Blood Blood-Peripheral  11-24-23   No growth at 72 Hours  --  --    RADIOLOGY:    11/24 CT:    IMPRESSION:    Slightly decreased bilateral opacities in the lungs, suggesting resolving   pneumonia.  Similar urinary bladder wall thickening. New prominent right ureter with   slight hyperenhancement, which raises the suspicion of ureteritis.  Enlargement of 2.1 cm thick walled cystic lesion in the right kidney   compared to one month ago, previously 1.1 cm. This would be suspicious   for a developing renal abscess in the setting of UTI.  
CC: F/U for bacteremia    Saw/spoke to patient. No fevers, no chills. No new complaints.    Allergies  No Known Allergies    ANTIMICROBIALS:  cefTRIAXone   IVPB 1000 every 24 hours    PE:    Vital Signs Last 24 Hrs  T(C): 36.7 (27 Nov 2023 12:38), Max: 36.8 (27 Nov 2023 04:39)  T(F): 98 (27 Nov 2023 12:38), Max: 98.3 (27 Nov 2023 04:39)  HR: 74 (27 Nov 2023 12:38) (74 - 79)  BP: 109/72 (27 Nov 2023 12:38) (109/72 - 115/72)  RR: 18 (27 Nov 2023 04:39) (18 - 18)  SpO2: 96% (27 Nov 2023 04:39) (96% - 96%)    Gen: AOx3, NAD, non-toxic  CV: Nontachycardic  Resp: Breathing comfortably, RA  Abd: Soft, nontender  IV/Skin: No thrombophlebitis    LABS:                        12.2   4.23  )-----------( 171      ( 26 Nov 2023 07:28 )             39.3     11-26    142  |  107  |  10  ----------------------------<  116<H>  3.7   |  27  |  0.80    Ca    8.3<L>      26 Nov 2023 07:28    TPro  5.2<L>  /  Alb  2.2<L>  /  TBili  1.0  /  DBili  x   /  AST  23  /  ALT  22  /  AlkPhos  84  11-26    Urinalysis Basic - ( 26 Nov 2023 07:28 )    Color: x / Appearance: x / SG: x / pH: x  Gluc: 116 mg/dL / Ketone: x  / Bili: x / Urobili: x   Blood: x / Protein: x / Nitrite: x   Leuk Esterase: x / RBC: x / WBC x   Sq Epi: x / Non Sq Epi: x / Bacteria: x    MICROBIOLOGY:    .Blood Blood-Peripheral  11-26-23   No growth at 24 hours  --  --    Clean Catch Clean Catch (Midstream)  11-24-23   >100,000 CFU/ml Escherichia coli  --  Escherichia coli    .Blood Blood-Peripheral  11-24-23   Growth in aerobic bottle: Escherichia coli  Direct identification is available within approximately 3-5  hours either by Blood Panel Multiplexed PCR or Direct  MALDI-TOF. Details: [email or web address]  --  Blood Culture PCR  Escherichia coli    .Blood Blood-Peripheral  11-24-23   No growth at 48 Hours  --  --    RADIOLOGY:    11/24 CT    IMPRESSION:    Slightly decreased bilateral opacities in the lungs, suggesting resolving   pneumonia.  Similar urinary bladder wall thickening. New prominent right ureter with   slight hyperenhancement, which raises the suspicion of ureteritis.  Enlargement of 2.1 cm thick walled cystic lesion in the right kidney   compared to one month ago, previously 1.1 cm. This would be suspicious   for a developing renal abscess in the setting of UTI.
Cardiovascular Disease Progress Note  Date of service: 11-26-23 @ 10:59    Overnight events: No acute events overnight.  Patient is in no distress.   Otherwise review of systems negative    Objective Findings:  T(C): 36.5 (11-26-23 @ 05:02), Max: 36.7 (11-25-23 @ 20:55)  HR: 82 (11-26-23 @ 05:02) (82 - 92)  BP: 159/81 (11-26-23 @ 05:02) (113/75 - 159/81)  RR: 18 (11-26-23 @ 05:02) (18 - 20)  SpO2: 98% (11-26-23 @ 05:02) (97% - 100%)  Wt(kg): --  Daily     Daily       Physical Exam:  Gen: NAD; Patient resting comfortably  HEENT: EOMI, Normocephalic/ atraumatic  CV: RRR, normal S1 + S2, no m/r/g  Lungs:  Normal respiratory effort; clear to auscultation bilaterally  Abd: soft, non-tender; bowel sounds present  Ext: No edema; warm and well perfused    Telemetry:  N/a    Laboratory Data:                        12.2   4.23  )-----------( 171      ( 26 Nov 2023 07:28 )             39.3     11-26    142  |  107  |  10  ----------------------------<  116<H>  3.7   |  27  |  0.80    Ca    8.3<L>      26 Nov 2023 07:28  Phos  2.7     11-25  Mg     1.9     11-25    TPro  5.2<L>  /  Alb  2.2<L>  /  TBili  1.0  /  DBili  x   /  AST  23  /  ALT  22  /  AlkPhos  84  11-26    PT/INR - ( 26 Nov 2023 07:28 )   PT: 53.1 sec;   INR: 5.31 ratio         PTT - ( 25 Nov 2023 10:54 )  PTT:40.8 sec          Inpatient Medications:  MEDICATIONS  (STANDING):  aspirin enteric coated 81 milliGRAM(s) Oral daily  cefTRIAXone   IVPB 1000 milliGRAM(s) IV Intermittent every 24 hours  digoxin     Tablet 250 MICROGram(s) Oral daily  finasteride 5 milliGRAM(s) Oral daily  metoprolol tartrate 50 milliGRAM(s) Oral two times a day  pantoprazole    Tablet 40 milliGRAM(s) Oral before breakfast  pyridoxine 100 milliGRAM(s) Oral daily  simvastatin 10 milliGRAM(s) Oral at bedtime  tamsulosin 0.4 milliGRAM(s) Oral at bedtime      Assessment:  89yo M w/ PMH of Afib on Coumadin, CHF, CVA, BPH, dementia (A&Ox0-1 at baseline) pw worsening confusion and altered mental status.      Plan of Care:    #Afib  - Rate controlled on Digoxin and BB  - Pt on Coumadin with INR goal of 2-3  - AC on hold for supratherapeutic INR, Please resume once INR <3  - Continue current management.     #Supratherapeutic INR  - AC on hold  S  #Sepsis  - 2/2 UTI  - Abx as per primary team    #HLD  - Statin as ordered      #ACP (advance care planning)-  Advanced care planning was discussed..  Risks, benefits and alternatives of medical treatment and procedures were addressed. 30 additional minutes spent addressing advance care plans.          Over 25 minutes spent on total encounter; more than 50% of the visit was spent counseling and/or coordinating care by the attending physician.      Orestes Hart,  [facility]  Cardiovascular Disease  [phone]
Date of Service  : 11-28-23     INTERVAL HPI/OVERNIGHT EVENTS: I feel fine. Doesn't keep NC Oxygen   Vital Signs Last 24 Hrs  T(C): 36.9 (28 Nov 2023 11:57), Max: 36.9 (28 Nov 2023 11:57)  T(F): 98.4 (28 Nov 2023 11:57), Max: 98.4 (28 Nov 2023 11:57)  HR: 88 (28 Nov 2023 11:57) (66 - 90)  BP: 125/78 (28 Nov 2023 11:57) (109/63 - 132/60)  BP(mean): --  RR: 18 (28 Nov 2023 11:57) (18 - 18)  SpO2: 95% (28 Nov 2023 11:57) (95% - 99%)    Parameters below as of 28 Nov 2023 11:57  Patient On (Oxygen Delivery Method): room air  O2 Flow (L/min): 95    I&O's Summary    27 Nov 2023 07:01  -  28 Nov 2023 07:00  --------------------------------------------------------  IN: 430 mL / OUT: 0 mL / NET: 430 mL      MEDICATIONS  (STANDING):  aspirin enteric coated 81 milliGRAM(s) Oral daily  cefTRIAXone   IVPB 1000 milliGRAM(s) IV Intermittent every 24 hours  digoxin     Tablet 250 MICROGram(s) Oral daily  finasteride 5 milliGRAM(s) Oral daily  metoprolol tartrate 50 milliGRAM(s) Oral two times a day  pantoprazole    Tablet 40 milliGRAM(s) Oral before breakfast  pyridoxine 100 milliGRAM(s) Oral daily  simvastatin 10 milliGRAM(s) Oral at bedtime  tamsulosin 0.4 milliGRAM(s) Oral at bedtime  warfarin 1.5 milliGRAM(s) Oral once    MEDICATIONS  (PRN):  acetaminophen     Tablet .. 650 milliGRAM(s) Oral every 6 hours PRN Temp greater or equal to 38C (100.4F), Mild Pain (1 - 3)    LABS:          PT/INR - ( 28 Nov 2023 11:02 )   PT: 12.6 sec;   INR: 1.21 ratio         PTT - ( 28 Nov 2023 11:02 )  PTT:29.0 sec    CAPILLARY BLOOD GLUCOSE                  Consultant(s) Notes Reviewed:  [x ] YES  [ ] NO    PHYSICAL EXAM:  GENERAL: NAD, well-groomed, well-developed,not in any distress ,  HEAD:  Atraumatic, Normocephalic  NECK: Supple, No JVD, Normal thyroid  NERVOUS SYSTEM:  Alert & , No focal deficit   CHEST/LUNG: Good air entry bilateral with no  rales, rhonchi, wheezing, or rubs  HEART: Regular rate and rhythm; No murmurs, rubs, or gallops  ABDOMEN: Soft, Nontender, Nondistended; Bowel sounds present  EXTREMITIES:  2+ Peripheral Pulses, No clubbing, cyanosis, or edema    Care Discussed with Consultants/Other Providers [ x] YES  [ ] NO
Date of Service  : 11-30-23     INTERVAL HPI/OVERNIGHT EVENTS: No new concerns.   Vital Signs Last 24 Hrs  T(C): 36.6 (30 Nov 2023 12:14), Max: 36.9 (29 Nov 2023 19:46)  T(F): 97.8 (30 Nov 2023 12:14), Max: 98.4 (29 Nov 2023 19:46)  HR: 66 (30 Nov 2023 12:14) (66 - 89)  BP: 105/66 (30 Nov 2023 12:14) (105/66 - 120/80)  BP(mean): --  RR: 18 (30 Nov 2023 12:14) (18 - 18)  SpO2: 97% (30 Nov 2023 12:14) (96% - 97%)    Parameters below as of 30 Nov 2023 05:06  Patient On (Oxygen Delivery Method): room air      I&O's Summary    29 Nov 2023 07:01  -  30 Nov 2023 07:00  --------------------------------------------------------  IN: 990 mL / OUT: 0 mL / NET: 990 mL    30 Nov 2023 07:01  -  30 Nov 2023 14:36  --------------------------------------------------------  IN: 120 mL / OUT: 0 mL / NET: 120 mL      MEDICATIONS  (STANDING):  aspirin enteric coated 81 milliGRAM(s) Oral daily  cefTRIAXone   IVPB 1000 milliGRAM(s) IV Intermittent every 24 hours  digoxin     Tablet 250 MICROGram(s) Oral daily  finasteride 5 milliGRAM(s) Oral daily  metoprolol tartrate 50 milliGRAM(s) Oral two times a day  pantoprazole    Tablet 40 milliGRAM(s) Oral before breakfast  pyridoxine 100 milliGRAM(s) Oral daily  simvastatin 10 milliGRAM(s) Oral at bedtime  tamsulosin 0.4 milliGRAM(s) Oral at bedtime    MEDICATIONS  (PRN):  acetaminophen     Tablet .. 650 milliGRAM(s) Oral every 6 hours PRN Temp greater or equal to 38C (100.4F), Mild Pain (1 - 3)    LABS:          PT/INR - ( 30 Nov 2023 10:32 )   PT: 19.0 sec;   INR: 1.84 ratio             CAPILLARY BLOOD GLUCOSE          Consultant(s) Notes Reviewed:  [x ] YES  [ ] NO    PHYSICAL EXAM:  GENERAL: NAD, well-groomed, well-developed,not in any distress ,  HEAD:  Atraumatic, Normocephalic  NECK: Supple, No JVD, Normal thyroid  NERVOUS SYSTEM:  Alert &  No focal deficit   CHEST/LUNG: Good air entry bilateral with no  rales, rhonchi, wheezing, or rubs  HEART: Regular rate and rhythm; No murmurs, rubs, or gallops  ABDOMEN: Soft, Nontender, Nondistended; Bowel sounds present  EXTREMITIES:  2+ Peripheral Pulses, No clubbing, cyanosis, or edema    Care Discussed with Consultants/Other Providers [ x] YES  [ ] NO
Cardiovascular Disease Progress Note  Date of service: 11-27-23 @ 08:07    Overnight events: No acute events overnight.  Mr. Kimbrough is in no distress.   Otherwise review of systems negative    Objective Findings:  T(C): 36.8 (11-27-23 @ 04:39), Max: 36.8 (11-27-23 @ 04:39)  HR: 76 (11-27-23 @ 04:39) (76 - 82)  BP: 115/72 (11-27-23 @ 04:39) (108/58 - 115/72)  RR: 18 (11-27-23 @ 04:39) (18 - 18)  SpO2: 96% (11-27-23 @ 04:39) (91% - 96%)  Wt(kg): --  Daily     Daily       Physical Exam:  Gen: NAD; Patient resting comfortably  HEENT: EOMI, Normocephalic/ atraumatic  CV: RRR, normal S1 + S2, no m/r/g  Lungs:  Normal respiratory effort; clear to auscultation bilaterally  Abd: soft, non-tender; bowel sounds present  Ext: No edema; warm and well perfused    Telemetry: N/a    Laboratory Data:                        12.2   4.23  )-----------( 171      ( 26 Nov 2023 07:28 )             39.3     11-26    142  |  107  |  10  ----------------------------<  116<H>  3.7   |  27  |  0.80    Ca    8.3<L>      26 Nov 2023 07:28  Phos  2.7     11-25  Mg     1.9     11-25    TPro  5.2<L>  /  Alb  2.2<L>  /  TBili  1.0  /  DBili  x   /  AST  23  /  ALT  22  /  AlkPhos  84  11-26    PT/INR - ( 27 Nov 2023 07:38 )   PT: 20.5 sec;   INR: 1.90 ratio         PTT - ( 25 Nov 2023 10:54 )  PTT:40.8 sec          Inpatient Medications:  MEDICATIONS  (STANDING):  aspirin enteric coated 81 milliGRAM(s) Oral daily  cefTRIAXone   IVPB 1000 milliGRAM(s) IV Intermittent every 24 hours  digoxin     Tablet 250 MICROGram(s) Oral daily  finasteride 5 milliGRAM(s) Oral daily  metoprolol tartrate 50 milliGRAM(s) Oral two times a day  pantoprazole    Tablet 40 milliGRAM(s) Oral before breakfast  pyridoxine 100 milliGRAM(s) Oral daily  simvastatin 10 milliGRAM(s) Oral at bedtime  tamsulosin 0.4 milliGRAM(s) Oral at bedtime      Assessment:  89yo M w/ PMH of Afib on Coumadin, CHF, CVA, BPH, dementia (A&Ox0-1 at baseline) pw worsening confusion and altered mental status.      Plan of Care:    #Afib  - Rate controlled on Digoxin and BB  - Pt on Coumadin with INR goal of 2-3  - AC on hold for supratherapeutic INR, Please resume once INR <3  - Continue current management.     #Supratherapeutic INR  - S/p Vit K 11/26/2023  - INR now 1.9   - Resume Coumadin if no contraindication with INR goal 2-3    #Sepsis  - 2/2 UTI  - Abx as per primary team    #HLD  - Statin as ordered        Over 25 minutes spent on total encounter; more than 50% of the visit was spent counseling and/or coordinating care by the attending physician.      Orestes Hart,  [facility]  Cardiovascular Disease  [phone]
Cardiovascular Disease Progress Note  Date of service: 11-29-23 @ 14:58    Overnight events: No acute events overnight.   Patient is in no distress.   Otherwise review of systems negative    Objective Findings:  T(C): 36.5 (11-29-23 @ 11:41), Max: 37.1 (11-29-23 @ 04:19)  HR: 77 (11-29-23 @ 11:41) (61 - 99)  BP: 109/72 (11-29-23 @ 11:41) (109/72 - 135/84)  RR: 18 (11-29-23 @ 11:41) (18 - 18)  SpO2: 97% (11-29-23 @ 11:41) (96% - 97%)  Wt(kg): --  Daily     Daily       Physical Exam:  Gen: NAD; Patient resting comfortably  HEENT: EOMI, Normocephalic/ atraumatic  CV: RRR, normal S1 + S2, no m/r/g  Lungs:  Normal respiratory effort; clear to auscultation bilaterally  Abd: soft, non-tender; bowel sounds present  Ext: No edema; warm and well perfused    Telemetry: N/a    Laboratory Data:          PT/INR - ( 29 Nov 2023 07:15 )   PT: 13.2 sec;   INR: 1.21 ratio         PTT - ( 28 Nov 2023 11:02 )  PTT:29.0 sec          Inpatient Medications:  MEDICATIONS  (STANDING):  aspirin enteric coated 81 milliGRAM(s) Oral daily  cefTRIAXone   IVPB 1000 milliGRAM(s) IV Intermittent every 24 hours  digoxin     Tablet 250 MICROGram(s) Oral daily  finasteride 5 milliGRAM(s) Oral daily  metoprolol tartrate 50 milliGRAM(s) Oral two times a day  pantoprazole    Tablet 40 milliGRAM(s) Oral before breakfast  pyridoxine 100 milliGRAM(s) Oral daily  simvastatin 10 milliGRAM(s) Oral at bedtime  tamsulosin 0.4 milliGRAM(s) Oral at bedtime  warfarin 3 milliGRAM(s) Oral once      Assessment:  89yo M w/ PMH of Afib on Coumadin, CHF, CVA, BPH, dementia (A&Ox0-1 at baseline) pw worsening confusion and altered mental status.      Plan of Care:    #Afib  - Rate controlled on Digoxin and BB  - Pt on Coumadin with INR goal of 2-3  - Continue current management.     #Supratherapeutic INR  - S/p Vit K 11/26/2023  - INR now subtherapeutic  - Agree with Coumadin 3mg this evening. Discussed with ACP.     #Sepsis  - 2/2 UTI  - Abx as per primary team    #HLD  - Statin as ordered      #ACP (advance care planning)-  Advanced care planning was discussed with the patient.  Risks, benefits and alternatives of medical treatment and procedures were discussed in detail and all questions were answered. 30 additional minutes spent addressing advance care plans.        Over 25 minutes spent on total encounter; more than 50% of the visit was spent counseling and/or coordinating care by the attending physician.      Orestes Hart,  [facility]  Cardiovascular Disease  [phone]
Cardiovascular Disease Progress Note  Date of service: 11-30-23 @ 07:07    Overnight events: No acute events overnight.  Patient is in no distress.   Otherwise review of systems negative    Objective Findings:  T(C): 36.4 (11-30-23 @ 05:06), Max: 36.9 (11-29-23 @ 19:46)  HR: 89 (11-30-23 @ 05:06) (77 - 89)  BP: 114/75 (11-30-23 @ 05:06) (109/72 - 120/80)  RR: 18 (11-30-23 @ 05:06) (18 - 18)  SpO2: 96% (11-30-23 @ 05:06) (96% - 97%)  Wt(kg): --  Daily     Daily       Physical Exam:  Gen: NAD; Patient resting comfortably  HEENT: EOMI, Normocephalic/ atraumatic  CV: RRR, normal S1 + S2, no m/r/g  Lungs:  Normal respiratory effort; clear to auscultation bilaterally  Abd: soft, non-tender; bowel sounds present  Ext: No edema; warm and well perfused    Telemetry: N/a    Laboratory Data:          PT/INR - ( 29 Nov 2023 07:15 )   PT: 13.2 sec;   INR: 1.21 ratio         PTT - ( 28 Nov 2023 11:02 )  PTT:29.0 sec          Inpatient Medications:  MEDICATIONS  (STANDING):  aspirin enteric coated 81 milliGRAM(s) Oral daily  cefTRIAXone   IVPB 1000 milliGRAM(s) IV Intermittent every 24 hours  digoxin     Tablet 250 MICROGram(s) Oral daily  finasteride 5 milliGRAM(s) Oral daily  metoprolol tartrate 50 milliGRAM(s) Oral two times a day  pantoprazole    Tablet 40 milliGRAM(s) Oral before breakfast  pyridoxine 100 milliGRAM(s) Oral daily  simvastatin 10 milliGRAM(s) Oral at bedtime  tamsulosin 0.4 milliGRAM(s) Oral at bedtime      Assessment: 89yo M w/ PMH of Afib on Coumadin, CHF, CVA, BPH, dementia (A&Ox0-1 at baseline) pw worsening confusion and altered mental status.      Plan of Care:    #Afib  - Rate controlled on Digoxin and BB  - Pt on Coumadin with INR goal of 2-3  - Continue current management.     #Supratherapeutic INR  - S/p Vit K 11/26/2023  - Coumadin dosing pending INR results.     #Sepsis  - 2/2 UTI  - Abx as per primary team    #HLD  - Statin as ordered    Discharge planning as per primary team.       Over 25 minutes spent on total encounter; more than 50% of the visit was spent counseling and/or coordinating care by the attending physician.      Orestes Hart DO [facility]  Cardiovascular Disease  [phone]
Date of Service  : 11-29-23     INTERVAL HPI/OVERNIGHT EVENTS: I am good.   Vital Signs Last 24 Hrs  T(C): 36.5 (29 Nov 2023 11:41), Max: 37.1 (29 Nov 2023 04:19)  T(F): 97.7 (29 Nov 2023 11:41), Max: 98.7 (29 Nov 2023 04:19)  HR: 77 (29 Nov 2023 11:41) (61 - 99)  BP: 109/72 (29 Nov 2023 11:41) (109/72 - 135/84)  BP(mean): --  RR: 18 (29 Nov 2023 11:41) (18 - 18)  SpO2: 97% (29 Nov 2023 11:41) (96% - 97%)    Parameters below as of 29 Nov 2023 11:41  Patient On (Oxygen Delivery Method): room air      I&O's Summary    MEDICATIONS  (STANDING):  aspirin enteric coated 81 milliGRAM(s) Oral daily  cefTRIAXone   IVPB 1000 milliGRAM(s) IV Intermittent every 24 hours  digoxin     Tablet 250 MICROGram(s) Oral daily  finasteride 5 milliGRAM(s) Oral daily  metoprolol tartrate 50 milliGRAM(s) Oral two times a day  pantoprazole    Tablet 40 milliGRAM(s) Oral before breakfast  pyridoxine 100 milliGRAM(s) Oral daily  simvastatin 10 milliGRAM(s) Oral at bedtime  tamsulosin 0.4 milliGRAM(s) Oral at bedtime  warfarin 3 milliGRAM(s) Oral once    MEDICATIONS  (PRN):  acetaminophen     Tablet .. 650 milliGRAM(s) Oral every 6 hours PRN Temp greater or equal to 38C (100.4F), Mild Pain (1 - 3)    LABS:          PT/INR - ( 29 Nov 2023 07:15 )   PT: 13.2 sec;   INR: 1.21 ratio         PTT - ( 28 Nov 2023 11:02 )  PTT:29.0 sec    CAPILLARY BLOOD GLUCOSE                  Consultant(s) Notes Reviewed:  [x ] YES  [ ] NO    PHYSICAL EXAM:  GENERAL: NAD, well-groomed, well-developed,not in any distress ,  HEAD:  Atraumatic, Normocephalic  NECK: Supple, No JVD, Normal thyroid  NERVOUS SYSTEM:  Alert & No focal deficit   CHEST/LUNG: Good air entry bilateral with no  rales, rhonchi, wheezing, or rubs  HEART: Regular rate and rhythm; No murmurs, rubs, or gallops  ABDOMEN: Soft, Nontender, Nondistended; Bowel sounds present  EXTREMITIES:  2+ Peripheral Pulses, No clubbing, cyanosis, or edema    Care Discussed with Consultants/Other Providers [ x] YES  [ ] NO
Cardiovascular Disease Progress Note  Date of service: 11-28-23 @ 08:39    Overnight events: No acute events overnight.  Patient is in no distress.   Otherwise review of systems negative    Objective Findings:  T(C): 36.4 (11-28-23 @ 04:10), Max: 36.7 (11-27-23 @ 12:38)  HR: 90 (11-28-23 @ 04:10) (66 - 90)  BP: 109/63 (11-28-23 @ 04:10) (109/63 - 132/60)  RR: 18 (11-28-23 @ 04:10) (18 - 18)  SpO2: 95% (11-28-23 @ 04:10) (95% - 99%)  Wt(kg): --  Daily     Daily       Physical Exam:  Gen: NAD; Patient resting comfortably  HEENT: EOMI, Normocephalic/ atraumatic  CV: RRR, normal S1 + S2, no m/r/g  Lungs:  Normal respiratory effort; clear to auscultation bilaterally  Abd: soft, non-tender; bowel sounds present  Ext: No edema; warm and well perfused    Telemetry: N/a    Laboratory Data:          PT/INR - ( 27 Nov 2023 07:38 )   PT: 20.5 sec;   INR: 1.90 ratio                   Inpatient Medications:  MEDICATIONS  (STANDING):  aspirin enteric coated 81 milliGRAM(s) Oral daily  cefTRIAXone   IVPB 1000 milliGRAM(s) IV Intermittent every 24 hours  digoxin     Tablet 250 MICROGram(s) Oral daily  finasteride 5 milliGRAM(s) Oral daily  metoprolol tartrate 50 milliGRAM(s) Oral two times a day  pantoprazole    Tablet 40 milliGRAM(s) Oral before breakfast  pyridoxine 100 milliGRAM(s) Oral daily  simvastatin 10 milliGRAM(s) Oral at bedtime  tamsulosin 0.4 milliGRAM(s) Oral at bedtime      Assessment:  89yo M w/ PMH of Afib on Coumadin, CHF, CVA, BPH, dementia (A&Ox0-1 at baseline) pw worsening confusion and altered mental status.      Plan of Care:    #Afib  - Rate controlled on Digoxin and BB  - Pt on Coumadin with INR goal of 2-3  - Continue current management.     #Supratherapeutic INR  - S/p Vit K 11/26/2023  - INR now 1.9   - Resume Coumadin if no contraindication with INR goal 2-3    #Sepsis  - 2/2 UTI  - Abx as per primary team    #HLD  - Statin as ordered          Over 25 minutes spent on total encounter; more than 50% of the visit was spent counseling and/or coordinating care by the attending physician.      Orestes Hart DO formerly [facility]  Cardiovascular Disease  [phone]
Patient is a 89y old  Male who presents with a chief complaint of UTI (25 Nov 2023 08:43)  patient not known to me, assigned this AM to assume care  chart reviewed and events thus far noted  admitted overnight by hospitalist colleague     DATE OF SERVICE: 11-25-23 @ 13:13    SUBJECTIVE / OVERNIGHT EVENTS: overnight events noted    ROS:  Resp: No cough no sputum production  CVS: No chest pain no palpitations no orthopnea  GI: no N/V/D  per RN  "I feel fine'       MEDICATIONS  (STANDING):  aspirin enteric coated 81 milliGRAM(s) Oral daily  digoxin     Tablet 250 MICROGram(s) Oral daily  finasteride 5 milliGRAM(s) Oral daily  fluconAZOLE IVPB 100 milliGRAM(s) IV Intermittent every 24 hours  metoprolol tartrate 50 milliGRAM(s) Oral two times a day  pantoprazole    Tablet 40 milliGRAM(s) Oral before breakfast  piperacillin/tazobactam IVPB.. 3.375 Gram(s) IV Intermittent every 8 hours  pyridoxine 100 milliGRAM(s) Oral daily  simvastatin 10 milliGRAM(s) Oral at bedtime  tamsulosin 0.4 milliGRAM(s) Oral at bedtime    MEDICATIONS  (PRN):  acetaminophen     Tablet .. 650 milliGRAM(s) Oral every 6 hours PRN Temp greater or equal to 38C (100.4F), Mild Pain (1 - 3)        CAPILLARY BLOOD GLUCOSE        I&O's Summary      Vital Signs Last 24 Hrs  T(C): 36.3 (25 Nov 2023 12:52), Max: 37.2 (24 Nov 2023 13:50)  T(F): 97.4 (25 Nov 2023 12:52), Max: 99 (24 Nov 2023 13:50)  HR: 84 (25 Nov 2023 12:52) (55 - 85)  BP: 117/72 (25 Nov 2023 12:52) (86/52 - 120/81)  BP(mean): 70 (24 Nov 2023 17:51) (64 - 76)  RR: 18 (25 Nov 2023 12:52) (15 - 18)  SpO2: 100% (25 Nov 2023 12:52) (97% - 100%)    PHYSICAL EXAM:  EYES: EOMI, PERRLA,  NECK: Supple, No JVD  CHEST/LUNG: clear b/l, no wheeze  HEART: S1 S2; no murmurs appreciated  ABDOMEN: Soft, Nontender, Bowel sounds present  EXTREMITIES:  No clubbing or cyanosis,  no edema  NEUROLOGY: AO x 3 non-focal  SKIN: No rashes or lesions  no CVA tenderness     LABS:                        12.1   5.72  )-----------( 188      ( 25 Nov 2023 10:54 )             37.6     11-25    141  |  108  |  11  ----------------------------<  121<H>  3.9   |  26  |  0.85    Ca    8.3<L>      25 Nov 2023 10:54  Phos  2.7     11-25  Mg     1.9     11-25    TPro  5.3<L>  /  Alb  2.3<L>  /  TBili  1.1  /  DBili  x   /  AST  33  /  ALT  26  /  AlkPhos  89  11-25    PT/INR - ( 25 Nov 2023 10:54 )   PT: 48.1 sec;   INR: 4.57 ratio         PTT - ( 25 Nov 2023 10:54 )  PTT:40.8 sec      Urinalysis Basic - ( 25 Nov 2023 10:54 )    Color: x / Appearance: x / SG: x / pH: x  Gluc: 121 mg/dL / Ketone: x  / Bili: x / Urobili: x   Blood: x / Protein: x / Nitrite: x   Leuk Esterase: x / RBC: x / WBC x   Sq Epi: x / Non Sq Epi: x / Bacteria: x          All consultant(s) notes reviewed and care discussed with other providers        Contact Number, Dr Hirsch [phone]

## 2023-11-30 NOTE — DISCHARGE NOTE PROVIDER - PROVIDER TOKENS
PROVIDER:[TOKEN:[[id number]],FOLLOWUP:[2 weeks]] PROVIDER:[TOKEN:[[id number]],FOLLOWUP:[2 weeks]],PROVIDER:[TOKEN:[[id number]],FOLLOWUP:[1-3 days]] PROVIDER:[TOKEN:[[id number]],FOLLOWUP:[2 weeks]],PROVIDER:[TOKEN:[[id number]],FOLLOWUP:[1-3 days]],PROVIDER:[TOKEN:[[id number]],FOLLOWUP:[2 months]]

## 2023-11-30 NOTE — PROGRESS NOTE ADULT - ASSESSMENT
89yo M w/ PMH of Afib on Coumadin, CHF, CVA, BPH, dementia (A&Ox0-1 at baseline) pw worsening confusion and altered mental status x1 day found to be septic likely i/s/o UTI w/ c/f renal abscess as well, pending urology eval     Problem/Plan - 1:  ·  Problem: Sepsis due to urinary tract infection, E Coli  bacteremia    continue antibiotics per ID for total 3 weeks .   follow blood cultures / sensitivities..  >> repeat bcx NGTD .   >>  close follow up for concerns about recurrent UTIS, enlarged / abnormal prostate on CT and possible renal abscess developing ( As per , too small to drain)     Problem/Plan - 2:  ·  Problem: Chronic atrial fibrillation.   ·  Plan: - CHADSVASc: 5  Dosing  coumadin as INR low.  Rpt INR tomorrow and Monday .   heart rate controlled.  cardio on board     Problem/Plan - 3:  ·  Problem: Chronic CHF (congestive heart failure).   ·  Plan: - Unclear baseline EF as no TTE in system  - Currently w/o s/s of acute exacerbation  - cardio following, appreciated      Problem/Plan - 4:  ·  Problem: History of CVA (cerebrovascular accident).   ·  Plan: continue ASA 81mg qd.     Problem/Plan - 5:  ·  Problem: BPH (benign prostatic hyperplasia).   ·  Plan: - c/w home medications.     Problem/Plan - 6:  ·  Problem: Alzheimer's dementia.   ·  Plan: - Frequent reorientation  - Family at bedside when feasible.     Problem/Plan - 7:  ·  Problem: Renal cyst/Abscess .   ·  Plan: Rpt US and Urology F/U outpt.    < from: CT Abdomen and Pelvis w/ IV Cont (11.24.23 @ 17:20) >  IMPRESSION:    Slightly decreased bilateral opacities in the lungs, suggesting resolving   pneumonia.  Similar urinary bladder wall thickening. New prominent right ureter with   slight hyperenhancement, which raises the suspicion of ureteritis.  Enlargement of 2.1 cm thick walled cystic lesion in the right kidney   compared to one month ago, previously 1.1 cm. This would be suspicious   for a developing renal abscess in the setting of UTI.    < end of copied text >    Dispo: LEAH planning home .D/W daughter Ada in great detail. .  code status: full

## 2023-12-01 LAB
CULTURE RESULTS: SIGNIFICANT CHANGE UP
CULTURE RESULTS: SIGNIFICANT CHANGE UP
SPECIMEN SOURCE: SIGNIFICANT CHANGE UP
SPECIMEN SOURCE: SIGNIFICANT CHANGE UP

## 2023-12-19 ENCOUNTER — EMERGENCY (EMERGENCY)
Facility: HOSPITAL | Age: 88
LOS: 1 days | Discharge: HOME | End: 2023-12-19
Attending: STUDENT IN AN ORGANIZED HEALTH CARE EDUCATION/TRAINING PROGRAM | Admitting: STUDENT IN AN ORGANIZED HEALTH CARE EDUCATION/TRAINING PROGRAM
Payer: MEDICARE

## 2023-12-19 VITALS
TEMPERATURE: 99 F | RESPIRATION RATE: 18 BRPM | SYSTOLIC BLOOD PRESSURE: 134 MMHG | OXYGEN SATURATION: 96 % | HEART RATE: 86 BPM | DIASTOLIC BLOOD PRESSURE: 78 MMHG

## 2023-12-19 PROCEDURE — 99284 EMERGENCY DEPT VISIT MOD MDM: CPT

## 2023-12-19 NOTE — ED PROVIDER NOTE - PATIENT PORTAL LINK FT
You can access the FollowMyHealth Patient Portal offered by [facility] by registering at the following website: [email or web address]. By joining [employer]’s FollowMyHealth portal, you will also be able to view your health information using other applications (apps) compatible with our system. You can access the FollowMyHealth Patient Portal offered by [facility] by registering at the following website: [email or web address]. By joining [employer]’s FollowMyHealth portal, you will also be able to view your health information using other applications (apps) compatible with our system.

## 2023-12-19 NOTE — ED PROVIDER NOTE - PHYSICAL EXAMINATION
General: no acute distress  Head: normocephalic; atraumatic  Eyes: conjunctivae clear bilaterally, sclerae anicteric  ENT: no nasal flaring, patent nares  Cardio: regular rate and rhythm; normal heart sounds  Resp: crackles in L lung base; poor inspiratory effort  GI: abdomen soft, nontender, nondistended  Neuro: A&Ox0 (consistent with baseline)  Skin: no rashes or bruising noted  MSK: moves all extremities  Lymph/Vasc: no LE edema General: no acute distress, somnolent  Head: normocephalic; atraumatic  Eyes: conjunctivae clear bilaterally, sclerae anicteric  ENT: no nasal flaring, patent nares  Cardio: regular rate and rhythm; normal heart sounds  Resp: crackles in L lung base; poor inspiratory effort  GI: abdomen soft, nontender, nondistended  Neuro: A&Ox0 (consistent with baseline)  Skin: no rashes or bruising noted  MSK: moves all extremities  Lymph/Vasc: no LE edema

## 2023-12-19 NOTE — ED PROVIDER NOTE - NSFOLLOWUPINSTRUCTIONS_ED_ALL_ED_FT
- Follow up with your doctor within 1 week - bring copies of your results if you were given. If you do not have a primary doctor, please call [phone] to find one convenient for you    - Return to the ED for any new, worsening, or concerning symptoms to you    - Continue all prescribed medications    - Take ibuprofen/tylenol as needed for pain    - Rest and keep yourself hydrated with fluids - Follow up with your doctor within 1 week - bring copies of your results if you were given. If you do not have a primary doctor, please call [phone] to find one convenient for you    - Return to the ED for any new, worsening, or concerning symptoms to you    - Continue all prescribed medications    - Take ibuprofen/tylenol as needed for pain    - Rest and keep yourself hydrated with fluids

## 2023-12-19 NOTE — ED PROVIDER NOTE - PROGRESS NOTE DETAILS
TAYLOR Maurer PGY1- Workup resulted, no elevated WBC, covid/flu/rsv negative, UA without evidence of infection, no pneumonia seen on CXR, no obvious source of infection and patient afebrile here. Spoke over the phone with daughters Scarlett and Ada, discussed results, they verbalized understanding. Ok for dc home. daughters stated patient usually gets home with ambulette, will reach out to social work to arrange transport home.

## 2023-12-19 NOTE — ED ADULT TRIAGE NOTE - CHIEF COMPLAINT QUOTE
Pt sent by family for fever, cough, lethargy. Recently on abx for UTI. As per EMS wife also sick at home. Hx CVA, Dementia, BPH, Afib.

## 2023-12-19 NOTE — ED PROVIDER NOTE - ATTENDING CONTRIBUTION TO CARE
I (Lasha) agree with above, I performed a history and physical. Counseled jim medical staff, physician assistant, and/or medical student on medical decision making as documented. Medical decisions and treatment interventions were made in real time during the patient encounter. Additionally and/or with the following exceptions: 89-year-old past medical history of A-fib on Coumadin, CVA, BPH, hypertension hyperlipidemia presents with cough.  Had been going on for 1 day.  Patient said he has normal baseline mental status as per family was at bedside.  Patient close demented not oriented patient in no respiratory distress no hypoxia.  Patient was signed out to oncoming attending pending sepsis workup and chest x-ray.  If chest x-ray positive recommended admission.

## 2023-12-19 NOTE — ED PROVIDER NOTE - CLINICAL SUMMARY MEDICAL DECISION MAKING FREE TEXT BOX
[facility] EM PGY1- ... TAYLOR Maurer PGY1- Patient here with new onset cough, lethargy, and shakiness starting earlier today, family concerned for possible infection. 2-3 days ago patient finished course of Cefuroxime for UTI. Crackles auscultated in L lung base, otherwise no significant abnormalities noted on exam. Concern for URI vs pneumonia. Will obtain cbc, cmp, vbg, coags, CXR, nasal swab to evaluate. Will check rectal temp and give antipyretic if needed.

## 2023-12-19 NOTE — ED PROVIDER NOTE - OBJECTIVE STATEMENT
The patient  is a 89 y/o M with... The patient  is a 89 y/o M with... The patient  is a 89 y/o M with past medical history of AFIB on coumadin, CVA on ASA, BPH, HTN, HLD, dementia A&Ox0-1 at baseline, brought to the ED by family from home (lives with family) for further evaluation of cough and lethargy beginning few hours ago. The patient  is a 89 y/o M with past medical history of AFIB on coumadin, CVA on ASA, BPH, HTN, HLD, dementia A&Ox0-1 at baseline, brought to the ED by family from home (lives with family) for further evaluation of cough and lethargy beginning few hours ago. The patient  is a 89 y/o M with past medical history of AFIB on coumadin, CVA on ASA, BPH, HTN, HLD, dementia A&Ox0-1 at baseline, brought to the ED by family from home (lives with family) for further evaluation of cough and lethargy beginning few hours ago. Patient noted to have "shakiness" earlier today while in shower, which family members note he typically exhibits when he has an infection. Finished course of Cefuroxime for UTI 2-3 days ago. Still with normal PO intake. No fever, chest pain, dyspnea, abdominal pain, nausea, vomiting, diarrhea, or any other symptoms.

## 2023-12-20 VITALS
SYSTOLIC BLOOD PRESSURE: 138 MMHG | RESPIRATION RATE: 16 BRPM | OXYGEN SATURATION: 100 % | HEART RATE: 78 BPM | DIASTOLIC BLOOD PRESSURE: 83 MMHG | TEMPERATURE: 99 F

## 2023-12-20 LAB
ALBUMIN SERPL ELPH-MCNC: 2.8 G/DL — LOW (ref 3.3–5)
ALBUMIN SERPL ELPH-MCNC: 2.8 G/DL — LOW (ref 3.3–5)
ALP SERPL-CCNC: 86 U/L — SIGNIFICANT CHANGE UP (ref 40–120)
ALP SERPL-CCNC: 86 U/L — SIGNIFICANT CHANGE UP (ref 40–120)
ALT FLD-CCNC: 18 U/L — SIGNIFICANT CHANGE UP (ref 4–41)
ALT FLD-CCNC: 18 U/L — SIGNIFICANT CHANGE UP (ref 4–41)
ANION GAP SERPL CALC-SCNC: 7 MMOL/L — SIGNIFICANT CHANGE UP (ref 7–14)
ANION GAP SERPL CALC-SCNC: 7 MMOL/L — SIGNIFICANT CHANGE UP (ref 7–14)
APPEARANCE UR: CLEAR — SIGNIFICANT CHANGE UP
APPEARANCE UR: CLEAR — SIGNIFICANT CHANGE UP
APTT BLD: 30.9 SEC — SIGNIFICANT CHANGE UP (ref 24.5–35.6)
APTT BLD: 30.9 SEC — SIGNIFICANT CHANGE UP (ref 24.5–35.6)
AST SERPL-CCNC: 32 U/L — SIGNIFICANT CHANGE UP (ref 4–40)
AST SERPL-CCNC: 32 U/L — SIGNIFICANT CHANGE UP (ref 4–40)
BACTERIA # UR AUTO: NEGATIVE /HPF — SIGNIFICANT CHANGE UP
BACTERIA # UR AUTO: NEGATIVE /HPF — SIGNIFICANT CHANGE UP
BASE EXCESS BLDV CALC-SCNC: 0.6 MMOL/L — SIGNIFICANT CHANGE UP (ref -2–3)
BASE EXCESS BLDV CALC-SCNC: 0.6 MMOL/L — SIGNIFICANT CHANGE UP (ref -2–3)
BASOPHILS # BLD AUTO: 0.01 K/UL — SIGNIFICANT CHANGE UP (ref 0–0.2)
BASOPHILS # BLD AUTO: 0.01 K/UL — SIGNIFICANT CHANGE UP (ref 0–0.2)
BASOPHILS NFR BLD AUTO: 0.2 % — SIGNIFICANT CHANGE UP (ref 0–2)
BASOPHILS NFR BLD AUTO: 0.2 % — SIGNIFICANT CHANGE UP (ref 0–2)
BILIRUB SERPL-MCNC: 0.6 MG/DL — SIGNIFICANT CHANGE UP (ref 0.2–1.2)
BILIRUB SERPL-MCNC: 0.6 MG/DL — SIGNIFICANT CHANGE UP (ref 0.2–1.2)
BILIRUB UR-MCNC: NEGATIVE — SIGNIFICANT CHANGE UP
BILIRUB UR-MCNC: NEGATIVE — SIGNIFICANT CHANGE UP
BLOOD GAS VENOUS COMPREHENSIVE RESULT: SIGNIFICANT CHANGE UP
BLOOD GAS VENOUS COMPREHENSIVE RESULT: SIGNIFICANT CHANGE UP
BUN SERPL-MCNC: 9 MG/DL — SIGNIFICANT CHANGE UP (ref 7–23)
BUN SERPL-MCNC: 9 MG/DL — SIGNIFICANT CHANGE UP (ref 7–23)
CALCIUM SERPL-MCNC: 8.1 MG/DL — LOW (ref 8.4–10.5)
CALCIUM SERPL-MCNC: 8.1 MG/DL — LOW (ref 8.4–10.5)
CAST: 0 /LPF — SIGNIFICANT CHANGE UP (ref 0–4)
CAST: 0 /LPF — SIGNIFICANT CHANGE UP (ref 0–4)
CHLORIDE BLDV-SCNC: 102 MMOL/L — SIGNIFICANT CHANGE UP (ref 96–108)
CHLORIDE BLDV-SCNC: 102 MMOL/L — SIGNIFICANT CHANGE UP (ref 96–108)
CHLORIDE SERPL-SCNC: 99 MMOL/L — SIGNIFICANT CHANGE UP (ref 98–107)
CHLORIDE SERPL-SCNC: 99 MMOL/L — SIGNIFICANT CHANGE UP (ref 98–107)
CO2 BLDV-SCNC: 27.4 MMOL/L — HIGH (ref 22–26)
CO2 BLDV-SCNC: 27.4 MMOL/L — HIGH (ref 22–26)
CO2 SERPL-SCNC: 26 MMOL/L — SIGNIFICANT CHANGE UP (ref 22–31)
CO2 SERPL-SCNC: 26 MMOL/L — SIGNIFICANT CHANGE UP (ref 22–31)
COLOR SPEC: YELLOW — SIGNIFICANT CHANGE UP
COLOR SPEC: YELLOW — SIGNIFICANT CHANGE UP
CREAT SERPL-MCNC: 0.66 MG/DL — SIGNIFICANT CHANGE UP (ref 0.5–1.3)
CREAT SERPL-MCNC: 0.66 MG/DL — SIGNIFICANT CHANGE UP (ref 0.5–1.3)
DIFF PNL FLD: ABNORMAL
DIFF PNL FLD: ABNORMAL
EGFR: 89 ML/MIN/1.73M2 — SIGNIFICANT CHANGE UP
EGFR: 89 ML/MIN/1.73M2 — SIGNIFICANT CHANGE UP
EOSINOPHIL # BLD AUTO: 0 K/UL — SIGNIFICANT CHANGE UP (ref 0–0.5)
EOSINOPHIL # BLD AUTO: 0 K/UL — SIGNIFICANT CHANGE UP (ref 0–0.5)
EOSINOPHIL NFR BLD AUTO: 0 % — SIGNIFICANT CHANGE UP (ref 0–6)
EOSINOPHIL NFR BLD AUTO: 0 % — SIGNIFICANT CHANGE UP (ref 0–6)
FLUAV AG NPH QL: SIGNIFICANT CHANGE UP
FLUAV AG NPH QL: SIGNIFICANT CHANGE UP
FLUBV AG NPH QL: SIGNIFICANT CHANGE UP
FLUBV AG NPH QL: SIGNIFICANT CHANGE UP
GAS PNL BLDV: 126 MMOL/L — LOW (ref 136–145)
GAS PNL BLDV: 126 MMOL/L — LOW (ref 136–145)
GLUCOSE BLDV-MCNC: 98 MG/DL — SIGNIFICANT CHANGE UP (ref 70–99)
GLUCOSE BLDV-MCNC: 98 MG/DL — SIGNIFICANT CHANGE UP (ref 70–99)
GLUCOSE SERPL-MCNC: 131 MG/DL — HIGH (ref 70–99)
GLUCOSE SERPL-MCNC: 131 MG/DL — HIGH (ref 70–99)
GLUCOSE UR QL: NEGATIVE MG/DL — SIGNIFICANT CHANGE UP
GLUCOSE UR QL: NEGATIVE MG/DL — SIGNIFICANT CHANGE UP
HCO3 BLDV-SCNC: 26 MMOL/L — SIGNIFICANT CHANGE UP (ref 22–29)
HCO3 BLDV-SCNC: 26 MMOL/L — SIGNIFICANT CHANGE UP (ref 22–29)
HCT VFR BLD CALC: 38.6 % — LOW (ref 39–50)
HCT VFR BLD CALC: 38.6 % — LOW (ref 39–50)
HCT VFR BLDA CALC: 38 % — LOW (ref 39–51)
HCT VFR BLDA CALC: 38 % — LOW (ref 39–51)
HGB BLD CALC-MCNC: 12.6 G/DL — SIGNIFICANT CHANGE UP (ref 12.6–17.4)
HGB BLD CALC-MCNC: 12.6 G/DL — SIGNIFICANT CHANGE UP (ref 12.6–17.4)
HGB BLD-MCNC: 12.2 G/DL — LOW (ref 13–17)
HGB BLD-MCNC: 12.2 G/DL — LOW (ref 13–17)
IANC: 4.27 K/UL — SIGNIFICANT CHANGE UP (ref 1.8–7.4)
IANC: 4.27 K/UL — SIGNIFICANT CHANGE UP (ref 1.8–7.4)
IMM GRANULOCYTES NFR BLD AUTO: 0.4 % — SIGNIFICANT CHANGE UP (ref 0–0.9)
IMM GRANULOCYTES NFR BLD AUTO: 0.4 % — SIGNIFICANT CHANGE UP (ref 0–0.9)
INR BLD: 1.68 RATIO — HIGH (ref 0.85–1.18)
INR BLD: 1.68 RATIO — HIGH (ref 0.85–1.18)
KETONES UR-MCNC: NEGATIVE MG/DL — SIGNIFICANT CHANGE UP
KETONES UR-MCNC: NEGATIVE MG/DL — SIGNIFICANT CHANGE UP
LACTATE BLDV-MCNC: 2.2 MMOL/L — HIGH (ref 0.5–2)
LACTATE BLDV-MCNC: 2.2 MMOL/L — HIGH (ref 0.5–2)
LEUKOCYTE ESTERASE UR-ACNC: NEGATIVE — SIGNIFICANT CHANGE UP
LEUKOCYTE ESTERASE UR-ACNC: NEGATIVE — SIGNIFICANT CHANGE UP
LYMPHOCYTES # BLD AUTO: 0.76 K/UL — LOW (ref 1–3.3)
LYMPHOCYTES # BLD AUTO: 0.76 K/UL — LOW (ref 1–3.3)
LYMPHOCYTES # BLD AUTO: 13.7 % — SIGNIFICANT CHANGE UP (ref 13–44)
LYMPHOCYTES # BLD AUTO: 13.7 % — SIGNIFICANT CHANGE UP (ref 13–44)
MCHC RBC-ENTMCNC: 31.6 GM/DL — LOW (ref 32–36)
MCHC RBC-ENTMCNC: 31.6 GM/DL — LOW (ref 32–36)
MCHC RBC-ENTMCNC: 32.7 PG — SIGNIFICANT CHANGE UP (ref 27–34)
MCHC RBC-ENTMCNC: 32.7 PG — SIGNIFICANT CHANGE UP (ref 27–34)
MCV RBC AUTO: 103.5 FL — HIGH (ref 80–100)
MCV RBC AUTO: 103.5 FL — HIGH (ref 80–100)
MONOCYTES # BLD AUTO: 0.5 K/UL — SIGNIFICANT CHANGE UP (ref 0–0.9)
MONOCYTES # BLD AUTO: 0.5 K/UL — SIGNIFICANT CHANGE UP (ref 0–0.9)
MONOCYTES NFR BLD AUTO: 9 % — SIGNIFICANT CHANGE UP (ref 2–14)
MONOCYTES NFR BLD AUTO: 9 % — SIGNIFICANT CHANGE UP (ref 2–14)
NEUTROPHILS # BLD AUTO: 4.27 K/UL — SIGNIFICANT CHANGE UP (ref 1.8–7.4)
NEUTROPHILS # BLD AUTO: 4.27 K/UL — SIGNIFICANT CHANGE UP (ref 1.8–7.4)
NEUTROPHILS NFR BLD AUTO: 76.7 % — SIGNIFICANT CHANGE UP (ref 43–77)
NEUTROPHILS NFR BLD AUTO: 76.7 % — SIGNIFICANT CHANGE UP (ref 43–77)
NITRITE UR-MCNC: NEGATIVE — SIGNIFICANT CHANGE UP
NITRITE UR-MCNC: NEGATIVE — SIGNIFICANT CHANGE UP
NRBC # BLD: 0 /100 WBCS — SIGNIFICANT CHANGE UP (ref 0–0)
NRBC # BLD: 0 /100 WBCS — SIGNIFICANT CHANGE UP (ref 0–0)
NRBC # FLD: 0 K/UL — SIGNIFICANT CHANGE UP (ref 0–0)
NRBC # FLD: 0 K/UL — SIGNIFICANT CHANGE UP (ref 0–0)
PCO2 BLDV: 44 MMHG — SIGNIFICANT CHANGE UP (ref 42–55)
PCO2 BLDV: 44 MMHG — SIGNIFICANT CHANGE UP (ref 42–55)
PH BLDV: 7.38 — SIGNIFICANT CHANGE UP (ref 7.32–7.43)
PH BLDV: 7.38 — SIGNIFICANT CHANGE UP (ref 7.32–7.43)
PH UR: 7.5 — SIGNIFICANT CHANGE UP (ref 5–8)
PH UR: 7.5 — SIGNIFICANT CHANGE UP (ref 5–8)
PLATELET # BLD AUTO: 133 K/UL — LOW (ref 150–400)
PLATELET # BLD AUTO: 133 K/UL — LOW (ref 150–400)
PO2 BLDV: 53 MMHG — HIGH (ref 25–45)
PO2 BLDV: 53 MMHG — HIGH (ref 25–45)
POTASSIUM BLDV-SCNC: SIGNIFICANT CHANGE UP MMOL/L (ref 3.5–5.1)
POTASSIUM BLDV-SCNC: SIGNIFICANT CHANGE UP MMOL/L (ref 3.5–5.1)
POTASSIUM SERPL-MCNC: 4.5 MMOL/L — SIGNIFICANT CHANGE UP (ref 3.5–5.3)
POTASSIUM SERPL-MCNC: 4.5 MMOL/L — SIGNIFICANT CHANGE UP (ref 3.5–5.3)
POTASSIUM SERPL-SCNC: 4.5 MMOL/L — SIGNIFICANT CHANGE UP (ref 3.5–5.3)
POTASSIUM SERPL-SCNC: 4.5 MMOL/L — SIGNIFICANT CHANGE UP (ref 3.5–5.3)
PROT SERPL-MCNC: 5.8 G/DL — LOW (ref 6–8.3)
PROT SERPL-MCNC: 5.8 G/DL — LOW (ref 6–8.3)
PROT UR-MCNC: NEGATIVE MG/DL — SIGNIFICANT CHANGE UP
PROT UR-MCNC: NEGATIVE MG/DL — SIGNIFICANT CHANGE UP
PROTHROM AB SERPL-ACNC: 18.7 SEC — HIGH (ref 9.5–13)
PROTHROM AB SERPL-ACNC: 18.7 SEC — HIGH (ref 9.5–13)
RBC # BLD: 3.73 M/UL — LOW (ref 4.2–5.8)
RBC # BLD: 3.73 M/UL — LOW (ref 4.2–5.8)
RBC # FLD: 15.2 % — HIGH (ref 10.3–14.5)
RBC # FLD: 15.2 % — HIGH (ref 10.3–14.5)
RBC CASTS # UR COMP ASSIST: 1 /HPF — SIGNIFICANT CHANGE UP (ref 0–4)
RBC CASTS # UR COMP ASSIST: 1 /HPF — SIGNIFICANT CHANGE UP (ref 0–4)
REVIEW: SIGNIFICANT CHANGE UP
REVIEW: SIGNIFICANT CHANGE UP
RSV RNA NPH QL NAA+NON-PROBE: SIGNIFICANT CHANGE UP
RSV RNA NPH QL NAA+NON-PROBE: SIGNIFICANT CHANGE UP
SAO2 % BLDV: 85.9 % — SIGNIFICANT CHANGE UP (ref 67–88)
SAO2 % BLDV: 85.9 % — SIGNIFICANT CHANGE UP (ref 67–88)
SARS-COV-2 RNA SPEC QL NAA+PROBE: SIGNIFICANT CHANGE UP
SARS-COV-2 RNA SPEC QL NAA+PROBE: SIGNIFICANT CHANGE UP
SODIUM SERPL-SCNC: 132 MMOL/L — LOW (ref 135–145)
SODIUM SERPL-SCNC: 132 MMOL/L — LOW (ref 135–145)
SP GR SPEC: 1 — SIGNIFICANT CHANGE UP (ref 1–1.03)
SP GR SPEC: 1 — SIGNIFICANT CHANGE UP (ref 1–1.03)
SQUAMOUS # UR AUTO: 0 /HPF — SIGNIFICANT CHANGE UP (ref 0–5)
SQUAMOUS # UR AUTO: 0 /HPF — SIGNIFICANT CHANGE UP (ref 0–5)
UROBILINOGEN FLD QL: 0.2 MG/DL — SIGNIFICANT CHANGE UP (ref 0.2–1)
UROBILINOGEN FLD QL: 0.2 MG/DL — SIGNIFICANT CHANGE UP (ref 0.2–1)
WBC # BLD: 5.56 K/UL — SIGNIFICANT CHANGE UP (ref 3.8–10.5)
WBC # BLD: 5.56 K/UL — SIGNIFICANT CHANGE UP (ref 3.8–10.5)
WBC # FLD AUTO: 5.56 K/UL — SIGNIFICANT CHANGE UP (ref 3.8–10.5)
WBC # FLD AUTO: 5.56 K/UL — SIGNIFICANT CHANGE UP (ref 3.8–10.5)
WBC UR QL: 0 /HPF — SIGNIFICANT CHANGE UP (ref 0–5)
WBC UR QL: 0 /HPF — SIGNIFICANT CHANGE UP (ref 0–5)

## 2023-12-20 PROCEDURE — 71045 X-RAY EXAM CHEST 1 VIEW: CPT | Mod: 26

## 2023-12-20 RX ORDER — SODIUM CHLORIDE 9 MG/ML
500 INJECTION INTRAMUSCULAR; INTRAVENOUS; SUBCUTANEOUS ONCE
Refills: 0 | Status: COMPLETED | OUTPATIENT
Start: 2023-12-20 | End: 2023-12-20

## 2023-12-20 RX ADMIN — SODIUM CHLORIDE 500 MILLILITER(S): 9 INJECTION INTRAMUSCULAR; INTRAVENOUS; SUBCUTANEOUS at 03:52

## 2023-12-20 RX ADMIN — SODIUM CHLORIDE 500 MILLILITER(S): 9 INJECTION INTRAMUSCULAR; INTRAVENOUS; SUBCUTANEOUS at 03:00

## 2023-12-20 NOTE — PROVIDER CONTACT NOTE (OTHER) - ASSESSMENT
Pt is returning home, needs ambulance transport due to dementia.  Pt's family will be home to assist pt.

## 2023-12-20 NOTE — PROVIDER CONTACT NOTE (OTHER) - ACTION/TREATMENT ORDERED:
SW arranged transport via [facility] 9:30 a.m. trip # 110A. SW arranged transport via [facility] 9:30 a.m. trip # 110A.

## 2023-12-20 NOTE — ED ADULT NURSE NOTE - OBJECTIVE STATEMENT
pt was BIB daughter and aid for lethargy, fever and non productive cough at home, hx cva,bph, dementia and afib, on arrival pt alert but non verbal, not noted to be in ant acute distress, pt becomes agitated when touched, he is otherwise calm. Breathing freely on room air, non ambulatory, no active cough noted, febrile, bed rail up x2 for safety, bed locked and in lowest position. management continues.

## 2023-12-20 NOTE — ED ADULT NURSE NOTE - NSFALLHARMRISKINTERV_ED_ALL_ED
Assistance OOB with selected safe patient handling equipment if applicable/Assistance with ambulation/Communicate risk of Fall with Harm to all staff, patient, and family/Provide visual cue: red socks, yellow wristband, yellow gown, etc/Reinforce activity limits and safety measures with patient and family/Bed in lowest position, wheels locked, appropriate side rails in place/Call bell, personal items and telephone in reach/Instruct patient to call for assistance before getting out of bed/chair/stretcher/Non-slip footwear applied when patient is off stretcher/[place] to call system/Physically safe environment - no spills, clutter or unnecessary equipment/Purposeful Proactive Rounding/Room/bathroom lighting operational, light cord in reach Assistance OOB with selected safe patient handling equipment if applicable/Assistance with ambulation/Communicate risk of Fall with Harm to all staff, patient, and family/Provide visual cue: red socks, yellow wristband, yellow gown, etc/Reinforce activity limits and safety measures with patient and family/Bed in lowest position, wheels locked, appropriate side rails in place/Call bell, personal items and telephone in reach/Instruct patient to call for assistance before getting out of bed/chair/stretcher/Non-slip footwear applied when patient is off stretcher/[place] to call system/Physically safe environment - no spills, clutter or unnecessary equipment/Purposeful Proactive Rounding/Room/bathroom lighting operational, light cord in reach

## 2023-12-20 NOTE — ED ADULT NURSE REASSESSMENT NOTE - NS ED NURSE REASSESS COMMENT FT1
Pt dcd for home, daughter was called by MD, pt will be picked up at 930am, currently asleep, no acute distress, aid at bedside. PIV removed and dc papers given.

## 2023-12-20 NOTE — ED ADULT NURSE REASSESSMENT NOTE - NS ED NURSE REASSESS COMMENT FT1
Pt awaiting fausto menchaca at bedside will travel with pt; pt noted to be wet, pt care provided, tolerated well, dry and clean, VS WNL. will continue to monitor.

## 2023-12-21 LAB
CULTURE RESULTS: NO GROWTH — SIGNIFICANT CHANGE UP
CULTURE RESULTS: NO GROWTH — SIGNIFICANT CHANGE UP
SPECIMEN SOURCE: SIGNIFICANT CHANGE UP
SPECIMEN SOURCE: SIGNIFICANT CHANGE UP

## 2023-12-25 LAB
CULTURE RESULTS: SIGNIFICANT CHANGE UP
SPECIMEN SOURCE: SIGNIFICANT CHANGE UP

## 2024-01-07 ENCOUNTER — INPATIENT (INPATIENT)
Facility: HOSPITAL | Age: 89
LOS: 6 days | Discharge: HOME | DRG: 871 | End: 2024-01-14
Attending: INTERNAL MEDICINE | Admitting: INTERNAL MEDICINE
Payer: MEDICARE

## 2024-01-07 VITALS
OXYGEN SATURATION: 100 % | DIASTOLIC BLOOD PRESSURE: 77 MMHG | HEART RATE: 109 BPM | SYSTOLIC BLOOD PRESSURE: 129 MMHG | RESPIRATION RATE: 24 BRPM

## 2024-01-07 DIAGNOSIS — U07.1 COVID-19: ICD-10-CM

## 2024-01-07 DIAGNOSIS — I50.9 HEART FAILURE, UNSPECIFIED: ICD-10-CM

## 2024-01-07 DIAGNOSIS — I63.9 CEREBRAL INFARCTION, UNSPECIFIED: ICD-10-CM

## 2024-01-07 DIAGNOSIS — Z29.9 ENCOUNTER FOR PROPHYLACTIC MEASURES, UNSPECIFIED: ICD-10-CM

## 2024-01-07 DIAGNOSIS — E78.5 HYPERLIPIDEMIA, UNSPECIFIED: ICD-10-CM

## 2024-01-07 DIAGNOSIS — Z98.890 OTHER SPECIFIED POSTPROCEDURAL STATES: Chronic | ICD-10-CM

## 2024-01-07 DIAGNOSIS — I48.20 CHRONIC ATRIAL FIBRILLATION, UNSPECIFIED: ICD-10-CM

## 2024-01-07 DIAGNOSIS — G30.9 ALZHEIMER'S DISEASE, UNSPECIFIED: ICD-10-CM

## 2024-01-07 DIAGNOSIS — Z87.438 PERSONAL HISTORY OF OTHER DISEASES OF MALE GENITAL ORGANS: ICD-10-CM

## 2024-01-07 LAB
ALBUMIN SERPL ELPH-MCNC: 3.2 G/DL — LOW (ref 3.3–5)
ALBUMIN SERPL ELPH-MCNC: 3.2 G/DL — LOW (ref 3.3–5)
ALP SERPL-CCNC: 112 U/L — SIGNIFICANT CHANGE UP (ref 40–120)
ALP SERPL-CCNC: 112 U/L — SIGNIFICANT CHANGE UP (ref 40–120)
ALT FLD-CCNC: 28 U/L — SIGNIFICANT CHANGE UP (ref 10–45)
ALT FLD-CCNC: 28 U/L — SIGNIFICANT CHANGE UP (ref 10–45)
ANION GAP SERPL CALC-SCNC: 8 MMOL/L — SIGNIFICANT CHANGE UP (ref 5–17)
ANION GAP SERPL CALC-SCNC: 8 MMOL/L — SIGNIFICANT CHANGE UP (ref 5–17)
ANISOCYTOSIS BLD QL: SLIGHT — SIGNIFICANT CHANGE UP
ANISOCYTOSIS BLD QL: SLIGHT — SIGNIFICANT CHANGE UP
APPEARANCE UR: CLEAR — SIGNIFICANT CHANGE UP
APPEARANCE UR: CLEAR — SIGNIFICANT CHANGE UP
APTT BLD: 32.4 SEC — SIGNIFICANT CHANGE UP (ref 24.5–35.6)
APTT BLD: 32.4 SEC — SIGNIFICANT CHANGE UP (ref 24.5–35.6)
AST SERPL-CCNC: 31 U/L — SIGNIFICANT CHANGE UP (ref 10–40)
AST SERPL-CCNC: 31 U/L — SIGNIFICANT CHANGE UP (ref 10–40)
BACTERIA # UR AUTO: NEGATIVE /HPF — SIGNIFICANT CHANGE UP
BACTERIA # UR AUTO: NEGATIVE /HPF — SIGNIFICANT CHANGE UP
BASE EXCESS BLDV CALC-SCNC: -0.1 MMOL/L — SIGNIFICANT CHANGE UP (ref -2–3)
BASE EXCESS BLDV CALC-SCNC: -0.1 MMOL/L — SIGNIFICANT CHANGE UP (ref -2–3)
BASOPHILS # BLD AUTO: 0 K/UL — SIGNIFICANT CHANGE UP (ref 0–0.2)
BASOPHILS # BLD AUTO: 0 K/UL — SIGNIFICANT CHANGE UP (ref 0–0.2)
BASOPHILS NFR BLD AUTO: 0 % — SIGNIFICANT CHANGE UP (ref 0–2)
BASOPHILS NFR BLD AUTO: 0 % — SIGNIFICANT CHANGE UP (ref 0–2)
BILIRUB SERPL-MCNC: 0.6 MG/DL — SIGNIFICANT CHANGE UP (ref 0.2–1.2)
BILIRUB SERPL-MCNC: 0.6 MG/DL — SIGNIFICANT CHANGE UP (ref 0.2–1.2)
BILIRUB UR-MCNC: NEGATIVE — SIGNIFICANT CHANGE UP
BILIRUB UR-MCNC: NEGATIVE — SIGNIFICANT CHANGE UP
BUN SERPL-MCNC: 9 MG/DL — SIGNIFICANT CHANGE UP (ref 7–23)
BUN SERPL-MCNC: 9 MG/DL — SIGNIFICANT CHANGE UP (ref 7–23)
CA-I SERPL-SCNC: 0.94 MMOL/L — LOW (ref 1.15–1.33)
CA-I SERPL-SCNC: 0.94 MMOL/L — LOW (ref 1.15–1.33)
CALCIUM SERPL-MCNC: 8.8 MG/DL — SIGNIFICANT CHANGE UP (ref 8.4–10.5)
CALCIUM SERPL-MCNC: 8.8 MG/DL — SIGNIFICANT CHANGE UP (ref 8.4–10.5)
CAST: 4 /LPF — SIGNIFICANT CHANGE UP (ref 0–4)
CAST: 4 /LPF — SIGNIFICANT CHANGE UP (ref 0–4)
CHLORIDE BLDV-SCNC: 98 MMOL/L — SIGNIFICANT CHANGE UP (ref 96–108)
CHLORIDE BLDV-SCNC: 98 MMOL/L — SIGNIFICANT CHANGE UP (ref 96–108)
CHLORIDE SERPL-SCNC: 100 MMOL/L — SIGNIFICANT CHANGE UP (ref 96–108)
CHLORIDE SERPL-SCNC: 100 MMOL/L — SIGNIFICANT CHANGE UP (ref 96–108)
CO2 BLDV-SCNC: 29 MMOL/L — HIGH (ref 22–26)
CO2 BLDV-SCNC: 29 MMOL/L — HIGH (ref 22–26)
CO2 SERPL-SCNC: 27 MMOL/L — SIGNIFICANT CHANGE UP (ref 22–31)
CO2 SERPL-SCNC: 27 MMOL/L — SIGNIFICANT CHANGE UP (ref 22–31)
COLOR SPEC: YELLOW — SIGNIFICANT CHANGE UP
COLOR SPEC: YELLOW — SIGNIFICANT CHANGE UP
CREAT SERPL-MCNC: 0.74 MG/DL — SIGNIFICANT CHANGE UP (ref 0.5–1.3)
CREAT SERPL-MCNC: 0.74 MG/DL — SIGNIFICANT CHANGE UP (ref 0.5–1.3)
DIFF PNL FLD: NEGATIVE — SIGNIFICANT CHANGE UP
DIFF PNL FLD: NEGATIVE — SIGNIFICANT CHANGE UP
DIGOXIN SERPL-MCNC: 1.9 NG/ML — SIGNIFICANT CHANGE UP (ref 0.8–2)
DIGOXIN SERPL-MCNC: 1.9 NG/ML — SIGNIFICANT CHANGE UP (ref 0.8–2)
EGFR: 86 ML/MIN/1.73M2 — SIGNIFICANT CHANGE UP
EGFR: 86 ML/MIN/1.73M2 — SIGNIFICANT CHANGE UP
EOSINOPHIL # BLD AUTO: 0 K/UL — SIGNIFICANT CHANGE UP (ref 0–0.5)
EOSINOPHIL # BLD AUTO: 0 K/UL — SIGNIFICANT CHANGE UP (ref 0–0.5)
EOSINOPHIL NFR BLD AUTO: 0 % — SIGNIFICANT CHANGE UP (ref 0–6)
EOSINOPHIL NFR BLD AUTO: 0 % — SIGNIFICANT CHANGE UP (ref 0–6)
FLUAV AG NPH QL: SIGNIFICANT CHANGE UP
FLUAV AG NPH QL: SIGNIFICANT CHANGE UP
FLUBV AG NPH QL: SIGNIFICANT CHANGE UP
FLUBV AG NPH QL: SIGNIFICANT CHANGE UP
GAS PNL BLDV: 123 MMOL/L — LOW (ref 136–145)
GAS PNL BLDV: 123 MMOL/L — LOW (ref 136–145)
GAS PNL BLDV: SIGNIFICANT CHANGE UP
GAS PNL BLDV: SIGNIFICANT CHANGE UP
GLUCOSE BLDV-MCNC: 105 MG/DL — HIGH (ref 70–99)
GLUCOSE BLDV-MCNC: 105 MG/DL — HIGH (ref 70–99)
GLUCOSE SERPL-MCNC: 144 MG/DL — HIGH (ref 70–99)
GLUCOSE SERPL-MCNC: 144 MG/DL — HIGH (ref 70–99)
GLUCOSE UR QL: NEGATIVE MG/DL — SIGNIFICANT CHANGE UP
GLUCOSE UR QL: NEGATIVE MG/DL — SIGNIFICANT CHANGE UP
HCO3 BLDV-SCNC: 27 MMOL/L — SIGNIFICANT CHANGE UP (ref 22–29)
HCO3 BLDV-SCNC: 27 MMOL/L — SIGNIFICANT CHANGE UP (ref 22–29)
HCT VFR BLD CALC: 44.6 % — SIGNIFICANT CHANGE UP (ref 39–50)
HCT VFR BLD CALC: 44.6 % — SIGNIFICANT CHANGE UP (ref 39–50)
HCT VFR BLDA CALC: 44 % — SIGNIFICANT CHANGE UP (ref 39–51)
HCT VFR BLDA CALC: 44 % — SIGNIFICANT CHANGE UP (ref 39–51)
HGB BLD CALC-MCNC: 14.6 G/DL — SIGNIFICANT CHANGE UP (ref 12.6–17.4)
HGB BLD CALC-MCNC: 14.6 G/DL — SIGNIFICANT CHANGE UP (ref 12.6–17.4)
HGB BLD-MCNC: 14.2 G/DL — SIGNIFICANT CHANGE UP (ref 13–17)
HGB BLD-MCNC: 14.2 G/DL — SIGNIFICANT CHANGE UP (ref 13–17)
INR BLD: 1.67 RATIO — HIGH (ref 0.85–1.18)
INR BLD: 1.67 RATIO — HIGH (ref 0.85–1.18)
KETONES UR-MCNC: ABNORMAL MG/DL
KETONES UR-MCNC: ABNORMAL MG/DL
LACTATE BLDV-MCNC: 2.5 MMOL/L — HIGH (ref 0.5–2)
LACTATE BLDV-MCNC: 2.5 MMOL/L — HIGH (ref 0.5–2)
LEUKOCYTE ESTERASE UR-ACNC: NEGATIVE — SIGNIFICANT CHANGE UP
LEUKOCYTE ESTERASE UR-ACNC: NEGATIVE — SIGNIFICANT CHANGE UP
LYMPHOCYTES # BLD AUTO: 0.3 K/UL — LOW (ref 1–3.3)
LYMPHOCYTES # BLD AUTO: 0.3 K/UL — LOW (ref 1–3.3)
LYMPHOCYTES # BLD AUTO: 8.7 % — LOW (ref 13–44)
LYMPHOCYTES # BLD AUTO: 8.7 % — LOW (ref 13–44)
MACROCYTES BLD QL: SLIGHT — SIGNIFICANT CHANGE UP
MACROCYTES BLD QL: SLIGHT — SIGNIFICANT CHANGE UP
MANUAL SMEAR VERIFICATION: SIGNIFICANT CHANGE UP
MANUAL SMEAR VERIFICATION: SIGNIFICANT CHANGE UP
MCHC RBC-ENTMCNC: 31.8 GM/DL — LOW (ref 32–36)
MCHC RBC-ENTMCNC: 31.8 GM/DL — LOW (ref 32–36)
MCHC RBC-ENTMCNC: 33.2 PG — SIGNIFICANT CHANGE UP (ref 27–34)
MCHC RBC-ENTMCNC: 33.2 PG — SIGNIFICANT CHANGE UP (ref 27–34)
MCV RBC AUTO: 104.2 FL — HIGH (ref 80–100)
MCV RBC AUTO: 104.2 FL — HIGH (ref 80–100)
MONOCYTES # BLD AUTO: 0.21 K/UL — SIGNIFICANT CHANGE UP (ref 0–0.9)
MONOCYTES # BLD AUTO: 0.21 K/UL — SIGNIFICANT CHANGE UP (ref 0–0.9)
MONOCYTES NFR BLD AUTO: 6.1 % — SIGNIFICANT CHANGE UP (ref 2–14)
MONOCYTES NFR BLD AUTO: 6.1 % — SIGNIFICANT CHANGE UP (ref 2–14)
NEUTROPHILS # BLD AUTO: 2.87 K/UL — SIGNIFICANT CHANGE UP (ref 1.8–7.4)
NEUTROPHILS # BLD AUTO: 2.87 K/UL — SIGNIFICANT CHANGE UP (ref 1.8–7.4)
NEUTROPHILS NFR BLD AUTO: 82.6 % — HIGH (ref 43–77)
NEUTROPHILS NFR BLD AUTO: 82.6 % — HIGH (ref 43–77)
NITRITE UR-MCNC: NEGATIVE — SIGNIFICANT CHANGE UP
NITRITE UR-MCNC: NEGATIVE — SIGNIFICANT CHANGE UP
NT-PROBNP SERPL-SCNC: 1892 PG/ML — HIGH (ref 0–300)
NT-PROBNP SERPL-SCNC: 1892 PG/ML — HIGH (ref 0–300)
PCO2 BLDV: 54 MMHG — SIGNIFICANT CHANGE UP (ref 42–55)
PCO2 BLDV: 54 MMHG — SIGNIFICANT CHANGE UP (ref 42–55)
PH BLDV: 7.31 — LOW (ref 7.32–7.43)
PH BLDV: 7.31 — LOW (ref 7.32–7.43)
PH UR: 5 — SIGNIFICANT CHANGE UP (ref 5–8)
PH UR: 5 — SIGNIFICANT CHANGE UP (ref 5–8)
PLAT MORPH BLD: NORMAL — SIGNIFICANT CHANGE UP
PLAT MORPH BLD: NORMAL — SIGNIFICANT CHANGE UP
PLATELET # BLD AUTO: 152 K/UL — SIGNIFICANT CHANGE UP (ref 150–400)
PLATELET # BLD AUTO: 152 K/UL — SIGNIFICANT CHANGE UP (ref 150–400)
PO2 BLDV: 21 MMHG — LOW (ref 25–45)
PO2 BLDV: 21 MMHG — LOW (ref 25–45)
POLYCHROMASIA BLD QL SMEAR: SLIGHT — SIGNIFICANT CHANGE UP
POLYCHROMASIA BLD QL SMEAR: SLIGHT — SIGNIFICANT CHANGE UP
POTASSIUM BLDV-SCNC: >10 MMOL/L — CRITICAL HIGH (ref 3.5–5.1)
POTASSIUM BLDV-SCNC: >10 MMOL/L — CRITICAL HIGH (ref 3.5–5.1)
POTASSIUM SERPL-MCNC: 5.2 MMOL/L — SIGNIFICANT CHANGE UP (ref 3.5–5.3)
POTASSIUM SERPL-MCNC: 5.2 MMOL/L — SIGNIFICANT CHANGE UP (ref 3.5–5.3)
POTASSIUM SERPL-SCNC: 5.2 MMOL/L — SIGNIFICANT CHANGE UP (ref 3.5–5.3)
POTASSIUM SERPL-SCNC: 5.2 MMOL/L — SIGNIFICANT CHANGE UP (ref 3.5–5.3)
PROCALCITONIN SERPL-MCNC: 0.06 NG/ML — SIGNIFICANT CHANGE UP (ref 0.02–0.1)
PROCALCITONIN SERPL-MCNC: 0.06 NG/ML — SIGNIFICANT CHANGE UP (ref 0.02–0.1)
PROT SERPL-MCNC: 6.5 G/DL — SIGNIFICANT CHANGE UP (ref 6–8.3)
PROT SERPL-MCNC: 6.5 G/DL — SIGNIFICANT CHANGE UP (ref 6–8.3)
PROT UR-MCNC: NEGATIVE MG/DL — SIGNIFICANT CHANGE UP
PROT UR-MCNC: NEGATIVE MG/DL — SIGNIFICANT CHANGE UP
PROTHROM AB SERPL-ACNC: 18.1 SEC — HIGH (ref 9.5–13)
PROTHROM AB SERPL-ACNC: 18.1 SEC — HIGH (ref 9.5–13)
RBC # BLD: 4.28 M/UL — SIGNIFICANT CHANGE UP (ref 4.2–5.8)
RBC # BLD: 4.28 M/UL — SIGNIFICANT CHANGE UP (ref 4.2–5.8)
RBC # FLD: 13.3 % — SIGNIFICANT CHANGE UP (ref 10.3–14.5)
RBC # FLD: 13.3 % — SIGNIFICANT CHANGE UP (ref 10.3–14.5)
RBC BLD AUTO: SIGNIFICANT CHANGE UP
RBC BLD AUTO: SIGNIFICANT CHANGE UP
RBC CASTS # UR COMP ASSIST: 2 /HPF — SIGNIFICANT CHANGE UP (ref 0–4)
RBC CASTS # UR COMP ASSIST: 2 /HPF — SIGNIFICANT CHANGE UP (ref 0–4)
RSV RNA NPH QL NAA+NON-PROBE: SIGNIFICANT CHANGE UP
RSV RNA NPH QL NAA+NON-PROBE: SIGNIFICANT CHANGE UP
SAO2 % BLDV: 22.7 % — LOW (ref 67–88)
SAO2 % BLDV: 22.7 % — LOW (ref 67–88)
SARS-COV-2 RNA SPEC QL NAA+PROBE: DETECTED
SARS-COV-2 RNA SPEC QL NAA+PROBE: DETECTED
SODIUM SERPL-SCNC: 135 MMOL/L — SIGNIFICANT CHANGE UP (ref 135–145)
SODIUM SERPL-SCNC: 135 MMOL/L — SIGNIFICANT CHANGE UP (ref 135–145)
SP GR SPEC: 1.03 — SIGNIFICANT CHANGE UP (ref 1–1.03)
SP GR SPEC: 1.03 — SIGNIFICANT CHANGE UP (ref 1–1.03)
SQUAMOUS # UR AUTO: 6 /HPF — HIGH (ref 0–5)
SQUAMOUS # UR AUTO: 6 /HPF — HIGH (ref 0–5)
TROPONIN T, HIGH SENSITIVITY RESULT: 24 NG/L — SIGNIFICANT CHANGE UP (ref 0–51)
TROPONIN T, HIGH SENSITIVITY RESULT: 24 NG/L — SIGNIFICANT CHANGE UP (ref 0–51)
TROPONIN T, HIGH SENSITIVITY RESULT: 26 NG/L — SIGNIFICANT CHANGE UP (ref 0–51)
TROPONIN T, HIGH SENSITIVITY RESULT: 26 NG/L — SIGNIFICANT CHANGE UP (ref 0–51)
UROBILINOGEN FLD QL: 1 MG/DL — SIGNIFICANT CHANGE UP (ref 0.2–1)
UROBILINOGEN FLD QL: 1 MG/DL — SIGNIFICANT CHANGE UP (ref 0.2–1)
VARIANT LYMPHS # BLD: 2.6 % — SIGNIFICANT CHANGE UP (ref 0–6)
VARIANT LYMPHS # BLD: 2.6 % — SIGNIFICANT CHANGE UP (ref 0–6)
WBC # BLD: 3.47 K/UL — LOW (ref 3.8–10.5)
WBC # BLD: 3.47 K/UL — LOW (ref 3.8–10.5)
WBC # FLD AUTO: 3.47 K/UL — LOW (ref 3.8–10.5)
WBC # FLD AUTO: 3.47 K/UL — LOW (ref 3.8–10.5)
WBC UR QL: 2 /HPF — SIGNIFICANT CHANGE UP (ref 0–5)
WBC UR QL: 2 /HPF — SIGNIFICANT CHANGE UP (ref 0–5)

## 2024-01-07 PROCEDURE — 99223 1ST HOSP IP/OBS HIGH 75: CPT

## 2024-01-07 PROCEDURE — 71045 X-RAY EXAM CHEST 1 VIEW: CPT | Mod: 26

## 2024-01-07 PROCEDURE — 99285 EMERGENCY DEPT VISIT HI MDM: CPT

## 2024-01-07 RX ORDER — REMDESIVIR 5 MG/ML
200 INJECTION INTRAVENOUS EVERY 24 HOURS
Refills: 0 | Status: COMPLETED | OUTPATIENT
Start: 2024-01-08 | End: 2024-01-08

## 2024-01-07 RX ORDER — CHOLECALCIFEROL (VITAMIN D3) 125 MCG
1000 CAPSULE ORAL DAILY
Refills: 0 | Status: DISCONTINUED | OUTPATIENT
Start: 2024-01-07 | End: 2024-01-14

## 2024-01-07 RX ORDER — ACETAMINOPHEN 500 MG
1000 TABLET ORAL ONCE
Refills: 0 | Status: COMPLETED | OUTPATIENT
Start: 2024-01-07 | End: 2024-01-07

## 2024-01-07 RX ORDER — SODIUM,POTASSIUM PHOSPHATES 278-250MG
1 POWDER IN PACKET (EA) ORAL ONCE
Refills: 0 | Status: COMPLETED | OUTPATIENT
Start: 2024-01-07 | End: 2024-01-07

## 2024-01-07 RX ORDER — ASPIRIN/CALCIUM CARB/MAGNESIUM 324 MG
81 TABLET ORAL DAILY
Refills: 0 | Status: DISCONTINUED | OUTPATIENT
Start: 2024-01-07 | End: 2024-01-14

## 2024-01-07 RX ORDER — ACETAMINOPHEN 500 MG
650 TABLET ORAL EVERY 6 HOURS
Refills: 0 | Status: DISCONTINUED | OUTPATIENT
Start: 2024-01-07 | End: 2024-01-14

## 2024-01-07 RX ORDER — REMDESIVIR 5 MG/ML
INJECTION INTRAVENOUS
Refills: 0 | Status: COMPLETED | OUTPATIENT
Start: 2024-01-08 | End: 2024-01-12

## 2024-01-07 RX ORDER — PANTOPRAZOLE SODIUM 20 MG/1
40 TABLET, DELAYED RELEASE ORAL
Refills: 0 | Status: DISCONTINUED | OUTPATIENT
Start: 2024-01-07 | End: 2024-01-14

## 2024-01-07 RX ORDER — WARFARIN SODIUM 2.5 MG/1
1 TABLET ORAL ONCE
Refills: 0 | Status: COMPLETED | OUTPATIENT
Start: 2024-01-07 | End: 2024-01-07

## 2024-01-07 RX ORDER — FINASTERIDE 5 MG/1
5 TABLET, FILM COATED ORAL DAILY
Refills: 0 | Status: DISCONTINUED | OUTPATIENT
Start: 2024-01-07 | End: 2024-01-14

## 2024-01-07 RX ORDER — SIMVASTATIN 20 MG/1
10 TABLET, FILM COATED ORAL AT BEDTIME
Refills: 0 | Status: DISCONTINUED | OUTPATIENT
Start: 2024-01-07 | End: 2024-01-14

## 2024-01-07 RX ORDER — REMDESIVIR 5 MG/ML
100 INJECTION INTRAVENOUS EVERY 24 HOURS
Refills: 0 | Status: COMPLETED | OUTPATIENT
Start: 2024-01-09 | End: 2024-01-12

## 2024-01-07 RX ORDER — DIGOXIN 250 MCG
250 TABLET ORAL DAILY
Refills: 0 | Status: DISCONTINUED | OUTPATIENT
Start: 2024-01-07 | End: 2024-01-14

## 2024-01-07 RX ORDER — SODIUM CHLORIDE 9 MG/ML
500 INJECTION INTRAMUSCULAR; INTRAVENOUS; SUBCUTANEOUS ONCE
Refills: 0 | Status: COMPLETED | OUTPATIENT
Start: 2024-01-07 | End: 2024-01-07

## 2024-01-07 RX ORDER — TAMSULOSIN HYDROCHLORIDE 0.4 MG/1
0.4 CAPSULE ORAL AT BEDTIME
Refills: 0 | Status: DISCONTINUED | OUTPATIENT
Start: 2024-01-07 | End: 2024-01-14

## 2024-01-07 RX ORDER — PYRIDOXINE HCL (VITAMIN B6) 100 MG
100 TABLET ORAL DAILY
Refills: 0 | Status: DISCONTINUED | OUTPATIENT
Start: 2024-01-07 | End: 2024-01-14

## 2024-01-07 RX ORDER — METOPROLOL TARTRATE 50 MG
50 TABLET ORAL
Refills: 0 | Status: DISCONTINUED | OUTPATIENT
Start: 2024-01-07 | End: 2024-01-14

## 2024-01-07 RX ADMIN — SODIUM CHLORIDE 500 MILLILITER(S): 9 INJECTION INTRAMUSCULAR; INTRAVENOUS; SUBCUTANEOUS at 18:57

## 2024-01-07 RX ADMIN — Medication 1000 MILLIGRAM(S): at 18:15

## 2024-01-07 RX ADMIN — Medication 63.75 MILLIMOLE(S): at 23:13

## 2024-01-07 RX ADMIN — Medication 1000 MILLIGRAM(S): at 17:01

## 2024-01-07 RX ADMIN — SODIUM CHLORIDE 500 MILLILITER(S): 9 INJECTION INTRAMUSCULAR; INTRAVENOUS; SUBCUTANEOUS at 19:30

## 2024-01-07 RX ADMIN — Medication 400 MILLIGRAM(S): at 16:46

## 2024-01-07 NOTE — ED ADULT NURSE REASSESSMENT NOTE - NS ED NURSE REASSESS COMMENT FT1
Pt straight cathed using sterile technique. 2 RNs present to confirm sterility. Pt tolerated procedure well. Sterile specimen collected. UA and Culture sent as ordered. Approx 100 cc yellow urine output noted to bag.

## 2024-01-07 NOTE — H&P ADULT - PROBLEM SELECTOR PLAN 1
SIRS met (fever, tachycardia, tachypnea, leukopenia)  UA w/o c/f infxn; CXR w/o consolidation; procal 0.06; COV+  - req minimal O2, if unable to wean ON start dex in AM  - d/w family re remdesivir course  - c/w coumadin re VTE ppx  - f/u UCx, BCx x 2  - VBG w/ lactate  - trend labs  - VS q4h SIRS met (fever, tachycardia, tachypnea, leukopenia)  UA w/o c/f infxn; CXR w/o consolidation; procal 0.06; COV+  - req minimal O2 w/o documentation of hypoxemia, if unable to wean O2 by AM start dex  - start remdesivir given dementia c/f progression to severe dz (GFR > 30), monitor LFTs  - c/w coumadin re VTE ppx  - f/u UCx, BCx x 2  - VBG w/ lactate  - trend labs  - VS q4h SIRS met (fever, tachycardia, tachypnea, leukopenia)  UA w/o c/f infxn; CXR w/o consolidation; procal 0.06; COV+  - req minimal O2 w/o documentation of hypoxemia on admit, and now breathing comfortably on room air, hold dex for now and ctm  - start remdesivir given dementia c/f progression to severe dz (GFR > 30), monitor LFTs  - c/w coumadin re VTE ppx  - f/u UCx, BCx x 2  - VBG w/ lactate  - trend labs  - VS q4h SIRS met (fever, tachycardia, tachypnea, leukopenia)  UA w/o c/f infxn; CXR w/o consolidation; procal 0.06; COV+  - req minimal O2 w/o documentation of hypoxemia on admit, and now breathing comfortably on room air, hold dex for now and ctm  - start remdesivir given dementia c/f progression to severe dz, (GFR > 30), monitor LFTs - d/w daughter Malek who agrees w/ plan   - c/w coumadin re VTE ppx  - f/u UCx, BCx x 2  - VBG w/ lactate  - trend labs  - VS q4h

## 2024-01-07 NOTE — ED ADULT NURSE NOTE - NS ED NURSE REPORT GIVEN DT
----- Message from Emanuel LINDA Frisard sent at 12/14/2020 10:38 AM CST -----  Contact: patient  Patient called in and stated she spoke to Dr. Smiley yesterday Sunday 12/13/20 (via the portal) and was expecting a call back today to schedule an appointment.  Patient stated she fell & hit her head last Thursday 12/10/20.      Patient call back number is [phone]     08-Jan-2024 02:01

## 2024-01-07 NOTE — ED ADULT NURSE NOTE - OBJECTIVE STATEMENT
89 y/o male BIB EMS with complaints of fevers, covid + from home test, increased lethargy, tremors. history of dementia unable to provide me with any information. Daughter was at bedside to provide some information. Respirations even and unlabored. No signs/symptoms of pain/discomfort.

## 2024-01-07 NOTE — ED ADULT NURSE NOTE - NSFALLUNIVINTERV_ED_ALL_ED
Bed/Stretcher in lowest position, wheels locked, appropriate side rails in place/Call bell, personal items and telephone in reach/Instruct patient to call for assistance before getting out of bed/chair/stretcher/Non-slip footwear applied when patient is off stretcher/[place] to call system/Physically safe environment - no spills, clutter or unnecessary equipment/Purposeful proactive rounding/Room/bathroom lighting operational, light cord in reach Bed/Stretcher in lowest position, wheels locked, appropriate side rails in place/Call bell, personal items and telephone in reach/Instruct patient to call for assistance before getting out of bed/chair/stretcher/Non-slip footwear applied when patient is off stretcher/[place] to call system/Physically safe environment - no spills, clutter or unnecessary equipment/Purposeful proactive rounding/Room/bathroom lighting operational, light cord in reach

## 2024-01-07 NOTE — H&P ADULT - NSHPSOCIALHISTORY_GEN_ALL_CORE
per daughter Malek, denies tobacco, EtOH, illicit drug use. Resides w/ wife and 24H HHA. Ambulates w/ walker.

## 2024-01-07 NOTE — ED ADULT TRIAGE NOTE - CHIEF COMPLAINT QUOTE
covid+, febrile, sent from home, +hx dementia, +hx parkinsons with tremor, unable to obtain good waveform O2 sat, O2 donned by EMS

## 2024-01-07 NOTE — H&P ADULT - PROBLEM SELECTOR PLAN 7
- c/w home meds  - fall/aspiration precaution  - formal S&S - c/w home meds  - fall/aspiration precaution  - formal S&S in AM, will give puree diet w/ mod thick liquids in interim (per 11/2023 formal S&S and confirmed w/ daughter Malek who requests pt be given diet tonight prior to repeat formal testing), will order bedside RN dysphagia screen prior

## 2024-01-07 NOTE — ED PROVIDER NOTE - ATTENDING CONTRIBUTION TO CARE
------------ATTENDING NOTE------------  pt w/ daughter brought to ED by EMS c/o fevers, unproductive cough, concerns for shaking w/ fever, likely COVID-19 infection (as others at home), complicated by advanced dementia / nonambulatory, per EMS no hypoxia, complicated by CHF, awaiting full labs/imaging and close reassessments -->  - Kodi Mejias MD   ------------------------------------------------

## 2024-01-07 NOTE — ED PROVIDER NOTE - OBJECTIVE STATEMENT
89-year-old male history of A-fib on Coumadin, CHF, CVA, BPH, dementia ANO x 0 presents to the ED with COVID-positive test and was having some tremors at home which prompted daughter to bring patient to the hospital.  Daughter noting no change in mental status, is taking normal p.o.,, making normal urine.  she notes that sometimes he starts having tremors when he has a urine infection.

## 2024-01-07 NOTE — ED PROVIDER NOTE - PHYSICAL EXAMINATION
Const:   Thin elderly male,  not responding to commands  Eyes: no conjunctival injection, and symmetrical lids.  HEENT: Head NCAT, no lesions. Atraumatic external nose and ears. Moist MM.  Neck: Symmetric, trachea midline.   RESP: Unlabored respiratory effort.    GI: Nontender/Nondistended   MSK: Normocephalic/Atraumatic, Lower Extremities w/o calf tenderness or edema b/l.   Skin: Warm, dry and intact.   Neuro:  patient contracted, will not move extremities  Psych: Awake, Alert, & Oriented (AAO) X on commandX0

## 2024-01-07 NOTE — H&P ADULT - PROBLEM SELECTOR PLAN 8
- replete phos (not given in ED)   - rpt trop given indet   - coumadin re vte ppx  - aspiration, fall precaution  - dispo pending course, PT eval - replete phos (not given in ED)   - coumadin re vte ppx  - aspiration, fall precaution  - dispo pending course, PT eval - replete phos (not given in ED)   - coumadin re vte ppx  - aspiration, fall precaution  - dispo pending course, PT eval  - f/u hyperpigmented lesion L chest as outpt w/ PCP/derm - replete phos (not given in ED)   - coumadin re vte ppx  - aspiration, fall precaution  - dispo pending course, PT eval  - f/u hyperpigmented lesion L chest as outpt w/ PCP/derm  - full code per daughter Malek - replete phos (not given in ED)   - coumadin re vte ppx  - aspiration, fall precaution  - dispo pending course, PT eval  - f/u macrocytosis, hyperpigmented lesion L chest as outpt w/ PCP/derm  - full code per daughter Malek

## 2024-01-07 NOTE — ED ADULT NURSE NOTE - CAS EDP DISCH DISPOSITION ADMI
[facility]

## 2024-01-07 NOTE — H&P ADULT - PROBLEM SELECTOR PLAN 2
- c/w coumadin (INR subtx, rpt in AM and consider dose adjust), dig, BB - dose coumadin one time 1mg tonight, will need to redose tomorrow and consider adjust based on AM coags, c/w dig and BB - dose coumadin one time 1mg tonight, will need to redose tomorrow and consider adjust based on AM coags, c/w dig and BB w/ strict hold parameters given sepsis

## 2024-01-07 NOTE — H&P ADULT - NSICDXFAMILYHX_GEN_ALL_CORE_FT
FAMILY HISTORY:  Father  Still living? Unknown  FH: dementia, Age at diagnosis: Age Unknown  FH: heart disease, Age at diagnosis: Age Unknown    Mother  Still living? Unknown  FH: dementia, Age at diagnosis: Age Unknown  FH: heart disease, Age at diagnosis: Age Unknown

## 2024-01-07 NOTE — H&P ADULT - NSHPPHYSICALEXAM_GEN_ALL_CORE
PHYSICAL EXAM:  GENERAL: NAD, well-groomed, well-developed, not participant in interview, difficult exam given pt not participant and lynda self.   HEAD: Atraumatic, Normocephalic  EYES: PERRL, conjunctiva and sclera clear  ENMT: Visualized MMM otherwise difficult to assess oropharynx iso participation   NECK: Supple  NERVOUS SYSTEM: Alert & Oriented X0, alert and tracking movements, nodding to some questions and following some commands, though not speaking. Moving all extremities, otherwise difficult to assess sensorimotor exam iso participation.   CHEST/LUNG: Clear to auscultation bilaterally; No rales, rhonchi, wheezing, or rubs. Breathing comfortably on room air. +hyperpigmented lesion L chest  HEART: Regular rate and +irregular rhythm; No murmurs, rubs, or gallops  ABDOMEN: Soft, Nontender, Nondistended; Bowel sounds present. No guarding, rebound tenderness, or rigidity. Difficult exam given pt lynda himself and not laying flat for examiner.  EXTREMITIES: 2+ Peripheral Pulses, 1+ pitting pedal edema, no warmth/erythema/tenderness b/l LE. PHYSICAL EXAM:  GENERAL: NAD, well-groomed, well-developed, not participant in interview, difficult exam given pt not participant and lynda self.   HEAD: Atraumatic, Normocephalic  EYES: PERRL, conjunctiva and sclera clear  ENMT: Visualized MMM otherwise difficult to assess oropharynx iso participation   NECK: Supple  NERVOUS SYSTEM: Alert & Oriented X0, alert and tracking movements, nodding to some questions and following some commands, though not speaking. Moving all extremities, no tremors visualized including w/ ROM. Otherwise difficult to assess sensorimotor exam iso participation.   CHEST/LUNG: Clear to auscultation bilaterally; No rales, rhonchi, wheezing, or rubs. Breathing comfortably on room air. +hyperpigmented lesion L chest  HEART: Regular rate and +irregular rhythm; No murmurs, rubs, or gallops  ABDOMEN: Soft, Nontender, Nondistended; Bowel sounds present. No guarding, rebound tenderness, or rigidity. Difficult exam given pt lynda himself and not laying flat for examiner.  EXTREMITIES: 2+ Peripheral Pulses, 1+ pitting pedal edema, no warmth/erythema/tenderness b/l LE.

## 2024-01-07 NOTE — H&P ADULT - ASSESSMENT
89M HLD, AFib (Coumadin), CHF, CVA, BPH, Alzheimer's dementia (AOx0-1 bl) admit 11/2023 for AMS iso sepsis 2/2 EColi bacteremia presumed urinary source (c/f renal abscess dc'd for uro f/u), ED presentation 12/20/23 for "shakiness" noticed by family who stated at that time that it usually is harbinger of infxn for him, dc'd home, now a/w sepsis 2/2 COVID-19 infxn  89M HLD, AFib (Coumadin), CHF, CVA (residual L deficit), BPH, Alzheimer's dementia (AOx0-1 bl) admit 11/2023 for AMS iso sepsis 2/2 EColi bacteremia presumed urinary source (c/f renal abscess dc'd for uro f/u), ED presentation 12/20/23 for "shakiness" noticed by family who stated at that time that it usually is harbinger of infxn for him, dc'd home, now a/w sepsis 2/2 COVID-19 infxn

## 2024-01-07 NOTE — H&P ADULT - HISTORY OF PRESENT ILLNESS
89M HLD, AFib (Coumadin), CHF, CVA, BPH, Alzheimer's dementia (AOx0-1 bl) admit 11/2023 for AMS iso sepsis 2/2 EColi bacteremia presumed urinary source (c/f renal abscess dc'd for uro f/u), ED presentation 12/20/23 for "shakiness" noticed by family who stated at that time that it usually is harbinger of infxn for him, dc'd home, now presents BIBEMS for tremors iso COVID-19+ home test    VS: febrile tmax 38.6C, tachycardic HR 80s-100s, BP 90s-120s/40s-70s, tachypneic RR 22-24, SpO2%  on 1.5L NC  Labs: neutrophil predominant leukopenia 3.47 w/ procal 0.06, macrocytosis .2; INR 1.67; phos 2.2; HST 26, proBNP 1892   Micro: UA not c/f infxn; COV+  Imaging: CXR cardiomegaly, no acute chest dz   Received: ofirmev 1g IV, NS 500cc IV    admit to medicine for further eval/mgt 89M HLD, AFib (Coumadin), CHF, CVA, BPH, Alzheimer's dementia (AOx0-1 bl) admit 11/2023 for AMS iso sepsis 2/2 EColi bacteremia presumed urinary source (c/f renal abscess dc'd for uro f/u), ED presentation 12/20/23 for "shakiness" noticed by family who stated at that time that it usually is harbinger of infxn for him, dc'd home, now presents BIBEMS for tremors iso COVID-19+ home test per ED notation. Attempted to interview pt at bedside, alert though oriented x 0, not participating in interview.    VS: febrile tmax 38.6C, tachycardic HR 80s-100s, BP 90s-120s/40s-70s, tachypneic RR 22-24, SpO2%  on 1.5L NC  Labs: neutrophil predominant leukopenia 3.47 w/ procal 0.06, macrocytosis .2; INR 1.67; phos 2.2; HST 26, proBNP 1892   Micro: UA not c/f infxn; COV+  Imaging: CXR cardiomegaly, no acute chest dz   Received: ofirmev 1g IV, NS 500cc IV    admit to medicine for further eval/mgt 89M HLD, AFib (Coumadin), CHF, CVA (residual L deficit), BPH, Alzheimer's dementia (AOx0-1 bl) admit 11/2023 for AMS iso sepsis 2/2 EColi bacteremia presumed urinary source (c/f renal abscess dc'd for uro f/u), ED presentation 12/20/23 for "shakiness" noticed by family who stated at that time that it usually is harbinger of infxn for him, dc'd home, now presents BIBEMS for tremors iso COVID-19+ home test per ED notation. Attempted to interview pt at bedside, alert though oriented x 0, not participating in interview. Contacted daughter Ada who relays that pt resides w/ wife and HHA, both of whom are COVID-19+, and decided to test pt after he appeared lethargic today, brought pt to ED given positive result. Otherwise they do not note any changes in his behavior, urinary habits, or PO intake. Ada confirms that pt is AOx0 at bl, and does not participate in conversation.    VS: febrile tmax 38.6C, tachycardic HR 80s-100s, BP 90s-120s/40s-70s, tachypneic RR 22-24, SpO2%  on 1.5L NC  Labs: neutrophil predominant leukopenia 3.47 w/ procal 0.06, macrocytosis .2; INR 1.67; phos 2.2; HST 26, proBNP 1892   Micro: UA not c/f infxn; COV+  Imaging: CXR cardiomegaly, no acute chest dz   Received: ofirmev 1g IV, NS 500cc IV    admit to medicine for further eval/mgt 89M HLD, AFib (Coumadin), CHF, CVA (residual L deficit), BPH, Alzheimer's dementia (AOx0-1 bl) admit 11/2023 for AMS iso sepsis 2/2 EColi bacteremia presumed urinary source (c/f renal abscess dc'd for uro f/u), ED presentation 12/20/23 for "shakiness" noticed by family who stated at that time that it usually is harbinger of infxn for him, dc'd home, now presents BIBEMS for tremors iso COVID-19+ home test per ED notation. Attempted to interview pt at bedside, alert though oriented x 0, not participating in interview. Contacted daughter Ada who relays that pt resides w/ wife and HHA, both of whom are COVID-19+, and decided to test pt after he appeared lethargic today, brought pt to ED given positive result. Otherwise they do not note any changes in his behavior, urinary habits, or PO intake. Ada confirms that pt is AOx0 at bl, and does not participate in conversation. Med list confirmed w/ daughter.    VS: febrile tmax 38.6C, tachycardic HR 80s-100s, BP 90s-120s/40s-70s, tachypneic RR 22-24, SpO2%  on 1.5L NC  Labs: neutrophil predominant leukopenia 3.47 w/ procal 0.06, macrocytosis .2; INR 1.67; phos 2.2; HST 26, proBNP 1892   Micro: UA not c/f infxn; COV+  Imaging: CXR cardiomegaly, no acute chest dz   Received: ofirmev 1g IV, NS 500cc IV    admit to medicine for further eval/mgt

## 2024-01-07 NOTE — H&P ADULT - PROBLEM SELECTOR PLAN 3
- monitor vol status, caution w/ IVF proBNP elevated, HST w/o significant delta  - monitor vol status, caution w/ IVF proBNP elevated, HST w/o significant delta  exam w/ 1+ pitting pedal edema though otherwise appearing euvolemic  - monitor vol status, caution w/ IVF

## 2024-01-08 LAB
A1C WITH ESTIMATED AVERAGE GLUCOSE RESULT: 4.7 % — SIGNIFICANT CHANGE UP (ref 4–5.6)
A1C WITH ESTIMATED AVERAGE GLUCOSE RESULT: 4.7 % — SIGNIFICANT CHANGE UP (ref 4–5.6)
ALBUMIN SERPL ELPH-MCNC: 2.8 G/DL — LOW (ref 3.3–5)
ALBUMIN SERPL ELPH-MCNC: 2.8 G/DL — LOW (ref 3.3–5)
ALP SERPL-CCNC: 86 U/L — SIGNIFICANT CHANGE UP (ref 40–120)
ALP SERPL-CCNC: 86 U/L — SIGNIFICANT CHANGE UP (ref 40–120)
ALT FLD-CCNC: 25 U/L — SIGNIFICANT CHANGE UP (ref 10–45)
ALT FLD-CCNC: 25 U/L — SIGNIFICANT CHANGE UP (ref 10–45)
ANION GAP SERPL CALC-SCNC: 7 MMOL/L — SIGNIFICANT CHANGE UP (ref 5–17)
ANION GAP SERPL CALC-SCNC: 7 MMOL/L — SIGNIFICANT CHANGE UP (ref 5–17)
ANION GAP SERPL CALC-SCNC: 9 MMOL/L — SIGNIFICANT CHANGE UP (ref 5–17)
ANION GAP SERPL CALC-SCNC: 9 MMOL/L — SIGNIFICANT CHANGE UP (ref 5–17)
AST SERPL-CCNC: 28 U/L — SIGNIFICANT CHANGE UP (ref 10–40)
AST SERPL-CCNC: 28 U/L — SIGNIFICANT CHANGE UP (ref 10–40)
BASE EXCESS BLDV CALC-SCNC: 3.2 MMOL/L — HIGH (ref -2–3)
BASE EXCESS BLDV CALC-SCNC: 3.2 MMOL/L — HIGH (ref -2–3)
BASOPHILS # BLD AUTO: 0 K/UL — SIGNIFICANT CHANGE UP (ref 0–0.2)
BASOPHILS # BLD AUTO: 0 K/UL — SIGNIFICANT CHANGE UP (ref 0–0.2)
BASOPHILS NFR BLD AUTO: 0 % — SIGNIFICANT CHANGE UP (ref 0–2)
BASOPHILS NFR BLD AUTO: 0 % — SIGNIFICANT CHANGE UP (ref 0–2)
BILIRUB SERPL-MCNC: 0.7 MG/DL — SIGNIFICANT CHANGE UP (ref 0.2–1.2)
BILIRUB SERPL-MCNC: 0.7 MG/DL — SIGNIFICANT CHANGE UP (ref 0.2–1.2)
BUN SERPL-MCNC: 7 MG/DL — SIGNIFICANT CHANGE UP (ref 7–23)
BUN SERPL-MCNC: 7 MG/DL — SIGNIFICANT CHANGE UP (ref 7–23)
BUN SERPL-MCNC: 9 MG/DL — SIGNIFICANT CHANGE UP (ref 7–23)
BUN SERPL-MCNC: 9 MG/DL — SIGNIFICANT CHANGE UP (ref 7–23)
CA-I SERPL-SCNC: 1.12 MMOL/L — LOW (ref 1.15–1.33)
CA-I SERPL-SCNC: 1.12 MMOL/L — LOW (ref 1.15–1.33)
CALCIUM SERPL-MCNC: 7.8 MG/DL — LOW (ref 8.4–10.5)
CALCIUM SERPL-MCNC: 7.8 MG/DL — LOW (ref 8.4–10.5)
CALCIUM SERPL-MCNC: 7.9 MG/DL — LOW (ref 8.4–10.5)
CALCIUM SERPL-MCNC: 7.9 MG/DL — LOW (ref 8.4–10.5)
CHLORIDE BLDV-SCNC: 99 MMOL/L — SIGNIFICANT CHANGE UP (ref 96–108)
CHLORIDE BLDV-SCNC: 99 MMOL/L — SIGNIFICANT CHANGE UP (ref 96–108)
CHLORIDE SERPL-SCNC: 102 MMOL/L — SIGNIFICANT CHANGE UP (ref 96–108)
CHLORIDE SERPL-SCNC: 102 MMOL/L — SIGNIFICANT CHANGE UP (ref 96–108)
CHLORIDE SERPL-SCNC: 105 MMOL/L — SIGNIFICANT CHANGE UP (ref 96–108)
CHLORIDE SERPL-SCNC: 105 MMOL/L — SIGNIFICANT CHANGE UP (ref 96–108)
CK SERPL-CCNC: 44 U/L — SIGNIFICANT CHANGE UP (ref 30–200)
CK SERPL-CCNC: 44 U/L — SIGNIFICANT CHANGE UP (ref 30–200)
CO2 BLDV-SCNC: 32 MMOL/L — HIGH (ref 22–26)
CO2 BLDV-SCNC: 32 MMOL/L — HIGH (ref 22–26)
CO2 SERPL-SCNC: 23 MMOL/L — SIGNIFICANT CHANGE UP (ref 22–31)
CO2 SERPL-SCNC: 23 MMOL/L — SIGNIFICANT CHANGE UP (ref 22–31)
CO2 SERPL-SCNC: 28 MMOL/L — SIGNIFICANT CHANGE UP (ref 22–31)
CO2 SERPL-SCNC: 28 MMOL/L — SIGNIFICANT CHANGE UP (ref 22–31)
CREAT SERPL-MCNC: 0.58 MG/DL — SIGNIFICANT CHANGE UP (ref 0.5–1.3)
CREAT SERPL-MCNC: 0.58 MG/DL — SIGNIFICANT CHANGE UP (ref 0.5–1.3)
CREAT SERPL-MCNC: 0.62 MG/DL — SIGNIFICANT CHANGE UP (ref 0.5–1.3)
CREAT SERPL-MCNC: 0.62 MG/DL — SIGNIFICANT CHANGE UP (ref 0.5–1.3)
CRP SERPL-MCNC: 42 MG/L — HIGH (ref 0–4)
CRP SERPL-MCNC: 42 MG/L — HIGH (ref 0–4)
EGFR: 91 ML/MIN/1.73M2 — SIGNIFICANT CHANGE UP
EGFR: 91 ML/MIN/1.73M2 — SIGNIFICANT CHANGE UP
EGFR: 93 ML/MIN/1.73M2 — SIGNIFICANT CHANGE UP
EGFR: 93 ML/MIN/1.73M2 — SIGNIFICANT CHANGE UP
EOSINOPHIL # BLD AUTO: 0 K/UL — SIGNIFICANT CHANGE UP (ref 0–0.5)
EOSINOPHIL # BLD AUTO: 0 K/UL — SIGNIFICANT CHANGE UP (ref 0–0.5)
EOSINOPHIL NFR BLD AUTO: 0 % — SIGNIFICANT CHANGE UP (ref 0–6)
EOSINOPHIL NFR BLD AUTO: 0 % — SIGNIFICANT CHANGE UP (ref 0–6)
ESTIMATED AVERAGE GLUCOSE: 88 MG/DL — SIGNIFICANT CHANGE UP (ref 68–114)
ESTIMATED AVERAGE GLUCOSE: 88 MG/DL — SIGNIFICANT CHANGE UP (ref 68–114)
FERRITIN SERPL-MCNC: 278 NG/ML — SIGNIFICANT CHANGE UP (ref 30–400)
FERRITIN SERPL-MCNC: 278 NG/ML — SIGNIFICANT CHANGE UP (ref 30–400)
GAS PNL BLDV: 132 MMOL/L — LOW (ref 136–145)
GAS PNL BLDV: 132 MMOL/L — LOW (ref 136–145)
GAS PNL BLDV: SIGNIFICANT CHANGE UP
GLUCOSE BLDV-MCNC: 91 MG/DL — SIGNIFICANT CHANGE UP (ref 70–99)
GLUCOSE BLDV-MCNC: 91 MG/DL — SIGNIFICANT CHANGE UP (ref 70–99)
GLUCOSE SERPL-MCNC: 114 MG/DL — HIGH (ref 70–99)
GLUCOSE SERPL-MCNC: 114 MG/DL — HIGH (ref 70–99)
GLUCOSE SERPL-MCNC: 98 MG/DL — SIGNIFICANT CHANGE UP (ref 70–99)
GLUCOSE SERPL-MCNC: 98 MG/DL — SIGNIFICANT CHANGE UP (ref 70–99)
HCO3 BLDV-SCNC: 30 MMOL/L — HIGH (ref 22–29)
HCO3 BLDV-SCNC: 30 MMOL/L — HIGH (ref 22–29)
HCT VFR BLD CALC: 37.5 % — LOW (ref 39–50)
HCT VFR BLD CALC: 37.5 % — LOW (ref 39–50)
HCT VFR BLDA CALC: 43 % — SIGNIFICANT CHANGE UP (ref 39–51)
HCT VFR BLDA CALC: 43 % — SIGNIFICANT CHANGE UP (ref 39–51)
HGB BLD CALC-MCNC: 14.4 G/DL — SIGNIFICANT CHANGE UP (ref 12.6–17.4)
HGB BLD CALC-MCNC: 14.4 G/DL — SIGNIFICANT CHANGE UP (ref 12.6–17.4)
HGB BLD-MCNC: 12.2 G/DL — LOW (ref 13–17)
HGB BLD-MCNC: 12.2 G/DL — LOW (ref 13–17)
IMM GRANULOCYTES NFR BLD AUTO: 0.3 % — SIGNIFICANT CHANGE UP (ref 0–0.9)
IMM GRANULOCYTES NFR BLD AUTO: 0.3 % — SIGNIFICANT CHANGE UP (ref 0–0.9)
INR BLD: 1.71 RATIO — HIGH (ref 0.85–1.18)
INR BLD: 1.71 RATIO — HIGH (ref 0.85–1.18)
LACTATE BLDV-MCNC: 2.4 MMOL/L — HIGH (ref 0.5–2)
LACTATE BLDV-MCNC: 2.4 MMOL/L — HIGH (ref 0.5–2)
LDH SERPL L TO P-CCNC: 196 U/L — SIGNIFICANT CHANGE UP (ref 50–242)
LDH SERPL L TO P-CCNC: 196 U/L — SIGNIFICANT CHANGE UP (ref 50–242)
LYMPHOCYTES # BLD AUTO: 0.7 K/UL — LOW (ref 1–3.3)
LYMPHOCYTES # BLD AUTO: 0.7 K/UL — LOW (ref 1–3.3)
LYMPHOCYTES # BLD AUTO: 21 % — SIGNIFICANT CHANGE UP (ref 13–44)
LYMPHOCYTES # BLD AUTO: 21 % — SIGNIFICANT CHANGE UP (ref 13–44)
MAGNESIUM SERPL-MCNC: 1.9 MG/DL — SIGNIFICANT CHANGE UP (ref 1.6–2.6)
MAGNESIUM SERPL-MCNC: 1.9 MG/DL — SIGNIFICANT CHANGE UP (ref 1.6–2.6)
MCHC RBC-ENTMCNC: 32.5 GM/DL — SIGNIFICANT CHANGE UP (ref 32–36)
MCHC RBC-ENTMCNC: 32.5 GM/DL — SIGNIFICANT CHANGE UP (ref 32–36)
MCHC RBC-ENTMCNC: 33.8 PG — SIGNIFICANT CHANGE UP (ref 27–34)
MCHC RBC-ENTMCNC: 33.8 PG — SIGNIFICANT CHANGE UP (ref 27–34)
MCV RBC AUTO: 103.9 FL — HIGH (ref 80–100)
MCV RBC AUTO: 103.9 FL — HIGH (ref 80–100)
MONOCYTES # BLD AUTO: 0.53 K/UL — SIGNIFICANT CHANGE UP (ref 0–0.9)
MONOCYTES # BLD AUTO: 0.53 K/UL — SIGNIFICANT CHANGE UP (ref 0–0.9)
MONOCYTES NFR BLD AUTO: 15.9 % — HIGH (ref 2–14)
MONOCYTES NFR BLD AUTO: 15.9 % — HIGH (ref 2–14)
MRSA PCR RESULT.: DETECTED
MRSA PCR RESULT.: DETECTED
NEUTROPHILS # BLD AUTO: 2.1 K/UL — SIGNIFICANT CHANGE UP (ref 1.8–7.4)
NEUTROPHILS # BLD AUTO: 2.1 K/UL — SIGNIFICANT CHANGE UP (ref 1.8–7.4)
NEUTROPHILS NFR BLD AUTO: 62.8 % — SIGNIFICANT CHANGE UP (ref 43–77)
NEUTROPHILS NFR BLD AUTO: 62.8 % — SIGNIFICANT CHANGE UP (ref 43–77)
NRBC # BLD: 0 /100 WBCS — SIGNIFICANT CHANGE UP (ref 0–0)
NRBC # BLD: 0 /100 WBCS — SIGNIFICANT CHANGE UP (ref 0–0)
PCO2 BLDV: 53 MMHG — SIGNIFICANT CHANGE UP (ref 42–55)
PCO2 BLDV: 53 MMHG — SIGNIFICANT CHANGE UP (ref 42–55)
PH BLDV: 7.36 — SIGNIFICANT CHANGE UP (ref 7.32–7.43)
PH BLDV: 7.36 — SIGNIFICANT CHANGE UP (ref 7.32–7.43)
PHOSPHATE SERPL-MCNC: 2.8 MG/DL — SIGNIFICANT CHANGE UP (ref 2.5–4.5)
PHOSPHATE SERPL-MCNC: 2.8 MG/DL — SIGNIFICANT CHANGE UP (ref 2.5–4.5)
PLATELET # BLD AUTO: 132 K/UL — LOW (ref 150–400)
PLATELET # BLD AUTO: 132 K/UL — LOW (ref 150–400)
PO2 BLDV: 26 MMHG — SIGNIFICANT CHANGE UP (ref 25–45)
PO2 BLDV: 26 MMHG — SIGNIFICANT CHANGE UP (ref 25–45)
POTASSIUM BLDV-SCNC: 4.2 MMOL/L — SIGNIFICANT CHANGE UP (ref 3.5–5.1)
POTASSIUM BLDV-SCNC: 4.2 MMOL/L — SIGNIFICANT CHANGE UP (ref 3.5–5.1)
POTASSIUM SERPL-MCNC: 4.2 MMOL/L — SIGNIFICANT CHANGE UP (ref 3.5–5.3)
POTASSIUM SERPL-MCNC: 4.2 MMOL/L — SIGNIFICANT CHANGE UP (ref 3.5–5.3)
POTASSIUM SERPL-MCNC: 4.3 MMOL/L — SIGNIFICANT CHANGE UP (ref 3.5–5.3)
POTASSIUM SERPL-MCNC: 4.3 MMOL/L — SIGNIFICANT CHANGE UP (ref 3.5–5.3)
POTASSIUM SERPL-SCNC: 4.2 MMOL/L — SIGNIFICANT CHANGE UP (ref 3.5–5.3)
POTASSIUM SERPL-SCNC: 4.2 MMOL/L — SIGNIFICANT CHANGE UP (ref 3.5–5.3)
POTASSIUM SERPL-SCNC: 4.3 MMOL/L — SIGNIFICANT CHANGE UP (ref 3.5–5.3)
POTASSIUM SERPL-SCNC: 4.3 MMOL/L — SIGNIFICANT CHANGE UP (ref 3.5–5.3)
PROT SERPL-MCNC: 5.5 G/DL — LOW (ref 6–8.3)
PROT SERPL-MCNC: 5.5 G/DL — LOW (ref 6–8.3)
PROTHROM AB SERPL-ACNC: 17.7 SEC — HIGH (ref 9.5–13)
PROTHROM AB SERPL-ACNC: 17.7 SEC — HIGH (ref 9.5–13)
RBC # BLD: 3.61 M/UL — LOW (ref 4.2–5.8)
RBC # BLD: 3.61 M/UL — LOW (ref 4.2–5.8)
RBC # FLD: 13.3 % — SIGNIFICANT CHANGE UP (ref 10.3–14.5)
RBC # FLD: 13.3 % — SIGNIFICANT CHANGE UP (ref 10.3–14.5)
S AUREUS DNA NOSE QL NAA+PROBE: DETECTED
S AUREUS DNA NOSE QL NAA+PROBE: DETECTED
SAO2 % BLDV: 34.9 % — LOW (ref 67–88)
SAO2 % BLDV: 34.9 % — LOW (ref 67–88)
SODIUM SERPL-SCNC: 137 MMOL/L — SIGNIFICANT CHANGE UP (ref 135–145)
WBC # BLD: 3.34 K/UL — LOW (ref 3.8–10.5)
WBC # BLD: 3.34 K/UL — LOW (ref 3.8–10.5)
WBC # FLD AUTO: 3.34 K/UL — LOW (ref 3.8–10.5)
WBC # FLD AUTO: 3.34 K/UL — LOW (ref 3.8–10.5)

## 2024-01-08 PROCEDURE — 99222 1ST HOSP IP/OBS MODERATE 55: CPT

## 2024-01-08 RX ORDER — WARFARIN SODIUM 2.5 MG/1
1.5 TABLET ORAL ONCE
Refills: 0 | Status: COMPLETED | OUTPATIENT
Start: 2024-01-08 | End: 2024-01-08

## 2024-01-08 RX ORDER — MUPIROCIN 20 MG/G
1 OINTMENT TOPICAL
Refills: 0 | Status: COMPLETED | OUTPATIENT
Start: 2024-01-08 | End: 2024-01-13

## 2024-01-08 RX ORDER — CHLORHEXIDINE GLUCONATE 213 G/1000ML
1 SOLUTION TOPICAL DAILY
Refills: 0 | Status: DISCONTINUED | OUTPATIENT
Start: 2024-01-08 | End: 2024-01-14

## 2024-01-08 RX ADMIN — CHLORHEXIDINE GLUCONATE 1 APPLICATION(S): 213 SOLUTION TOPICAL at 11:30

## 2024-01-08 RX ADMIN — PANTOPRAZOLE SODIUM 40 MILLIGRAM(S): 20 TABLET, DELAYED RELEASE ORAL at 06:48

## 2024-01-08 RX ADMIN — SIMVASTATIN 10 MILLIGRAM(S): 20 TABLET, FILM COATED ORAL at 21:41

## 2024-01-08 RX ADMIN — Medication 250 MICROGRAM(S): at 06:48

## 2024-01-08 RX ADMIN — FINASTERIDE 5 MILLIGRAM(S): 5 TABLET, FILM COATED ORAL at 11:14

## 2024-01-08 RX ADMIN — Medication 1000 UNIT(S): at 11:14

## 2024-01-08 RX ADMIN — Medication 50 MILLIGRAM(S): at 17:28

## 2024-01-08 RX ADMIN — WARFARIN SODIUM 1.5 MILLIGRAM(S): 2.5 TABLET ORAL at 21:41

## 2024-01-08 RX ADMIN — PANTOPRAZOLE SODIUM 40 MILLIGRAM(S): 20 TABLET, DELAYED RELEASE ORAL at 17:29

## 2024-01-08 RX ADMIN — TAMSULOSIN HYDROCHLORIDE 0.4 MILLIGRAM(S): 0.4 CAPSULE ORAL at 21:41

## 2024-01-08 RX ADMIN — REMDESIVIR 200 MILLIGRAM(S): 5 INJECTION INTRAVENOUS at 10:33

## 2024-01-08 RX ADMIN — Medication 50 MILLIGRAM(S): at 06:48

## 2024-01-08 RX ADMIN — Medication 81 MILLIGRAM(S): at 11:14

## 2024-01-08 NOTE — PATIENT PROFILE ADULT - NSPROGENSOURCEINFO_GEN_A_NUR
Unable to obtain information. Patient is A&Ox0-1 @ baseline, does not participate in interview. No family @ bedside/patient

## 2024-01-08 NOTE — PHYSICAL THERAPY INITIAL EVALUATION ADULT - PERTINENT HX OF CURRENT PROBLEM, REHAB EVAL
89M HLD, AFib (Coumadin), CHF, CVA (residual L deficit), BPH, Alzheimer's dementia (AOx0-1 bl) admit 11/2023 for AMS iso sepsis 2/2 EColi bacteremia presumed urinary source (c/f renal abscess dc'd for uro f/u), ED presentation 12/20/23 for "shakiness" noticed by family who stated at that time that it usually is harbinger of infxn for him, dc'd home, now presents BIBEMS for tremors iso COVID-19+ home test per ED notation. Attempted to interview pt at bedside, alert though oriented x 0, not participating in interview. Contacted daughter Ada who relays that pt resides w/ wife and HHA, both of whom are COVID-19+, and decided to test pt after he appeared lethargic today, brought pt to ED given positive result. Otherwise they do not note any changes in his behavior, urinary habits, or PO intake. Ada confirms that pt is AOx0 at bl, and does not participate in conversation. Med list confirmed w/ daughter.  Hosp Course: 1/7 XR CHEST: No acute chest disease.

## 2024-01-08 NOTE — PATIENT PROFILE ADULT - NS PRO AD NO ADVANCE DIRECTIVE
Unable to obtain information. Patient is A&Ox0-1 @ baseline, does not participate in interview. No family @ bedside/No

## 2024-01-08 NOTE — PHYSICAL THERAPY INITIAL EVALUATION ADULT - TRANSFER TRAINING, PT EVAL
GOAL: Patient will perform sit to stand transfers with max assist x1 at rolling walker with proper hand placement

## 2024-01-08 NOTE — SWALLOW BEDSIDE ASSESSMENT ADULT - SWALLOW EVAL: CRITERIA FOR SKILLED INTERVENTION MET
SURGICAL ENDOCRINOLOGY FOLLOW-UP  PARATHYROID POSTOPERATIVE VISIT    CHIEF COMPLAINT:  Primary hyperparathyroidism s/p minimally invasive parathyroidectomy with Dr. Hawthorne 1/16/2020.     HISTORY OF PRESENT ILLNESS:  Elier Santamaria is a 54 year old male with primary hyperparathyroidism.  Highest calcium has been 10.9 with a PTH of 96.  His indication for surgical intervention include calcium approximately 1 point above normal and elevated urinary calcium. Preoperative imaging suggested left sided parathyroid adenoma.  On 1/16/2020, he underwent left inferior type C parathyroidectomy - 381 mg, 2.0 x 0.7 x 0.5 cm.  Pathology was consistent with hypercellular parathyroid tissue.  PTH dropped 77% intraoperatively and he was discharged home on the day of surgery.  He now returns for follow up.  Denies numbness/tingling.  No incision concerns.  Swallowing without difficulty.  Voice clear. Reports some pain to posterior neck, pain in similar to pain he had prior to anterior cervical fusion several years ago.     Allergies:   ALLERGIES:  No Known Allergies     Medications:  Prior to Admission medications    Medication Sig Start Date End Date Taking? Authorizing Provider   lisdexamfetamine (VYVANSE) 40 MG capsule Take 1 capsule by mouth every morning. 12/23/19  Yes JOSE L Moser   fluticasone (FLOVENT HFA) 220 MCG/ACT inhaler 2 puffs bid and swallow. 12/20/19  Yes Giancarlo Ramirez MD   Lurasidone HCl (LATUDA) 40 MG tablet Take 1 tablet by mouth at bedtime. Take with food 10/1/19  Yes JOSE L Moser   pravastatin (PRAVACHOL) 40 MG tablet Take 1 tablet by mouth daily. 6/11/19  Yes Giancarlo Ramirez MD   fenofibrate (TRICOR) 145 MG tablet Take 1 tablet by mouth daily. 6/11/19  Yes Giancarlo Ramirez MD   omega-3 acid ethyl esters (LOVAZA) 1 g capsule Take 2 capsules by mouth 2 times daily. 6/11/19  Yes Giancarlo Ramirez MD   VITAMIN D, CHOLECALCIFEROL, PO Take 4,000 Units by mouth daily.   Yes Outside  Provider   pantoprazole (PROTONIX) 40 MG tablet TAKE 1 TABLET BY MOUTH EVERY 12 HOURS 4/18/19  Yes Marcos Malone MD   buPROPion (WELLBUTRIN XL) 300 MG 24 hr tablet Take 1 tablet by mouth daily. 11/27/18  Yes JOSE L Moser   sertraline (ZOLOFT) 100 MG tablet Take 2 tablets by mouth daily. 11/27/18  Yes JOSE L Moser        PHYSICAL EXAM:   Vitals:    01/31/20 0917   BP: 138/88      General appearance:  Alert, appears stated age and cooperative.  Neck:  Supple, symmetrical, trachea midline and healing cervical incision without erythema or drainage, voice clear.     LABORATORY DATA:   Calcium: 8.5   PTH: 20  Vitamin D: 26.7    PATHOLOGY:   Left inferior parathyroid, parathyroidectomy:     - Parathyroid adenoma    - Weight: 381 mg     ASSESSMENT/PLAN:   Elier Santamaria is a 54 year old male with primary hyperparathyroidism s/p minimally invasive parathyroidectomy (left inferior type C parathyroid) on 1/16/2020. Overall, doing well. Suspect neck pain is related to positioning during surgery, hopefully will improve with time. His indication for surgery was calcium approximately 1 point above normal and elevated urinary calcium.  We discussed the pathology, which showed a benign hypercellular parathyroid gland.  We reviewed the lab results, which show the calcium and PTH are normal today. Vitamin D slightly low, currently taking 4,000 IU daily. Recommended he start taking 6,000 IU daily. Will plan to see him back in six months with repeat labs (calcium, vitamin D and PTH). Instructed on sun protection to incision. Patient given opportunity to ask questions which were answered to his satisfaction. The patient indicated understanding of the diagnosis and agreed with the plan of care.     Char Beltre PA-C      not appropriate for swallowing intervention

## 2024-01-08 NOTE — SWALLOW BEDSIDE ASSESSMENT ADULT - DIET PRIOR TO ADMI
Per d/w daughter; puree with moderately thickened liquids; total feeding assistance with encouragement upon feeding as Pt may refuse first few tspns but eventually accepts PO per behavior pta.

## 2024-01-08 NOTE — PHYSICAL THERAPY INITIAL EVALUATION ADULT - ADDITIONAL COMMENTS
Called pt's daughter Ada. As per daughter pt lives with spouse in an apartment and has aide helping 24/7. Prior to admission pt amb with RW with assist from aide in his room. Pt AxO x0 and that's his baseline as per daughter. Pt owns a RW.

## 2024-01-08 NOTE — SWALLOW BEDSIDE ASSESSMENT ADULT - ORAL PREPARATORY PHASE
max encouragement provided/Refuses to accept bolus into oral cavity/Reduced oral grading/Anterior loss of bolus

## 2024-01-08 NOTE — PATIENT PROFILE ADULT - NSPROPTRIGHTBILLOFRIGHTS_GEN_A_NUR
Unable to provide information. Patient is A&Ox0-1 @ baseline, does not participate in interview. No family @ bedside/patient

## 2024-01-08 NOTE — PROGRESS NOTE ADULT - ASSESSMENT
89M HLD, AFib (Coumadin), CHF, CVA (residual L deficit), BPH, Alzheimer's dementia (AOx0-1 bl) admit 11/2023 for AMS iso sepsis 2/2 EColi bacteremia presumed urinary source (c/f renal abscess dc'd for uro f/u), ED presentation 12/20/23 for "shakiness" noticed by family who stated at that time that it usually is harbinger of infxn for him, dc'd home, now a/w sepsis 2/2 COVID-19 infxn        Problem/Plan - 1:  ·  Problem: Sepsis due to COVID-19.   ·  Plan: SIRS met (fever, tachycardia, tachypnea, leukopenia)  UA w/o c/f infxn; CXR w/o consolidation; procal 0.06; COV+  - req minimal O2 w/o documentation of hypoxemia on admit, and now breathing comfortably on room air, hold dex for now and ctm  - start remdesivir given dementia c/f progression to severe dz, (GFR > 30), monitor LFTs - d/w daughter Malek who agrees w/ plan   - c/w coumadin re VTE ppx  - f/u UCx, BCx x 2  - VBG w/ lactate  - trend labs  - VS q4h.     Problem/Plan - 2:  ·  Problem: Chronic atrial fibrillation.   ·  Plan: - dose coumadin one time 1mg tonight,   will need to redose tomorrow and consider adjust based on AM coags,   c/w dig and BB w/ strict hold parameters given sepsis.     Problem/Plan - 3:  ·  Problem: Chronic CHF.   ·  Plan: proBNP elevated, HST w/o significant delta  exam w/ 1+ pitting pedal edema though otherwise appearing euvolemic  - monitor vol status, caution w/ IVF.     Problem/Plan - 4:  ·  Problem: CVA (cerebrovascular accident).   ·  Plan: residual L deficit per daughter Malek   - c/w ASA.     Problem/Plan - 5:  ·  Problem: HLD (hyperlipidemia).   ·  Plan: - c/w simvastatin.     Problem/Plan - 6:  ·  Problem: History of BPH.   ·  Plan: - BS per routine, c/w home meds.     Problem/Plan - 7:  ·  Problem: Alzheimer's dementia.   ·  Plan: - c/w home meds  - fall/aspiration precaution  - formal S&S in AM, will give puree diet w/ mod thick liquids in interim (per 11/2023 formal S&S and confirmed w/ daughter Malek who requests pt be given diet tonight prior to repeat formal testing), will order bedside RN dysphagia screen prior.     Problem/Plan - 8:  ·  Problem: Need for prophylactic measure.   ·  Plan: - replete phos (not given in ED)   - coumadin re vte ppx  - aspiration, fall precaution  - dispo pending course, PT eval  - f/u macrocytosis, hyperpigmented lesion L chest as outpt w/ PCP/derm.  - full code per daughter Malek.  D/W Daughter in detail.

## 2024-01-08 NOTE — PATIENT PROFILE ADULT - FALL HARM RISK - FALLEN IN PAST
Unable to obtain information. Patient is A&Ox0-1 @ baseline, does not participate in interview. No family @ bedside/Unable to determine

## 2024-01-08 NOTE — SWALLOW BEDSIDE ASSESSMENT ADULT - COMMENTS
89M HLD, AFib (Coumadin), CHF, CVA (residual L deficit), BPH, Alzheimer's dementia (AOx0-1 bl) admit 11/2023 for AMS iso sepsis 2/2 EColi bacteremia presumed urinary source (c/f renal abscess dc'd for uro f/u). Pt presented to ED BIBEMS for tremors, COVID-19+ home test per ED notation. Attempted to interview pt at bedside, alert though oriented x 0, not participating in interview. MD per H&P contacted daughter Ada who relays that pt resides w/ wife and HHA, both of whom are COVID-19+; Pt brought pt to ED given positive result. Otherwise they do not note any changes in his behavior, urinary habits, or PO intake. SLP also d/w dgtr on this date who confirms that pt is AOx0 at baseline and does not participate in conversation although she reports the Pt has been tolerating pureed food with moderately thickened liquids.  1/7/24 CXR: IMPRESSION: No acute chest disease.  Per previous admission 11/23, bedside swallowing evaluation recommended puree with moderately thickened liquids.

## 2024-01-08 NOTE — PATIENT PROFILE ADULT - NSPRESCRALCFREQ_GEN_A_NUR
Unable to obtain information. Patient is A&Ox0-1 @ baseline, does not participate in interview. No family @ bedside/Never

## 2024-01-08 NOTE — PATIENT PROFILE ADULT - NSPROGENPREVTRANSF_GEN_A_NUR
Unable to obtain information. Patient is A&Ox0-1 @ baseline, does not participate in interview. No family @ bedside/no history of blood product transfusion

## 2024-01-08 NOTE — PATIENT PROFILE ADULT - FALL HARM RISK - HARM RISK INTERVENTIONS
Assistance with ambulation/Assistance OOB with selected safe patient handling equipment/Communicate Risk of Fall with Harm to all staff/Discuss with provider need for PT consult/Monitor gait and stability/Provide patient with walking aids - walker, cane, crutches/Reinforce activity limits and safety measures with patient and family/Tailored Fall Risk Interventions/Visual Cue: Yellow wristband and red socks/Bed in lowest position, wheels locked, appropriate side rails in place/Call bell, personal items and telephone in reach/Instruct patient to call for assistance before getting out of bed or chair/Non-slip footwear when patient is out of bed/[place] to call system/Physically safe environment - no spills, clutter or unnecessary equipment/Purposeful Proactive Rounding/Room/bathroom lighting operational, light cord in reach Assistance with ambulation/Assistance OOB with selected safe patient handling equipment/Communicate Risk of Fall with Harm to all staff/Discuss with provider need for PT consult/Monitor gait and stability/Provide patient with walking aids - walker, cane, crutches/Reinforce activity limits and safety measures with patient and family/Tailored Fall Risk Interventions/Visual Cue: Yellow wristband and red socks/Bed in lowest position, wheels locked, appropriate side rails in place/Call bell, personal items and telephone in reach/Instruct patient to call for assistance before getting out of bed or chair/Non-slip footwear when patient is out of bed/[place] to call system/Physically safe environment - no spills, clutter or unnecessary equipment/Purposeful Proactive Rounding/Room/bathroom lighting operational, light cord in reach

## 2024-01-08 NOTE — SWALLOW BEDSIDE ASSESSMENT ADULT - SLP GENERAL OBSERVATIONS
Carried
Pt awake, alert, oriented x 0; SLP discussed Pt with daughter prior to exam; Pt's mentation does not support use of  phone

## 2024-01-08 NOTE — PATIENT PROFILE ADULT - NSTRANSFERBELONGINGSRESP_GEN_A_NUR
As per ED report, patient's daughter is A&Ox0-1 @ baseline, hx of dementia. Family not present at bedside during admission

## 2024-01-08 NOTE — PATIENT PROFILE ADULT - NSPROHMSYMPCOND_GEN_A_NUR
Unable to obtain information. Patient is A&Ox0-1 @ baseline, does not participate in interview. No family @ bedside/cardiovascular/neurologic

## 2024-01-08 NOTE — SWALLOW BEDSIDE ASSESSMENT ADULT - ORAL PHASE
mild; Pt adequately cleared oral cavity on trials which he accepted tspn amounts of bolus./Delayed oral transit time

## 2024-01-08 NOTE — PROGRESS NOTE ADULT - SUBJECTIVE AND OBJECTIVE BOX
Date of Service  : 01-08-24     INTERVAL HPI/OVERNIGHT EVENTS: No new  concerns.   Vital Signs Last 24 Hrs  T(C): 36.8 (08 Jan 2024 08:26), Max: 38.6 (07 Jan 2024 18:30)  T(F): 98.2 (08 Jan 2024 08:26), Max: 101.5 (07 Jan 2024 18:30)  HR: 75 (08 Jan 2024 08:26) (75 - 109)  BP: 109/62 (08 Jan 2024 08:26) (98/54 - 149/73)  BP(mean): 99 (08 Jan 2024 01:55) (65 - 99)  RR: 18 (08 Jan 2024 08:26) (18 - 24)  SpO2: 96% (08 Jan 2024 08:26) (96% - 100%)    Parameters below as of 08 Jan 2024 08:26  Patient On (Oxygen Delivery Method): room air      I&O's Summary    MEDICATIONS  (STANDING):  aspirin enteric coated 81 milliGRAM(s) Oral daily  chlorhexidine 2% Cloths 1 Application(s) Topical daily  cholecalciferol 1000 Unit(s) Oral daily  digoxin     Tablet 250 MICROGram(s) Oral daily  finasteride 5 milliGRAM(s) Oral daily  metoprolol tartrate 50 milliGRAM(s) Oral two times a day  pantoprazole    Tablet 40 milliGRAM(s) Oral two times a day  pyridoxine 100 milliGRAM(s) Oral daily  remdesivir  IVPB   IV Intermittent   simvastatin 10 milliGRAM(s) Oral at bedtime  tamsulosin 0.4 milliGRAM(s) Oral at bedtime    MEDICATIONS  (PRN):  acetaminophen     Tablet .. 650 milliGRAM(s) Oral every 6 hours PRN Temp greater or equal to 38C (100.4F), Mild Pain (1 - 3)    LABS:                        12.2   3.34  )-----------( 132      ( 08 Jan 2024 11:34 )             37.5     01-08    137  |  102  |  7   ----------------------------<  98  4.2   |  28  |  0.62    Ca    7.9<L>      08 Jan 2024 11:33  Phos  2.8     01-08  Mg     1.9     01-08    TPro  5.5<L>  /  Alb  2.8<L>  /  TBili  0.7  /  DBili  x   /  AST  28  /  ALT  25  /  AlkPhos  86  01-08    PT/INR - ( 07 Jan 2024 16:52 )   PT: 18.1 sec;   INR: 1.67 ratio         PTT - ( 07 Jan 2024 16:52 )  PTT:32.4 sec  Urinalysis Basic - ( 08 Jan 2024 11:33 )    Color: x / Appearance: x / SG: x / pH: x  Gluc: 98 mg/dL / Ketone: x  / Bili: x / Urobili: x   Blood: x / Protein: x / Nitrite: x   Leuk Esterase: x / RBC: x / WBC x   Sq Epi: x / Non Sq Epi: x / Bacteria: x      CAPILLARY BLOOD GLUCOSE            Urinalysis Basic - ( 08 Jan 2024 11:33 )    Color: x / Appearance: x / SG: x / pH: x  Gluc: 98 mg/dL / Ketone: x  / Bili: x / Urobili: x   Blood: x / Protein: x / Nitrite: x   Leuk Esterase: x / RBC: x / WBC x   Sq Epi: x / Non Sq Epi: x / Bacteria: x          Consultant(s) Notes Reviewed:  [x ] YES  [ ] NO    PHYSICAL EXAM:  GENERAL: NAD, well-groomed, well-developed,not in any distress ,  HEAD:  Atraumatic, Normocephalic  NECK: Supple, No JVD, Normal thyroid  NERVOUS SYSTEM:  Alert & No focal deficit   CHEST/LUNG: Good air entry bilateral with no  rales, rhonchi, wheezing, or rubs  HEART: Regular rate and rhythm; No murmurs, rubs, or gallops  ABDOMEN: Soft, Nontender, Nondistended; Bowel sounds present  EXTREMITIES:  2+ Peripheral Pulses, No clubbing, cyanosis, or edema    Care Discussed with Consultants/Other Providers [ x] YES  [ ] NO

## 2024-01-08 NOTE — SWALLOW BEDSIDE ASSESSMENT ADULT - SWALLOW EVAL: DIAGNOSIS
Pt is a 89 y/o male with h/o dementia and dysphagia who was admitted with Covid and presents with oral prep/oral stage dysphagia primarily characterized by limited acceptance of PO trials. Underlying cognitive deficits may be a contributing factor to dysphagia. Oral grading and control of bolus are impaired.  No signs or symptoms of laryngeal penetration/aspiration evident with most conservative textures which is deemed to be Pt's baseline.

## 2024-01-08 NOTE — CONSULT NOTE ADULT - ASSESSMENT
89M HLD, AFib (Coumadin), CHF, CVA (residual L deficit), BPH, Alzheimer's dementia (AOx0-1 bl) admit 11/2023 for AMS iso sepsis 2/2 EColi bacteremia presumed urinary source (c/f renal abscess dc'd for uro f/u), ED presentation 12/20/23 for "shakiness" noticed by family who stated at that time that it usually is harbinger of infxn for him, dc'd home, now presents BIBEMS for tremors iso COVID-19+ home test per ED notation.       Overall fever, leucopenia, lactic acidosis on presentation, Sepsis due to COVID infection.   No SOB or hypoxia.   U/A negative, no diarrhea.       PLAN:   C/w remdesivir  monitor LFT's and Cr while on remdesivir  trend CBC for lymphopenia  supportive care.   follow up blood cx, in lab  f/u urine cx in lab.   MRSA PCR in lab.   monitor off abx.      Plan discussed with Medicine ACP.     Angie Aguayo  Please contact through MS Teams   If no response or past 5 pm/weekend call [phone].  89M HLD, AFib (Coumadin), CHF, CVA (residual L deficit), BPH, Alzheimer's dementia (AOx0-1 bl) admit 11/2023 for AMS iso sepsis 2/2 EColi bacteremia presumed urinary source (c/f renal abscess dc'd for uro f/u), ED presentation 12/20/23 for "shakiness" noticed by family who stated at that time that it usually is harbinger of infxn for him, dc'd home, now presents BIBEMS for tremors iso COVID-19+ home test per ED notation.       Overall fever, leucopenia, lactic acidosis on presentation, Sepsis due to COVID infection.   No SOB or hypoxia.   U/A negative, no diarrhea.       PLAN:   C/w remdesivir  monitor LFT's and Cr while on remdesivir  trend CBC for lymphopenia  supportive care.   follow up blood cx, in lab  f/u urine cx in lab.   MRSA PCR in lab.   monitor off abx.      Plan discussed with Medicine ACP.     Angie Aguayo  Please contact through MS Teams   If no response or past 5 pm/weekend call [phone].

## 2024-01-08 NOTE — PATIENT PROFILE ADULT - NSPROGENBLOODRESTRICT_GEN_A_NUR
Unable to obtain information. Patient is A&Ox0-1 @ baseline, does not participate in interview. No family @ bedside

## 2024-01-08 NOTE — SWALLOW BEDSIDE ASSESSMENT ADULT - PHARYNGEAL PHASE
no overt signs of aspiration; clear vocal quality post swallow; pharyngeal swallow may be mildly delayed

## 2024-01-08 NOTE — SWALLOW BEDSIDE ASSESSMENT ADULT - ASR SWALLOW ASPIRATION MONITOR
*If evident, report to MD immediately and d/c oral diet vs GOC discussion/change of breathing pattern/gurgly voice/fever/pneumonia/throat clearing/upper respiratory infection

## 2024-01-08 NOTE — CONSULT NOTE ADULT - SUBJECTIVE AND OBJECTIVE BOX
Cardiovascular Disease Initial Evaluation  Date of service: 01-08-24 @ 11:01    CHIEF COMPLAINT:  COVID-19    HISTORY OF PRESENT ILLNESS:  Mr. Kimbrough is a 89M HLD, AFib on Coumadin, CHF, CVA (residual L deficit), BPH, Alzheimer's dementia (AOx0-1 base line) with recent admission 11/2023 for AMS iso sepsis 2/2 EColi bacteremia presumed urinary source (c/f renal abscess dc'd for uro f/u), ED presentation 12/20/23 for "shakiness" noticed by family. Patient found to have COVID-19 on admission. Patient is a poor historian. History obtained from prior records. Patient resides w/ wife and HHA, both of whom are COVID-19+, and decided to test pt after he appeared lethargic today, brought pt to ED given positive result. Otherwise they do not note any changes in his behavior, urinary habits, or PO intake. Ada confirms that pt is AOx0 at bl, and does not participate in conversation. Med list confirmed w/ daughter.        Allergies    No Known Allergies    Intolerances    	    MEDICATIONS:  aspirin enteric coated 81 milliGRAM(s) Oral daily  digoxin     Tablet 250 MICROGram(s) Oral daily  metoprolol tartrate 50 milliGRAM(s) Oral two times a day    remdesivir  IVPB   IV Intermittent       acetaminophen     Tablet .. 650 milliGRAM(s) Oral every 6 hours PRN    pantoprazole    Tablet 40 milliGRAM(s) Oral two times a day    finasteride 5 milliGRAM(s) Oral daily  simvastatin 10 milliGRAM(s) Oral at bedtime    chlorhexidine 2% Cloths 1 Application(s) Topical daily  cholecalciferol 1000 Unit(s) Oral daily  pyridoxine 100 milliGRAM(s) Oral daily  tamsulosin 0.4 milliGRAM(s) Oral at bedtime      PAST MEDICAL & SURGICAL HISTORY:  CVA (cerebral vascular accident)      CHF (congestive heart failure)      Atrial fibrillation      BPH (benign prostatic hyperplasia)      Dementia      H/O hernia repair      S/P ear surgery          FAMILY HISTORY:  FH: heart disease (Father, Mother)    FH: dementia (Father, Mother)        SOCIAL HISTORY:    The patient is a nonsmoker       REVIEW OF SYSTEMS:  See HPI, otherwise complete 14 point review of systems negative    [ x] All others negative	  [ ] Unable to obtain    PHYSICAL EXAM:  T(C): 36.8 (01-08-24 @ 08:26), Max: 38.6 (01-07-24 @ 18:30)  HR: 75 (01-08-24 @ 08:26) (75 - 109)  BP: 109/62 (01-08-24 @ 08:26) (98/54 - 149/73)  RR: 18 (01-08-24 @ 08:26) (18 - 24)  SpO2: 96% (01-08-24 @ 08:26) (96% - 100%)  Wt(kg): --  I&O's Summary      Appearance: No Acute Distress  HEENT:  Normal oral mucosa, PERRL, EOMI	  Cardiovascular: Normal S1 S2, No JVD, No murmurs/rubs/gallops  Respiratory: Normal respiratory effort; Lungs clear to auscultation bilaterally  Gastrointestinal:  Soft, Non-tender, + BS	  Skin: No rashes, No ecchymoses, No cyanosis	  Neurologic: Non-focal; no weakness  Extremities: No clubbing, cyanosis or edema  Vascular: Peripheral pulses palpable 2+ bilaterally  Psychiatry: A & O x 0, lethargic.     Laboratory Data:	 	    CBC Full  -  ( 07 Jan 2024 16:52 )  WBC Count : 3.47 K/uL  Hemoglobin : 14.2 g/dL  Hematocrit : 44.6 %  Platelet Count - Automated : 152 K/uL  Mean Cell Volume : 104.2 fl  Mean Cell Hemoglobin : 33.2 pg  Mean Cell Hemoglobin Concentration : 31.8 gm/dL  Auto Neutrophil # : 2.87 K/uL  Auto Lymphocyte # : 0.30 K/uL  Auto Monocyte # : 0.21 K/uL  Auto Eosinophil # : 0.00 K/uL  Auto Basophil # : 0.00 K/uL  Auto Neutrophil % : 82.6 %  Auto Lymphocyte % : 8.7 %  Auto Monocyte % : 6.1 %  Auto Eosinophil % : 0.0 %  Auto Basophil % : 0.0 %    01-08    137  |  105  |  9   ----------------------------<  114<H>  4.3   |  23  |  0.58  01-07    135  |  100  |  9   ----------------------------<  144<H>  5.2   |  27  |  0.74    Ca    7.8<L>      08 Jan 2024 01:36  Ca    8.8      07 Jan 2024 16:52  Phos  2.2     01-07    TPro  6.5  /  Alb  3.2<L>  /  TBili  0.6  /  DBili  x   /  AST  31  /  ALT  28  /  AlkPhos  112  01-07      proBNP:   Lipid Profile:   HgA1c:   TSH:       CARDIAC MARKERS:            Interpretation of Telemetry: N/a	    ECG: N/a  RADIOLOGY:  OTHER: 	    PREVIOUS DIAGNOSTIC TESTING:    [ ] Echocardiogram:  [ ] Catheterization:  [ ] Stress Test:  	    Assessment:  89yo M w/ PMH of Afib on Coumadin, CHF, CVA, BPH, dementia (A&Ox0-1 at baseline) presents with acute COVID-19 infection.       Plan of Care:    #Afib  - Continue Digoxin and BB  - Pt on Coumadin with INR goal of 2-3  - Continue current management.       #Sepsis  - 2/2 UTI  - Abx as per primary team    #HLD  - Statin as ordered      #COVID-19   - Patient started on Remdesivir        72 minutes spent on total encounter; more than 50% of the visit was spent counseling and/or coordinating care by the attending physician.   	  Orestes Hart,  [facility]  Cardiovascular Diseases  [phone]     Cardiovascular Disease Initial Evaluation  Date of service: 01-08-24 @ 11:01    CHIEF COMPLAINT:  COVID-19    HISTORY OF PRESENT ILLNESS:  Mr. Kimbrough is a 89M HLD, AFib on Coumadin, CHF, CVA (residual L deficit), BPH, Alzheimer's dementia (AOx0-1 base line) with recent admission 11/2023 for AMS iso sepsis 2/2 EColi bacteremia presumed urinary source (c/f renal abscess dc'd for uro f/u), ED presentation 12/20/23 for "shakiness" noticed by family. Patient found to have COVID-19 on admission. Patient is a poor historian. History obtained from prior records. Patient resides w/ wife and HHA, both of whom are COVID-19+, and decided to test pt after he appeared lethargic today, brought pt to ED given positive result. Otherwise they do not note any changes in his behavior, urinary habits, or PO intake. Ada confirms that pt is AOx0 at bl, and does not participate in conversation. Med list confirmed w/ daughter.        Allergies    No Known Allergies    Intolerances    	    MEDICATIONS:  aspirin enteric coated 81 milliGRAM(s) Oral daily  digoxin     Tablet 250 MICROGram(s) Oral daily  metoprolol tartrate 50 milliGRAM(s) Oral two times a day    remdesivir  IVPB   IV Intermittent       acetaminophen     Tablet .. 650 milliGRAM(s) Oral every 6 hours PRN    pantoprazole    Tablet 40 milliGRAM(s) Oral two times a day    finasteride 5 milliGRAM(s) Oral daily  simvastatin 10 milliGRAM(s) Oral at bedtime    chlorhexidine 2% Cloths 1 Application(s) Topical daily  cholecalciferol 1000 Unit(s) Oral daily  pyridoxine 100 milliGRAM(s) Oral daily  tamsulosin 0.4 milliGRAM(s) Oral at bedtime      PAST MEDICAL & SURGICAL HISTORY:  CVA (cerebral vascular accident)      CHF (congestive heart failure)      Atrial fibrillation      BPH (benign prostatic hyperplasia)      Dementia      H/O hernia repair      S/P ear surgery          FAMILY HISTORY:  FH: heart disease (Father, Mother)    FH: dementia (Father, Mother)        SOCIAL HISTORY:    The patient is a nonsmoker       REVIEW OF SYSTEMS:  See HPI, otherwise complete 14 point review of systems negative    [ x] All others negative	  [ ] Unable to obtain    PHYSICAL EXAM:  T(C): 36.8 (01-08-24 @ 08:26), Max: 38.6 (01-07-24 @ 18:30)  HR: 75 (01-08-24 @ 08:26) (75 - 109)  BP: 109/62 (01-08-24 @ 08:26) (98/54 - 149/73)  RR: 18 (01-08-24 @ 08:26) (18 - 24)  SpO2: 96% (01-08-24 @ 08:26) (96% - 100%)  Wt(kg): --  I&O's Summary      Appearance: No Acute Distress  HEENT:  Normal oral mucosa, PERRL, EOMI	  Cardiovascular: Normal S1 S2, No JVD, No murmurs/rubs/gallops  Respiratory: Normal respiratory effort; Lungs clear to auscultation bilaterally  Gastrointestinal:  Soft, Non-tender, + BS	  Skin: No rashes, No ecchymoses, No cyanosis	  Neurologic: Non-focal; no weakness  Extremities: No clubbing, cyanosis or edema  Vascular: Peripheral pulses palpable 2+ bilaterally  Psychiatry: A & O x 0, lethargic.     Laboratory Data:	 	    CBC Full  -  ( 07 Jan 2024 16:52 )  WBC Count : 3.47 K/uL  Hemoglobin : 14.2 g/dL  Hematocrit : 44.6 %  Platelet Count - Automated : 152 K/uL  Mean Cell Volume : 104.2 fl  Mean Cell Hemoglobin : 33.2 pg  Mean Cell Hemoglobin Concentration : 31.8 gm/dL  Auto Neutrophil # : 2.87 K/uL  Auto Lymphocyte # : 0.30 K/uL  Auto Monocyte # : 0.21 K/uL  Auto Eosinophil # : 0.00 K/uL  Auto Basophil # : 0.00 K/uL  Auto Neutrophil % : 82.6 %  Auto Lymphocyte % : 8.7 %  Auto Monocyte % : 6.1 %  Auto Eosinophil % : 0.0 %  Auto Basophil % : 0.0 %    01-08    137  |  105  |  9   ----------------------------<  114<H>  4.3   |  23  |  0.58  01-07    135  |  100  |  9   ----------------------------<  144<H>  5.2   |  27  |  0.74    Ca    7.8<L>      08 Jan 2024 01:36  Ca    8.8      07 Jan 2024 16:52  Phos  2.2     01-07    TPro  6.5  /  Alb  3.2<L>  /  TBili  0.6  /  DBili  x   /  AST  31  /  ALT  28  /  AlkPhos  112  01-07      proBNP:   Lipid Profile:   HgA1c:   TSH:       CARDIAC MARKERS:            Interpretation of Telemetry: N/a	    ECG: N/a  RADIOLOGY:  OTHER: 	    PREVIOUS DIAGNOSTIC TESTING:    [ ] Echocardiogram:  [ ] Catheterization:  [ ] Stress Test:  	    Assessment:  89yo M w/ PMH of Afib on Coumadin, CHF, CVA, BPH, dementia (A&Ox0-1 at baseline) presents with acute COVID-19 infection.       Plan of Care:    #Afib  - Continue Digoxin and BB  - Pt on Coumadin with INR goal of 2-3  - Continue current management.       #Sepsis  - 2/2 UTI  - Abx as per primary team    #HLD  - Statin as ordered      #COVID-19   - Patient started on Remdesivir        72 minutes spent on total encounter; more than 50% of the visit was spent counseling and/or coordinating care by the attending physician.   	  Orestes Hart,  [facility]  Cardiovascular Diseases  [phone]     Cardiovascular Disease Initial Evaluation  Date of service: 01-08-24 @ 11:01    CHIEF COMPLAINT:  COVID-19    HISTORY OF PRESENT ILLNESS:  Mr. Kimbrough is a 89M HLD, AFib on Coumadin, CHF, CVA (residual L deficit), BPH, Alzheimer's dementia (AOx0-1 base line) with recent admission 11/2023 for AMS iso sepsis 2/2 EColi bacteremia presumed urinary source (c/f renal abscess dc'd for uro f/u), ED presentation 12/20/23 for "shakiness" noticed by family. Patient found to have COVID-19 on admission. Patient is a poor historian. History obtained from prior records. Patient resides w/ wife and HHA, both of whom are COVID-19+, and decided to test pt after he appeared lethargic today, brought pt to ED given positive result. Otherwise they do not note any changes in his behavior, urinary habits, or PO intake. Ada confirms that pt is AOx0 at bl, and does not participate in conversation. Med list confirmed w/ daughter.        Allergies    No Known Allergies    Intolerances    	    MEDICATIONS:  aspirin enteric coated 81 milliGRAM(s) Oral daily  digoxin     Tablet 250 MICROGram(s) Oral daily  metoprolol tartrate 50 milliGRAM(s) Oral two times a day    remdesivir  IVPB   IV Intermittent       acetaminophen     Tablet .. 650 milliGRAM(s) Oral every 6 hours PRN    pantoprazole    Tablet 40 milliGRAM(s) Oral two times a day    finasteride 5 milliGRAM(s) Oral daily  simvastatin 10 milliGRAM(s) Oral at bedtime    chlorhexidine 2% Cloths 1 Application(s) Topical daily  cholecalciferol 1000 Unit(s) Oral daily  pyridoxine 100 milliGRAM(s) Oral daily  tamsulosin 0.4 milliGRAM(s) Oral at bedtime      PAST MEDICAL & SURGICAL HISTORY:  CVA (cerebral vascular accident)      CHF (congestive heart failure)      Atrial fibrillation      BPH (benign prostatic hyperplasia)      Dementia      H/O hernia repair      S/P ear surgery          FAMILY HISTORY:  FH: heart disease (Father, Mother)    FH: dementia (Father, Mother)        SOCIAL HISTORY:    The patient is a nonsmoker       REVIEW OF SYSTEMS:  See HPI, otherwise complete 14 point review of systems negative    [ x] All others negative	  [ ] Unable to obtain    PHYSICAL EXAM:  T(C): 36.8 (01-08-24 @ 08:26), Max: 38.6 (01-07-24 @ 18:30)  HR: 75 (01-08-24 @ 08:26) (75 - 109)  BP: 109/62 (01-08-24 @ 08:26) (98/54 - 149/73)  RR: 18 (01-08-24 @ 08:26) (18 - 24)  SpO2: 96% (01-08-24 @ 08:26) (96% - 100%)  Wt(kg): --  I&O's Summary      Appearance: No Acute Distress  HEENT:  Normal oral mucosa, PERRL, EOMI	  Cardiovascular: Normal S1 S2, No JVD, No murmurs/rubs/gallops  Respiratory: Normal respiratory effort; Lungs clear to auscultation bilaterally  Gastrointestinal:  Soft, Non-tender, + BS	  Skin: No rashes, No ecchymoses, No cyanosis	  Neurologic: Non-focal; no weakness  Extremities: No clubbing, cyanosis or edema  Vascular: Peripheral pulses palpable 2+ bilaterally  Psychiatry: A & O x 0, lethargic.     Laboratory Data:	 	    CBC Full  -  ( 07 Jan 2024 16:52 )  WBC Count : 3.47 K/uL  Hemoglobin : 14.2 g/dL  Hematocrit : 44.6 %  Platelet Count - Automated : 152 K/uL  Mean Cell Volume : 104.2 fl  Mean Cell Hemoglobin : 33.2 pg  Mean Cell Hemoglobin Concentration : 31.8 gm/dL  Auto Neutrophil # : 2.87 K/uL  Auto Lymphocyte # : 0.30 K/uL  Auto Monocyte # : 0.21 K/uL  Auto Eosinophil # : 0.00 K/uL  Auto Basophil # : 0.00 K/uL  Auto Neutrophil % : 82.6 %  Auto Lymphocyte % : 8.7 %  Auto Monocyte % : 6.1 %  Auto Eosinophil % : 0.0 %  Auto Basophil % : 0.0 %    01-08    137  |  105  |  9   ----------------------------<  114<H>  4.3   |  23  |  0.58  01-07    135  |  100  |  9   ----------------------------<  144<H>  5.2   |  27  |  0.74    Ca    7.8<L>      08 Jan 2024 01:36  Ca    8.8      07 Jan 2024 16:52  Phos  2.2     01-07    TPro  6.5  /  Alb  3.2<L>  /  TBili  0.6  /  DBili  x   /  AST  31  /  ALT  28  /  AlkPhos  112  01-07      proBNP:   Lipid Profile:   HgA1c:   TSH:       CARDIAC MARKERS:            Interpretation of Telemetry: N/a	    ECG: N/a  RADIOLOGY:  OTHER: 	    PREVIOUS DIAGNOSTIC TESTING:    [ ] Echocardiogram:  [ ] Catheterization:  [ ] Stress Test:  	    Assessment:  89yo M w/ PMH of Afib on Coumadin, CHF, CVA, BPH, dementia (A&Ox0-1 at baseline) presents with acute COVID-19 infection.       Plan of Care:    #Afib  - Continue Digoxin and BB  - Pt on Coumadin with INR goal of 2-3  - Continue current management.       #HLD  - Statin as ordered      #COVID-19   - Patient started on Remdesivir        72 minutes spent on total encounter; more than 50% of the visit was spent counseling and/or coordinating care by the attending physician.   	  Orestes Hart DO [facility]  Cardiovascular Diseases  [phone]     Cardiovascular Disease Initial Evaluation  Date of service: 01-08-24 @ 11:01    CHIEF COMPLAINT:  COVID-19    HISTORY OF PRESENT ILLNESS:  Mr. Kimbrough is a 89M HLD, AFib on Coumadin, CHF, CVA (residual L deficit), BPH, Alzheimer's dementia (AOx0-1 base line) with recent admission 11/2023 for AMS iso sepsis 2/2 EColi bacteremia presumed urinary source (c/f renal abscess dc'd for uro f/u), ED presentation 12/20/23 for "shakiness" noticed by family. Patient found to have COVID-19 on admission. Patient is a poor historian. History obtained from prior records. Patient resides w/ wife and HHA, both of whom are COVID-19+, and decided to test pt after he appeared lethargic today, brought pt to ED given positive result. Otherwise they do not note any changes in his behavior, urinary habits, or PO intake. Ada confirms that pt is AOx0 at bl, and does not participate in conversation. Med list confirmed w/ daughter.        Allergies    No Known Allergies    Intolerances    	    MEDICATIONS:  aspirin enteric coated 81 milliGRAM(s) Oral daily  digoxin     Tablet 250 MICROGram(s) Oral daily  metoprolol tartrate 50 milliGRAM(s) Oral two times a day    remdesivir  IVPB   IV Intermittent       acetaminophen     Tablet .. 650 milliGRAM(s) Oral every 6 hours PRN    pantoprazole    Tablet 40 milliGRAM(s) Oral two times a day    finasteride 5 milliGRAM(s) Oral daily  simvastatin 10 milliGRAM(s) Oral at bedtime    chlorhexidine 2% Cloths 1 Application(s) Topical daily  cholecalciferol 1000 Unit(s) Oral daily  pyridoxine 100 milliGRAM(s) Oral daily  tamsulosin 0.4 milliGRAM(s) Oral at bedtime      PAST MEDICAL & SURGICAL HISTORY:  CVA (cerebral vascular accident)      CHF (congestive heart failure)      Atrial fibrillation      BPH (benign prostatic hyperplasia)      Dementia      H/O hernia repair      S/P ear surgery          FAMILY HISTORY:  FH: heart disease (Father, Mother)    FH: dementia (Father, Mother)        SOCIAL HISTORY:    The patient is a nonsmoker       REVIEW OF SYSTEMS:  See HPI, otherwise complete 14 point review of systems negative    [ x] All others negative	  [ ] Unable to obtain    PHYSICAL EXAM:  T(C): 36.8 (01-08-24 @ 08:26), Max: 38.6 (01-07-24 @ 18:30)  HR: 75 (01-08-24 @ 08:26) (75 - 109)  BP: 109/62 (01-08-24 @ 08:26) (98/54 - 149/73)  RR: 18 (01-08-24 @ 08:26) (18 - 24)  SpO2: 96% (01-08-24 @ 08:26) (96% - 100%)  Wt(kg): --  I&O's Summary      Appearance: No Acute Distress  HEENT:  Normal oral mucosa, PERRL, EOMI	  Cardiovascular: Normal S1 S2, No JVD, No murmurs/rubs/gallops  Respiratory: Normal respiratory effort; Lungs clear to auscultation bilaterally  Gastrointestinal:  Soft, Non-tender, + BS	  Skin: No rashes, No ecchymoses, No cyanosis	  Neurologic: Non-focal; no weakness  Extremities: No clubbing, cyanosis or edema  Vascular: Peripheral pulses palpable 2+ bilaterally  Psychiatry: A & O x 0, lethargic.     Laboratory Data:	 	    CBC Full  -  ( 07 Jan 2024 16:52 )  WBC Count : 3.47 K/uL  Hemoglobin : 14.2 g/dL  Hematocrit : 44.6 %  Platelet Count - Automated : 152 K/uL  Mean Cell Volume : 104.2 fl  Mean Cell Hemoglobin : 33.2 pg  Mean Cell Hemoglobin Concentration : 31.8 gm/dL  Auto Neutrophil # : 2.87 K/uL  Auto Lymphocyte # : 0.30 K/uL  Auto Monocyte # : 0.21 K/uL  Auto Eosinophil # : 0.00 K/uL  Auto Basophil # : 0.00 K/uL  Auto Neutrophil % : 82.6 %  Auto Lymphocyte % : 8.7 %  Auto Monocyte % : 6.1 %  Auto Eosinophil % : 0.0 %  Auto Basophil % : 0.0 %    01-08    137  |  105  |  9   ----------------------------<  114<H>  4.3   |  23  |  0.58  01-07    135  |  100  |  9   ----------------------------<  144<H>  5.2   |  27  |  0.74    Ca    7.8<L>      08 Jan 2024 01:36  Ca    8.8      07 Jan 2024 16:52  Phos  2.2     01-07    TPro  6.5  /  Alb  3.2<L>  /  TBili  0.6  /  DBili  x   /  AST  31  /  ALT  28  /  AlkPhos  112  01-07      proBNP:   Lipid Profile:   HgA1c:   TSH:       CARDIAC MARKERS:            Interpretation of Telemetry: N/a	    ECG: N/a  RADIOLOGY:  OTHER: 	    PREVIOUS DIAGNOSTIC TESTING:    [ ] Echocardiogram:  [ ] Catheterization:  [ ] Stress Test:  	    Assessment:  89yo M w/ PMH of Afib on Coumadin, CHF, CVA, BPH, dementia (A&Ox0-1 at baseline) presents with acute COVID-19 infection.       Plan of Care:    #Afib  - Continue Digoxin and BB  - Pt on Coumadin with INR goal of 2-3  - Continue current management.       #HLD  - Statin as ordered      #COVID-19   - Patient started on Remdesivir        72 minutes spent on total encounter; more than 50% of the visit was spent counseling and/or coordinating care by the attending physician.   	  Orestes Hart DO [facility]  Cardiovascular Diseases  [phone]

## 2024-01-08 NOTE — CONSULT NOTE ADULT - SUBJECTIVE AND OBJECTIVE BOX
Patient is a 89y old  Male who presents with a chief complaint of COVID-19 (08 Jan 2024 13:46)      HPI:  89M HLD, AFib (Coumadin), CHF, CVA (residual L deficit), BPH, Alzheimer's dementia (AOx0-1 bl) admit 11/2023 for AMS iso sepsis 2/2 EColi bacteremia presumed urinary source (c/f renal abscess dc'd for uro f/u), ED presentation 12/20/23 for "shakiness" noticed by family who stated at that time that it usually is harbinger of infxn for him, dc'd home, now presents BIBEMS for tremors iso COVID-19+ home test per ED notation. Attempted to interview pt at bedside, alert though oriented x 0, not participating in interview. Contacted daughter Ada who relays that pt resides w/ wife and HHA, both of whom are COVID-19+, and decided to test pt after he appeared lethargic today, brought pt to ED given positive result. Otherwise they do not note any changes in his behavior, urinary habits, or PO intake. Ada confirms that pt is AOx0 at bl, and does not participate in conversation. Med list confirmed w/ daughter.    VS: febrile tmax 38.6C, tachycardic HR 80s-100s, BP 90s-120s/40s-70s, tachypneic RR 22-24, SpO2%  on 1.5L NC  Labs: neutrophil predominant leukopenia 3.47 w/ procal 0.06, macrocytosis .2; INR 1.67; phos 2.2; HST 26, proBNP 1892   Micro: UA not c/f infxn; COV+  Imaging: CXR cardiomegaly, no acute chest dz   Received: ofirmev 1g IV, NS 500cc IV    admit to medicine for further eval/mgt (07 Jan 2024 20:17)  Above reviewed:   Pt not verbal, awake, but not answering questions, per RN some cough, no SOB, no diarrhea.         PAST MEDICAL & SURGICAL HISTORY:  CVA (cerebral vascular accident)      CHF (congestive heart failure)      Atrial fibrillation      BPH (benign prostatic hyperplasia)      Dementia      H/O hernia repair      S/P ear surgery          REVIEW OF SYSTEMS  Unable to obtain due to pt's underlying mental status.         Social history:  From home, lives with spouse per chart.         FAMILY HISTORY:  FH: heart disease (Father, Mother)    FH: dementia (Father, Mother)        Allergies  No Known Allergies        Antimicrobials:  remdesivir  IVPB   IV Intermittent         Vital Signs Last 24 Hrs  T(C): 36.4 (08 Jan 2024 14:00), Max: 38.6 (07 Jan 2024 18:30)  T(F): 97.6 (08 Jan 2024 14:00), Max: 101.5 (07 Jan 2024 18:30)  HR: 75 (08 Jan 2024 14:00) (75 - 89)  BP: 117/64 (08 Jan 2024 14:00) (98/54 - 149/73)  BP(mean): 99 (08 Jan 2024 01:55) (67 - 99)  RR: 18 (08 Jan 2024 14:00) (18 - 23)  SpO2: 97% (08 Jan 2024 14:00) (96% - 100%)    Parameters below as of 08 Jan 2024 14:00  Patient On (Oxygen Delivery Method): room air        PHYSICAL EXAM: Patient in no acute distress.    Constitutional: Comfortable. Awake    Eyes: No discharge or conjunctival injection    ENMT: No thrush.     Neck: Supple,     Respiratory:  + air entry bilaterally.    Cardiovascular: S1 S2 wnl,     Gastrointestinal: Soft, non distended.    Genitourinary: No brewster    Extremities: No edema.    Vascular: peripheral pulses felt    Skin: No rash     Musculoskeletal: No joint swelling.    Psychiatric: unable to assess.                                 12.2   3.34  )-----------( 132      ( 08 Jan 2024 11:34 )             37.5       01-08    137  |  102  |  7   ----------------------------<  98  4.2   |  28  |  0.62    Ca    7.9<L>      08 Jan 2024 11:33  Phos  2.8     01-08  Mg     1.9     01-08    TPro  5.5<L>  /  Alb  2.8<L>  /  TBili  0.7  /  DBili  x   /  AST  28  /  ALT  25  /  AlkPhos  86  01-08        Urinalysis + Microscopic Examination (01.07.24 @ 18:36)   pH Urine: 5.0  Urine Appearance: Clear  Color: Yellow  Specific Gravity: 1.027  Protein, Urine: Negative mg/dL  Glucose Qualitative, Urine: Negative mg/dL  Ketone - Urine: Trace mg/dL  Blood, Urine: Negative  Bilirubin: Negative  Urobilinogen: 1.0 mg/dL  Leukocyte Esterase Concentration: Negative  Nitrite: Negative  White Blood Cell - Urine: 2 /HPF  Red Blood Cell - Urine: 2 /HPF  Bacteria: Negative /HPF  Cast: 4 /LPF  Epithelial Cells: 6 /HPF        Radiology: Imaging reviewed and visualized personally [ x]      < from: Xray Chest 1 View- PORTABLE-Urgent (Xray Chest 1 View- PORTABLE-Urgent .) (01.07.24 @ 15:49) >    IMPRESSION:    No acute chest disease.

## 2024-01-09 LAB
-  STAPHYLOCOCCUS EPIDERMIDIS, METHICILLIN RESISTANT: SIGNIFICANT CHANGE UP
-  STAPHYLOCOCCUS EPIDERMIDIS, METHICILLIN RESISTANT: SIGNIFICANT CHANGE UP
ALBUMIN SERPL ELPH-MCNC: 2.3 G/DL — LOW (ref 3.3–5)
ALBUMIN SERPL ELPH-MCNC: 2.3 G/DL — LOW (ref 3.3–5)
ALP SERPL-CCNC: 86 U/L — SIGNIFICANT CHANGE UP (ref 40–120)
ALP SERPL-CCNC: 86 U/L — SIGNIFICANT CHANGE UP (ref 40–120)
ALT FLD-CCNC: 23 U/L — SIGNIFICANT CHANGE UP (ref 10–45)
ALT FLD-CCNC: 23 U/L — SIGNIFICANT CHANGE UP (ref 10–45)
ANION GAP SERPL CALC-SCNC: 11 MMOL/L — SIGNIFICANT CHANGE UP (ref 5–17)
ANION GAP SERPL CALC-SCNC: 11 MMOL/L — SIGNIFICANT CHANGE UP (ref 5–17)
AST SERPL-CCNC: 32 U/L — SIGNIFICANT CHANGE UP (ref 10–40)
AST SERPL-CCNC: 32 U/L — SIGNIFICANT CHANGE UP (ref 10–40)
BILIRUB SERPL-MCNC: 0.8 MG/DL — SIGNIFICANT CHANGE UP (ref 0.2–1.2)
BILIRUB SERPL-MCNC: 0.8 MG/DL — SIGNIFICANT CHANGE UP (ref 0.2–1.2)
BUN SERPL-MCNC: 9 MG/DL — SIGNIFICANT CHANGE UP (ref 7–23)
BUN SERPL-MCNC: 9 MG/DL — SIGNIFICANT CHANGE UP (ref 7–23)
CALCIUM SERPL-MCNC: 8.4 MG/DL — SIGNIFICANT CHANGE UP (ref 8.4–10.5)
CALCIUM SERPL-MCNC: 8.4 MG/DL — SIGNIFICANT CHANGE UP (ref 8.4–10.5)
CHLORIDE SERPL-SCNC: 101 MMOL/L — SIGNIFICANT CHANGE UP (ref 96–108)
CHLORIDE SERPL-SCNC: 101 MMOL/L — SIGNIFICANT CHANGE UP (ref 96–108)
CO2 SERPL-SCNC: 28 MMOL/L — SIGNIFICANT CHANGE UP (ref 22–31)
CO2 SERPL-SCNC: 28 MMOL/L — SIGNIFICANT CHANGE UP (ref 22–31)
CREAT SERPL-MCNC: 0.72 MG/DL — SIGNIFICANT CHANGE UP (ref 0.5–1.3)
CREAT SERPL-MCNC: 0.72 MG/DL — SIGNIFICANT CHANGE UP (ref 0.5–1.3)
CULTURE RESULTS: SIGNIFICANT CHANGE UP
CULTURE RESULTS: SIGNIFICANT CHANGE UP
EGFR: 87 ML/MIN/1.73M2 — SIGNIFICANT CHANGE UP
EGFR: 87 ML/MIN/1.73M2 — SIGNIFICANT CHANGE UP
GLUCOSE SERPL-MCNC: 114 MG/DL — HIGH (ref 70–99)
GLUCOSE SERPL-MCNC: 114 MG/DL — HIGH (ref 70–99)
GRAM STN FLD: ABNORMAL
HCT VFR BLD CALC: 40.6 % — SIGNIFICANT CHANGE UP (ref 39–50)
HCT VFR BLD CALC: 40.6 % — SIGNIFICANT CHANGE UP (ref 39–50)
HGB BLD-MCNC: 13.2 G/DL — SIGNIFICANT CHANGE UP (ref 13–17)
HGB BLD-MCNC: 13.2 G/DL — SIGNIFICANT CHANGE UP (ref 13–17)
INR BLD: 1.54 RATIO — HIGH (ref 0.85–1.18)
INR BLD: 1.54 RATIO — HIGH (ref 0.85–1.18)
MAGNESIUM SERPL-MCNC: 2 MG/DL — SIGNIFICANT CHANGE UP (ref 1.6–2.6)
MAGNESIUM SERPL-MCNC: 2 MG/DL — SIGNIFICANT CHANGE UP (ref 1.6–2.6)
MCHC RBC-ENTMCNC: 32.5 GM/DL — SIGNIFICANT CHANGE UP (ref 32–36)
MCHC RBC-ENTMCNC: 32.5 GM/DL — SIGNIFICANT CHANGE UP (ref 32–36)
MCHC RBC-ENTMCNC: 32.8 PG — SIGNIFICANT CHANGE UP (ref 27–34)
MCHC RBC-ENTMCNC: 32.8 PG — SIGNIFICANT CHANGE UP (ref 27–34)
MCV RBC AUTO: 101 FL — HIGH (ref 80–100)
MCV RBC AUTO: 101 FL — HIGH (ref 80–100)
METHOD TYPE: SIGNIFICANT CHANGE UP
METHOD TYPE: SIGNIFICANT CHANGE UP
NRBC # BLD: 0 /100 WBCS — SIGNIFICANT CHANGE UP (ref 0–0)
NRBC # BLD: 0 /100 WBCS — SIGNIFICANT CHANGE UP (ref 0–0)
PHOSPHATE SERPL-MCNC: 2.5 MG/DL — SIGNIFICANT CHANGE UP (ref 2.5–4.5)
PHOSPHATE SERPL-MCNC: 2.5 MG/DL — SIGNIFICANT CHANGE UP (ref 2.5–4.5)
PLATELET # BLD AUTO: 126 K/UL — LOW (ref 150–400)
PLATELET # BLD AUTO: 126 K/UL — LOW (ref 150–400)
POTASSIUM SERPL-MCNC: 4.4 MMOL/L — SIGNIFICANT CHANGE UP (ref 3.5–5.3)
POTASSIUM SERPL-MCNC: 4.4 MMOL/L — SIGNIFICANT CHANGE UP (ref 3.5–5.3)
POTASSIUM SERPL-SCNC: 4.4 MMOL/L — SIGNIFICANT CHANGE UP (ref 3.5–5.3)
POTASSIUM SERPL-SCNC: 4.4 MMOL/L — SIGNIFICANT CHANGE UP (ref 3.5–5.3)
PROT SERPL-MCNC: 5.6 G/DL — LOW (ref 6–8.3)
PROT SERPL-MCNC: 5.6 G/DL — LOW (ref 6–8.3)
PROTHROM AB SERPL-ACNC: 16.7 SEC — HIGH (ref 9.5–13)
PROTHROM AB SERPL-ACNC: 16.7 SEC — HIGH (ref 9.5–13)
RBC # BLD: 4.02 M/UL — LOW (ref 4.2–5.8)
RBC # BLD: 4.02 M/UL — LOW (ref 4.2–5.8)
RBC # FLD: 13.2 % — SIGNIFICANT CHANGE UP (ref 10.3–14.5)
RBC # FLD: 13.2 % — SIGNIFICANT CHANGE UP (ref 10.3–14.5)
SODIUM SERPL-SCNC: 140 MMOL/L — SIGNIFICANT CHANGE UP (ref 135–145)
SODIUM SERPL-SCNC: 140 MMOL/L — SIGNIFICANT CHANGE UP (ref 135–145)
SPECIMEN SOURCE: SIGNIFICANT CHANGE UP
WBC # BLD: 4.22 K/UL — SIGNIFICANT CHANGE UP (ref 3.8–10.5)
WBC # BLD: 4.22 K/UL — SIGNIFICANT CHANGE UP (ref 3.8–10.5)
WBC # FLD AUTO: 4.22 K/UL — SIGNIFICANT CHANGE UP (ref 3.8–10.5)
WBC # FLD AUTO: 4.22 K/UL — SIGNIFICANT CHANGE UP (ref 3.8–10.5)

## 2024-01-09 PROCEDURE — 99232 SBSQ HOSP IP/OBS MODERATE 35: CPT

## 2024-01-09 RX ORDER — VANCOMYCIN HCL 1 G
1000 VIAL (EA) INTRAVENOUS ONCE
Refills: 0 | Status: DISCONTINUED | OUTPATIENT
Start: 2024-01-09 | End: 2024-01-09

## 2024-01-09 RX ORDER — WARFARIN SODIUM 2.5 MG/1
1.5 TABLET ORAL ONCE
Refills: 0 | Status: COMPLETED | OUTPATIENT
Start: 2024-01-09 | End: 2024-01-09

## 2024-01-09 RX ADMIN — CHLORHEXIDINE GLUCONATE 1 APPLICATION(S): 213 SOLUTION TOPICAL at 11:36

## 2024-01-09 RX ADMIN — SIMVASTATIN 10 MILLIGRAM(S): 20 TABLET, FILM COATED ORAL at 21:41

## 2024-01-09 RX ADMIN — PANTOPRAZOLE SODIUM 40 MILLIGRAM(S): 20 TABLET, DELAYED RELEASE ORAL at 17:39

## 2024-01-09 RX ADMIN — REMDESIVIR 200 MILLIGRAM(S): 5 INJECTION INTRAVENOUS at 11:10

## 2024-01-09 RX ADMIN — PANTOPRAZOLE SODIUM 40 MILLIGRAM(S): 20 TABLET, DELAYED RELEASE ORAL at 05:26

## 2024-01-09 RX ADMIN — TAMSULOSIN HYDROCHLORIDE 0.4 MILLIGRAM(S): 0.4 CAPSULE ORAL at 21:41

## 2024-01-09 RX ADMIN — MUPIROCIN 1 APPLICATION(S): 20 OINTMENT TOPICAL at 05:26

## 2024-01-09 RX ADMIN — WARFARIN SODIUM 1.5 MILLIGRAM(S): 2.5 TABLET ORAL at 21:41

## 2024-01-09 RX ADMIN — MUPIROCIN 1 APPLICATION(S): 20 OINTMENT TOPICAL at 17:39

## 2024-01-09 RX ADMIN — Medication 250 MICROGRAM(S): at 05:26

## 2024-01-09 NOTE — PROGRESS NOTE ADULT - SUBJECTIVE AND OBJECTIVE BOX
89yPatient is a 89y old  Male who presents with a chief complaint of COVID-19 (09 Jan 2024 12:48)      Interval history:  Low grade temp, communicative today but confused.     Allergies:   No Known Allergies      Antimicrobials:  remdesivir  IVPB   IV Intermittent   remdesivir  IVPB 100 milliGRAM(s) IV Intermittent every 24 hours        REVIEW OF SYSTEMS:  Unable to obtain due to pt's underlying mental status.       Vital Signs Last 24 Hrs  T(C): 37.1 (01-09-24 @ 13:34), Max: 37.8 (01-09-24 @ 04:41)  T(F): 98.7 (01-09-24 @ 13:34), Max: 100.1 (01-09-24 @ 04:41)  HR: 93 (01-09-24 @ 17:35) (79 - 99)  BP: 110/72 (01-09-24 @ 17:35) (94/54 - 115/60)  BP(mean): --  RR: 18 (01-09-24 @ 13:34) (18 - 19)  SpO2: 95% (01-09-24 @ 13:34) (94% - 98%)      PHYSICAL EXAM:  Pt in no acute distress, awake.   breathing comfortably.   non distended abdomen  no edema LE   no phlebitis                             13.2   4.22  )-----------( 126      ( 09 Jan 2024 11:10 )             40.6   01-09    140  |  101  |  9   ----------------------------<  114<H>  4.4   |  28  |  0.72    Ca    8.4      09 Jan 2024 11:10  Phos  2.5     01-09  Mg     2.0     01-09    TPro  5.6<L>  /  Alb  2.3<L>  /  TBili  0.8  /  DBili  x   /  AST  32  /  ALT  23  /  AlkPhos  86  01-09      LIVER FUNCTIONS - ( 09 Jan 2024 11:10 )  Alb: 2.3 g/dL / Pro: 5.6 g/dL / ALK PHOS: 86 U/L / ALT: 23 U/L / AST: 32 U/L / GGT: x               Culture - Blood (collected 07 Jan 2024 23:00)  Source: .Blood Blood-Peripheral  Preliminary Report (09 Jan 2024 05:02):    No growth at 24 hours    Culture - Blood (collected 07 Jan 2024 22:51)  Source: .Blood Blood-Peripheral  Gram Stain (09 Jan 2024 11:33):    Growth in aerobic bottle: Gram Positive Cocci in Clusters    Growth in anaerobic bottle: Gram Positive Cocci in Clusters  Preliminary Report (09 Jan 2024 19:54):    Growth in aerobic bottle: Staphylococcus epidermidis    Isolation of Coagulase negative Staphylococcus from single blood culture    sets may represent    contamination. Contact the Microbiology Department at [phone] if    susceptibility testing is    clinically indicated.    Growth in anaerobic bottle: Gram Positive Cocci in Clusters    Direct identification is available within approximately 3-5    hours either by Blood Panel Multiplexed PCR or Direct    MALDI-TOF. Details: [email or web address]  Organism: Blood Culture PCR (09 Jan 2024 04:35)  Organism: Blood Culture PCR (09 Jan 2024 04:35)    Culture - Urine (collected 07 Jan 2024 18:36)  Source: Catheterized Catheterized  Final Report (09 Jan 2024 16:22):    Normal Urogenital yuli present             89yPatient is a 89y old  Male who presents with a chief complaint of COVID-19 (09 Jan 2024 12:48)      Interval history:  Low grade temp, communicative today but confused.     Allergies:   No Known Allergies      Antimicrobials:  remdesivir  IVPB   IV Intermittent   remdesivir  IVPB 100 milliGRAM(s) IV Intermittent every 24 hours        REVIEW OF SYSTEMS:  Unable to obtain due to pt's underlying mental status.       Vital Signs Last 24 Hrs  T(C): 37.1 (01-09-24 @ 13:34), Max: 37.8 (01-09-24 @ 04:41)  T(F): 98.7 (01-09-24 @ 13:34), Max: 100.1 (01-09-24 @ 04:41)  HR: 93 (01-09-24 @ 17:35) (79 - 99)  BP: 110/72 (01-09-24 @ 17:35) (94/54 - 115/60)  BP(mean): --  RR: 18 (01-09-24 @ 13:34) (18 - 19)  SpO2: 95% (01-09-24 @ 13:34) (94% - 98%)      PHYSICAL EXAM:  Pt in no acute distress, awake.   breathing comfortably.   non distended abdomen  no edema LE   no phlebitis                             13.2   4.22  )-----------( 126      ( 09 Jan 2024 11:10 )             40.6   01-09    140  |  101  |  9   ----------------------------<  114<H>  4.4   |  28  |  0.72    Ca    8.4      09 Jan 2024 11:10  Phos  2.5     01-09  Mg     2.0     01-09    TPro  5.6<L>  /  Alb  2.3<L>  /  TBili  0.8  /  DBili  x   /  AST  32  /  ALT  23  /  AlkPhos  86  01-09      LIVER FUNCTIONS - ( 09 Jan 2024 11:10 )  Alb: 2.3 g/dL / Pro: 5.6 g/dL / ALK PHOS: 86 U/L / ALT: 23 U/L / AST: 32 U/L / GGT: x               Culture - Blood (collected 07 Jan 2024 23:00)  Source: .Blood Blood-Peripheral  Preliminary Report (09 Jan 2024 05:02):    No growth at 24 hours    Culture - Blood (collected 07 Jan 2024 22:51)  Source: .Blood Blood-Peripheral  Gram Stain (09 Jan 2024 11:33):    Growth in aerobic bottle: Gram Positive Cocci in Clusters    Growth in anaerobic bottle: Gram Positive Cocci in Clusters  Preliminary Report (09 Jan 2024 19:54):    Growth in aerobic bottle: Staphylococcus epidermidis    Isolation of Coagulase negative Staphylococcus from single blood culture    sets may represent    contamination. Contact the Microbiology Department at [phone] if    susceptibility testing is    clinically indicated.    Growth in anaerobic bottle: Gram Positive Cocci in Clusters    Direct identification is available within approximately 3-5    hours either by Blood Panel Multiplexed PCR or Direct    MALDI-TOF. Details: [email or web address]  Organism: Blood Culture PCR (09 Jan 2024 04:35)  Organism: Blood Culture PCR (09 Jan 2024 04:35)    Culture - Urine (collected 07 Jan 2024 18:36)  Source: Catheterized Catheterized  Final Report (09 Jan 2024 16:22):    Normal Urogenital yuli present

## 2024-01-09 NOTE — PROGRESS NOTE ADULT - ASSESSMENT
89M HLD, AFib (Coumadin), CHF, CVA (residual L deficit), BPH, Alzheimer's dementia (AOx0-1 bl) admit 11/2023 for AMS iso sepsis 2/2 EColi bacteremia presumed urinary source (c/f renal abscess dc'd for uro f/u), ED presentation 12/20/23 for "shakiness" noticed by family who stated at that time that it usually is harbinger of infxn for him, dc'd home, now a/w sepsis 2/2 COVID-19 infxn        Problem/Plan - 1:  ·  Problem: Sepsis due to COVID-19.   ·  Plan: SIRS met (fever, tachycardia, tachypnea, leukopenia)  UA w/o c/f infxn; CXR w/o consolidation; procal 0.06; COV+  - req minimal O2 w/o documentation of hypoxemia on admit, and now breathing comfortably on room air, hold dex for now and ctm  - start remdesivir given dementia c/f progression to severe dz, (GFR > 30), monitor LFTs - d/w daughter Malek who agrees w/ plan   - c/w coumadin re VTE ppx  - f/u UCx, BCx x 2  - VBG w/ lactate  - trend labs  - VS q4h.     Problem/Plan - 2:  ·  Problem: Chronic atrial fibrillation.   ·  Plan: - dose coumadin one time 1mg tonight,   will need to redose tomorrow and consider adjust based on AM coags,   c/w dig and BB w/ strict hold parameters given sepsis.     Problem/Plan - 3:  ·  Problem: Chronic CHF.   ·  Plan: proBNP elevated, HST w/o significant delta  exam w/ 1+ pitting pedal edema though otherwise appearing euvolemic  - monitor vol status, caution w/ IVF.     Problem/Plan - 4:  ·  Problem: CVA (cerebrovascular accident).   ·  Plan: residual L deficit per daughter Malek   - c/w ASA.     Problem/Plan - 5:  ·  Problem: HLD (hyperlipidemia).   ·  Plan: - c/w simvastatin.     Problem/Plan - 6:  ·  Problem: History of BPH.   ·  Plan: - BS per routine, c/w home meds.     Problem/Plan - 7:  ·  Problem: Alzheimer's dementia.   ·  Plan: - c/w home meds  - fall/aspiration precaution  - formal S&S in AM, will give puree diet w/ mod thick liquids in interim (per 11/2023 formal S&S and confirmed w/ daughter Malek who requests pt be given diet tonight prior to repeat formal testing), will order bedside RN dysphagia screen prior.     Problem/Plan - 8:  ·  Problem: Need for prophylactic measure.   ·  Plan: - replete phos (not given in ED)   - coumadin re vte ppx  - aspiration, fall precaution  - dispo pending course, PT eval  - f/u macrocytosis, hyperpigmented lesion L chest as outpt w/ PCP/derm.  - full code per daughter Malek.    D/W Daughter in detail.DC PLANNING .

## 2024-01-09 NOTE — PROGRESS NOTE ADULT - SUBJECTIVE AND OBJECTIVE BOX
Date of Service  : 01-09-24     INTERVAL HPI/OVERNIGHT EVENTS: I feel ok .   Vital Signs Last 24 Hrs  T(C): 37.1 (09 Jan 2024 13:34), Max: 37.8 (09 Jan 2024 04:41)  T(F): 98.7 (09 Jan 2024 13:34), Max: 100.1 (09 Jan 2024 04:41)  HR: 79 (09 Jan 2024 13:34) (79 - 89)  BP: 98/61 (09 Jan 2024 13:34) (98/61 - 115/60)  BP(mean): --  RR: 18 (09 Jan 2024 13:34) (18 - 19)  SpO2: 95% (09 Jan 2024 13:34) (94% - 98%)    Parameters below as of 09 Jan 2024 13:34  Patient On (Oxygen Delivery Method): room air      I&O's Summary    08 Jan 2024 07:01  -  09 Jan 2024 07:00  --------------------------------------------------------  IN: 480 mL / OUT: 0 mL / NET: 480 mL      MEDICATIONS  (STANDING):  aspirin enteric coated 81 milliGRAM(s) Oral daily  chlorhexidine 2% Cloths 1 Application(s) Topical daily  cholecalciferol 1000 Unit(s) Oral daily  digoxin     Tablet 250 MICROGram(s) Oral daily  finasteride 5 milliGRAM(s) Oral daily  metoprolol tartrate 50 milliGRAM(s) Oral two times a day  mupirocin 2% Nasal 1 Application(s) Both Nostrils two times a day  pantoprazole    Tablet 40 milliGRAM(s) Oral two times a day  pyridoxine 100 milliGRAM(s) Oral daily  remdesivir  IVPB   IV Intermittent   remdesivir  IVPB 100 milliGRAM(s) IV Intermittent every 24 hours  simvastatin 10 milliGRAM(s) Oral at bedtime  tamsulosin 0.4 milliGRAM(s) Oral at bedtime  warfarin 1.5 milliGRAM(s) Oral once    MEDICATIONS  (PRN):  acetaminophen     Tablet .. 650 milliGRAM(s) Oral every 6 hours PRN Temp greater or equal to 38C (100.4F), Mild Pain (1 - 3)    LABS:                        13.2   4.22  )-----------( 126      ( 09 Jan 2024 11:10 )             40.6     01-09    140  |  101  |  9   ----------------------------<  114<H>  4.4   |  28  |  0.72    Ca    8.4      09 Jan 2024 11:10  Phos  2.5     01-09  Mg     2.0     01-09    TPro  5.6<L>  /  Alb  2.3<L>  /  TBili  0.8  /  DBili  x   /  AST  32  /  ALT  23  /  AlkPhos  86  01-09    PT/INR - ( 09 Jan 2024 11:10 )   PT: 16.7 sec;   INR: 1.54 ratio         PTT - ( 07 Jan 2024 16:52 )  PTT:32.4 sec  Urinalysis Basic - ( 09 Jan 2024 11:10 )    Color: x / Appearance: x / SG: x / pH: x  Gluc: 114 mg/dL / Ketone: x  / Bili: x / Urobili: x   Blood: x / Protein: x / Nitrite: x   Leuk Esterase: x / RBC: x / WBC x   Sq Epi: x / Non Sq Epi: x / Bacteria: x      CAPILLARY BLOOD GLUCOSE            Urinalysis Basic - ( 09 Jan 2024 11:10 )    Color: x / Appearance: x / SG: x / pH: x  Gluc: 114 mg/dL / Ketone: x  / Bili: x / Urobili: x   Blood: x / Protein: x / Nitrite: x   Leuk Esterase: x / RBC: x / WBC x   Sq Epi: x / Non Sq Epi: x / Bacteria: x          Consultant(s) Notes Reviewed:  [x ] YES  [ ] NO    PHYSICAL EXAM:  GENERAL: NAD, well-groomed, well-developed,not in any distress ,  HEAD:  Atraumatic, Normocephalic  NECK: Supple, No JVD, Normal thyroid  NERVOUS SYSTEM:  Alert & Oriented X1, No focal deficit   CHEST/LUNG: Good air entry bilateral with no  rales, rhonchi, wheezing, or rubs  HEART: Regular rate and rhythm; No murmurs, rubs, or gallops  ABDOMEN: Soft, Nontender, Nondistended; Bowel sounds present  EXTREMITIES:  2+ Peripheral Pulses, No clubbing, cyanosis, or edema      Care Discussed with Consultants/Other Providers [ x] YES  [ ] NO

## 2024-01-09 NOTE — PROGRESS NOTE ADULT - ASSESSMENT
89M HLD, AFib (Coumadin), CHF, CVA (residual L deficit), BPH, Alzheimer's dementia (AOx0-1 bl) admit 11/2023 for AMS iso sepsis 2/2 EColi bacteremia presumed urinary source (c/f renal abscess dc'd for uro f/u), ED presentation 12/20/23 for "shakiness" noticed by family who stated at that time that it usually is harbinger of infxn for him, dc'd home, now presents BIBEMS for tremors iso COVID-19+ home test per ED notation.       Overall fever, leucopenia, lactic acidosis on presentation, Sepsis due to COVID infection.   No SOB or hypoxia.   U/A negative, no diarrhea.   CoNS in blood cx. likely represents procurement contaminant.       PLAN:   C/w remdesivir, can limit duration to 3 days   monitor LFT's and Cr while on remdesivir  trend CBC for lymphopenia  supportive care.   repeat blood cx in lab.   MRSA PCR +  monitor off abx.      Plan discussed with Medicine ACP.     Angie Aguayo  Please contact through MS Teams   If no response or past 5 pm/weekend call [phone].  89M HLD, AFib (Coumadin), CHF, CVA (residual L deficit), BPH, Alzheimer's dementia (AOx0-1 bl) admit 11/2023 for AMS iso sepsis 2/2 EColi bacteremia presumed urinary source (c/f renal abscess dc'd for uro f/u), ED presentation 12/20/23 for "shakiness" noticed by family who stated at that time that it usually is harbinger of infxn for him, dc'd home, now presents BIBEMS for tremors iso COVID-19+ home test per ED notation.       Overall fever, leucopenia, lactic acidosis on presentation, Sepsis due to COVID infection.   No SOB or hypoxia.   U/A negative, no diarrhea.   CoNS in blood cx. likely represents procurement contaminant.       PLAN:   C/w remdesivir, can limit duration to 3 days   monitor LFT's and Cr while on remdesivir  trend CBC for lymphopenia  supportive care.   repeat blood cx in lab.   MRSA PCR +  monitor off abx.      Plan discussed with Medicine ACP.     Angie Aguayo  Please contact through MS Teams   If no response or past 5 pm/weekend call [phone].

## 2024-01-09 NOTE — PROGRESS NOTE ADULT - SUBJECTIVE AND OBJECTIVE BOX
Cardiovascular Disease Progress Note  Date of service: 24 @ 07:50    Overnight events: No acute events overnight.  Patient is in no distress.   Otherwise review of systems negative    Objective Findings:  T(C): 37 (24 @ 04:43), Max: 37.8 (24 @ 04:41)  HR: 89 (24 @ 04:43) (75 - 89)  BP: 101/62 (24 @ 04:43) (101/62 - 117/64)  RR: 18 (24 @ 04:43) (18 - 19)  SpO2: 94% (24 @ 04:43) (94% - 98%)  Wt(kg): --  Daily     Daily Weight in k.8 (2024 04:43)      Physical Exam:  Gen: NAD; Patient resting comfortably  HEENT: EOMI, Normocephalic/ atraumatic  CV: RRR, normal S1 + S2, no m/r/g  Lungs:  Normal respiratory effort; clear to auscultation bilaterally  Abd: soft, non-tender; bowel sounds present  Ext: No edema; warm and well perfused    Telemetry: N/a    Laboratory Data:                        12.2   3.34  )-----------( 132      ( 2024 11:34 )             37.5         137  |  102  |  7   ----------------------------<  98  4.2   |  28  |  0.62    Ca    7.9<L>      2024 11:33  Phos  2.8       Mg     1.9         TPro  5.5<L>  /  Alb  2.8<L>  /  TBili  0.7  /  DBili  x   /  AST  28  /  ALT  25  /  AlkPhos  86  -08    PT/INR - ( 2024 13:37 )   PT: 17.7 sec;   INR: 1.71 ratio         PTT - ( 2024 16:52 )  PTT:32.4 sec  CARDIAC MARKERS ( 2024 11:33 )  x     / x     / 44 U/L / x     / x              Inpatient Medications:  MEDICATIONS  (STANDING):  aspirin enteric coated 81 milliGRAM(s) Oral daily  chlorhexidine 2% Cloths 1 Application(s) Topical daily  cholecalciferol 1000 Unit(s) Oral daily  digoxin     Tablet 250 MICROGram(s) Oral daily  finasteride 5 milliGRAM(s) Oral daily  metoprolol tartrate 50 milliGRAM(s) Oral two times a day  mupirocin 2% Nasal 1 Application(s) Both Nostrils two times a day  pantoprazole    Tablet 40 milliGRAM(s) Oral two times a day  pyridoxine 100 milliGRAM(s) Oral daily  remdesivir  IVPB   IV Intermittent   remdesivir  IVPB 100 milliGRAM(s) IV Intermittent every 24 hours  simvastatin 10 milliGRAM(s) Oral at bedtime  tamsulosin 0.4 milliGRAM(s) Oral at bedtime      Assessment:  89yo M w/ PMH of Afib on Coumadin, CHF, CVA, BPH, dementia (A&Ox0-1 at baseline) presents with acute COVID-19 infection.       Plan of Care:    #Afib  - Continue Digoxin and BB  - Pt on Coumadin with INR goal of 2-3  - Continue current management.       #HLD  - Statin as ordered      #COVID-19   - Patient started on Remdesivir  - Blood culture x1 on 2023 growing G + cocci  - ID following, input appreciated.         #ACP (advance care planning)-  Advanced care planning was discussed.  Risks, benefits and alternatives of medical treatment and procedures were addressed. 30 additional minutes spent addressing advance care plans.    Over 25 minutes spent on total encounter; more than 50% of the visit was spent counseling and/or coordinating care by the attending physician.      Orestes Hart DO [facility]  Cardiovascular Disease  [phone] Cardiovascular Disease Progress Note  Date of service: 24 @ 07:50    Overnight events: No acute events overnight.  Patient is in no distress.   Otherwise review of systems negative    Objective Findings:  T(C): 37 (24 @ 04:43), Max: 37.8 (24 @ 04:41)  HR: 89 (24 @ 04:43) (75 - 89)  BP: 101/62 (24 @ 04:43) (101/62 - 117/64)  RR: 18 (24 @ 04:43) (18 - 19)  SpO2: 94% (24 @ 04:43) (94% - 98%)  Wt(kg): --  Daily     Daily Weight in k.8 (2024 04:43)      Physical Exam:  Gen: NAD; Patient resting comfortably  HEENT: EOMI, Normocephalic/ atraumatic  CV: RRR, normal S1 + S2, no m/r/g  Lungs:  Normal respiratory effort; clear to auscultation bilaterally  Abd: soft, non-tender; bowel sounds present  Ext: No edema; warm and well perfused    Telemetry: N/a    Laboratory Data:                        12.2   3.34  )-----------( 132      ( 2024 11:34 )             37.5         137  |  102  |  7   ----------------------------<  98  4.2   |  28  |  0.62    Ca    7.9<L>      2024 11:33  Phos  2.8       Mg     1.9         TPro  5.5<L>  /  Alb  2.8<L>  /  TBili  0.7  /  DBili  x   /  AST  28  /  ALT  25  /  AlkPhos  86  -08    PT/INR - ( 2024 13:37 )   PT: 17.7 sec;   INR: 1.71 ratio         PTT - ( 2024 16:52 )  PTT:32.4 sec  CARDIAC MARKERS ( 2024 11:33 )  x     / x     / 44 U/L / x     / x              Inpatient Medications:  MEDICATIONS  (STANDING):  aspirin enteric coated 81 milliGRAM(s) Oral daily  chlorhexidine 2% Cloths 1 Application(s) Topical daily  cholecalciferol 1000 Unit(s) Oral daily  digoxin     Tablet 250 MICROGram(s) Oral daily  finasteride 5 milliGRAM(s) Oral daily  metoprolol tartrate 50 milliGRAM(s) Oral two times a day  mupirocin 2% Nasal 1 Application(s) Both Nostrils two times a day  pantoprazole    Tablet 40 milliGRAM(s) Oral two times a day  pyridoxine 100 milliGRAM(s) Oral daily  remdesivir  IVPB   IV Intermittent   remdesivir  IVPB 100 milliGRAM(s) IV Intermittent every 24 hours  simvastatin 10 milliGRAM(s) Oral at bedtime  tamsulosin 0.4 milliGRAM(s) Oral at bedtime      Assessment:  89yo M w/ PMH of Afib on Coumadin, CHF, CVA, BPH, dementia (A&Ox0-1 at baseline) presents with acute COVID-19 infection.       Plan of Care:    #Afib  - Continue Digoxin and BB  - Pt on Coumadin with INR goal of 2-3  - Continue current management.       #HLD  - Statin as ordered      #COVID-19   - Patient started on Remdesivir  - Blood culture x1 on 2023 growing G + cocci  - ID following, input appreciated.         #ACP (advance care planning)-  Advanced care planning was discussed.  Risks, benefits and alternatives of medical treatment and procedures were addressed. 30 additional minutes spent addressing advance care plans.    Over 25 minutes spent on total encounter; more than 50% of the visit was spent counseling and/or coordinating care by the attending physician.      Orestes Hart DO [facility]  Cardiovascular Disease  [phone] No

## 2024-01-10 ENCOUNTER — TRANSCRIPTION ENCOUNTER (OUTPATIENT)
Age: 89
End: 2024-01-10

## 2024-01-10 LAB
APTT BLD: 33.8 SEC — SIGNIFICANT CHANGE UP (ref 24.5–35.6)
APTT BLD: 33.8 SEC — SIGNIFICANT CHANGE UP (ref 24.5–35.6)
CULTURE RESULTS: ABNORMAL
CULTURE RESULTS: ABNORMAL
E FAECALIS DNA BLD POS QL NAA+NON-PROBE: SIGNIFICANT CHANGE UP
E FAECALIS DNA BLD POS QL NAA+NON-PROBE: SIGNIFICANT CHANGE UP
GRAM STN FLD: ABNORMAL
INR BLD: 1.83 RATIO — HIGH (ref 0.85–1.18)
INR BLD: 1.83 RATIO — HIGH (ref 0.85–1.18)
METHOD TYPE: SIGNIFICANT CHANGE UP
METHOD TYPE: SIGNIFICANT CHANGE UP
ORGANISM # SPEC MICROSCOPIC CNT: ABNORMAL
PROTHROM AB SERPL-ACNC: 18.9 SEC — HIGH (ref 9.5–13)
PROTHROM AB SERPL-ACNC: 18.9 SEC — HIGH (ref 9.5–13)
SPECIMEN SOURCE: SIGNIFICANT CHANGE UP

## 2024-01-10 PROCEDURE — 99233 SBSQ HOSP IP/OBS HIGH 50: CPT

## 2024-01-10 RX ORDER — WARFARIN SODIUM 2.5 MG/1
1.5 TABLET ORAL ONCE
Refills: 0 | Status: COMPLETED | OUTPATIENT
Start: 2024-01-10 | End: 2024-01-10

## 2024-01-10 RX ORDER — AMPICILLIN TRIHYDRATE 250 MG
2 CAPSULE ORAL EVERY 4 HOURS
Refills: 0 | Status: DISCONTINUED | OUTPATIENT
Start: 2024-01-10 | End: 2024-01-14

## 2024-01-10 RX ADMIN — REMDESIVIR 200 MILLIGRAM(S): 5 INJECTION INTRAVENOUS at 11:47

## 2024-01-10 RX ADMIN — Medication 50 MILLIGRAM(S): at 05:38

## 2024-01-10 RX ADMIN — Medication 200 GRAM(S): at 13:06

## 2024-01-10 RX ADMIN — PANTOPRAZOLE SODIUM 40 MILLIGRAM(S): 20 TABLET, DELAYED RELEASE ORAL at 05:38

## 2024-01-10 RX ADMIN — Medication 81 MILLIGRAM(S): at 11:48

## 2024-01-10 RX ADMIN — SIMVASTATIN 10 MILLIGRAM(S): 20 TABLET, FILM COATED ORAL at 21:29

## 2024-01-10 RX ADMIN — Medication 1000 UNIT(S): at 11:48

## 2024-01-10 RX ADMIN — MUPIROCIN 1 APPLICATION(S): 20 OINTMENT TOPICAL at 05:38

## 2024-01-10 RX ADMIN — Medication 250 MICROGRAM(S): at 05:38

## 2024-01-10 RX ADMIN — FINASTERIDE 5 MILLIGRAM(S): 5 TABLET, FILM COATED ORAL at 11:48

## 2024-01-10 RX ADMIN — CHLORHEXIDINE GLUCONATE 1 APPLICATION(S): 213 SOLUTION TOPICAL at 11:48

## 2024-01-10 RX ADMIN — Medication 100 MILLIGRAM(S): at 11:48

## 2024-01-10 RX ADMIN — WARFARIN SODIUM 1.5 MILLIGRAM(S): 2.5 TABLET ORAL at 21:29

## 2024-01-10 RX ADMIN — Medication 50 MILLIGRAM(S): at 17:16

## 2024-01-10 RX ADMIN — PANTOPRAZOLE SODIUM 40 MILLIGRAM(S): 20 TABLET, DELAYED RELEASE ORAL at 17:16

## 2024-01-10 RX ADMIN — Medication 200 GRAM(S): at 21:29

## 2024-01-10 RX ADMIN — TAMSULOSIN HYDROCHLORIDE 0.4 MILLIGRAM(S): 0.4 CAPSULE ORAL at 21:29

## 2024-01-10 RX ADMIN — Medication 200 GRAM(S): at 17:14

## 2024-01-10 RX ADMIN — Medication 200 GRAM(S): at 15:57

## 2024-01-10 RX ADMIN — MUPIROCIN 1 APPLICATION(S): 20 OINTMENT TOPICAL at 17:31

## 2024-01-10 NOTE — PROGRESS NOTE ADULT - SUBJECTIVE AND OBJECTIVE BOX
89yPatient is a 89y old  Male who presents with a chief complaint of COVID-19 (10 Sonu 2024 15:31)      Interval history:  Afebrile, no diarrhea, no SOB.       Allergies:   No Known Allergies      Antimicrobials:  ampicillin  IVPB 2 Gram(s) IV Intermittent every 4 hours  remdesivir  IVPB 100 milliGRAM(s) IV Intermittent every 24 hours      REVIEW OF SYSTEMS:  Not much cough  No N/V  No rash.       Vital Signs Last 24 Hrs  T(C): 36.3 (01-10-24 @ 13:19), Max: 37.2 (01-10-24 @ 01:55)  T(F): 97.3 (01-10-24 @ 13:19), Max: 98.9 (01-10-24 @ 01:55)  HR: 84 (01-10-24 @ 13:19) (84 - 100)  BP: 104/61 (01-10-24 @ 13:19) (104/61 - 157/76)  BP(mean): --  RR: 18 (01-10-24 @ 13:19) (18 - 18)  SpO2: 97% (01-10-24 @ 13:19) (93% - 97%)      PHYSICAL EXAM:  Pt in no acute distress, awake. communicates with few words intermittently.   breathing comfortably.   non distended abdomen  no edema LE   no phlebitis                           13.2   4.22  )-----------( 126      ( 09 Jan 2024 11:10 )             40.6   01-09    140  |  101  |  9   ----------------------------<  114<H>  4.4   |  28  |  0.72    Ca    8.4      09 Jan 2024 11:10  Phos  2.5     01-09  Mg     2.0     01-09    TPro  5.6<L>  /  Alb  2.3<L>  /  TBili  0.8  /  DBili  x   /  AST  32  /  ALT  23  /  AlkPhos  86  01-09      LIVER FUNCTIONS - ( 09 Jan 2024 11:10 )  Alb: 2.3 g/dL / Pro: 5.6 g/dL / ALK PHOS: 86 U/L / ALT: 23 U/L / AST: 32 U/L / GGT: x               Culture - Blood (collected 09 Jan 2024 13:31)  Source: .Blood Blood-Venous  Gram Stain (10 Sonu 2024 07:27):    Growth in anaerobic bottle: Gram Positive Cocci in Pairs and Chains    Growth in aerobic bottle: Gram Positive Cocci in Pairs and Chains  Preliminary Report (10 Sonu 2024 07:27):    Growth in anaerobic bottle: Gram Positive Cocci in Pairs and Chains    Growth in aerobic bottle: Gram Positive Cocci in Pairs and Chains    Culture - Blood (collected 09 Jan 2024 13:27)  Source: .Blood Blood-Peripheral  Gram Stain (10 Sonu 2024 07:26):    Growth in aerobic bottle: Gram Positive Cocci in Pairs and Chains    Growth in anaerobic bottle: Gram Positive Cocci in Pairs and Chains  Preliminary Report (10 Sonu 2024 07:26):    Growth in aerobic bottle: Gram Positive Cocci in Pairs and Chains    Growth in anaerobic bottle: Gram Positive Cocci in Pairs and Chains    Direct identification is available within approximately 3-5    hours either by Blood Panel Multiplexed PCR or Direct    MALDI-TOF. Details: [email or web address]  Organism: Blood Culture PCR (10 Sonu 2024 09:33)  Organism: Blood Culture PCR (10 Sonu 2024 09:33)    Culture - Blood (collected 07 Jan 2024 23:00)  Source: .Blood Blood-Peripheral  Preliminary Report (10 Sonu 2024 05:01):    No growth at 48 Hours    Culture - Blood (collected 07 Jan 2024 22:51)  Source: .Blood Blood-Peripheral  Gram Stain (09 Jan 2024 11:33):    Growth in aerobic bottle: Gram Positive Cocci in Clusters    Growth in anaerobic bottle: Gram Positive Cocci in Clusters  Final Report (10 Sonu 2024 14:17):    Growth in aerobic and anaerobic bottles: Staphylococcus epidermidis    Isolation of Coagulase negative Staphylococcus from single blood culture    sets may represent    contamination. Contact the Microbiology Department at [phone] if    susceptibilitytesting is    clinically indicated.    Direct identification is available within approximately 3-5    hours either by Blood Panel Multiplexed PCR or Direct    MALDI-TOF. Details: [email or web address]  Organism: Blood Culture PCR (10 Sonu 2024 14:17)  Organism: Blood Culture PCR (10 Sonu 2024 14:17)    Culture - Urine (collected 07 Jan 2024 18:36)  Source: Catheterized Catheterized  Final Report (09 Jan 2024 16:22):    Normal Urogenital yuli present           89yPatient is a 89y old  Male who presents with a chief complaint of COVID-19 (10 Sonu 2024 15:31)      Interval history:  Afebrile, no diarrhea, no SOB.       Allergies:   No Known Allergies      Antimicrobials:  ampicillin  IVPB 2 Gram(s) IV Intermittent every 4 hours  remdesivir  IVPB 100 milliGRAM(s) IV Intermittent every 24 hours      REVIEW OF SYSTEMS:  Not much cough  No N/V  No rash.       Vital Signs Last 24 Hrs  T(C): 36.3 (01-10-24 @ 13:19), Max: 37.2 (01-10-24 @ 01:55)  T(F): 97.3 (01-10-24 @ 13:19), Max: 98.9 (01-10-24 @ 01:55)  HR: 84 (01-10-24 @ 13:19) (84 - 100)  BP: 104/61 (01-10-24 @ 13:19) (104/61 - 157/76)  BP(mean): --  RR: 18 (01-10-24 @ 13:19) (18 - 18)  SpO2: 97% (01-10-24 @ 13:19) (93% - 97%)      PHYSICAL EXAM:  Pt in no acute distress, awake. communicates with few words intermittently.   breathing comfortably.   non distended abdomen  no edema LE   no phlebitis                           13.2   4.22  )-----------( 126      ( 09 Jan 2024 11:10 )             40.6   01-09    140  |  101  |  9   ----------------------------<  114<H>  4.4   |  28  |  0.72    Ca    8.4      09 Jan 2024 11:10  Phos  2.5     01-09  Mg     2.0     01-09    TPro  5.6<L>  /  Alb  2.3<L>  /  TBili  0.8  /  DBili  x   /  AST  32  /  ALT  23  /  AlkPhos  86  01-09      LIVER FUNCTIONS - ( 09 Jan 2024 11:10 )  Alb: 2.3 g/dL / Pro: 5.6 g/dL / ALK PHOS: 86 U/L / ALT: 23 U/L / AST: 32 U/L / GGT: x               Culture - Blood (collected 09 Jan 2024 13:31)  Source: .Blood Blood-Venous  Gram Stain (10 Sonu 2024 07:27):    Growth in anaerobic bottle: Gram Positive Cocci in Pairs and Chains    Growth in aerobic bottle: Gram Positive Cocci in Pairs and Chains  Preliminary Report (10 Sonu 2024 07:27):    Growth in anaerobic bottle: Gram Positive Cocci in Pairs and Chains    Growth in aerobic bottle: Gram Positive Cocci in Pairs and Chains    Culture - Blood (collected 09 Jan 2024 13:27)  Source: .Blood Blood-Peripheral  Gram Stain (10 Sonu 2024 07:26):    Growth in aerobic bottle: Gram Positive Cocci in Pairs and Chains    Growth in anaerobic bottle: Gram Positive Cocci in Pairs and Chains  Preliminary Report (10 Sonu 2024 07:26):    Growth in aerobic bottle: Gram Positive Cocci in Pairs and Chains    Growth in anaerobic bottle: Gram Positive Cocci in Pairs and Chains    Direct identification is available within approximately 3-5    hours either by Blood Panel Multiplexed PCR or Direct    MALDI-TOF. Details: [email or web address]  Organism: Blood Culture PCR (10 Sonu 2024 09:33)  Organism: Blood Culture PCR (10 Sonu 2024 09:33)    Culture - Blood (collected 07 Jan 2024 23:00)  Source: .Blood Blood-Peripheral  Preliminary Report (10 Sonu 2024 05:01):    No growth at 48 Hours    Culture - Blood (collected 07 Jan 2024 22:51)  Source: .Blood Blood-Peripheral  Gram Stain (09 Jan 2024 11:33):    Growth in aerobic bottle: Gram Positive Cocci in Clusters    Growth in anaerobic bottle: Gram Positive Cocci in Clusters  Final Report (10 Sonu 2024 14:17):    Growth in aerobic and anaerobic bottles: Staphylococcus epidermidis    Isolation of Coagulase negative Staphylococcus from single blood culture    sets may represent    contamination. Contact the Microbiology Department at [phone] if    susceptibilitytesting is    clinically indicated.    Direct identification is available within approximately 3-5    hours either by Blood Panel Multiplexed PCR or Direct    MALDI-TOF. Details: [email or web address]  Organism: Blood Culture PCR (10 Sonu 2024 14:17)  Organism: Blood Culture PCR (10 Sonu 2024 14:17)    Culture - Urine (collected 07 Jan 2024 18:36)  Source: Catheterized Catheterized  Final Report (09 Jan 2024 16:22):    Normal Urogenital yuli present

## 2024-01-10 NOTE — DIETITIAN INITIAL EVALUATION ADULT - PROBLEM SELECTOR PLAN 8
- replete phos (not given in ED)   - coumadin re vte ppx  - aspiration, fall precaution  - dispo pending course, PT eval  - f/u macrocytosis, hyperpigmented lesion L chest as outpt w/ PCP/derm  - full code per daughter Malek

## 2024-01-10 NOTE — DIETITIAN INITIAL EVALUATION ADULT - ADD RECOMMEND
-Recommend liberalizing DASH diet to Regular in setting of age. Defer diet texture/consistency to speech language pathologist/medical team.  -RD to add Mighty Shake 1x/day (200 lay, 7 Gm protein) and Magic Cup x 1/day (provides 290 kcal, 9 g protein) to assist with PO intake.  -Monitor PO intake, diet, weight, labs, skin, GI symptoms, and BM regularity.  -RD remains available upon request and will follow up per protocol.  -Monitor for malnutrition.

## 2024-01-10 NOTE — DISCHARGE NOTE PROVIDER - NSDCMRMEDTOKEN_GEN_ALL_CORE_FT
aspirin 81 mg oral delayed release tablet: 1 tab(s) orally once a day  cholecalciferol 25 mcg (1000 intl units) oral tablet: 1 tab(s) orally once a day  Digox 250 mcg (0.25 mg) oral tablet: 1 tab(s) orally once a day  finasteride 5 mg oral tablet: 1 tab(s) orally once a day  metoprolol tartrate 50 mg oral tablet: 1 tab(s) orally 2 times a day  pantoprazole 40 mg oral delayed release tablet: 1 tab(s) orally 2 times a day  simvastatin 10 mg oral tablet: 1 tab(s) orally once a day (at bedtime)  tamsulosin 0.4 mg oral capsule: 1 cap(s) orally once a day (at bedtime)  Vitamin B6 100 mg oral tablet: 1 tab(s) orally once a day  warfarin 1 mg oral tablet: 1.5 tab(s) orally 2 times a week Wednesday and Friday  warfarin 1 mg oral tablet: 1 tab(s) orally 5 times a week Monday, Tuesday, Thursday, Saturday, Sunday   acetaminophen 325 mg oral tablet: 2 tab(s) orally every 6 hours As needed Temp greater or equal to 38C (100.4F), Mild Pain (1 - 3)  aspirin 81 mg oral delayed release tablet: 1 tab(s) orally once a day  cholecalciferol 25 mcg (1000 intl units) oral tablet: 1 tab(s) orally once a day  Digox 250 mcg (0.25 mg) oral tablet: 1 tab(s) orally once a day  finasteride 5 mg oral tablet: 1 tab(s) orally once a day  metoprolol tartrate 50 mg oral tablet: 1 tab(s) orally 2 times a day  pantoprazole 40 mg oral delayed release tablet: 1 tab(s) orally 2 times a day  simvastatin 10 mg oral tablet: 1 tab(s) orally once a day (at bedtime)  tamsulosin 0.4 mg oral capsule: 1 cap(s) orally once a day (at bedtime)  Vitamin B6 100 mg oral tablet: 1 tab(s) orally once a day  warfarin 1 mg oral tablet: 1.5 tab(s) orally 2 times a week Wednesday and Friday  warfarin 1 mg oral tablet: 1 tab(s) orally 5 times a week Monday, Tuesday, Thursday, Saturday, Sunday   acetaminophen 325 mg oral tablet: 2 tab(s) orally every 6 hours As needed Temp greater or equal to 38C (100.4F), Mild Pain (1 - 3)  amoxicillin 500 mg oral tablet: 1 tab(s) orally every 8 hours  aspirin 81 mg oral delayed release tablet: 1 tab(s) orally once a day  cholecalciferol 25 mcg (1000 intl units) oral tablet: 1 tab(s) orally once a day  Digox 250 mcg (0.25 mg) oral tablet: 1 tab(s) orally once a day  finasteride 5 mg oral tablet: 1 tab(s) orally once a day  metoprolol tartrate 50 mg oral tablet: 1 tab(s) orally 2 times a day  pantoprazole 40 mg oral delayed release tablet: 1 tab(s) orally 2 times a day  simvastatin 10 mg oral tablet: 1 tab(s) orally once a day (at bedtime)  tamsulosin 0.4 mg oral capsule: 1 cap(s) orally once a day (at bedtime)  Vitamin B6 100 mg oral tablet: 1 tab(s) orally once a day  warfarin 1 mg oral tablet: 1.5 tab(s) orally 2 times a week Wednesday and Friday  warfarin 1 mg oral tablet: 1 tab(s) orally 5 times a week Monday, Tuesday, Thursday, Saturday, Sunday

## 2024-01-10 NOTE — DISCHARGE NOTE PROVIDER - NSDCFUADDAPPT_GEN_ALL_CORE_FT
APPTS ARE READY TO BE MADE: [ ] YES    Best Family or Patient Contact (if needed):    Additional Information about above appointments (if needed):    1:   2:   3:     Other comments or requests:    APPTS ARE READY TO BE MADE: [x] YES    Best Family or Patient Contact (if needed):    Additional Information about above appointments (if needed):    1:   2:   3:     Other comments or requests:    APPTS ARE READY TO BE MADE: [x] YES    Best Family or Patient Contact (if needed):    Additional Information about above appointments (if needed):    1:   2:   3:     Other comments or requests:     Patient refused to provide their date-of-birth; unable to proceed with referral information.

## 2024-01-10 NOTE — DIETITIAN INITIAL EVALUATION ADULT - NS FNS DIET ORDER
Diet, DASH/TLC:   Sodium & Cholesterol Restricted  Pureed (PUREED)  Moderately Thick Liquids (MODTHICKLIQS)  Liquid via Teaspoon Only (01-08-24 @ 13:58) [Active]

## 2024-01-10 NOTE — DIETITIAN INITIAL EVALUATION ADULT - PERTINENT MEDS FT
MEDICATIONS  (STANDING):  ampicillin  IVPB 2 Gram(s) IV Intermittent every 4 hours  aspirin enteric coated 81 milliGRAM(s) Oral daily  cholecalciferol 1000 Unit(s) Oral daily  digoxin     Tablet 250 MICROGram(s) Oral daily  finasteride 5 milliGRAM(s) Oral daily  metoprolol tartrate 50 milliGRAM(s) Oral two times a day  mupirocin 2% Nasal 1 Application(s) Both Nostrils two times a day  pantoprazole    Tablet 40 milliGRAM(s) Oral two times a day  pyridoxine 100 milliGRAM(s) Oral daily  remdesivir  IVPB   IV Intermittent   remdesivir  IVPB 100 milliGRAM(s) IV Intermittent every 24 hours  simvastatin 10 milliGRAM(s) Oral at bedtime  tamsulosin 0.4 milliGRAM(s) Oral at bedtime    
No

## 2024-01-10 NOTE — DIETITIAN INITIAL EVALUATION ADULT - REASON INDICATOR FOR ASSESSMENT
RD assessment warranted for: Consult for MST score 2 or >. Chart reviewed, events noted.  Source: medical record, RN, previous RD notes.  Patient Mongolian speaking but confused per flowsheets, not participating in interview at time of RD visit. RD assessment warranted for: Consult for MST score 2 or >. Chart reviewed, events noted.  Source: medical record, RN, previous RD notes.  Patient Yoruba speaking but confused per flowsheets, not participating in interview at time of RD visit.

## 2024-01-10 NOTE — DISCHARGE NOTE PROVIDER - PROVIDER TOKENS
Vascular Cardiology Progress Note    SERVICE CONSULT: [phone]              EMAIL [email or web address]   OFFICE [phone]    CC:  Retained foreign body in foot    Interval Events:  Doing well.  No C/P or SOB.  No L foot pain reported.  Awaiting PICC line.    Allergies  Avapro (Hives)  enalapril (Unknown)  losartan (Other)  seasonal allergies-outdoor (Urticaria; Rhinorrhea; Sneezing)    Intolerances  losartan (Other)  	  MEDICATIONS  (STANDING):  allopurinol 100 milliGRAM(s) Oral daily  amLODIPine   Tablet 10 milliGRAM(s) Oral daily  atorvastatin 40 milliGRAM(s) Oral at bedtime  carvedilol 25 milliGRAM(s) Oral every 12 hours  ceFAZolin   IVPB      ceFAZolin   IVPB 2000 milliGRAM(s) IV Intermittent every 12 hours  clopidogrel Tablet 75 milliGRAM(s) Oral daily  famotidine    Tablet 20 milliGRAM(s) Oral daily  fenofibrate Tablet 48 milliGRAM(s) Oral daily  folic acid 1 milliGRAM(s) Oral daily  furosemide    Tablet 20 milliGRAM(s) Oral daily  glucagon  Injectable 1 milliGRAM(s) IntraMuscular once  heparin   Injectable 5000 Unit(s) SubCutaneous every 12 hours  hydrALAZINE 50 milliGRAM(s) Oral two times a day  insulin glargine Injectable (LANTUS) 10 Unit(s) SubCutaneous at bedtime  insulin lispro (ADMELOG) corrective regimen sliding scale   SubCutaneous three times a day before meals  insulin lispro (ADMELOG) corrective regimen sliding scale   SubCutaneous at bedtime  multivitamin 1 Tablet(s) Oral daily  tamsulosin 0.4 milliGRAM(s) Oral at bedtime    PAST MEDICAL & SURGICAL HISTORY:  OA (osteoarthritis)  H/O gastroesophageal reflux (GERD)  Chronic kidney disease (CKD)  SHARATH (obstructive sleep apnea) non compliant with CPAP  Ganglion cyst of wrist, left  CAD (coronary artery disease)  Diabetes  Hyperlipidemia  Hypertension  BPH (Benign Prostatic Hypertrophy)  Pneumonia  H/O: Rotator Cuff Tear  Renal Calculi  Diabetes Mellitus Type II  Hyperlipemia  Hypertension  Bilateral rotator cuff syndrome  History of lumbar laminectomy  Cervical fusion syndrome  Trigger finger of both hands  Bilateral cataracts  lumbar laminectomy  Laminectomy with Spinal Fusion  History of Arthroscopy    FAMILY HISTORY:  Family history of cerebrovascular accident (Mother)    SOCIAL HISTORY:  unchanged    REVIEW OF SYSTEMS:  CONSTITUTIONAL: No fever  EYES: No eye pain  ENT:  No sinus or throat pain  NECK: No pain or stiffness  RESPIRATORY: No SOB  CARDIOVASCULAR: No C/P  GASTROINTESTINAL: No abdominal or epigastric pain.   GENITOURINARY: No hematuria  NEUROLOGICAL: No memory loss, loss of strength  SKIN: L foot dressing C/D/I  LYMPH Nodes: No enlarged glands  ENDOCRINE: No heat or cold intolerance noted  MUSCULOSKELETAL: prior L foot post-op discomfort  PSYCHIATRIC: No depression, anxiety  HEME/LYMPH: No bleeding gums  ALLERGY AND IMMUNOLOGIC: No hives	  [ x] All others negative	    Vital Signs Last 24 Hrs  T(C): 36.6 (08 Mar 2021 08:20), Max: 37.1 (07 Mar 2021 16:49)  T(F): 97.9 (08 Mar 2021 08:20), Max: 98.8 (07 Mar 2021 20:17)  HR: 60 (08 Mar 2021 08:20) (60 - 72)  BP: 145/50 (08 Mar 2021 08:20) (118/61 - 149/76)  RR: 18 (08 Mar 2021 08:20) (18 - 18)  SpO2: 98% (08 Mar 2021 08:20) (96% - 99%)    Appearance: NAD 	  HEENT: NC/AT  Cardiovascular: No JVD, RRR, S1 and S2    Respiratory:  CTA B/L  Psychiatry:  AAO x 3  Gastrointestinal:  Soft, Non-tender, + BS	  Skin: No rashes, No ecchymoses, No cyanosis	  Neurologic: No focal deficit noted   Extremities: Bilateral Calves Soft,  No LE Edema N/L  L Foot in ACE wrap    Vascular Pulse Exam:  Right DP: palpable  Left D : palpable   Right PT: palpable   Left PT: palpable       Labs:                 10.4   4.87  )-----------( 256      ( 08 Mar 2021 07:15 )             31.3     03-08    140  |  104  |  33<H>  ----------------------------<  114<H>  3.8   |  25  |  2.14<H>    Ca    11.3<H>      08 Mar 2021 07:14    TPro  6.9  /  Alb  3.6  /  TBili  0.3  /  DBili  x   /  AST  18  /  ALT  9<L>  /  AlkPhos  61  03-08        Assessment:  1. CAD s/p PCI      Non-revascularized mLAD 70%  2. HTN  3. 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 Retained Foreign Body L Foot      Concern for Osteomyelitis      Plan:  1. S/p Left foot 5th MPJ I&D with bone biopsy.  2. ABX recommendations as per ID. Podiatry recommending long term ABX. PICC pending  3. Continue Plavix. Recommend resuming ASA for non-revascularized CAD.  4. Monitor BP on Norvasc, Coreg, Hydralazine.  5. Monitor Renal function.     On Lasix 20 mg every other day at home.   6. Continue Statin.   7. Appreciate excellent Podiatry care.     Thank you,  ESEQUIEL Burns, MPAS  Vascular Cardiology Service   [phone] PROVIDER:[TOKEN:[[id number]],FOLLOWUP:[1 week]] PROVIDER:[TOKEN:[[id number]],FOLLOWUP:[1 week]] PROVIDER:[TOKEN:[[id number]],FOLLOWUP:[1 week]]

## 2024-01-10 NOTE — DIETITIAN INITIAL EVALUATION ADULT - NUTRITON FOCUSED PHYSICAL EXAM
-OkL8e=2.6%. FS well controlled. Continue with ISS for now and continue to monitor FS closely.  -holding home metformin dose no...

## 2024-01-10 NOTE — PROGRESS NOTE ADULT - SUBJECTIVE AND OBJECTIVE BOX
Cardiovascular Disease Progress Note  Date of service: 01-10-24 @ 08:11    Overnight events: No acute events overnight.  Patient is in no distress.   Otherwise review of systems negative    Objective Findings:  T(C): 37.1 (01-10-24 @ 05:23), Max: 37.2 (01-10-24 @ 01:55)  HR: 100 (01-10-24 @ 05:23) (79 - 100)  BP: 125/84 (01-10-24 @ 05:23) (94/54 - 157/76)  RR: 18 (01-10-24 @ 05:23) (18 - 18)  SpO2: 93% (01-10-24 @ 05:23) (93% - 97%)  Wt(kg): --  Daily     Daily Weight in k.1 (10 Sonu 2024 05:23)      Physical Exam:  Gen: NAD; Patient resting comfortably  HEENT: EOMI, Normocephalic/ atraumatic  CV: RRR, normal S1 + S2, no m/r/g  Lungs:  Normal respiratory effort; clear to auscultation bilaterally  Abd: soft, non-tender; bowel sounds present  Ext: No edema; warm and well perfused    Telemetry: N/a    Laboratory Data:                        13.2   4.22  )-----------( 126      ( 2024 11:10 )             40.6         140  |  101  |  9   ----------------------------<  114<H>  4.4   |  28  |  0.72    Ca    8.4      2024 11:10  Phos  2.5       Mg     2.0         TPro  5.6<L>  /  Alb  2.3<L>  /  TBili  0.8  /  DBili  x   /  AST  32  /  ALT  23  /  AlkPhos  86      PT/INR - ( 2024 11:10 )   PT: 16.7 sec;   INR: 1.54 ratio           CARDIAC MARKERS ( 2024 11:33 )  x     / x     / 44 U/L / x     / x              Inpatient Medications:  MEDICATIONS  (STANDING):  aspirin enteric coated 81 milliGRAM(s) Oral daily  chlorhexidine 2% Cloths 1 Application(s) Topical daily  cholecalciferol 1000 Unit(s) Oral daily  digoxin     Tablet 250 MICROGram(s) Oral daily  finasteride 5 milliGRAM(s) Oral daily  metoprolol tartrate 50 milliGRAM(s) Oral two times a day  mupirocin 2% Nasal 1 Application(s) Both Nostrils two times a day  pantoprazole    Tablet 40 milliGRAM(s) Oral two times a day  pyridoxine 100 milliGRAM(s) Oral daily  remdesivir  IVPB   IV Intermittent   remdesivir  IVPB 100 milliGRAM(s) IV Intermittent every 24 hours  simvastatin 10 milliGRAM(s) Oral at bedtime  tamsulosin 0.4 milliGRAM(s) Oral at bedtime      Assessment:  89yo M w/ PMH of Afib on Coumadin, CHF, CVA, BPH, dementia (A&Ox0-1 at baseline) presents with acute COVID-19 infection.       Plan of Care:    #Afib  - Continue Digoxin and BB  - Pt on Coumadin. Continue with INR goal of 2-3  - Continue current management.       #HLD  - Statin as ordered      #COVID-19   - Patient started on Remdesivir  - Blood culture x1 on 2023 growing G + cocci  - ID following, input appreciated.       Over 25 minutes spent on total encounter; more than 50% of the visit was spent counseling and/or coordinating care by the attending physician.      Orestes Hart DO [facility]  Cardiovascular Disease  [phone] Cardiovascular Disease Progress Note  Date of service: 01-10-24 @ 08:11    Overnight events: No acute events overnight.  Patient is in no distress.   Otherwise review of systems negative    Objective Findings:  T(C): 37.1 (01-10-24 @ 05:23), Max: 37.2 (01-10-24 @ 01:55)  HR: 100 (01-10-24 @ 05:23) (79 - 100)  BP: 125/84 (01-10-24 @ 05:23) (94/54 - 157/76)  RR: 18 (01-10-24 @ 05:23) (18 - 18)  SpO2: 93% (01-10-24 @ 05:23) (93% - 97%)  Wt(kg): --  Daily     Daily Weight in k.1 (10 Sonu 2024 05:23)      Physical Exam:  Gen: NAD; Patient resting comfortably  HEENT: EOMI, Normocephalic/ atraumatic  CV: RRR, normal S1 + S2, no m/r/g  Lungs:  Normal respiratory effort; clear to auscultation bilaterally  Abd: soft, non-tender; bowel sounds present  Ext: No edema; warm and well perfused    Telemetry: N/a    Laboratory Data:                        13.2   4.22  )-----------( 126      ( 2024 11:10 )             40.6         140  |  101  |  9   ----------------------------<  114<H>  4.4   |  28  |  0.72    Ca    8.4      2024 11:10  Phos  2.5       Mg     2.0         TPro  5.6<L>  /  Alb  2.3<L>  /  TBili  0.8  /  DBili  x   /  AST  32  /  ALT  23  /  AlkPhos  86      PT/INR - ( 2024 11:10 )   PT: 16.7 sec;   INR: 1.54 ratio           CARDIAC MARKERS ( 2024 11:33 )  x     / x     / 44 U/L / x     / x              Inpatient Medications:  MEDICATIONS  (STANDING):  aspirin enteric coated 81 milliGRAM(s) Oral daily  chlorhexidine 2% Cloths 1 Application(s) Topical daily  cholecalciferol 1000 Unit(s) Oral daily  digoxin     Tablet 250 MICROGram(s) Oral daily  finasteride 5 milliGRAM(s) Oral daily  metoprolol tartrate 50 milliGRAM(s) Oral two times a day  mupirocin 2% Nasal 1 Application(s) Both Nostrils two times a day  pantoprazole    Tablet 40 milliGRAM(s) Oral two times a day  pyridoxine 100 milliGRAM(s) Oral daily  remdesivir  IVPB   IV Intermittent   remdesivir  IVPB 100 milliGRAM(s) IV Intermittent every 24 hours  simvastatin 10 milliGRAM(s) Oral at bedtime  tamsulosin 0.4 milliGRAM(s) Oral at bedtime      Assessment:  89yo M w/ PMH of Afib on Coumadin, CHF, CVA, BPH, dementia (A&Ox0-1 at baseline) presents with acute COVID-19 infection.       Plan of Care:    #Afib  - Continue Digoxin and BB  - Pt on Coumadin. Continue with INR goal of 2-3  - Continue current management.       #HLD  - Statin as ordered      #COVID-19   - Patient started on Remdesivir  - Blood culture x1 on 2023 growing G + cocci  - ID following, input appreciated.       Over 25 minutes spent on total encounter; more than 50% of the visit was spent counseling and/or coordinating care by the attending physician.      Orestes Hart DO [facility]  Cardiovascular Disease  [phone]

## 2024-01-10 NOTE — DISCHARGE NOTE PROVIDER - CARE PROVIDER_API CALL
Shiraz Nguyễn  Cardiovascular Disease  [address]  [place] [address]  Phone: [phone]  Fax: [phone]  Follow Up Time: 1 week   Shiraz Nguyễn  Cardiovascular Disease  [address]  [place] [address]  Phone: [phone]  Fax: [phone]  Follow Up Time: 1 week   Shiraz Nguyễn  Cardiovascular Disease  [address]  [place] [address]  Phone: [phone]  Fax: [phone]  Follow Up Time: 1 week

## 2024-01-10 NOTE — DIETITIAN INITIAL EVALUATION ADULT - ORAL INTAKE PTA/DIET HISTORY
Unable to obtain diet history from patient this time, family not present at bedside at time of RD visit. Per speech language pathologist note 1/8: patient consuming pureed texture foods and moderately thickened liquids PTA.  Patient noted with no changes in PO intake PTA per H&P. Noted with fair PO intake during previous RD note 11/26/23.  Unknown if patient following any specific diets/restrictions or consuming oral nutrition supplements PTA. No known food allergies per chart.

## 2024-01-10 NOTE — DIETITIAN INITIAL EVALUATION ADULT - PROBLEM SELECTOR PLAN 2
- dose coumadin one time 1mg tonight, will need to redose tomorrow and consider adjust based on AM coags, c/w dig and BB w/ strict hold parameters given sepsis

## 2024-01-10 NOTE — PROGRESS NOTE ADULT - SUBJECTIVE AND OBJECTIVE BOX
Date of Service  : 01-10-24     INTERVAL HPI/OVERNIGHT EVENTS: Doing ok  Vital Signs Last 24 Hrs  T(C): 36.3 (10 Sonu 2024 13:19), Max: 37.2 (10 Sonu 2024 01:55)  T(F): 97.3 (10 Sonu 2024 13:19), Max: 98.9 (10 Sonu 2024 01:55)  HR: 84 (10 Sonu 2024 13:19) (84 - 100)  BP: 104/61 (10 Sonu 2024 13:19) (94/54 - 157/76)  BP(mean): --  RR: 18 (10 Sonu 2024 13:19) (18 - 18)  SpO2: 97% (10 Sonu 2024 13:19) (93% - 97%)    Parameters below as of 10 Sonu 2024 13:19  Patient On (Oxygen Delivery Method): room air      I&O's Summary    09 Jan 2024 07:01  -  10 Sonu 2024 07:00  --------------------------------------------------------  IN: 300 mL / OUT: 450 mL / NET: -150 mL      MEDICATIONS  (STANDING):  ampicillin  IVPB 2 Gram(s) IV Intermittent every 4 hours  aspirin enteric coated 81 milliGRAM(s) Oral daily  chlorhexidine 2% Cloths 1 Application(s) Topical daily  cholecalciferol 1000 Unit(s) Oral daily  digoxin     Tablet 250 MICROGram(s) Oral daily  finasteride 5 milliGRAM(s) Oral daily  metoprolol tartrate 50 milliGRAM(s) Oral two times a day  mupirocin 2% Nasal 1 Application(s) Both Nostrils two times a day  pantoprazole    Tablet 40 milliGRAM(s) Oral two times a day  pyridoxine 100 milliGRAM(s) Oral daily  remdesivir  IVPB   IV Intermittent   remdesivir  IVPB 100 milliGRAM(s) IV Intermittent every 24 hours  simvastatin 10 milliGRAM(s) Oral at bedtime  tamsulosin 0.4 milliGRAM(s) Oral at bedtime    MEDICATIONS  (PRN):  acetaminophen     Tablet .. 650 milliGRAM(s) Oral every 6 hours PRN Temp greater or equal to 38C (100.4F), Mild Pain (1 - 3)    LABS:                        13.2   4.22  )-----------( 126      ( 09 Jan 2024 11:10 )             40.6     01-09    140  |  101  |  9   ----------------------------<  114<H>  4.4   |  28  |  0.72    Ca    8.4      09 Jan 2024 11:10  Phos  2.5     01-09  Mg     2.0     01-09    TPro  5.6<L>  /  Alb  2.3<L>  /  TBili  0.8  /  DBili  x   /  AST  32  /  ALT  23  /  AlkPhos  86  01-09    PT/INR - ( 10 Sonu 2024 14:47 )   PT: 18.9 sec;   INR: 1.83 ratio         PTT - ( 10 Sonu 2024 14:47 )  PTT:33.8 sec  Urinalysis Basic - ( 09 Jan 2024 11:10 )    Color: x / Appearance: x / SG: x / pH: x  Gluc: 114 mg/dL / Ketone: x  / Bili: x / Urobili: x   Blood: x / Protein: x / Nitrite: x   Leuk Esterase: x / RBC: x / WBC x   Sq Epi: x / Non Sq Epi: x / Bacteria: x      CAPILLARY BLOOD GLUCOSE            Urinalysis Basic - ( 09 Jan 2024 11:10 )    Color: x / Appearance: x / SG: x / pH: x  Gluc: 114 mg/dL / Ketone: x  / Bili: x / Urobili: x   Blood: x / Protein: x / Nitrite: x   Leuk Esterase: x / RBC: x / WBC x   Sq Epi: x / Non Sq Epi: x / Bacteria: x      REVIEW OF SYSTEMS:      RADIOLOGY & ADDITIONAL TESTS:    Consultant(s) Notes Reviewed:  [x ] YES  [ ] NO    PHYSICAL EXAM:  GENERAL: Not in any distress ,  HEAD:  Atraumatic, Normocephalic  EYES: EOMI, PERRLA, conjunctiva and sclera clear  ENMT: No tonsillar erythema, exudates, or enlargement; Moist mucous membranes, Good dentition, No lesions  NECK: Supple, No JVD, Normal thyroid  NERVOUS SYSTEM:  Alert &  No focal deficit   CHEST/LUNG: Good air entry bilateral with no  rales, rhonchi, wheezing, or rubs  HEART: Regular rate and rhythm; No murmurs, rubs, or gallops  ABDOMEN: Soft, Nontender, Nondistended; Bowel sounds present  EXTREMITIES:  2+ Peripheral Pulses, No clubbing, cyanosis, or edema    Care Discussed with Consultants/Other Providers [ x] YES  [ ] NO

## 2024-01-10 NOTE — DIETITIAN INITIAL EVALUATION ADULT - NSFNSNUTRCHEWSWALLOWFT_GEN_A_CORE
Seen by speech language pathologist 1/8 with recommendations for: Suggest puree with moderately thickened liquids via tspn.

## 2024-01-10 NOTE — DIETITIAN INITIAL EVALUATION ADULT - PROBLEM SELECTOR PLAN 3
proBNP elevated, HST w/o significant delta  exam w/ 1+ pitting pedal edema though otherwise appearing euvolemic  - monitor vol status, caution w/ IVF

## 2024-01-10 NOTE — DISCHARGE NOTE PROVIDER - CARE PROVIDERS DIRECT ADDRESSES
[email or web address]

## 2024-01-10 NOTE — DIETITIAN INITIAL EVALUATION ADULT - PROBLEM SELECTOR PLAN 7
CHIEF COMPLAINT    Chief Complaint   Patient presents with   • Medication Management    Medication Management      HISTORY    The patient is a 68 year old male who is here with follow-up hypertension carpal tunnel syndrome prostatism.  Seven no chest pain at rest no shortness of breath at rest no dyspnea during exertion or chest pain during exertion no palpitations no peripheral edema no coughing no fatigue no weakness currently treated with Micardis.  No urinary hesitancy the stream strength is diminished no dysuria no hematuria no urinary hesitancy has nocturia times 0-1 no amaurosis fugax or diplopia no blurred vision last optometry 2019.  She is feeling well.  Reports his mother  year ago.  He had been the primary caretaker for.  Medications were reviewed with the patient which has been compliant.    MEDICATIONS  Current Outpatient Medications   Medication Sig   • telmisartan (MICARDIS) 80 MG tablet Take 1 tablet by mouth daily.   • Misc Natural Products (GLUCOSAMINE CHONDROITIN ADV PO) Take 1 tablet by mouth daily.   • Cholecalciferol (VITAMIN D3) 2000 units Tab Take 1 tablet by mouth daily.   • Saw Palmetto, Serenoa repens, (SAW PALMETTO BERRY PO) Take 1 tablet by mouth daily.   • CRANBERRY PO Take 1 capsule by mouth daily.     No current facility-administered medications for this visit.        ALLERGIES  Allergies as of 2019 - Reviewed 2019   Allergen Reaction Noted   • Sulfites   (food or med) SWELLING 2014       HISTORIES  Past Medical History:   Diagnosis Date   • Carpal tunnel syndrome 2014   • Essential hypertension, benign 2014       Past Surgical History:   Procedure Laterality Date   • Joint replacement      left hip        Family history: Positive seizure hypertension dementia     Social History     Occupational History   • Not on file   Tobacco Use   • Smoking status: Former Smoker     Types: Cigars     Last attempt to quit: 2016     Years since quitting: 3.5    • Smokeless tobacco: Never Used   • Tobacco comment: patient smokes one cigarette every two weeks   Substance and Sexual Activity   • Alcohol use: Not on file   • Drug use: Not on file   • Sexual activity: Not on file       REVIEW OF SYSTEMS      Constitutional: Denies weight loss,weight gain, generalized fatigue,   Eyes:  See HPI  Respiratory: Denies shortness of breath, chronic cough,,wheeze, see HPI  Cardiovascular:  See HPI  GI:  Denies difficulty swallowing, abdominal pain, diarrhea, constipation, melena, changes in bowel habits, jaundice, rectal bleeding,choking,dysphagia,nausea,vomiting  :  See HPI, breast redness  Musculoskeletal:  Denies back pain, joint pain,myalgia,joint swelling,stiffness neck pain  No wrist pain.  Neurologic:  Denies headache, focal weakness or sensory changes,dizziness, seizures, loss of vision,gait disorder, falls no numbness or weakness of hands  Endocrine:  Denies polyuria or polydipsia, denies temperature intolerance       PHYSICAL EXAM  Vitals:  Blood pressure 130/72, pulse 76, temperature 98.1 °F (36.7 °C), temperature source Oral, resp. rate 16, height 5' 9\" (1.753 m), weight 73 kg.   Pleasant white male alert orient x3 no distress  Skin:: No rash: No pallor: No cyanosis no jaundice  HEENT:. sclera nonicteric, conjunctiva without pallor no lid lag or proptosis ; PERRLA EOMI. . nares without congestion ,.no polyps, ; Oropharynx well hydrated clear without lesions or hyperemia. Tongue midline. Palate and uvula elevates symmetrically. Smile and forehead wrinkle symmetric. Normal parotids bilaterally.   Neck: Supple without adenopathy without thyromegaly, no thyroid tenderness no thyroid nodule, no carotid bruits no JVD, nontender   Lymph nodes: No supraclavicular nodes no axillary nodes  Lungs: Clear to auscultation ,equal unlabored breath sounds, without rales, wheeze rhonchi or retraction   Heart: Regular rate and rhythm normal S1-S2 without murmur; PMI normal     Abdomen:  Bowel sounds normoactive soft nontender nondistended; no guarding, no rebound , no mass, no hepatosplenomegaly, no abdominal aortic enlargement, no hernia, no bruit    Rectal:  Prostate nontender spongy slightly asymmetric spongy without nodule stool hemoccult obtained  Extremities: No clubbing ,cyanosis, edema or deformity, no synovial thickening; pulses are 2+ diffusely ; no coldness.  Full range of motion without pain diffusely Homans sign negative bilaterally bilateral wrist nontender full range of motion without pain     Neurological: Cranial nerves II through XII intact; no tremor; motor strength 5 / 5 diffusely; DTRs 2+ diffusely;  gait normal Romberg negative sensory intact, monofilament intact, toenail negative Phalen negative shake negative  Mental status: Not anxious, not depressed   Recent PHQ 2/9 Score    PHQ 2:  Date Adult PHQ 2 Score   11/6/2019 0       PHQ 9:       DEPRESSION ASSESSMENT/PLAN:  Depression screening is negative no further plan needed.      ASSESSMENT/PLAN    1. Essential hypertension, benign controlled low-sodium diet blood pressure check with nurse in 6 months   2. Prostatism mild enlargement continue Saw Palmetto   3. Bilateral carpal tunnel syndrome asymptomatic   4. Screening for malignant neoplasm of the rectum check stool Hemoccult   5. Special screening for malignant neoplasm of prostate check PSA   Check CMP PSA UA lipid profile CBC  Walk weight lift  Patient declined colonoscopy and vaccines  Consider CT calcium score screen of heart  Follow-up 12 months    PATIENT INSTRUCTIONS  Patient Instructions   Schedule lab: fasting soon  Same other usual meds  DIET :low sodium  Exercise:Walk,weight lift  Nurse visit:BP check 6 months  Colonoscopy: patient declined  Vaccines:patient declined  Follow up: Return in about 1 year (around 11/6/2020) for SCHEDULE LAB soon, BP check with nurse 6 months.  Consider CT heart Ca++ score screen: 288-8000, $50    Patient Education     Low-Salt  Diet  This diet removes foods that are high in salt. It also limits the amount of salt you use when cooking. It is most often used for people with high blood pressure, edema (fluid retention), and kidney, liver, or heart disease.  Table salt contains the mineral sodium. Your body needs sodium to work normally. But too much sodium can make your health problems worse. Your healthcare provider is recommending a low-salt (also called low-sodium) diet for you. Your total daily allowance of salt is 1,500 to 2,300 milligrams (mg). It is less than 1 teaspoon of table salt. This means you can have only about 500 to 700 mg of sodium at each meal. People with certain health problems should limit salt intake to the lower end of the recommended range.    When you cook, don’t add much salt. If you can cook without using salt, even better. Don’t add salt to your food at the table.  When shopping, read food labels. Salt is often called sodium on the label. Choose foods that are salt-free, low salt, or very low salt. Note that foods with reduced salt may not lower your salt intake enough.    Beans, potatoes, and pasta  Ok: Dry beans, split peas, lentils, potatoes, rice, macaroni, pasta, spaghetti without added salt  Avoid: Potato chips, tortilla chips, and similar products  Breads and cereals  Ok: Low-sodium breads, rolls, cereals, and cakes; low-salt crackers, matzo crackers  Avoid: Salted crackers, pretzels, popcorn, Sinhala toast, pancakes, muffins  Dairy  Ok: Milk, chocolate milk, hot chocolate mix, low-salt cheeses, and yogurt  Avoid: Processed cheese and cheese spreads; Roquefort, Camembert, and cottage cheese; buttermilk, instant breakfast drink  Desserts  Ok: Ice cream, frozen yogurt, juice bars, gelatin, cookies and pies, sugar, honey, jelly, hard candy  Avoid: Most pies, cakes and cookies prepared or processed with salt; instant pudding  Drinks  Ok: Tea, coffee, fizzy (carbonated) drinks, juices  Avoid: Flavored coffees,  electrolyte replacement drinks, sports drinks  Meats  Ok: All fresh meat, fish, poultry, low-salt tuna, eggs, egg substitute  Avoid: Smoked, pickled, brine-cured, or salted meats and fish. This includes barber, chipped beef, corned beef, hot dogs, deli meats, ham, kosher meats, salt pork, sausage, canned tuna, salted codfish, smoked salmon, herring, sardines, or anchovies.  Seasonings and spices  Ok: Most seasonings are okay. Good substitutes for salt include: fresh herb blends, hot sauce, lemon, garlic, cardona, vinegar, dry mustard, parsley, cilantro, horseradish, tomato paste, regular margarine, mayonnaise, unsalted butter, cream cheese, vegetable oil, cream, low-salt salad dressing and gravy.  Avoid: Regular ketchup, relishes, pickles, soy sauce, teriyaki sauce, Worcestershire sauce, BBQ sauce, tartar sauce, meat tenderizer, chili sauce, regular gravy, regular salad dressing, salted butter  Soups  Ok: Low-salt soups and broths made with allowed foods  Avoid: Bouillon cubes, soups with smoked or salted meats, regular soup and broth  Vegetables  Ok: Most vegetables are okay; also low-salt tomato and vegetable juices  Avoid: Sauerkraut and other brine-soaked vegetables; pickles and other pickled vegetables; tomato juice, olives  Date Last Reviewed: 8/1/2016 © 2000-2018 [employer]. [address] [place] [address]. All rights reserved. This information is not intended as a substitute for professional medical care. Always follow your healthcare professional's instructions.                     - c/w home meds  - fall/aspiration precaution  - formal S&S in AM, will give puree diet w/ mod thick liquids in interim (per 11/2023 formal S&S and confirmed w/ daughter Malek who requests pt be given diet tonight prior to repeat formal testing), will order bedside RN dysphagia screen prior

## 2024-01-10 NOTE — DISCHARGE NOTE PROVIDER - NSDCCPCAREPLAN_GEN_ALL_CORE_FT
PRINCIPAL DISCHARGE DIAGNOSIS  Diagnosis: 2019 novel coronavirus disease (COVID-19)  Assessment and Plan of Treatment: You tested positive for COVID 19.  You no longer require hospitalization.  Please restrict activities outside of your home except for getting medical care.  Do not go to work, school, or public areas.  Avoid using public transportation, ride-sharing, or taxis.  Separate yourself from other people and animals in your home.  Call ahead before visiting your doctor.  Wear a facemask when you are around other people. Cover your cough and sneezes.  Clean your hands often.  Avoid sharing personal household items.  Clean all frequently touched surfaces daily.        SECONDARY DISCHARGE DIAGNOSES  Diagnosis: Chronic atrial fibrillation  Assessment and Plan of Treatment: Continue Coumadin as prescribed  Atrial fibrillation is the most common heart rhythm problem & has the risk of stroke & heart attack  It helps if you control your blood pressure, not drink more than 1-2 alcohol drinks per day, cut down on caffeine, getting treatment for over active thyroid gland, & getting exercise  Call your doctor if you feel your heart racing or beating unusually, chest tightness or pain, lightheaded, faint, shortness of breath especially with exercise  It is important to take your heart medication as prescribed  You may be on anticoagulation which is very important to take as directed - you may need blood work to monitor drug levels      Diagnosis: Chronic CHF  Assessment and Plan of Treatment: Weigh yourself daily.  If you gain 3lbs in 3 days, or 5lbs in a week call your Health Care Provider.  Do not eat or drink foods containing more than 2000mg of salt (sodium) in your diet every day.  Call your Health Care Provider if you have any swelling or increased swelling in your feet, ankles, and/or stomach.  The Pt was provided with CHF diet instruction (low sodium diet, daily weights, label reading, Heart Healthy Cooking Tips & Heart Healthy shopping Tips).  Take all of your medication as directed.  If you become dizzy call your Health Care Provider.      Diagnosis: Alzheimer's dementia  Assessment and Plan of Treatment:      PRINCIPAL DISCHARGE DIAGNOSIS  Diagnosis: 2019 novel coronavirus disease (COVID-19)  Assessment and Plan of Treatment: You tested positive for COVID 19.  You no longer require hospitalization.  Please restrict activities outside of your home except for getting medical care.  Do not go to work, school, or public areas.  Avoid using public transportation, ride-sharing, or taxis.  Separate yourself from other people and animals in your home.  Call ahead before visiting your doctor.  Wear a facemask when you are around other people. Cover your cough and sneezes.  Clean your hands often.  Avoid sharing personal household items.  Clean all frequently touched surfaces daily.        SECONDARY DISCHARGE DIAGNOSES  Diagnosis: Bacteremia  Assessment and Plan of Treatment: Please continue amoxicillin as prescribed    Diagnosis: Chronic atrial fibrillation  Assessment and Plan of Treatment: Continue Coumadin as prescribed  Atrial fibrillation is the most common heart rhythm problem & has the risk of stroke & heart attack  It helps if you control your blood pressure, not drink more than 1-2 alcohol drinks per day, cut down on caffeine, getting treatment for over active thyroid gland, & getting exercise  Call your doctor if you feel your heart racing or beating unusually, chest tightness or pain, lightheaded, faint, shortness of breath especially with exercise  It is important to take your heart medication as prescribed  You may be on anticoagulation which is very important to take as directed - you may need blood work to monitor drug levels      Diagnosis: Chronic CHF  Assessment and Plan of Treatment: Weigh yourself daily.  If you gain 3lbs in 3 days, or 5lbs in a week call your Health Care Provider.  Do not eat or drink foods containing more than 2000mg of salt (sodium) in your diet every day.  Call your Health Care Provider if you have any swelling or increased swelling in your feet, ankles, and/or stomach.  The Pt was provided with CHF diet instruction (low sodium diet, daily weights, label reading, Heart Healthy Cooking Tips & Heart Healthy shopping Tips).  Take all of your medication as directed.  If you become dizzy call your Health Care Provider.      Diagnosis: Alzheimer's dementia  Assessment and Plan of Treatment: Follow-up with your primary care physician within 1 week. Call for appointment.  Please bring all discharge paperwork and list of medications to all follow up appointments  Please call for follow up appointments one day after discharge       PRINCIPAL DISCHARGE DIAGNOSIS  Diagnosis: 2019 novel coronavirus disease (COVID-19)  Assessment and Plan of Treatment: You tested positive for COVID 19.  You no longer require hospitalization.  Please restrict activities outside of your home except for getting medical care.  Do not go to work, school, or public areas.  Avoid using public transportation, ride-sharing, or taxis.  Separate yourself from other people and animals in your home till 1/17/24.  Call ahead before visiting your doctor.  Wear a facemask when you are around other people. Cover your cough and sneezes.  Clean your hands often.  Avoid sharing personal household items.  Clean all frequently touched surfaces daily.        SECONDARY DISCHARGE DIAGNOSES  Diagnosis: Chronic atrial fibrillation  Assessment and Plan of Treatment: Continue Coumadin as prescribed  Atrial fibrillation is the most common heart rhythm problem & has the risk of stroke & heart attack  It helps if you control your blood pressure, not drink more than 1-2 alcohol drinks per day, cut down on caffeine, getting treatment for over active thyroid gland, & getting exercise  Call your doctor if you feel your heart racing or beating unusually, chest tightness or pain, lightheaded, faint, shortness of breath especially with exercise  It is important to take your heart medication as prescribed  You may be on anticoagulation which is very important to take as directed - you may need blood work to monitor drug levels      Diagnosis: Chronic CHF  Assessment and Plan of Treatment: Weigh yourself daily.  If you gain 3lbs in 3 days, or 5lbs in a week call your Health Care Provider.  Do not eat or drink foods containing more than 2000mg of salt (sodium) in your diet every day.  Call your Health Care Provider if you have any swelling or increased swelling in your feet, ankles, and/or stomach.  The Pt was provided with CHF diet instruction (low sodium diet, daily weights, label reading, Heart Healthy Cooking Tips & Heart Healthy shopping Tips).  Take all of your medication as directed.  If you become dizzy call your Health Care Provider.      Diagnosis: Alzheimer's dementia  Assessment and Plan of Treatment: Follow-up with your primary care physician within 1 week. Call for appointment.  Please bring all discharge paperwork and list of medications to all follow up appointments  Please call for follow up appointments one day after discharge      Diagnosis: Bacteremia  Assessment and Plan of Treatment: Please continue amoxicillin as prescribed

## 2024-01-10 NOTE — PROGRESS NOTE ADULT - ASSESSMENT
89M HLD, AFib (Coumadin), CHF, CVA (residual L deficit), BPH, Alzheimer's dementia (AOx0-1 bl) admit 11/2023 for AMS iso sepsis 2/2 EColi bacteremia presumed urinary source (c/f renal abscess dc'd for uro f/u), ED presentation 12/20/23 for "shakiness" noticed by family who stated at that time that it usually is harbinger of infxn for him, dc'd home, now presents BIBEMS for tremors iso COVID-19+ home test per ED notation.       Overall fever, leucopenia, lactic acidosis on presentation, Sepsis due to COVID infection.   No SOB or hypoxia.   U/A negative, no diarrhea.   CoNS in blood cx. likely represents procurement contaminant.   new E fecalis bacteremia       PLAN:   On remdesivir, can limit duration to 3 days   monitor LFT's and Cr while on remdesivir  trend CBC for lymphopenia  supportive care.   recent blood cx with E fecalis   repeat blood cx ordered.  needs CT abd/pelvis with contrast if feasible.   TTE    Started Ampicillin 2 q4h for now.         Plan discussed with Medicine ACP.     Angie Aguayo  Please contact through MS Teams   If no response or past 5 pm/weekend call [phone].  89M HLD, AFib (Coumadin), CHF, CVA (residual L deficit), BPH, Alzheimer's dementia (AOx0-1 bl) admit 11/2023 for AMS iso sepsis 2/2 EColi bacteremia presumed urinary source (c/f renal abscess dc'd for uro f/u), ED presentation 12/20/23 for "shakiness" noticed by family who stated at that time that it usually is harbinger of infxn for him, dc'd home, now presents BIBEMS for tremors iso COVID-19+ home test per ED notation.       Overall fever, leucopenia, lactic acidosis on presentation, Sepsis due to COVID infection.   No SOB or hypoxia.   U/A negative, no diarrhea.   CoNS in blood cx. likely represents procurement contaminant.   new E fecalis bacteremia       PLAN:   On remdesivir, can limit duration to 3 days   monitor LFT's and Cr while on remdesivir  trend CBC for lymphopenia  supportive care.   recent blood cx with E fecalis   repeat blood cx ordered.  needs CT abd/pelvis with contrast if feasible.   TTE    Started Ampicillin 2 q4h for now.         Plan discussed with Medicine ACP.     Angie Aguayo  Please contact through MS Teams   If no response or past 5 pm/weekend call [phone].

## 2024-01-10 NOTE — DISCHARGE NOTE PROVIDER - HOSPITAL COURSE
HPI:  89M HLD, AFib (Coumadin), CHF, CVA (residual L deficit), BPH, Alzheimer's dementia (AOx0-1 bl) admit 11/2023 for AMS iso sepsis 2/2 EColi bacteremia presumed urinary source (c/f renal abscess dc'd for uro f/u), ED presentation 12/20/23 for "shakiness" noticed by family who stated at that time that it usually is harbinger of infxn for him, dc'd home, now presents BIBEMS for tremors iso COVID-19+ home test per ED notation. Attempted to interview pt at bedside, alert though oriented x 0, not participating in interview. Contacted daughter Ada who relays that pt resides w/ wife and HHA, both of whom are COVID-19+, and decided to test pt after he appeared lethargic today, brought pt to ED given positive result. Otherwise they do not note any changes in his behavior, urinary habits, or PO intake. Ada confirms that pt is AOx0 at bl, and does not participate in conversation. Med list confirmed w/ daughter.    VS: febrile tmax 38.6C, tachycardic HR 80s-100s, BP 90s-120s/40s-70s, tachypneic RR 22-24, SpO2%  on 1.5L NC  Labs: neutrophil predominant leukopenia 3.47 w/ procal 0.06, macrocytosis .2; INR 1.67; phos 2.2; HST 26, proBNP 1892   Micro: UA not c/f infxn; COV+  Imaging: CXR cardiomegaly, no acute chest dz   Received: ofirmev 1g IV, NS 500cc IV    admit to medicine for further eval/mgt (07 Jan 2024 20:17)    Hospital Course:  Patient was admitted for sepsis secondary to Covid 19 infection.  CXR without consolidation.  No shortness of breath or hypoxia noted. Infectious disease was consulted and patient was started on remdesivir for 3 days given dementia and concern for progression to severe disease. Blood cultures were initially contaminated and repeat……... Cardiology consulted for hx of afib, INR was monitored during hospital course and coumadin dosed appropriately. Evaluated by physical therapy and recommended for Home PT.    Important Medication Changes and Reason:    Active or Pending Issues Requiring Follow-up:    Advanced Directives:   [x] Full code  [ ] DNR  [ ] Hospice    Discharge Diagnoses:  Sepsis secondary to Covid 19  Chronic atrial fibrillation  Chronic CHF  Alzheimer's Dementia       HPI:  89M HLD, AFib (Coumadin), CHF, CVA (residual L deficit), BPH, Alzheimer's dementia (AOx0-1 bl) admit 11/2023 for AMS iso sepsis 2/2 EColi bacteremia presumed urinary source (c/f renal abscess dc'd for uro f/u), ED presentation 12/20/23 for "shakiness" noticed by family who stated at that time that it usually is harbinger of infxn for him, dc'd home, now presents BIBEMS for tremors iso COVID-19+ home test per ED notation. Attempted to interview pt at bedside, alert though oriented x 0, not participating in interview. Contacted daughter Ada who relays that pt resides w/ wife and HHA, both of whom are COVID-19+, and decided to test pt after he appeared lethargic today, brought pt to ED given positive result. Otherwise they do not note any changes in his behavior, urinary habits, or PO intake. Ada confirms that pt is AOx0 at bl, and does not participate in conversation. Med list confirmed w/ daughter.    VS: febrile tmax 38.6C, tachycardic HR 80s-100s, BP 90s-120s/40s-70s, tachypneic RR 22-24, SpO2%  on 1.5L NC  Labs: neutrophil predominant leukopenia 3.47 w/ procal 0.06, macrocytosis .2; INR 1.67; phos 2.2; HST 26, proBNP 1892   Micro: UA not c/f infxn; COV+  Imaging: CXR cardiomegaly, no acute chest dz   Received: ofirmev 1g IV, NS 500cc IV    admit to medicine for further eval/mgt (07 Jan 2024 20:17)    Hospital Course:  Patient was admitted for sepsis secondary to Covid 19 infection.  CXR without consolidation.  No shortness of breath or hypoxia noted. Infectious disease was consulted and patient was started on remdesivir for 3 days given dementia and concern for progression to severe disease. Blood cultures were initially CoNS, repeat bcx revealed E fecalis bacteremia. ID was consulted recommending Ampicillin. CT abd/pelvis with no obvious source of infection. Repeat bcx on 1/10/24 have remained negative x72 hours. Plan for transition to po amoxicillin 500 mg q8h to complete 14 days from negative cx. Cardiology consulted for hx of afib, INR was monitored during hospital course and coumadin dosed appropriately. Evaluated by physical therapy and recommended for Home PT.  Discharge/Dispo/Med rec discussed with attending Dr. Jacinto. Patient medically cleared for discharge with outpatient follow up    Important Medication Changes and Reason:  - transition to po amoxicillin 500 mg q8h to complete 14 days from negative cx.     Active or Pending Issues Requiring Follow-up:  Follow-up with primary care physician within 1 week.        Advanced Directives:   [x] Full code  [ ] DNR  [ ] Hospice    Discharge Diagnoses:  Sepsis secondary to Covid 19  E fecalis bacteremia  Chronic atrial fibrillation  Chronic CHF  Alzheimer's Dementia       HPI:  89M HLD, AFib (Coumadin), CHF, CVA (residual L deficit), BPH, Alzheimer's dementia (AOx0-1 bl) admit 11/2023 for AMS iso sepsis 2/2 EColi bacteremia presumed urinary source (c/f renal abscess dc'd for uro f/u), ED presentation 12/20/23 for "shakiness" noticed by family who stated at that time that it usually is harbinger of infxn for him, dc'd home, now presents BIBEMS for tremors iso COVID-19+ home test per ED notation. Attempted to interview pt at bedside, alert though oriented x 0, not participating in interview. Contacted daughter Ada who relays that pt resides w/ wife and HHA, both of whom are COVID-19+, and decided to test pt after he appeared lethargic today, brought pt to ED given positive result. Otherwise they do not note any changes in his behavior, urinary habits, or PO intake. Ada confirms that pt is AOx0 at bl, and does not participate in conversation. Med list confirmed w/ daughter.    VS: febrile tmax 38.6C, tachycardic HR 80s-100s, BP 90s-120s/40s-70s, tachypneic RR 22-24, SpO2%  on 1.5L NC  Labs: neutrophil predominant leukopenia 3.47 w/ procal 0.06, macrocytosis .2; INR 1.67; phos 2.2; HST 26, proBNP 1892   Micro: UA not c/f infxn; COV+  Imaging: CXR cardiomegaly, no acute chest dz   Received: ofirmev 1g IV, NS 500cc IV    admit to medicine for further eval/mgt (07 Jan 2024 20:17)    Hospital Course:  Patient was admitted for sepsis secondary to Covid 19 infection.  CXR without consolidation.  No shortness of breath or hypoxia noted. Infectious disease was consulted and patient was started on remdesivir for 3 days given dementia and concern for progression to severe disease. Blood cultures were initially CoNS, repeat bcx revealed E fecalis bacteremia. ID was consulted recommending Ampicillin. CT abd/pelvis with no obvious source of infection. Repeat bcx on 1/10/24 have remained negative x72 hours. Plan for transition to po amoxicillin 500 mg q8h to complete 14 days from negative cx. Cardiology consulted for hx of afib, INR was monitored during hospital course and coumadin dosed appropriately. Evaluated by physical therapy and recommended for Home PT.  Discharge/Dispo/Med rec discussed with attending Dr. Jacinto. Patient medically cleared for discharge with outpatient follow up. Maintain Isolation 3 more days to complete 10 days on 1/17/24.    Important Medication Changes and Reason:  - transition to po amoxicillin 500 mg q8h to complete 14 days from negative cx end date 1/24/24    Active or Pending Issues Requiring Follow-up:  Follow-up with primary care physician within 1 week.        Advanced Directives:   [x] Full code  [ ] DNR  [ ] Hospice    Discharge Diagnoses:  Sepsis secondary to Covid 19  E fecalis bacteremia  Chronic atrial fibrillation  Chronic CHF  Alzheimer's Dementia

## 2024-01-10 NOTE — DIETITIAN INITIAL EVALUATION ADULT - PERTINENT LABORATORY DATA
01-09    140  |  101  |  9   ----------------------------<  114<H>  4.4   |  28  |  0.72    Ca    8.4      09 Jan 2024 11:10  Phos  2.5     01-09  Mg     2.0     01-09    TPro  5.6<L>  /  Alb  2.3<L>  /  TBili  0.8  /  DBili  x   /  AST  32  /  ALT  23  /  AlkPhos  86  01-09  A1C with Estimated Average Glucose Result: 4.7 % (01-08-24 @ 11:34)

## 2024-01-10 NOTE — DIETITIAN INITIAL EVALUATION ADULT - PROBLEM SELECTOR PLAN 1
SIRS met (fever, tachycardia, tachypnea, leukopenia)  UA w/o c/f infxn; CXR w/o consolidation; procal 0.06; COV+  - req minimal O2 w/o documentation of hypoxemia on admit, and now breathing comfortably on room air, hold dex for now and ctm  - start remdesivir given dementia c/f progression to severe dz, (GFR > 30), monitor LFTs - d/w daughter Malek who agrees w/ plan   - c/w coumadin re VTE ppx  - f/u UCx, BCx x 2  - VBG w/ lactate  - trend labs  - VS q4h

## 2024-01-10 NOTE — PROGRESS NOTE ADULT - ASSESSMENT
89M HLD, AFib (Coumadin), CHF, CVA (residual L deficit), BPH, Alzheimer's dementia (AOx0-1 bl) admit 11/2023 for AMS iso sepsis 2/2 EColi bacteremia presumed urinary source (c/f renal abscess dc'd for uro f/u), ED presentation 12/20/23 for "shakiness" noticed by family who stated at that time that it usually is harbinger of infxn for him, dc'd home, now a/w sepsis 2/2 COVID-19 infxn        Problem/Plan - 1:  ·  Problem: Sepsis due to COVID-19.   ·  Plan: SIRS met (fever, tachycardia, tachypnea, leukopenia)  UA w/o c/f infxn; CXR w/o consolidation; procal 0.06; COV+  - req minimal O2 w/o documentation of hypoxemia on admit, and now breathing comfortably on room air, hold dex for now and ctm  - start remdesivir given dementia c/f progression to severe dz, (GFR > 30), monitor LFTs - d/w daughter Malek who agrees w/ plan   - c/w coumadin re VTE ppx  - f/u UCx, BCx x 2  - VBG w/ lactate  - trend labs  - VS q4h.     Problem/Plan - 2:  ·  Problem: Chronic atrial fibrillation.   ·  Plan: - dose coumadin one time 1mg tonight,   will need to redose tomorrow and consider adjust based on AM coags,   c/w dig and BB w/ strict hold parameters given sepsis.     Problem/Plan - 3:  ·  Problem: Chronic CHF.   ·  Plan: proBNP elevated, HST w/o significant delta  exam w/ 1+ pitting pedal edema though otherwise appearing euvolemic  - monitor vol status, caution w/ IVF.     Problem/Plan - 4:  ·  Problem: CVA (cerebrovascular accident).   ·  Plan: residual L deficit per daughter Malek   - c/w ASA.     Problem/Plan - 5:  ·  Problem: HLD (hyperlipidemia).   ·  Plan: - c/w simvastatin.     Problem/Plan - 6:  ·  Problem: History of BPH.   ·  Plan: - BS per routine, c/w home meds.     Problem/Plan - 7:  ·  Problem: Alzheimer's dementia.   ·  Plan: - c/w home meds  - fall/aspiration precaution  - formal S&S in AM, will give puree diet w/ mod thick liquids in interim (per 11/2023 formal S&S and confirmed w/ daughter Malek who requests pt be given diet tonight prior to repeat formal testing), will order bedside RN dysphagia screen prior.     Problem/Plan - 8:  ·  Problem: Enterococcus Bacteremia .   ·  Plan: - IV 2 Ampicillin   - full code per daughter Malek.    D/W Daughter in  detail.

## 2024-01-10 NOTE — DIETITIAN INITIAL EVALUATION ADULT - OTHER INFO
Unable to obtain UBW/wt history from patient at this time.  Dosing weight: 143.9lb (1/7). No dosing height available per chart. 61 inches noted as dosing height per previous RD note 11/26/23.  IBW: 112lb, %IBW: 128% based on dosing weight and height of 5'1.  Weight history per [facility] HIE: 160.1lb (11/24/23), 120lb (10/20/23), 145.1lb (8/1/23), 145.1lb (9/10/22), 145.1lb (4/20/22), 141lb (6/15/21).  Weight inconsistencies noted per HIE. RD to continue to monitor weight trends as available/able.     -COVID+ ordered for remdesivir.  -Ordered for IV antibiotics for bacteremia.  -S/p Phos-NaK 1/7.  -Ordered for cholecalciferol and pyridoxine for micronutrient supplementation. Unable to obtain UBW/wt history from patient at this time.  Dosing weight: 143.9lb (1/7). No dosing height available per chart. 61 inches noted as dosing height per previous RD note 11/26/23.  IBW: 112lb, %IBW: 128% based on dosing weight and height of 5'1.  Weight history per [facility] HIE: 160.1lb (11/24/23), 120lb (10/20/23), 145.1lb (8/1/23), 145.1lb (9/10/22), 145.1lb (4/20/22), 141lb (6/15/21).  Weight inconsistencies noted per HIE. RD to continue to monitor weight trends as available/able.     -COVID+ ordered for remdesivir.  -Ordered for IV antibiotics for bacteremia.  -S/p Phos-NaK 1/7.  -Ordered for cholecalciferol and pyridoxine for micronutrient supplementation.

## 2024-01-11 LAB
-  AMPICILLIN: SIGNIFICANT CHANGE UP
-  AMPICILLIN: SIGNIFICANT CHANGE UP
-  GENTAMICIN SYNERGY: SIGNIFICANT CHANGE UP
-  GENTAMICIN SYNERGY: SIGNIFICANT CHANGE UP
-  STREPTOMYCIN SYNERGY: SIGNIFICANT CHANGE UP
-  STREPTOMYCIN SYNERGY: SIGNIFICANT CHANGE UP
-  VANCOMYCIN: SIGNIFICANT CHANGE UP
-  VANCOMYCIN: SIGNIFICANT CHANGE UP
ANION GAP SERPL CALC-SCNC: 12 MMOL/L — SIGNIFICANT CHANGE UP (ref 5–17)
ANION GAP SERPL CALC-SCNC: 12 MMOL/L — SIGNIFICANT CHANGE UP (ref 5–17)
BUN SERPL-MCNC: 11 MG/DL — SIGNIFICANT CHANGE UP (ref 7–23)
BUN SERPL-MCNC: 11 MG/DL — SIGNIFICANT CHANGE UP (ref 7–23)
CALCIUM SERPL-MCNC: 8.2 MG/DL — LOW (ref 8.4–10.5)
CALCIUM SERPL-MCNC: 8.2 MG/DL — LOW (ref 8.4–10.5)
CHLORIDE SERPL-SCNC: 105 MMOL/L — SIGNIFICANT CHANGE UP (ref 96–108)
CHLORIDE SERPL-SCNC: 105 MMOL/L — SIGNIFICANT CHANGE UP (ref 96–108)
CO2 SERPL-SCNC: 21 MMOL/L — LOW (ref 22–31)
CO2 SERPL-SCNC: 21 MMOL/L — LOW (ref 22–31)
CREAT SERPL-MCNC: 0.57 MG/DL — SIGNIFICANT CHANGE UP (ref 0.5–1.3)
CREAT SERPL-MCNC: 0.57 MG/DL — SIGNIFICANT CHANGE UP (ref 0.5–1.3)
CULTURE RESULTS: ABNORMAL
EGFR: 93 ML/MIN/1.73M2 — SIGNIFICANT CHANGE UP
EGFR: 93 ML/MIN/1.73M2 — SIGNIFICANT CHANGE UP
GLUCOSE BLDC GLUCOMTR-MCNC: 107 MG/DL — HIGH (ref 70–99)
GLUCOSE BLDC GLUCOMTR-MCNC: 107 MG/DL — HIGH (ref 70–99)
GLUCOSE SERPL-MCNC: 109 MG/DL — HIGH (ref 70–99)
GLUCOSE SERPL-MCNC: 109 MG/DL — HIGH (ref 70–99)
HCT VFR BLD CALC: 43.7 % — SIGNIFICANT CHANGE UP (ref 39–50)
HCT VFR BLD CALC: 43.7 % — SIGNIFICANT CHANGE UP (ref 39–50)
HGB BLD-MCNC: 14.1 G/DL — SIGNIFICANT CHANGE UP (ref 13–17)
HGB BLD-MCNC: 14.1 G/DL — SIGNIFICANT CHANGE UP (ref 13–17)
MCHC RBC-ENTMCNC: 32.3 GM/DL — SIGNIFICANT CHANGE UP (ref 32–36)
MCHC RBC-ENTMCNC: 32.3 GM/DL — SIGNIFICANT CHANGE UP (ref 32–36)
MCHC RBC-ENTMCNC: 33.5 PG — SIGNIFICANT CHANGE UP (ref 27–34)
MCHC RBC-ENTMCNC: 33.5 PG — SIGNIFICANT CHANGE UP (ref 27–34)
MCV RBC AUTO: 103.8 FL — HIGH (ref 80–100)
MCV RBC AUTO: 103.8 FL — HIGH (ref 80–100)
METHOD TYPE: SIGNIFICANT CHANGE UP
METHOD TYPE: SIGNIFICANT CHANGE UP
NRBC # BLD: 0 /100 WBCS — SIGNIFICANT CHANGE UP (ref 0–0)
NRBC # BLD: 0 /100 WBCS — SIGNIFICANT CHANGE UP (ref 0–0)
ORGANISM # SPEC MICROSCOPIC CNT: ABNORMAL
PLATELET # BLD AUTO: 104 K/UL — LOW (ref 150–400)
PLATELET # BLD AUTO: 104 K/UL — LOW (ref 150–400)
POTASSIUM SERPL-MCNC: 5 MMOL/L — SIGNIFICANT CHANGE UP (ref 3.5–5.3)
POTASSIUM SERPL-MCNC: 5 MMOL/L — SIGNIFICANT CHANGE UP (ref 3.5–5.3)
POTASSIUM SERPL-SCNC: 5 MMOL/L — SIGNIFICANT CHANGE UP (ref 3.5–5.3)
POTASSIUM SERPL-SCNC: 5 MMOL/L — SIGNIFICANT CHANGE UP (ref 3.5–5.3)
RBC # BLD: 4.21 M/UL — SIGNIFICANT CHANGE UP (ref 4.2–5.8)
RBC # BLD: 4.21 M/UL — SIGNIFICANT CHANGE UP (ref 4.2–5.8)
RBC # FLD: 13.2 % — SIGNIFICANT CHANGE UP (ref 10.3–14.5)
RBC # FLD: 13.2 % — SIGNIFICANT CHANGE UP (ref 10.3–14.5)
SODIUM SERPL-SCNC: 138 MMOL/L — SIGNIFICANT CHANGE UP (ref 135–145)
SODIUM SERPL-SCNC: 138 MMOL/L — SIGNIFICANT CHANGE UP (ref 135–145)
SPECIMEN SOURCE: SIGNIFICANT CHANGE UP
WBC # BLD: 4.03 K/UL — SIGNIFICANT CHANGE UP (ref 3.8–10.5)
WBC # BLD: 4.03 K/UL — SIGNIFICANT CHANGE UP (ref 3.8–10.5)
WBC # FLD AUTO: 4.03 K/UL — SIGNIFICANT CHANGE UP (ref 3.8–10.5)
WBC # FLD AUTO: 4.03 K/UL — SIGNIFICANT CHANGE UP (ref 3.8–10.5)

## 2024-01-11 PROCEDURE — 93306 TTE W/DOPPLER COMPLETE: CPT | Mod: 26

## 2024-01-11 PROCEDURE — 74177 CT ABD & PELVIS W/CONTRAST: CPT | Mod: 26

## 2024-01-11 PROCEDURE — 99232 SBSQ HOSP IP/OBS MODERATE 35: CPT

## 2024-01-11 RX ORDER — WARFARIN SODIUM 2.5 MG/1
1.5 TABLET ORAL ONCE
Refills: 0 | Status: COMPLETED | OUTPATIENT
Start: 2024-01-11 | End: 2024-01-11

## 2024-01-11 RX ADMIN — WARFARIN SODIUM 1.5 MILLIGRAM(S): 2.5 TABLET ORAL at 22:10

## 2024-01-11 RX ADMIN — MUPIROCIN 1 APPLICATION(S): 20 OINTMENT TOPICAL at 05:26

## 2024-01-11 RX ADMIN — Medication 100 MILLIGRAM(S): at 11:12

## 2024-01-11 RX ADMIN — Medication 200 GRAM(S): at 22:09

## 2024-01-11 RX ADMIN — PANTOPRAZOLE SODIUM 40 MILLIGRAM(S): 20 TABLET, DELAYED RELEASE ORAL at 17:20

## 2024-01-11 RX ADMIN — Medication 200 GRAM(S): at 14:54

## 2024-01-11 RX ADMIN — REMDESIVIR 200 MILLIGRAM(S): 5 INJECTION INTRAVENOUS at 11:11

## 2024-01-11 RX ADMIN — Medication 81 MILLIGRAM(S): at 11:12

## 2024-01-11 RX ADMIN — PANTOPRAZOLE SODIUM 40 MILLIGRAM(S): 20 TABLET, DELAYED RELEASE ORAL at 05:26

## 2024-01-11 RX ADMIN — Medication 200 GRAM(S): at 05:24

## 2024-01-11 RX ADMIN — TAMSULOSIN HYDROCHLORIDE 0.4 MILLIGRAM(S): 0.4 CAPSULE ORAL at 22:10

## 2024-01-11 RX ADMIN — MUPIROCIN 1 APPLICATION(S): 20 OINTMENT TOPICAL at 17:25

## 2024-01-11 RX ADMIN — CHLORHEXIDINE GLUCONATE 1 APPLICATION(S): 213 SOLUTION TOPICAL at 11:16

## 2024-01-11 RX ADMIN — Medication 200 GRAM(S): at 10:12

## 2024-01-11 RX ADMIN — Medication 50 MILLIGRAM(S): at 05:25

## 2024-01-11 RX ADMIN — SIMVASTATIN 10 MILLIGRAM(S): 20 TABLET, FILM COATED ORAL at 22:10

## 2024-01-11 RX ADMIN — Medication 200 GRAM(S): at 02:31

## 2024-01-11 RX ADMIN — Medication 1000 UNIT(S): at 11:12

## 2024-01-11 RX ADMIN — FINASTERIDE 5 MILLIGRAM(S): 5 TABLET, FILM COATED ORAL at 11:12

## 2024-01-11 RX ADMIN — Medication 50 MILLIGRAM(S): at 17:20

## 2024-01-11 RX ADMIN — Medication 200 GRAM(S): at 17:20

## 2024-01-11 NOTE — PROGRESS NOTE ADULT - SUBJECTIVE AND OBJECTIVE BOX
Date of Service  : 01-11-24     INTERVAL HPI/OVERNIGHT EVENTS: I feel fine.   Vital Signs Last 24 Hrs  T(C): 36.3 (11 Jan 2024 13:51), Max: 36.9 (11 Jan 2024 05:26)  T(F): 97.3 (11 Jan 2024 13:51), Max: 98.4 (11 Jan 2024 05:26)  HR: 93 (11 Jan 2024 13:51) (87 - 93)  BP: 128/75 (11 Jan 2024 13:51) (128/75 - 146/88)  BP(mean): --  RR: 18 (11 Jan 2024 13:51) (18 - 18)  SpO2: 96% (11 Jan 2024 13:51) (96% - 98%)    Parameters below as of 11 Jan 2024 13:51  Patient On (Oxygen Delivery Method): room air      I&O's Summary    10 Sonu 2024 07:01  -  11 Jan 2024 07:00  --------------------------------------------------------  IN: 300 mL / OUT: 700 mL / NET: -400 mL      MEDICATIONS  (STANDING):  ampicillin  IVPB 2 Gram(s) IV Intermittent every 4 hours  aspirin enteric coated 81 milliGRAM(s) Oral daily  chlorhexidine 2% Cloths 1 Application(s) Topical daily  cholecalciferol 1000 Unit(s) Oral daily  digoxin     Tablet 250 MICROGram(s) Oral daily  finasteride 5 milliGRAM(s) Oral daily  metoprolol tartrate 50 milliGRAM(s) Oral two times a day  mupirocin 2% Nasal 1 Application(s) Both Nostrils two times a day  pantoprazole    Tablet 40 milliGRAM(s) Oral two times a day  pyridoxine 100 milliGRAM(s) Oral daily  remdesivir  IVPB   IV Intermittent   remdesivir  IVPB 100 milliGRAM(s) IV Intermittent every 24 hours  simvastatin 10 milliGRAM(s) Oral at bedtime  tamsulosin 0.4 milliGRAM(s) Oral at bedtime    MEDICATIONS  (PRN):  acetaminophen     Tablet .. 650 milliGRAM(s) Oral every 6 hours PRN Temp greater or equal to 38C (100.4F), Mild Pain (1 - 3)    LABS:                        14.1   4.03  )-----------( 104      ( 11 Jan 2024 13:16 )             43.7     01-11    138  |  105  |  11  ----------------------------<  109<H>  5.0   |  21<L>  |  0.57    Ca    8.2<L>      11 Jan 2024 13:16      PT/INR - ( 10 Sonu 2024 14:47 )   PT: 18.9 sec;   INR: 1.83 ratio         PTT - ( 10 Sonu 2024 14:47 )  PTT:33.8 sec  Urinalysis Basic - ( 11 Jan 2024 13:16 )    Color: x / Appearance: x / SG: x / pH: x  Gluc: 109 mg/dL / Ketone: x  / Bili: x / Urobili: x   Blood: x / Protein: x / Nitrite: x   Leuk Esterase: x / RBC: x / WBC x   Sq Epi: x / Non Sq Epi: x / Bacteria: x      CAPILLARY BLOOD GLUCOSE      POCT Blood Glucose.: 107 mg/dL (11 Jan 2024 09:11)        Urinalysis Basic - ( 11 Jan 2024 13:16 )    Color: x / Appearance: x / SG: x / pH: x  Gluc: 109 mg/dL / Ketone: x  / Bili: x / Urobili: x   Blood: x / Protein: x / Nitrite: x   Leuk Esterase: x / RBC: x / WBC x   Sq Epi: x / Non Sq Epi: x / Bacteria: x        Consultant(s) Notes Reviewed:  [x ] YES  [ ] NO    PHYSICAL EXAM:  GENERAL: NAD, not in any distress ,  HEAD:  Atraumatic, Normocephalic  NECK: Supple, No JVD, Normal thyroid  NERVOUS SYSTEM:  Alert   CHEST/LUNG: Good air entry bilateral with no  rales, rhonchi, wheezing, or rubs  HEART: Regular rate and rhythm; No murmurs, rubs, or gallops  ABDOMEN: Soft, Nontender, Nondistended; Bowel sounds present  EXTREMITIES:  2+ Peripheral Pulses, No clubbing, cyanosis, or edema    Care Discussed with Consultants/Other Providers [ x] YES  [ ] NO

## 2024-01-11 NOTE — PROGRESS NOTE ADULT - SUBJECTIVE AND OBJECTIVE BOX
89yPatient is a 89y old  Male who presents with a chief complaint of COVID-19 (11 Jan 2024 16:26)      Interval history:  Afebrile, attempted use of , pt unable to cooperate. pt confused.       Allergies:   No Known Allergies    Antimicrobials:  ampicillin  IVPB 2 Gram(s) IV Intermittent every 4 hours  remdesivir  IVPB 100 milliGRAM(s) IV Intermittent every 24 hours      REVIEW OF SYSTEMS:  Unable to obtain due to pt's underlying mental status.       Vital Signs Last 24 Hrs  T(C): 36.3 (01-11-24 @ 21:07), Max: 36.9 (01-11-24 @ 05:26)  T(F): 97.4 (01-11-24 @ 21:07), Max: 98.4 (01-11-24 @ 05:26)  HR: 61 (01-11-24 @ 21:07) (61 - 94)  BP: 112/55 (01-11-24 @ 21:07) (112/55 - 150/79)  BP(mean): --  RR: 18 (01-11-24 @ 21:07) (18 - 18)  SpO2: 96% (01-11-24 @ 21:07) (96% - 98%)      PHYSICAL EXAM:  Pt in no acute distress, awake. communicates but confused.   breathing comfortably.   non distended abdomen  no edema LE   no phlebitis                               14.1   4.03  )-----------( 104      ( 11 Jan 2024 13:16 )             43.7   01-11    138  |  105  |  11  ----------------------------<  109<H>  5.0   |  21<L>  |  0.57    Ca    8.2<L>      11 Jan 2024 13:16        Culture - Blood (collected 10 Sonu 2024 12:55)  Source: .Blood Blood-Venous  Preliminary Report (11 Jan 2024 18:02):    No growth at 24 hours    Culture - Blood (collected 10 Sonu 2024 12:50)  Source: .Blood Blood-Peripheral  Preliminary Report (11 Jan 2024 18:02):    No growth at 24 hours    Culture - Blood (collected 09 Jan 2024 13:31)  Source: .Blood Blood-Venous  Gram Stain (10 Sonu 2024 07:27):    Growth in anaerobic bottle: Gram Positive Cocci in Pairs and Chains    Growth in aerobic bottle: Gram Positive Cocci in Pairs and Chains  Preliminary Report (10 Sonu 2024 20:54):    Growth in aerobic and anaerobic bottles: Enterococcus faecalis    See previous culture [id number]    Culture - Blood (collected 09 Jan 2024 13:27)  Source: .Blood Blood-Peripheral  Gram Stain (10 Sonu 2024 07:26):    Growth in aerobic bottle: Gram Positive Cocci in Pairs and Chains    Growth in anaerobic bottle: Gram Positive Cocci in Pairs and Chains  Preliminary Report (10 Sonu 2024 20:56):    Growth in aerobic and anaerobic bottles: Enterococcus faecalis    Direct identification is available within approximately 3-5    hours either by Blood Panel Multiplexed PCR or Direct    MALDI-TOF. Details: [email or web address]  Organism: Blood Culture PCR (10 Sonu 2024 09:33)  Organism: Blood Culture PCR (10 Sonu 2024 09:33)             89yPatient is a 89y old  Male who presents with a chief complaint of COVID-19 (11 Jan 2024 16:26)      Interval history:  Afebrile, attempted use of , pt unable to cooperate. pt confused.       Allergies:   No Known Allergies    Antimicrobials:  ampicillin  IVPB 2 Gram(s) IV Intermittent every 4 hours  remdesivir  IVPB 100 milliGRAM(s) IV Intermittent every 24 hours      REVIEW OF SYSTEMS:  Unable to obtain due to pt's underlying mental status.       Vital Signs Last 24 Hrs  T(C): 36.3 (01-11-24 @ 21:07), Max: 36.9 (01-11-24 @ 05:26)  T(F): 97.4 (01-11-24 @ 21:07), Max: 98.4 (01-11-24 @ 05:26)  HR: 61 (01-11-24 @ 21:07) (61 - 94)  BP: 112/55 (01-11-24 @ 21:07) (112/55 - 150/79)  BP(mean): --  RR: 18 (01-11-24 @ 21:07) (18 - 18)  SpO2: 96% (01-11-24 @ 21:07) (96% - 98%)      PHYSICAL EXAM:  Pt in no acute distress, awake. communicates but confused.   breathing comfortably.   non distended abdomen  no edema LE   no phlebitis                               14.1   4.03  )-----------( 104      ( 11 Jan 2024 13:16 )             43.7   01-11    138  |  105  |  11  ----------------------------<  109<H>  5.0   |  21<L>  |  0.57    Ca    8.2<L>      11 Jan 2024 13:16        Culture - Blood (collected 10 Sonu 2024 12:55)  Source: .Blood Blood-Venous  Preliminary Report (11 Jan 2024 18:02):    No growth at 24 hours    Culture - Blood (collected 10 Sonu 2024 12:50)  Source: .Blood Blood-Peripheral  Preliminary Report (11 Jan 2024 18:02):    No growth at 24 hours    Culture - Blood (collected 09 Jan 2024 13:31)  Source: .Blood Blood-Venous  Gram Stain (10 Sonu 2024 07:27):    Growth in anaerobic bottle: Gram Positive Cocci in Pairs and Chains    Growth in aerobic bottle: Gram Positive Cocci in Pairs and Chains  Preliminary Report (10 Sonu 2024 20:54):    Growth in aerobic and anaerobic bottles: Enterococcus faecalis    See previous culture [id number]    Culture - Blood (collected 09 Jan 2024 13:27)  Source: .Blood Blood-Peripheral  Gram Stain (10 Sonu 2024 07:26):    Growth in aerobic bottle: Gram Positive Cocci in Pairs and Chains    Growth in anaerobic bottle: Gram Positive Cocci in Pairs and Chains  Preliminary Report (10 Sonu 2024 20:56):    Growth in aerobic and anaerobic bottles: Enterococcus faecalis    Direct identification is available within approximately 3-5    hours either by Blood Panel Multiplexed PCR or Direct    MALDI-TOF. Details: [email or web address]  Organism: Blood Culture PCR (10 Sonu 2024 09:33)  Organism: Blood Culture PCR (10 Sonu 2024 09:33)

## 2024-01-11 NOTE — PROGRESS NOTE ADULT - ASSESSMENT
89M HLD, AFib (Coumadin), CHF, CVA (residual L deficit), BPH, Alzheimer's dementia (AOx0-1 bl) admit 11/2023 for AMS iso sepsis 2/2 EColi bacteremia presumed urinary source (c/f renal abscess dc'd for uro f/u), ED presentation 12/20/23 for "shakiness" noticed by family who stated at that time that it usually is harbinger of infxn for him, dc'd home, now presents BIBEMS for tremors iso COVID-19+ home test per ED notation.       Overall fever, leucopenia, lactic acidosis on presentation, Sepsis due to COVID infection.   No SOB or hypoxia.   U/A negative, no diarrhea.   CoNS in blood cx. likely represents procurement contaminant.   new E fecalis bacteremia       PLAN:   On remdesivir,   monitor LFT's and Cr while on remdesivir  trend CBC, no leucocytosis   recent blood cx with E fecalis   repeat blood cx NTD  Pending CT abd/pelvis with contrast if feasible.   pending TTE    c/w Ampicillin 2 q4h for now.   discussed with daughter over the phone, pt with no hardware.         Plan discussed with Medicine ACP.     Angie Aguayo  Please contact through MS Teams   If no response or past 5 pm/weekend call [phone].  89M HLD, AFib (Coumadin), CHF, CVA (residual L deficit), BPH, Alzheimer's dementia (AOx0-1 bl) admit 11/2023 for AMS iso sepsis 2/2 EColi bacteremia presumed urinary source (c/f renal abscess dc'd for uro f/u), ED presentation 12/20/23 for "shakiness" noticed by family who stated at that time that it usually is harbinger of infxn for him, dc'd home, now presents BIBEMS for tremors iso COVID-19+ home test per ED notation.       Overall fever, leucopenia, lactic acidosis on presentation, Sepsis due to COVID infection.   No SOB or hypoxia.   U/A negative, no diarrhea.   CoNS in blood cx. likely represents procurement contaminant.   new E fecalis bacteremia       PLAN:   On remdesivir,   monitor LFT's and Cr while on remdesivir  trend CBC, no leucocytosis   recent blood cx with E fecalis   repeat blood cx NTD  Pending CT abd/pelvis with contrast if feasible.   pending TTE    c/w Ampicillin 2 q4h for now.   discussed with daughter over the phone, pt with no hardware.         Plan discussed with Medicine ACP.     Angie Aguayo  Please contact through MS Teams   If no response or past 5 pm/weekend call [phone].

## 2024-01-11 NOTE — PROGRESS NOTE ADULT - SUBJECTIVE AND OBJECTIVE BOX
Cardiovascular Disease Progress Note  Date of service: 01-11-24 @ 07:34    Overnight events: No acute events overnight. Patient is in no distress.    Otherwise review of systems negative    Objective Findings:  T(C): 36.9 (01-11-24 @ 05:26), Max: 36.9 (01-11-24 @ 05:26)  HR: 87 (01-11-24 @ 05:26) (84 - 91)  BP: 146/73 (01-11-24 @ 05:26) (104/61 - 146/88)  RR: 18 (01-11-24 @ 05:26) (18 - 18)  SpO2: 98% (01-11-24 @ 05:26) (96% - 98%)  Wt(kg): --  Daily     Daily       Physical Exam:  Gen: NAD; Patient resting comfortably  HEENT: EOMI, Normocephalic/ atraumatic  CV: RRR, normal S1 + S2, no m/r/g  Lungs:  Normal respiratory effort; clear to auscultation bilaterally  Abd: soft, non-tender; bowel sounds present  Ext: No edema; warm and well perfused    Telemetry: N/a    Laboratory Data:                        13.2   4.22  )-----------( 126      ( 09 Jan 2024 11:10 )             40.6     01-09    140  |  101  |  9   ----------------------------<  114<H>  4.4   |  28  |  0.72    Ca    8.4      09 Jan 2024 11:10  Phos  2.5     01-09  Mg     2.0     01-09    TPro  5.6<L>  /  Alb  2.3<L>  /  TBili  0.8  /  DBili  x   /  AST  32  /  ALT  23  /  AlkPhos  86  01-09    PT/INR - ( 10 Sonu 2024 14:47 )   PT: 18.9 sec;   INR: 1.83 ratio         PTT - ( 10 Sonu 2024 14:47 )  PTT:33.8 sec          Inpatient Medications:  MEDICATIONS  (STANDING):  ampicillin  IVPB 2 Gram(s) IV Intermittent every 4 hours  aspirin enteric coated 81 milliGRAM(s) Oral daily  chlorhexidine 2% Cloths 1 Application(s) Topical daily  cholecalciferol 1000 Unit(s) Oral daily  digoxin     Tablet 250 MICROGram(s) Oral daily  finasteride 5 milliGRAM(s) Oral daily  metoprolol tartrate 50 milliGRAM(s) Oral two times a day  mupirocin 2% Nasal 1 Application(s) Both Nostrils two times a day  pantoprazole    Tablet 40 milliGRAM(s) Oral two times a day  pyridoxine 100 milliGRAM(s) Oral daily  remdesivir  IVPB   IV Intermittent   remdesivir  IVPB 100 milliGRAM(s) IV Intermittent every 24 hours  simvastatin 10 milliGRAM(s) Oral at bedtime  tamsulosin 0.4 milliGRAM(s) Oral at bedtime      Assessment:  89yo M w/ PMH of Afib on Coumadin, CHF, CVA, BPH, dementia (A&Ox0-1 at baseline) presents with acute COVID-19 infection.       Plan of Care:    #Afib  - Continue Digoxin and BB  - Pt on Coumadin. Continue with INR goal of 2-3  - Continue current management.       #HLD  - Statin as ordered      #COVID-19   -  Remdesivir  - Blood culture x1 on 1/7/2023 growing G + cocci. Possible contaminnt.   - ID following, input appreciated.           Over 25 minutes spent on total encounter; more than 50% of the visit was spent counseling and/or coordinating care by the attending physician.      Orestes Hart DO [facility]  Cardiovascular Disease  [phone] Cardiovascular Disease Progress Note  Date of service: 01-11-24 @ 07:34    Overnight events: No acute events overnight. Patient is in no distress.    Otherwise review of systems negative    Objective Findings:  T(C): 36.9 (01-11-24 @ 05:26), Max: 36.9 (01-11-24 @ 05:26)  HR: 87 (01-11-24 @ 05:26) (84 - 91)  BP: 146/73 (01-11-24 @ 05:26) (104/61 - 146/88)  RR: 18 (01-11-24 @ 05:26) (18 - 18)  SpO2: 98% (01-11-24 @ 05:26) (96% - 98%)  Wt(kg): --  Daily     Daily       Physical Exam:  Gen: NAD; Patient resting comfortably  HEENT: EOMI, Normocephalic/ atraumatic  CV: RRR, normal S1 + S2, no m/r/g  Lungs:  Normal respiratory effort; clear to auscultation bilaterally  Abd: soft, non-tender; bowel sounds present  Ext: No edema; warm and well perfused    Telemetry: N/a    Laboratory Data:                        13.2   4.22  )-----------( 126      ( 09 Jan 2024 11:10 )             40.6     01-09    140  |  101  |  9   ----------------------------<  114<H>  4.4   |  28  |  0.72    Ca    8.4      09 Jan 2024 11:10  Phos  2.5     01-09  Mg     2.0     01-09    TPro  5.6<L>  /  Alb  2.3<L>  /  TBili  0.8  /  DBili  x   /  AST  32  /  ALT  23  /  AlkPhos  86  01-09    PT/INR - ( 10 Sonu 2024 14:47 )   PT: 18.9 sec;   INR: 1.83 ratio         PTT - ( 10 Sonu 2024 14:47 )  PTT:33.8 sec          Inpatient Medications:  MEDICATIONS  (STANDING):  ampicillin  IVPB 2 Gram(s) IV Intermittent every 4 hours  aspirin enteric coated 81 milliGRAM(s) Oral daily  chlorhexidine 2% Cloths 1 Application(s) Topical daily  cholecalciferol 1000 Unit(s) Oral daily  digoxin     Tablet 250 MICROGram(s) Oral daily  finasteride 5 milliGRAM(s) Oral daily  metoprolol tartrate 50 milliGRAM(s) Oral two times a day  mupirocin 2% Nasal 1 Application(s) Both Nostrils two times a day  pantoprazole    Tablet 40 milliGRAM(s) Oral two times a day  pyridoxine 100 milliGRAM(s) Oral daily  remdesivir  IVPB   IV Intermittent   remdesivir  IVPB 100 milliGRAM(s) IV Intermittent every 24 hours  simvastatin 10 milliGRAM(s) Oral at bedtime  tamsulosin 0.4 milliGRAM(s) Oral at bedtime      Assessment:  89yo M w/ PMH of Afib on Coumadin, CHF, CVA, BPH, dementia (A&Ox0-1 at baseline) presents with acute COVID-19 infection.       Plan of Care:    #Afib  - Continue Digoxin and BB  - Pt on Coumadin. Continue with INR goal of 2-3  - Continue current management.       #HLD  - Statin as ordered      #COVID-19   -  Remdesivir  - Blood culture x1 on 1/7/2023 growing G + cocci. Possible contaminnt.   - ID following, input appreciated.           Over 25 minutes spent on total encounter; more than 50% of the visit was spent counseling and/or coordinating care by the attending physician.      Orestes Hart DO [facility]  Cardiovascular Disease  [phone]

## 2024-01-12 LAB
ANION GAP SERPL CALC-SCNC: 12 MMOL/L — SIGNIFICANT CHANGE UP (ref 5–17)
ANION GAP SERPL CALC-SCNC: 12 MMOL/L — SIGNIFICANT CHANGE UP (ref 5–17)
BUN SERPL-MCNC: 12 MG/DL — SIGNIFICANT CHANGE UP (ref 7–23)
BUN SERPL-MCNC: 12 MG/DL — SIGNIFICANT CHANGE UP (ref 7–23)
CALCIUM SERPL-MCNC: 7.9 MG/DL — LOW (ref 8.4–10.5)
CALCIUM SERPL-MCNC: 7.9 MG/DL — LOW (ref 8.4–10.5)
CHLORIDE SERPL-SCNC: 105 MMOL/L — SIGNIFICANT CHANGE UP (ref 96–108)
CHLORIDE SERPL-SCNC: 105 MMOL/L — SIGNIFICANT CHANGE UP (ref 96–108)
CO2 SERPL-SCNC: 24 MMOL/L — SIGNIFICANT CHANGE UP (ref 22–31)
CO2 SERPL-SCNC: 24 MMOL/L — SIGNIFICANT CHANGE UP (ref 22–31)
CREAT SERPL-MCNC: 0.66 MG/DL — SIGNIFICANT CHANGE UP (ref 0.5–1.3)
CREAT SERPL-MCNC: 0.66 MG/DL — SIGNIFICANT CHANGE UP (ref 0.5–1.3)
EGFR: 89 ML/MIN/1.73M2 — SIGNIFICANT CHANGE UP
EGFR: 89 ML/MIN/1.73M2 — SIGNIFICANT CHANGE UP
GLUCOSE SERPL-MCNC: 107 MG/DL — HIGH (ref 70–99)
GLUCOSE SERPL-MCNC: 107 MG/DL — HIGH (ref 70–99)
INR BLD: 2.27 RATIO — HIGH (ref 0.85–1.18)
INR BLD: 2.27 RATIO — HIGH (ref 0.85–1.18)
POTASSIUM SERPL-MCNC: 3.9 MMOL/L — SIGNIFICANT CHANGE UP (ref 3.5–5.3)
POTASSIUM SERPL-MCNC: 3.9 MMOL/L — SIGNIFICANT CHANGE UP (ref 3.5–5.3)
POTASSIUM SERPL-SCNC: 3.9 MMOL/L — SIGNIFICANT CHANGE UP (ref 3.5–5.3)
POTASSIUM SERPL-SCNC: 3.9 MMOL/L — SIGNIFICANT CHANGE UP (ref 3.5–5.3)
PROTHROM AB SERPL-ACNC: 24.4 SEC — HIGH (ref 9.5–13)
PROTHROM AB SERPL-ACNC: 24.4 SEC — HIGH (ref 9.5–13)
SODIUM SERPL-SCNC: 141 MMOL/L — SIGNIFICANT CHANGE UP (ref 135–145)
SODIUM SERPL-SCNC: 141 MMOL/L — SIGNIFICANT CHANGE UP (ref 135–145)

## 2024-01-12 PROCEDURE — 99232 SBSQ HOSP IP/OBS MODERATE 35: CPT

## 2024-01-12 RX ORDER — WARFARIN SODIUM 2.5 MG/1
1 TABLET ORAL ONCE
Refills: 0 | Status: COMPLETED | OUTPATIENT
Start: 2024-01-12 | End: 2024-01-12

## 2024-01-12 RX ADMIN — Medication 1000 UNIT(S): at 11:08

## 2024-01-12 RX ADMIN — SIMVASTATIN 10 MILLIGRAM(S): 20 TABLET, FILM COATED ORAL at 22:22

## 2024-01-12 RX ADMIN — Medication 100 MILLIGRAM(S): at 11:08

## 2024-01-12 RX ADMIN — Medication 50 MILLIGRAM(S): at 05:43

## 2024-01-12 RX ADMIN — Medication 81 MILLIGRAM(S): at 11:08

## 2024-01-12 RX ADMIN — Medication 50 MILLIGRAM(S): at 17:23

## 2024-01-12 RX ADMIN — PANTOPRAZOLE SODIUM 40 MILLIGRAM(S): 20 TABLET, DELAYED RELEASE ORAL at 17:24

## 2024-01-12 RX ADMIN — REMDESIVIR 200 MILLIGRAM(S): 5 INJECTION INTRAVENOUS at 09:47

## 2024-01-12 RX ADMIN — Medication 200 GRAM(S): at 13:05

## 2024-01-12 RX ADMIN — WARFARIN SODIUM 1 MILLIGRAM(S): 2.5 TABLET ORAL at 22:22

## 2024-01-12 RX ADMIN — Medication 200 GRAM(S): at 22:22

## 2024-01-12 RX ADMIN — TAMSULOSIN HYDROCHLORIDE 0.4 MILLIGRAM(S): 0.4 CAPSULE ORAL at 22:32

## 2024-01-12 RX ADMIN — Medication 200 GRAM(S): at 05:43

## 2024-01-12 RX ADMIN — CHLORHEXIDINE GLUCONATE 1 APPLICATION(S): 213 SOLUTION TOPICAL at 11:08

## 2024-01-12 RX ADMIN — Medication 200 GRAM(S): at 09:08

## 2024-01-12 RX ADMIN — Medication 200 GRAM(S): at 17:23

## 2024-01-12 RX ADMIN — FINASTERIDE 5 MILLIGRAM(S): 5 TABLET, FILM COATED ORAL at 11:08

## 2024-01-12 RX ADMIN — Medication 200 GRAM(S): at 02:14

## 2024-01-12 RX ADMIN — Medication 250 MICROGRAM(S): at 05:43

## 2024-01-12 RX ADMIN — MUPIROCIN 1 APPLICATION(S): 20 OINTMENT TOPICAL at 05:43

## 2024-01-12 RX ADMIN — MUPIROCIN 1 APPLICATION(S): 20 OINTMENT TOPICAL at 17:24

## 2024-01-12 NOTE — PROGRESS NOTE ADULT - SUBJECTIVE AND OBJECTIVE BOX
89yPatient is a 89y old  Male who presents with a chief complaint of COVID-19 (12 Jan 2024 08:17)      Interval history:  Afebrile, no diarrhea.     Allergies:   No Known Allergies      Antimicrobials:  ampicillin  IVPB 2 Gram(s) IV Intermittent every 4 hours      REVIEW OF SYSTEMS:  No SOB  No N/V  No rash.       Vital Signs Last 24 Hrs  T(C): 36.5 (01-12-24 @ 12:44), Max: 36.9 (01-12-24 @ 05:37)  T(F): 97.7 (01-12-24 @ 12:44), Max: 98.4 (01-12-24 @ 05:37)  HR: 79 (01-12-24 @ 15:10) (61 - 79)  BP: 120/76 (01-12-24 @ 15:10) (112/55 - 126/77)  BP(mean): --  RR: 18 (01-12-24 @ 12:44) (18 - 18)  SpO2: 96% (01-12-24 @ 15:10) (96% - 98%)      PHYSICAL EXAM:  Pt in no acute distress, awake. communicates but confused.   breathing comfortably.   non distended abdomen  no edema LE   no phlebitis                             14.1   4.03  )-----------( 104      ( 11 Jan 2024 13:16 )             43.7   01-12    141  |  105  |  12  ----------------------------<  107<H>  3.9   |  24  |  0.66    Ca    7.9<L>      12 Jan 2024 07:06        Culture - Blood (collected 10 Sonu 2024 12:55)  Source: .Blood Blood-Venous  Preliminary Report (12 Jan 2024 18:01):    No growth at 48 Hours    Culture - Blood (collected 10 Sonu 2024 12:50)  Source: .Blood Blood-Peripheral  Preliminary Report (12 Jan 2024 18:01):    No growth at 48 Hours        Radiology:    < from: CT Abdomen and Pelvis w/ IV Cont (01.11.24 @ 18:13) >  IMPRESSION:  Small right renal cystic lesion, decreased in size from prior exam.        New small bilateral pleural effusions.    < from: TTE W or WO Ultrasound Enhancing Agent (01.11.24 @ 07:26) >  CONCLUSIONS:      1. Left ventricular cavity is normal. Left ventricular wall thickness is normal. Left ventricular systolic function is normal with an ejection fraction of 62 % by Clark's method of disks. There are no regional wall motion abnormalities seen.   2. The right ventricle is not well visualized.   3. Right atrium was not well visualized.   4. The leaflets are thickened at the tips of the anterior leaflet tip.   5. Fibrocalcific aortic valve sclerosis without stenosis.   6. Trace aortic regurgitation.   7. No pericardial effusion seen.

## 2024-01-12 NOTE — PROGRESS NOTE ADULT - SUBJECTIVE AND OBJECTIVE BOX
Date of Service  : 01-12-24     INTERVAL HPI/OVERNIGHT EVENTS: I feel fine.   Vital Signs Last 24 Hrs  T(C): 36.5 (12 Jan 2024 12:44), Max: 36.9 (12 Jan 2024 05:37)  T(F): 97.7 (12 Jan 2024 12:44), Max: 98.4 (12 Jan 2024 05:37)  HR: 79 (12 Jan 2024 15:10) (61 - 79)  BP: 120/76 (12 Jan 2024 15:10) (112/55 - 126/77)  BP(mean): --  RR: 18 (12 Jan 2024 12:44) (18 - 18)  SpO2: 96% (12 Jan 2024 15:10) (96% - 98%)    Parameters below as of 12 Jan 2024 15:10  Patient On (Oxygen Delivery Method): room air      I&O's Summary    MEDICATIONS  (STANDING):  ampicillin  IVPB 2 Gram(s) IV Intermittent every 4 hours  aspirin enteric coated 81 milliGRAM(s) Oral daily  chlorhexidine 2% Cloths 1 Application(s) Topical daily  cholecalciferol 1000 Unit(s) Oral daily  digoxin     Tablet 250 MICROGram(s) Oral daily  finasteride 5 milliGRAM(s) Oral daily  metoprolol tartrate 50 milliGRAM(s) Oral two times a day  mupirocin 2% Nasal 1 Application(s) Both Nostrils two times a day  pantoprazole    Tablet 40 milliGRAM(s) Oral two times a day  pyridoxine 100 milliGRAM(s) Oral daily  simvastatin 10 milliGRAM(s) Oral at bedtime  tamsulosin 0.4 milliGRAM(s) Oral at bedtime  warfarin 1 milliGRAM(s) Oral once    MEDICATIONS  (PRN):  acetaminophen     Tablet .. 650 milliGRAM(s) Oral every 6 hours PRN Temp greater or equal to 38C (100.4F), Mild Pain (1 - 3)    LABS:                        14.1   4.03  )-----------( 104      ( 11 Jan 2024 13:16 )             43.7     01-12    141  |  105  |  12  ----------------------------<  107<H>  3.9   |  24  |  0.66    Ca    7.9<L>      12 Jan 2024 07:06      PT/INR - ( 12 Jan 2024 07:06 )   PT: 24.4 sec;   INR: 2.27 ratio           Urinalysis Basic - ( 12 Jan 2024 07:06 )    Color: x / Appearance: x / SG: x / pH: x  Gluc: 107 mg/dL / Ketone: x  / Bili: x / Urobili: x   Blood: x / Protein: x / Nitrite: x   Leuk Esterase: x / RBC: x / WBC x   Sq Epi: x / Non Sq Epi: x / Bacteria: x      CAPILLARY BLOOD GLUCOSE            Urinalysis Basic - ( 12 Jan 2024 07:06 )    Color: x / Appearance: x / SG: x / pH: x  Gluc: 107 mg/dL / Ketone: x  / Bili: x / Urobili: x   Blood: x / Protein: x / Nitrite: x   Leuk Esterase: x / RBC: x / WBC x   Sq Epi: x / Non Sq Epi: x / Bacteria: x          RADIOLOGY & ADDITIONAL TESTS:    Consultant(s) Notes Reviewed:  [x ] YES  [ ] NO    PHYSICAL EXAM:  GENERAL: NAD, well-groomed, well-developed,not in any distress ,  HEAD:  Atraumatic, Normocephalic  NECK: Supple, No JVD, Normal thyroid  NERVOUS SYSTEM:  Alert &  No focal deficit   CHEST/LUNG: Good air entry bilateral with no  rales, rhonchi, wheezing, or rubs  HEART: Regular rate and rhythm; No murmurs, rubs, or gallops  ABDOMEN: Soft, Nontender, Nondistended; Bowel sounds present  EXTREMITIES:  2+ Peripheral Pulses, No clubbing, cyanosis, or edema    Care Discussed with Consultants/Other Providers [ x] YES  [ ] NO

## 2024-01-12 NOTE — PROGRESS NOTE ADULT - SUBJECTIVE AND OBJECTIVE BOX
Cardiovascular Disease Progress Note  Date of service: 24 @ 08:17    Overnight events: No acute events overnight.  Patient is in no distress.   Otherwise review of systems negative    Objective Findings:  T(C): 36.9 (24 @ 05:37), Max: 36.9 (24 @ 05:37)  HR: 78 (24 @ 05:37) (61 - 94)  BP: 123/74 (24 @ 05:37) (112/55 - 150/79)  RR: 18 (24 @ 05:37) (18 - 18)  SpO2: 98% (24 @ 05:37) (96% - 98%)  Wt(kg): --  Daily     Daily Weight in k.3 (2024 05:37)      Physical Exam:  Gen: NAD; Patient resting comfortably  HEENT: EOMI, Normocephalic/ atraumatic  CV: RRR, normal S1 + S2, no m/r/g  Lungs:  Normal respiratory effort; clear to auscultation bilaterally  Abd: soft, non-tender; bowel sounds present  Ext: No edema; warm and well perfused    Telemetry: N/a    Laboratory Data:                        14.1   4.03  )-----------( 104      ( 2024 13:16 )             43.7     -12    141  |  105  |  12  ----------------------------<  107<H>  3.9   |  24  |  0.66    Ca    7.9<L>      2024 07:06      PT/INR - ( 2024 07:06 )   PT: 24.4 sec;   INR: 2.27 ratio         PTT - ( 10 Sonu 2024 14:47 )  PTT:33.8 sec          Inpatient Medications:  MEDICATIONS  (STANDING):  ampicillin  IVPB 2 Gram(s) IV Intermittent every 4 hours  aspirin enteric coated 81 milliGRAM(s) Oral daily  chlorhexidine 2% Cloths 1 Application(s) Topical daily  cholecalciferol 1000 Unit(s) Oral daily  digoxin     Tablet 250 MICROGram(s) Oral daily  finasteride 5 milliGRAM(s) Oral daily  metoprolol tartrate 50 milliGRAM(s) Oral two times a day  mupirocin 2% Nasal 1 Application(s) Both Nostrils two times a day  pantoprazole    Tablet 40 milliGRAM(s) Oral two times a day  pyridoxine 100 milliGRAM(s) Oral daily  remdesivir  IVPB   IV Intermittent   remdesivir  IVPB 100 milliGRAM(s) IV Intermittent every 24 hours  simvastatin 10 milliGRAM(s) Oral at bedtime  tamsulosin 0.4 milliGRAM(s) Oral at bedtime      Assessment:  89yo M w/ PMH of Afib on Coumadin, CHF, CVA, BPH, dementia (A&Ox0-1 at baseline) presents with acute COVID-19 infection.       Plan of Care:    #Afib  - Continue Digoxin and BB  - Pt on Coumadin. Continue with INR goal of 2-3  - Continue current management.       #HLD  - Statin as ordered      #COVID-19   -  Remdesivir  - Blood culture x1 on 2023 growing G + cocci. Possible contaminnt.   - ID following, input appreciated.     #Bacteremia  - Blood culture growing E faecalis  - TTE with no clear evidence of vegetation  - Patient has advanced dementia, not a candidate for RAHEEM  - If there is high suspicion for endocarditis, would treat empirically with long abx course. Defer to ID for further input.     Plan of Care:          Over 25 minutes spent on total encounter; more than 50% of the visit was spent counseling and/or coordinating care by the attending physician.      Orestes Hart DO [facility]  Cardiovascular Disease  [phone] Cardiovascular Disease Progress Note  Date of service: 24 @ 08:17    Overnight events: No acute events overnight.  Patient is in no distress.   Otherwise review of systems negative    Objective Findings:  T(C): 36.9 (24 @ 05:37), Max: 36.9 (24 @ 05:37)  HR: 78 (24 @ 05:37) (61 - 94)  BP: 123/74 (24 @ 05:37) (112/55 - 150/79)  RR: 18 (24 @ 05:37) (18 - 18)  SpO2: 98% (24 @ 05:37) (96% - 98%)  Wt(kg): --  Daily     Daily Weight in k.3 (2024 05:37)      Physical Exam:  Gen: NAD; Patient resting comfortably  HEENT: EOMI, Normocephalic/ atraumatic  CV: RRR, normal S1 + S2, no m/r/g  Lungs:  Normal respiratory effort; clear to auscultation bilaterally  Abd: soft, non-tender; bowel sounds present  Ext: No edema; warm and well perfused    Telemetry: N/a    Laboratory Data:                        14.1   4.03  )-----------( 104      ( 2024 13:16 )             43.7     -12    141  |  105  |  12  ----------------------------<  107<H>  3.9   |  24  |  0.66    Ca    7.9<L>      2024 07:06      PT/INR - ( 2024 07:06 )   PT: 24.4 sec;   INR: 2.27 ratio         PTT - ( 10 Sonu 2024 14:47 )  PTT:33.8 sec          Inpatient Medications:  MEDICATIONS  (STANDING):  ampicillin  IVPB 2 Gram(s) IV Intermittent every 4 hours  aspirin enteric coated 81 milliGRAM(s) Oral daily  chlorhexidine 2% Cloths 1 Application(s) Topical daily  cholecalciferol 1000 Unit(s) Oral daily  digoxin     Tablet 250 MICROGram(s) Oral daily  finasteride 5 milliGRAM(s) Oral daily  metoprolol tartrate 50 milliGRAM(s) Oral two times a day  mupirocin 2% Nasal 1 Application(s) Both Nostrils two times a day  pantoprazole    Tablet 40 milliGRAM(s) Oral two times a day  pyridoxine 100 milliGRAM(s) Oral daily  remdesivir  IVPB   IV Intermittent   remdesivir  IVPB 100 milliGRAM(s) IV Intermittent every 24 hours  simvastatin 10 milliGRAM(s) Oral at bedtime  tamsulosin 0.4 milliGRAM(s) Oral at bedtime      Assessment:  89yo M w/ PMH of Afib on Coumadin, CHF, CVA, BPH, dementia (A&Ox0-1 at baseline) presents with acute COVID-19 infection.       Plan of Care:    #Afib  - Continue Digoxin and BB  - Pt on Coumadin. Continue with INR goal of 2-3  - Continue current management.       #HLD  - Statin as ordered      #COVID-19   -  Remdesivir  - Blood culture x1 on 2023 growing G + cocci. Possible contaminnt.   - ID following, input appreciated.     #Bacteremia  - Blood culture growing E faecalis  - TTE with no clear evidence of vegetation  - Patient has advanced dementia, not a candidate for RAHEEM  - If there is high suspicion for endocarditis, would treat empirically with long abx course. Defer to ID for further input.     Plan of Care:          Over 25 minutes spent on total encounter; more than 50% of the visit was spent counseling and/or coordinating care by the attending physician.      Orestes Hart DO [facility]  Cardiovascular Disease  [phone]

## 2024-01-12 NOTE — PROGRESS NOTE ADULT - ASSESSMENT
[address]  Evaluation     Patient Name: Girma Livingston  ZSEOJ'M Date: 6/15/2018  Problem List  Patient Active Problem List   Diagnosis    Primary osteoarthritis of left knee    Chronic pain of left knee    Acute pain of right knee    Prepatellar bursitis of right knee     Past Medical History  Past Medical History:   Diagnosis Date    Hypertension     Sleep apnea     no CPAP      Past Surgical History  Past Surgical History:   Procedure Laterality Date    CARPAL TUNNEL RELEASE      [place] ARTHROCENTESIS ASPIR&/INJ MAJOR JT/BURSA W/O US  6/14/2018    Procedure: ASPIRATION RIGHT KNEE BURSA; Surgeon: Estuardo Ramon MD;  Location: [facility];  Service: Orthopedics    [place] TOTAL KNEE ARTHROPLASTY Left 6/14/2018    Procedure: ARTHROPLASTY KNEE TOTAL;  Surgeon: Estuardo Ramon MD;  Location: [facility];  Service: Orthopedics         06/15/18 1030   Note Type   Note type Eval only   Restrictions/Precautions   Weight Bearing Precautions Per Order Yes   RUE Weight Bearing Per Order WBAT   LUE Weight Bearing Per Order WBAT   RLE Weight Bearing Per Order WBAT   LLE Weight Bearing Per Order WBAT   Pain Assessment   Pain Assessment 0-10   Pain Score 5   Pain Type Acute pain   Pain Location Knee   Pain Orientation Left   Hospital Pain Intervention(s) Repositioned; Ambulation/increased activity; Emotional support   Home Living   Type of [facility] Two level; Able to live on main level with bedroom/bathroom;Stairs to enter with rails   Home Equipment Walker;Cane  (rollator )   Prior Function   Level of [place] Independent with ADLs and functional mobility   Lives With Spouse   Receives Help From Family   ADL Assistance Independent   IADLs Independent   Falls in the last 6 months 0   Vocational Retired   [address]   Reciprocal Relationships SUPPORTIVE FAMILY - REPORTS WIFE WILL BE HOME AND ABLE TO PROVIDE ASSIST PRN    Service to Others RETIRED   Intrinsic Gratification ACTIVE PTA    Subjective   Subjective OFFERS NO C/O    ADL   Eating Assistance 7  Independent   Grooming Assistance 7  Independent   UB Bathing Assistance 5  Supervision/Setup   LB Bathing Assistance 4  Minimal Assistance   UB Dressing Assistance 5  Supervision/Setup   LB Dressing Assistance 4  Minimal Assistance   Toileting Assistance  4  Minimal Assistance   Bed Mobility   Supine to Sit 4  Minimal assistance   Transfers   Sit to Stand 4  Minimal assistance   Stand to Sit 4  Minimal assistance   Stand pivot 4  Minimal assistance   Functional Mobility   Functional Mobility 4  Minimal assistance   Additional items Rolling walker   Balance   Static Sitting Fair +   Dynamic Sitting Fair   Static Standing Fair -   Dynamic Standing Fair -   Ambulatory Fair -   Activity Tolerance   Activity Tolerance Patient limited by fatigue;Patient limited by pain   RUE Assessment   RUE Assessment WFL   LUE Assessment   LUE Assessment WFL   Cognition   Overall Cognitive Status WFL   Assessment   Limitation Decreased ADL status; Decreased endurance;Decreased self-care trans;Decreased high-level ADLs   Prognosis Good   Assessment Pt is a [de-identified] y o  male who was admitted to [facility] on 6/14/2018 with OA L knee s/p L TKR   Pt's problem list also includes PMH of HTN, previous surgery and sleep apnea  At baseline pt was completing adls and mobility independently - shares homemaking with family  Pt lives with spouse in 2 story home with first floor setup prn  Currently pt requires min assist for overall ADLS and min assist for functional mobility/transfers  Pt currently presents with impairments in the following categories -steps to enter environment, difficulty performing ADLS, difficulty performing IADLS  and health management  activity tolerance and standing balance/tolerance   These impairments, as well as pt's fatigue, pain, orthopedic restricitions  and WBS   limit pt's ability to safely engage in all baseline areas of occupation, includingbathing, dressing, toileting, functional mobility/transfers, house maintenance, social participation  and leisure activities however has supportive spouse who is able to provide necessary assist prn  From OT standpoint, recommend home with family support upon D/C- reviewed LB dressing tech and safe use of RW during functional tasks  Recommend continued OOB for meals, ambulate to/from BR, mobility in hallway with PT, nursing and restorative and setup for participation in self care activities -No further acute OT needs indicated at this time -d/c from caseload with above recommendations   Goals   Patient Goals go home    Plan   Treatment Interventions ADL retraining;Functional transfer training; Endurance training;Patient/family training;Equipment evaluation/education; Compensatory technique education; Activityengagement   OT Frequency Eval only   Recommendation   OT Discharge Recommendation Home with family support   OT - OK to Discharge Yes   Barthel Index   Feeding 10   Bathing 0   Grooming Score 5   Dressing Score 5   Bladder Score 10   Bowels Score 10   Toilet Use Score 5   Transfers (Bed/Chair) Score 10   Mobility (Level Surface) Score 10   Stairs Score 5   Barthel Index Score 70     May Loera 89M HLD, AFib (Coumadin), CHF, CVA (residual L deficit), BPH, Alzheimer's dementia (AOx0-1 bl) admit 11/2023 for AMS iso sepsis 2/2 EColi bacteremia presumed urinary source (c/f renal abscess dc'd for uro f/u), ED presentation 12/20/23 for "shakiness" noticed by family who stated at that time that it usually is harbinger of infxn for him, dc'd home, now presents BIBEMS for tremors iso COVID-19+ home test per ED notation.       Overall fever, leucopenia, lactic acidosis on presentation, Sepsis due to COVID infection.   No SOB or hypoxia.   U/A negative, no diarrhea.   CoNS in blood cx. likely represents procurement contaminant.   new E fecalis bacteremia       PLAN:   s/p completion of therapy with remdesivir,   trend CBC, no leucocytosis   recent blood cx with E fecalis   repeat blood cx NTD  CT abd/pelvis with no obvious source   TTE reassuring   c/w Ampicillin 2 q4h   if blood cx stay negative for 72 hours can transition to po amoxicillin 500 mg q8h to complete 14 days from negative cx  pt with no hardware.         Plan discussed with Medicine Attending     Angie Aguayo  Please contact through MS Teams   If no response or past 5 pm/weekend call [phone].  89M HLD, AFib (Coumadin), CHF, CVA (residual L deficit), BPH, Alzheimer's dementia (AOx0-1 bl) admit 11/2023 for AMS iso sepsis 2/2 EColi bacteremia presumed urinary source (c/f renal abscess dc'd for uro f/u), ED presentation 12/20/23 for "shakiness" noticed by family who stated at that time that it usually is harbinger of infxn for him, dc'd home, now presents BIBEMS for tremors iso COVID-19+ home test per ED notation.       Overall fever, leucopenia, lactic acidosis on presentation, Sepsis due to COVID infection.   No SOB or hypoxia.   U/A negative, no diarrhea.   CoNS in blood cx. likely represents procurement contaminant.   new E fecalis bacteremia       PLAN:   s/p completion of therapy with remdesivir,   trend CBC, no leucocytosis   recent blood cx with E fecalis   repeat blood cx NTD  CT abd/pelvis with no obvious source   TTE reassuring   c/w Ampicillin 2 q4h   if blood cx stay negative for 72 hours can transition to po amoxicillin 500 mg q8h to complete 14 days from negative cx  pt with no hardware.         Plan discussed with Medicine Attending     Angie Aguayo  Please contact through MS Teams   If no response or past 5 pm/weekend call [phone].

## 2024-01-12 NOTE — PROGRESS NOTE ADULT - ASSESSMENT
89M HLD, AFib (Coumadin), CHF, CVA (residual L deficit), BPH, Alzheimer's dementia (AOx0-1 bl) admit 11/2023 for AMS iso sepsis 2/2 EColi bacteremia presumed urinary source (c/f renal abscess dc'd for uro f/u), ED presentation 12/20/23 for "shakiness" noticed by family who stated at that time that it usually is harbinger of infxn for him, dc'd home, now a/w sepsis 2/2 COVID-19 infxn        Problem/Plan - 1:  ·  Problem: Sepsis due to COVID-19.   ·  Plan: SIRS met (fever, tachycardia, tachypnea, leukopenia)  UA w/o c/f infxn; CXR w/o consolidation; procal 0.06; COV+  - req minimal O2 w/o documentation of hypoxemia on admit, and now breathing comfortably on room air, hold dex for now and ctm  - start remdesivir given dementia c/f progression to severe dz, (GFR > 30), monitor LFTs - d/w daughter Malek who agrees w/ plan   - c/w coumadin re VTE ppx  - f/u UCx, BCx x 2  - VBG w/ lactate  - trend labs  - VS q4h.     Problem/Plan - 2:  ·  Problem: Chronic atrial fibrillation.   ·  Plan: - dose coumadin one time 1mg tonight,   will need to redose tomorrow and consider adjust based on AM coags,   c/w dig and BB w/ strict hold parameters given sepsis.     Problem/Plan - 3:  ·  Problem: Chronic CHF.   ·  Plan: proBNP elevated, HST w/o significant delta  exam w/ 1+ pitting pedal edema though otherwise appearing euvolemic  - monitor vol status, caution w/ IVF.     Problem/Plan - 4:  ·  Problem: CVA (cerebrovascular accident).   ·  Plan: residual L deficit per daughter Malek   - c/w ASA.     Problem/Plan - 5:  ·  Problem: HLD (hyperlipidemia).   ·  Plan: - c/w simvastatin.     Problem/Plan - 6:  ·  Problem: History of BPH.   ·  Plan: - BS per routine, c/w home meds.     Problem/Plan - 7:  ·  Problem: Alzheimer's dementia.   ·  Plan: - c/w home meds  - fall/aspiration precaution  - formal S&S in AM, will give puree diet w/ mod thick liquids in interim (per 11/2023 formal S&S and confirmed w/ daughter Malek who requests pt be given diet tonight prior to repeat formal testing), will order bedside RN dysphagia screen prior.     Problem/Plan - 8:  ·  Problem: Enterococcus Bacteremia .   ·  Plan: - IV 2 Ampicillin   - full code per daughter Malek.

## 2024-01-13 LAB
CULTURE RESULTS: SIGNIFICANT CHANGE UP
CULTURE RESULTS: SIGNIFICANT CHANGE UP
INR BLD: 2.05 RATIO — HIGH (ref 0.85–1.18)
INR BLD: 2.05 RATIO — HIGH (ref 0.85–1.18)
PROTHROM AB SERPL-ACNC: 22.1 SEC — HIGH (ref 9.5–13)
PROTHROM AB SERPL-ACNC: 22.1 SEC — HIGH (ref 9.5–13)
SARS-COV-2 RNA SPEC QL NAA+PROBE: DETECTED
SARS-COV-2 RNA SPEC QL NAA+PROBE: DETECTED
SPECIMEN SOURCE: SIGNIFICANT CHANGE UP
SPECIMEN SOURCE: SIGNIFICANT CHANGE UP

## 2024-01-13 RX ORDER — ACETAMINOPHEN 500 MG
2 TABLET ORAL
Qty: 0 | Refills: 0 | DISCHARGE
Start: 2024-01-13

## 2024-01-13 RX ORDER — WARFARIN SODIUM 2.5 MG/1
1 TABLET ORAL ONCE
Refills: 0 | Status: COMPLETED | OUTPATIENT
Start: 2024-01-13 | End: 2024-01-13

## 2024-01-13 RX ADMIN — Medication 50 MILLIGRAM(S): at 05:24

## 2024-01-13 RX ADMIN — Medication 250 MICROGRAM(S): at 05:23

## 2024-01-13 RX ADMIN — Medication 200 GRAM(S): at 10:19

## 2024-01-13 RX ADMIN — WARFARIN SODIUM 1 MILLIGRAM(S): 2.5 TABLET ORAL at 19:38

## 2024-01-13 RX ADMIN — Medication 81 MILLIGRAM(S): at 13:02

## 2024-01-13 RX ADMIN — SIMVASTATIN 10 MILLIGRAM(S): 20 TABLET, FILM COATED ORAL at 19:37

## 2024-01-13 RX ADMIN — Medication 200 GRAM(S): at 18:03

## 2024-01-13 RX ADMIN — MUPIROCIN 1 APPLICATION(S): 20 OINTMENT TOPICAL at 18:08

## 2024-01-13 RX ADMIN — FINASTERIDE 5 MILLIGRAM(S): 5 TABLET, FILM COATED ORAL at 13:03

## 2024-01-13 RX ADMIN — Medication 1000 UNIT(S): at 13:02

## 2024-01-13 RX ADMIN — Medication 200 GRAM(S): at 05:14

## 2024-01-13 RX ADMIN — Medication 200 GRAM(S): at 14:37

## 2024-01-13 RX ADMIN — Medication 100 MILLIGRAM(S): at 13:03

## 2024-01-13 RX ADMIN — MUPIROCIN 1 APPLICATION(S): 20 OINTMENT TOPICAL at 05:24

## 2024-01-13 RX ADMIN — CHLORHEXIDINE GLUCONATE 1 APPLICATION(S): 213 SOLUTION TOPICAL at 18:09

## 2024-01-13 RX ADMIN — TAMSULOSIN HYDROCHLORIDE 0.4 MILLIGRAM(S): 0.4 CAPSULE ORAL at 19:38

## 2024-01-13 RX ADMIN — Medication 200 GRAM(S): at 01:40

## 2024-01-13 RX ADMIN — Medication 200 GRAM(S): at 21:40

## 2024-01-13 NOTE — PROGRESS NOTE ADULT - SUBJECTIVE AND OBJECTIVE BOX
Cardiovascular Disease Progress Note  Date of service: 01-13-24 @ 09:54    Overnight events: No acute events overnight.  Patient is in no distress.   Otherwise review of systems negative    Objective Findings:  T(C): 36.7 (01-13-24 @ 05:34), Max: 36.7 (01-13-24 @ 05:34)  HR: 100 (01-13-24 @ 05:34) (73 - 100)  BP: 120/70 (01-13-24 @ 05:34) (120/70 - 126/77)  RR: 18 (01-13-24 @ 05:34) (18 - 18)  SpO2: 98% (01-13-24 @ 05:34) (96% - 98%)  Wt(kg): --  Daily     Daily       Physical Exam:  Gen: NAD; Patient resting comfortably  HEENT: EOMI, Normocephalic/ atraumatic  CV: RRR, normal S1 + S2, no m/r/g  Lungs:  Normal respiratory effort; clear to auscultation bilaterally  Abd: soft, non-tender; bowel sounds present  Ext: No edema; warm and well perfused    Telemetry: N/a    Laboratory Data:                        14.1   4.03  )-----------( 104      ( 11 Jan 2024 13:16 )             43.7     01-12    141  |  105  |  12  ----------------------------<  107<H>  3.9   |  24  |  0.66    Ca    7.9<L>      12 Jan 2024 07:06      PT/INR - ( 13 Jan 2024 07:03 )   PT: 22.1 sec;   INR: 2.05 ratio                   Inpatient Medications:  MEDICATIONS  (STANDING):  ampicillin  IVPB 2 Gram(s) IV Intermittent every 4 hours  aspirin enteric coated 81 milliGRAM(s) Oral daily  chlorhexidine 2% Cloths 1 Application(s) Topical daily  cholecalciferol 1000 Unit(s) Oral daily  digoxin     Tablet 250 MICROGram(s) Oral daily  finasteride 5 milliGRAM(s) Oral daily  metoprolol tartrate 50 milliGRAM(s) Oral two times a day  mupirocin 2% Nasal 1 Application(s) Both Nostrils two times a day  pantoprazole    Tablet 40 milliGRAM(s) Oral two times a day  pyridoxine 100 milliGRAM(s) Oral daily  simvastatin 10 milliGRAM(s) Oral at bedtime  tamsulosin 0.4 milliGRAM(s) Oral at bedtime      Assessment:  89yo M w/ PMH of Afib on Coumadin, CHF, CVA, BPH, dementia (A&Ox0-1 at baseline) presents with acute COVID-19 infection.       Plan of Care:    #Afib  - Continue Digoxin and BB  - Pt on Coumadin. Continue with INR goal of 2-3  - Continue current management.       #HLD  - Statin as ordered      #COVID-19   - S/p  Remdesivir  - Blood culture x1 on 1/7/2023 growing G + cocci. Possible contaminnt.   - ID following, input appreciated.     #Bacteremia  - Blood culture growing E faecalis  - TTE with no clear evidence of vegetation  - Patient has advanced dementia, not a candidate for RAHEEM  - ID input appreciated.             Over 25 minutes spent on total encounter; more than 50% of the visit was spent counseling and/or coordinating care by the attending physician.      Orestes Hart DO [facility]  Cardiovascular Disease  [phone] Cardiovascular Disease Progress Note  Date of service: 01-13-24 @ 09:54    Overnight events: No acute events overnight.  Patient is in no distress.   Otherwise review of systems negative    Objective Findings:  T(C): 36.7 (01-13-24 @ 05:34), Max: 36.7 (01-13-24 @ 05:34)  HR: 100 (01-13-24 @ 05:34) (73 - 100)  BP: 120/70 (01-13-24 @ 05:34) (120/70 - 126/77)  RR: 18 (01-13-24 @ 05:34) (18 - 18)  SpO2: 98% (01-13-24 @ 05:34) (96% - 98%)  Wt(kg): --  Daily     Daily       Physical Exam:  Gen: NAD; Patient resting comfortably  HEENT: EOMI, Normocephalic/ atraumatic  CV: RRR, normal S1 + S2, no m/r/g  Lungs:  Normal respiratory effort; clear to auscultation bilaterally  Abd: soft, non-tender; bowel sounds present  Ext: No edema; warm and well perfused    Telemetry: N/a    Laboratory Data:                        14.1   4.03  )-----------( 104      ( 11 Jan 2024 13:16 )             43.7     01-12    141  |  105  |  12  ----------------------------<  107<H>  3.9   |  24  |  0.66    Ca    7.9<L>      12 Jan 2024 07:06      PT/INR - ( 13 Jan 2024 07:03 )   PT: 22.1 sec;   INR: 2.05 ratio                   Inpatient Medications:  MEDICATIONS  (STANDING):  ampicillin  IVPB 2 Gram(s) IV Intermittent every 4 hours  aspirin enteric coated 81 milliGRAM(s) Oral daily  chlorhexidine 2% Cloths 1 Application(s) Topical daily  cholecalciferol 1000 Unit(s) Oral daily  digoxin     Tablet 250 MICROGram(s) Oral daily  finasteride 5 milliGRAM(s) Oral daily  metoprolol tartrate 50 milliGRAM(s) Oral two times a day  mupirocin 2% Nasal 1 Application(s) Both Nostrils two times a day  pantoprazole    Tablet 40 milliGRAM(s) Oral two times a day  pyridoxine 100 milliGRAM(s) Oral daily  simvastatin 10 milliGRAM(s) Oral at bedtime  tamsulosin 0.4 milliGRAM(s) Oral at bedtime      Assessment:  89yo M w/ PMH of Afib on Coumadin, CHF, CVA, BPH, dementia (A&Ox0-1 at baseline) presents with acute COVID-19 infection.       Plan of Care:    #Afib  - Continue Digoxin and BB  - Pt on Coumadin. Continue with INR goal of 2-3  - Continue current management.       #HLD  - Statin as ordered      #COVID-19   - S/p  Remdesivir  - Blood culture x1 on 1/7/2023 growing G + cocci. Possible contaminnt.   - ID following, input appreciated.     #Bacteremia  - Blood culture growing E faecalis  - TTE with no clear evidence of vegetation  - Patient has advanced dementia, not a candidate for RAHEME  - ID input appreciated.             Over 25 minutes spent on total encounter; more than 50% of the visit was spent counseling and/or coordinating care by the attending physician.      Orestes Hart DO [facility]  Cardiovascular Disease  [phone]

## 2024-01-13 NOTE — PROGRESS NOTE ADULT - ASSESSMENT
89M HLD, AFib (Coumadin), CHF, CVA (residual L deficit), BPH, Alzheimer's dementia (AOx0-1 bl) admit 11/2023 for AMS iso sepsis 2/2 EColi bacteremia presumed urinary source (c/f renal abscess dc'd for uro f/u), ED presentation 12/20/23 for "shakiness" noticed by family who stated at that time that it usually is harbinger of infxn for him, dc'd home, now a/w sepsis 2/2 COVID-19 infxn        Problem/Plan - 1:  ·  Problem: Sepsis due to COVID-19.   ·  Plan: SIRS met (fever, tachycardia, tachypnea, leukopenia)  UA w/o c/f infxn; CXR w/o consolidation; procal 0.06; COV+  - req minimal O2 w/o documentation of hypoxemia on admit, and now breathing comfortably on room air, hold dex for now and ctm  - start remdesivir given dementia c/f progression to severe dz, (GFR > 30), monitor LFTs - d/w daughter Malek who agrees w/ plan   - c/w coumadin re VTE ppx  - f/u UCx, BCx x 2  - VBG w/ lactate  - trend labs  - VS q4h.     Problem/Plan - 2:  ·  Problem: Chronic atrial fibrillation.   ·  Plan: - dose coumadin one time 1mg tonight,   will need to redose tomorrow and consider adjust based on AM coags,   c/w dig and BB w/ strict hold parameters given sepsis.     Problem/Plan - 3:  ·  Problem: Chronic CHF.   ·  Plan: proBNP elevated, HST w/o significant delta  exam w/ 1+ pitting pedal edema though otherwise appearing euvolemic  - monitor vol status, caution w/ IVF.     Problem/Plan - 4:  ·  Problem: CVA (cerebrovascular accident).   ·  Plan: residual L deficit per daughter Malek   - c/w ASA.     Problem/Plan - 5:  ·  Problem: HLD (hyperlipidemia).   ·  Plan: - c/w simvastatin.     Problem/Plan - 6:  ·  Problem: History of BPH.   ·  Plan: - BS per routine, c/w home meds.     Problem/Plan - 7:  ·  Problem: Alzheimer's dementia.   ·  Plan: - c/w home meds  - fall/aspiration precaution  - formal S&S in AM, will give puree diet w/ mod thick liquids in interim (per 11/2023 formal S&S and confirmed w/ daughter Malek who requests pt be given diet tonight prior to repeat formal testing), will order bedside RN dysphagia screen prior.     Problem/Plan - 8:  ·  Problem: Enterococcus Bacteremia .   ·  Plan: - IV 2 Ampicillin and changing to PO Abxs per ID   - full code per daughter Malek.    Dispo : DC planning home tomorrow. D/W Daughters .

## 2024-01-13 NOTE — PROVIDER CONTACT NOTE (CRITICAL VALUE NOTIFICATION) - BACKGROUND
Pt covid positive, was negative upon admission
(Admit Diagnosis) Infection due to severe acute respiratory syndrome coronavirus 2 (SARS-CoV-2), (PMH) Dementia, CVA, CHF, ) Atrial fibrillation

## 2024-01-13 NOTE — PROGRESS NOTE ADULT - SUBJECTIVE AND OBJECTIVE BOX
Date of Service  : 01-13-24     INTERVAL HPI/OVERNIGHT EVENTS: I feel fine. Daughters in room.   Vital Signs Last 24 Hrs  T(C): 36.6 (13 Jan 2024 18:14), Max: 36.7 (13 Jan 2024 05:34)  T(F): 97.9 (13 Jan 2024 18:14), Max: 98 (13 Jan 2024 05:34)  HR: 78 (13 Jan 2024 18:14) (78 - 100)  BP: 142/86 (13 Jan 2024 18:14) (120/70 - 142/86)  BP(mean): --  RR: 18 (13 Jan 2024 18:14) (18 - 18)  SpO2: 98% (13 Jan 2024 18:14) (96% - 98%)    Parameters below as of 13 Jan 2024 18:14  Patient On (Oxygen Delivery Method): room air      I&O's Summary    12 Jan 2024 07:01  -  13 Jan 2024 07:00  --------------------------------------------------------  IN: 240 mL / OUT: 0 mL / NET: 240 mL      MEDICATIONS  (STANDING):  ampicillin  IVPB 2 Gram(s) IV Intermittent every 4 hours  aspirin enteric coated 81 milliGRAM(s) Oral daily  chlorhexidine 2% Cloths 1 Application(s) Topical daily  cholecalciferol 1000 Unit(s) Oral daily  digoxin     Tablet 250 MICROGram(s) Oral daily  finasteride 5 milliGRAM(s) Oral daily  metoprolol tartrate 50 milliGRAM(s) Oral two times a day  pantoprazole    Tablet 40 milliGRAM(s) Oral two times a day  pyridoxine 100 milliGRAM(s) Oral daily  simvastatin 10 milliGRAM(s) Oral at bedtime  tamsulosin 0.4 milliGRAM(s) Oral at bedtime    MEDICATIONS  (PRN):  acetaminophen     Tablet .. 650 milliGRAM(s) Oral every 6 hours PRN Temp greater or equal to 38C (100.4F), Mild Pain (1 - 3)    LABS:    01-12    141  |  105  |  12  ----------------------------<  107<H>  3.9   |  24  |  0.66    Ca    7.9<L>      12 Jan 2024 07:06      PT/INR - ( 13 Jan 2024 07:03 )   PT: 22.1 sec;   INR: 2.05 ratio           Urinalysis Basic - ( 12 Jan 2024 07:06 )    Color: x / Appearance: x / SG: x / pH: x  Gluc: 107 mg/dL / Ketone: x  / Bili: x / Urobili: x   Blood: x / Protein: x / Nitrite: x   Leuk Esterase: x / RBC: x / WBC x   Sq Epi: x / Non Sq Epi: x / Bacteria: x      CAPILLARY BLOOD GLUCOSE            Urinalysis Basic - ( 12 Jan 2024 07:06 )    Color: x / Appearance: x / SG: x / pH: x  Gluc: 107 mg/dL / Ketone: x  / Bili: x / Urobili: x   Blood: x / Protein: x / Nitrite: x   Leuk Esterase: x / RBC: x / WBC x   Sq Epi: x / Non Sq Epi: x / Bacteria: x          Consultant(s) Notes Reviewed:  [x ] YES  [ ] NO    PHYSICAL EXAM:  GENERAL: NAD, well-groomed, well-developed,not in any distress ,  HEAD:  Atraumatic, Normocephalic  NECK: Supple, No JVD, Normal thyroid  NERVOUS SYSTEM:  Alert & , No focal deficit   CHEST/LUNG: Good air entry bilateral with no  rales, rhonchi, wheezing, or rubs  HEART: Regular rate and rhythm; No murmurs, rubs, or gallops  ABDOMEN: Soft, Nontender, Nondistended; Bowel sounds present  EXTREMITIES:  2+ Peripheral Pulses, No clubbing, cyanosis, or edema    Care Discussed with Consultants/Other Providers [ x] YES  [ ] NO

## 2024-01-13 NOTE — PROVIDER CONTACT NOTE (CRITICAL VALUE NOTIFICATION) - ASSESSMENT
A&Ox0, mental status at baseline, VSS, afebrile
Pt is has been on airborne/contact precautions, pt completed remdesivir

## 2024-01-13 NOTE — PROVIDER CONTACT NOTE (CRITICAL VALUE NOTIFICATION) - SITUATION
Blood Culture 1/7- Growth in aerobic bottle gram (+) cocci in clusters
Pt admitted for severe acute respiratory infection

## 2024-01-14 ENCOUNTER — TRANSCRIPTION ENCOUNTER (OUTPATIENT)
Age: 89
End: 2024-01-14

## 2024-01-14 VITALS
SYSTOLIC BLOOD PRESSURE: 133 MMHG | DIASTOLIC BLOOD PRESSURE: 80 MMHG | RESPIRATION RATE: 18 BRPM | OXYGEN SATURATION: 96 % | TEMPERATURE: 98 F | HEART RATE: 87 BPM

## 2024-01-14 LAB
INR BLD: 2.33 RATIO — HIGH (ref 0.85–1.18)
INR BLD: 2.33 RATIO — HIGH (ref 0.85–1.18)
PROTHROM AB SERPL-ACNC: 23.9 SEC — HIGH (ref 9.5–13)
PROTHROM AB SERPL-ACNC: 23.9 SEC — HIGH (ref 9.5–13)

## 2024-01-14 PROCEDURE — 80053 COMPREHEN METABOLIC PANEL: CPT

## 2024-01-14 PROCEDURE — 87086 URINE CULTURE/COLONY COUNT: CPT

## 2024-01-14 PROCEDURE — 82803 BLOOD GASES ANY COMBINATION: CPT

## 2024-01-14 PROCEDURE — 80048 BASIC METABOLIC PNL TOTAL CA: CPT

## 2024-01-14 PROCEDURE — 84100 ASSAY OF PHOSPHORUS: CPT

## 2024-01-14 PROCEDURE — 83735 ASSAY OF MAGNESIUM: CPT

## 2024-01-14 PROCEDURE — 80162 ASSAY OF DIGOXIN TOTAL: CPT

## 2024-01-14 PROCEDURE — 85610 PROTHROMBIN TIME: CPT

## 2024-01-14 PROCEDURE — 87186 SC STD MICRODIL/AGAR DIL: CPT

## 2024-01-14 PROCEDURE — 97162 PT EVAL MOD COMPLEX 30 MIN: CPT

## 2024-01-14 PROCEDURE — 81001 URINALYSIS AUTO W/SCOPE: CPT

## 2024-01-14 PROCEDURE — 74177 CT ABD & PELVIS W/CONTRAST: CPT

## 2024-01-14 PROCEDURE — 83605 ASSAY OF LACTIC ACID: CPT

## 2024-01-14 PROCEDURE — 82550 ASSAY OF CK (CPK): CPT

## 2024-01-14 PROCEDURE — 83036 HEMOGLOBIN GLYCOSYLATED A1C: CPT

## 2024-01-14 PROCEDURE — 87077 CULTURE AEROBIC IDENTIFY: CPT

## 2024-01-14 PROCEDURE — 85014 HEMATOCRIT: CPT

## 2024-01-14 PROCEDURE — 93306 TTE W/DOPPLER COMPLETE: CPT

## 2024-01-14 PROCEDURE — 87150 DNA/RNA AMPLIFIED PROBE: CPT

## 2024-01-14 PROCEDURE — 87637 SARSCOV2&INF A&B&RSV AMP PRB: CPT

## 2024-01-14 PROCEDURE — 84145 PROCALCITONIN (PCT): CPT

## 2024-01-14 PROCEDURE — 85025 COMPLETE CBC W/AUTO DIFF WBC: CPT

## 2024-01-14 PROCEDURE — 82728 ASSAY OF FERRITIN: CPT

## 2024-01-14 PROCEDURE — 87640 STAPH A DNA AMP PROBE: CPT

## 2024-01-14 PROCEDURE — 97530 THERAPEUTIC ACTIVITIES: CPT

## 2024-01-14 PROCEDURE — 99285 EMERGENCY DEPT VISIT HI MDM: CPT | Mod: 25

## 2024-01-14 PROCEDURE — 82947 ASSAY GLUCOSE BLOOD QUANT: CPT

## 2024-01-14 PROCEDURE — 92610 EVALUATE SWALLOWING FUNCTION: CPT

## 2024-01-14 PROCEDURE — 84132 ASSAY OF SERUM POTASSIUM: CPT

## 2024-01-14 PROCEDURE — 86140 C-REACTIVE PROTEIN: CPT

## 2024-01-14 PROCEDURE — 36415 COLL VENOUS BLD VENIPUNCTURE: CPT

## 2024-01-14 PROCEDURE — 85730 THROMBOPLASTIN TIME PARTIAL: CPT

## 2024-01-14 PROCEDURE — 87641 MR-STAPH DNA AMP PROBE: CPT

## 2024-01-14 PROCEDURE — 85018 HEMOGLOBIN: CPT

## 2024-01-14 PROCEDURE — 97116 GAIT TRAINING THERAPY: CPT

## 2024-01-14 PROCEDURE — 84295 ASSAY OF SERUM SODIUM: CPT

## 2024-01-14 PROCEDURE — 83880 ASSAY OF NATRIURETIC PEPTIDE: CPT

## 2024-01-14 PROCEDURE — 82330 ASSAY OF CALCIUM: CPT

## 2024-01-14 PROCEDURE — 82962 GLUCOSE BLOOD TEST: CPT

## 2024-01-14 PROCEDURE — 71045 X-RAY EXAM CHEST 1 VIEW: CPT

## 2024-01-14 PROCEDURE — 87040 BLOOD CULTURE FOR BACTERIA: CPT

## 2024-01-14 PROCEDURE — 92526 ORAL FUNCTION THERAPY: CPT

## 2024-01-14 PROCEDURE — 85027 COMPLETE CBC AUTOMATED: CPT

## 2024-01-14 PROCEDURE — 83615 LACTATE (LD) (LDH) ENZYME: CPT

## 2024-01-14 PROCEDURE — 82435 ASSAY OF BLOOD CHLORIDE: CPT

## 2024-01-14 PROCEDURE — 96365 THER/PROPH/DIAG IV INF INIT: CPT

## 2024-01-14 PROCEDURE — 87635 SARS-COV-2 COVID-19 AMP PRB: CPT

## 2024-01-14 PROCEDURE — 84484 ASSAY OF TROPONIN QUANT: CPT

## 2024-01-14 RX ORDER — AMOXICILLIN 250 MG/5ML
1 SUSPENSION, RECONSTITUTED, ORAL (ML) ORAL
Qty: 30 | Refills: 0
Start: 2024-01-14 | End: 2024-01-23

## 2024-01-14 RX ADMIN — Medication 81 MILLIGRAM(S): at 11:02

## 2024-01-14 RX ADMIN — Medication 200 GRAM(S): at 06:36

## 2024-01-14 RX ADMIN — Medication 200 GRAM(S): at 02:25

## 2024-01-14 RX ADMIN — Medication 100 MILLIGRAM(S): at 11:02

## 2024-01-14 RX ADMIN — FINASTERIDE 5 MILLIGRAM(S): 5 TABLET, FILM COATED ORAL at 11:02

## 2024-01-14 RX ADMIN — Medication 200 GRAM(S): at 08:11

## 2024-01-14 RX ADMIN — Medication 50 MILLIGRAM(S): at 06:37

## 2024-01-14 RX ADMIN — CHLORHEXIDINE GLUCONATE 1 APPLICATION(S): 213 SOLUTION TOPICAL at 11:03

## 2024-01-14 RX ADMIN — Medication 250 MICROGRAM(S): at 06:37

## 2024-01-14 RX ADMIN — Medication 200 GRAM(S): at 13:19

## 2024-01-14 RX ADMIN — Medication 1000 UNIT(S): at 11:02

## 2024-01-14 NOTE — DISCHARGE NOTE NURSING/CASE MANAGEMENT/SOCIAL WORK - NSDCVIVACCINE_GEN_ALL_CORE_FT
Tdap; 20-Apr-2022 17:15; Claudy Norris (RN); [employer]; [id number] (Exp. Date: 18-Jan-2024); IntraMuscular; Deltoid Right.; 0.5 milliLiter(s); VIS (VIS Published: 09-May-2013, VIS Presented: 20-Apr-2022);    Tdap; 20-Apr-2022 17:15; Claudy Norris (RN); [employer]; [id number] (Exp. Date: 18-Jan-2024); IntraMuscular; Deltoid Right.; 0.5 milliLiter(s); VIS (VIS Published: 09-May-2013, VIS Presented: 20-Apr-2022);

## 2024-01-14 NOTE — DISCHARGE NOTE NURSING/CASE MANAGEMENT/SOCIAL WORK - NSDCPEFALRISK_GEN_ALL_CORE
For information on Fall & Injury Prevention, visit: [email or web address] OR  [email or web address] OR  https://www.cdc.gov/steadi/patient.html For information on Fall & Injury Prevention, visit: [email or web address] OR  [email or web address] OR  https://www.cdc.gov/steadi/patient.html

## 2024-01-14 NOTE — PROGRESS NOTE ADULT - PROVIDER SPECIALTY LIST ADULT
Cardiology
Infectious Disease
Internal Medicine
Internal Medicine
Cardiology
Internal Medicine
Cardiology
Infectious Disease
Internal Medicine
Cardiology
Infectious Disease
Infectious Disease

## 2024-01-14 NOTE — DISCHARGE NOTE NURSING/CASE MANAGEMENT/SOCIAL WORK - NSDCPEPTCOWAR_GEN_ALL_CORE
Calm Warfarin/Coumadin - Compliance/Warfarin/Coumadin - Dietary Advice/Warfarin/Coumadin - Follow up monitoring/Warfarin/Coumadin - Potential for adverse drug reactions and interactions

## 2024-01-14 NOTE — DISCHARGE NOTE NURSING/CASE MANAGEMENT/SOCIAL WORK - PATIENT PORTAL LINK FT
You can access the FollowMyHealth Patient Portal offered by [facility] by registering at the following website: [email or web address]. By joining [employer]’s FollowMyHealth portal, you will also be able to view your health information using other applications (apps) compatible with our system. You can access the FollowMyHealth Patient Portal offered by [facility] by registering at the following website: [email or web address]. By joining [employer]’s FollowMyHealth portal, you will also be able to view your health information using other applications (apps) compatible with our system.

## 2024-01-14 NOTE — PROGRESS NOTE ADULT - SUBJECTIVE AND OBJECTIVE BOX
Cardiovascular Disease Progress Note  Date of service: 01-14-24 @ 10:26    Overnight events: No acute events overnight.  Patient is in no distress.   Otherwise review of systems negative    Objective Findings:  T(C): 36.4 (01-14-24 @ 06:30), Max: 36.6 (01-13-24 @ 14:32)  HR: 84 (01-14-24 @ 06:30) (76 - 92)  BP: 122/76 (01-14-24 @ 06:30) (116/74 - 142/86)  RR: 18 (01-14-24 @ 06:30) (18 - 18)  SpO2: 96% (01-14-24 @ 06:30) (95% - 98%)  Wt(kg): --  Daily     Daily       Physical Exam:  Gen: NAD; Patient resting comfortably  HEENT: EOMI, Normocephalic/ atraumatic  CV: RRR, normal S1 + S2, no m/r/g  Lungs:  Normal respiratory effort; clear to auscultation bilaterally  Abd: soft, non-tender; bowel sounds present  Ext: No edema; warm and well perfused    Telemetry:  N/a    Laboratory Data:          PT/INR - ( 13 Jan 2024 07:03 )   PT: 22.1 sec;   INR: 2.05 ratio                   Inpatient Medications:  MEDICATIONS  (STANDING):  ampicillin  IVPB 2 Gram(s) IV Intermittent every 4 hours  aspirin enteric coated 81 milliGRAM(s) Oral daily  chlorhexidine 2% Cloths 1 Application(s) Topical daily  cholecalciferol 1000 Unit(s) Oral daily  digoxin     Tablet 250 MICROGram(s) Oral daily  finasteride 5 milliGRAM(s) Oral daily  metoprolol tartrate 50 milliGRAM(s) Oral two times a day  pantoprazole    Tablet 40 milliGRAM(s) Oral two times a day  pyridoxine 100 milliGRAM(s) Oral daily  simvastatin 10 milliGRAM(s) Oral at bedtime  tamsulosin 0.4 milliGRAM(s) Oral at bedtime      Assessment:  89yo M w/ PMH of Afib on Coumadin, CHF, CVA, BPH, dementia (A&Ox0-1 at baseline) presents with acute COVID-19 infection.       Plan of Care:    #Afib  - Continue Digoxin and BB  - Pt on Coumadin. Continue with INR goal of 2-3  - Continue current management.       #HLD  - Statin as ordered      #COVID-19   - S/p  Remdesivir  - ID following, input appreciated.     #Bacteremia  - Blood culture growing E faecalis  - TTE with no clear evidence of vegetation  - Patient has advanced dementia, not a candidate for RAHEEM    #ACP (advance care planning)-  Advanced care planning was discussed.  Risks, benefits and alternatives of medical treatment and procedures were addressed. 30 additional minutes spent addressing advance care plans.      Over 25 minutes spent on total encounter; more than 50% of the visit was spent counseling and/or coordinating care by the attending physician.      Orestes Hart,  [facility]  Cardiovascular Disease  [phone] Cardiovascular Disease Progress Note  Date of service: 01-14-24 @ 10:26    Overnight events: No acute events overnight.  Patient is in no distress.   Otherwise review of systems negative    Objective Findings:  T(C): 36.4 (01-14-24 @ 06:30), Max: 36.6 (01-13-24 @ 14:32)  HR: 84 (01-14-24 @ 06:30) (76 - 92)  BP: 122/76 (01-14-24 @ 06:30) (116/74 - 142/86)  RR: 18 (01-14-24 @ 06:30) (18 - 18)  SpO2: 96% (01-14-24 @ 06:30) (95% - 98%)  Wt(kg): --  Daily     Daily       Physical Exam:  Gen: NAD; Patient resting comfortably  HEENT: EOMI, Normocephalic/ atraumatic  CV: RRR, normal S1 + S2, no m/r/g  Lungs:  Normal respiratory effort; clear to auscultation bilaterally  Abd: soft, non-tender; bowel sounds present  Ext: No edema; warm and well perfused    Telemetry:  N/a    Laboratory Data:          PT/INR - ( 13 Jan 2024 07:03 )   PT: 22.1 sec;   INR: 2.05 ratio                   Inpatient Medications:  MEDICATIONS  (STANDING):  ampicillin  IVPB 2 Gram(s) IV Intermittent every 4 hours  aspirin enteric coated 81 milliGRAM(s) Oral daily  chlorhexidine 2% Cloths 1 Application(s) Topical daily  cholecalciferol 1000 Unit(s) Oral daily  digoxin     Tablet 250 MICROGram(s) Oral daily  finasteride 5 milliGRAM(s) Oral daily  metoprolol tartrate 50 milliGRAM(s) Oral two times a day  pantoprazole    Tablet 40 milliGRAM(s) Oral two times a day  pyridoxine 100 milliGRAM(s) Oral daily  simvastatin 10 milliGRAM(s) Oral at bedtime  tamsulosin 0.4 milliGRAM(s) Oral at bedtime      Assessment:  89yo M w/ PMH of Afib on Coumadin, CHF, CVA, BPH, dementia (A&Ox0-1 at baseline) presents with acute COVID-19 infection.       Plan of Care:    #Afib  - Continue Digoxin and BB  - Pt on Coumadin. Continue with INR goal of 2-3  - Continue current management.       #HLD  - Statin as ordered      #COVID-19   - S/p  Remdesivir  - ID following, input appreciated.     #Bacteremia  - Blood culture growing E faecalis  - TTE with no clear evidence of vegetation  - Patient has advanced dementia, not a candidate for RAHEEM    #ACP (advance care planning)-  Advanced care planning was discussed.  Risks, benefits and alternatives of medical treatment and procedures were addressed. 30 additional minutes spent addressing advance care plans.      Over 25 minutes spent on total encounter; more than 50% of the visit was spent counseling and/or coordinating care by the attending physician.      Orestes Hart,  [facility]  Cardiovascular Disease  [phone]

## 2024-03-04 ENCOUNTER — INPATIENT (INPATIENT)
Facility: HOSPITAL | Age: 89
LOS: 3 days | Discharge: HOME | DRG: 871 | End: 2024-03-08
Attending: INTERNAL MEDICINE | Admitting: INTERNAL MEDICINE
Payer: MEDICARE

## 2024-03-04 VITALS
WEIGHT: 169.98 LBS | HEIGHT: 69 IN | RESPIRATION RATE: 20 BRPM | DIASTOLIC BLOOD PRESSURE: 56 MMHG | TEMPERATURE: 100 F | OXYGEN SATURATION: 94 % | SYSTOLIC BLOOD PRESSURE: 101 MMHG | HEART RATE: 92 BPM

## 2024-03-04 DIAGNOSIS — A41.9 SEPSIS, UNSPECIFIED ORGANISM: ICD-10-CM

## 2024-03-04 DIAGNOSIS — Z86.73 PERSONAL HISTORY OF TRANSIENT ISCHEMIC ATTACK (TIA), AND CEREBRAL INFARCTION WITHOUT RESIDUAL DEFICITS: ICD-10-CM

## 2024-03-04 DIAGNOSIS — Z98.890 OTHER SPECIFIED POSTPROCEDURAL STATES: Chronic | ICD-10-CM

## 2024-03-04 DIAGNOSIS — I95.9 HYPOTENSION, UNSPECIFIED: ICD-10-CM

## 2024-03-04 DIAGNOSIS — N39.0 URINARY TRACT INFECTION, SITE NOT SPECIFIED: ICD-10-CM

## 2024-03-04 DIAGNOSIS — I48.20 CHRONIC ATRIAL FIBRILLATION, UNSPECIFIED: ICD-10-CM

## 2024-03-04 DIAGNOSIS — N40.0 BENIGN PROSTATIC HYPERPLASIA WITHOUT LOWER URINARY TRACT SYMPTOMS: ICD-10-CM

## 2024-03-04 DIAGNOSIS — G30.9 ALZHEIMER'S DISEASE, UNSPECIFIED: ICD-10-CM

## 2024-03-04 LAB
ADD ON TEST-SPECIMEN IN LAB: SIGNIFICANT CHANGE UP
ALBUMIN SERPL ELPH-MCNC: 2.5 G/DL — LOW (ref 3.3–5)
ALP SERPL-CCNC: 75 U/L — SIGNIFICANT CHANGE UP (ref 40–120)
ALT FLD-CCNC: 26 U/L — SIGNIFICANT CHANGE UP (ref 10–45)
ANION GAP SERPL CALC-SCNC: 7 MMOL/L — SIGNIFICANT CHANGE UP (ref 5–17)
APPEARANCE UR: ABNORMAL
APTT BLD: 36.6 SEC — HIGH (ref 24.5–35.6)
AST SERPL-CCNC: 34 U/L — SIGNIFICANT CHANGE UP (ref 10–40)
BACTERIA # UR AUTO: ABNORMAL /HPF
BASE EXCESS BLDV CALC-SCNC: -1.4 MMOL/L — SIGNIFICANT CHANGE UP (ref -2–3)
BASE EXCESS BLDV CALC-SCNC: -2.3 MMOL/L — LOW (ref -2–3)
BASOPHILS # BLD AUTO: 0 K/UL — SIGNIFICANT CHANGE UP (ref 0–0.2)
BASOPHILS NFR BLD AUTO: 0 % — SIGNIFICANT CHANGE UP (ref 0–2)
BILIRUB SERPL-MCNC: 0.8 MG/DL — SIGNIFICANT CHANGE UP (ref 0.2–1.2)
BILIRUB UR-MCNC: NEGATIVE — SIGNIFICANT CHANGE UP
BUN SERPL-MCNC: 10 MG/DL — SIGNIFICANT CHANGE UP (ref 7–23)
CA-I SERPL-SCNC: 1.17 MMOL/L — SIGNIFICANT CHANGE UP (ref 1.15–1.33)
CA-I SERPL-SCNC: 1.17 MMOL/L — SIGNIFICANT CHANGE UP (ref 1.15–1.33)
CALCIUM SERPL-MCNC: 7.9 MG/DL — LOW (ref 8.4–10.5)
CAST: 45 /LPF — HIGH (ref 0–4)
CHLORIDE BLDV-SCNC: 101 MMOL/L — SIGNIFICANT CHANGE UP (ref 96–108)
CHLORIDE BLDV-SCNC: 103 MMOL/L — SIGNIFICANT CHANGE UP (ref 96–108)
CHLORIDE SERPL-SCNC: 105 MMOL/L — SIGNIFICANT CHANGE UP (ref 96–108)
CK MB CFR SERPL CALC: 1.6 NG/ML — SIGNIFICANT CHANGE UP (ref 0–6.7)
CK MB CFR SERPL CALC: 1.7 NG/ML — SIGNIFICANT CHANGE UP (ref 0–6.7)
CK SERPL-CCNC: 53 U/L — SIGNIFICANT CHANGE UP (ref 30–200)
CK SERPL-CCNC: 93 U/L — SIGNIFICANT CHANGE UP (ref 30–200)
CO2 BLDV-SCNC: 24 MMOL/L — SIGNIFICANT CHANGE UP (ref 22–26)
CO2 BLDV-SCNC: 26 MMOL/L — SIGNIFICANT CHANGE UP (ref 22–26)
CO2 SERPL-SCNC: 24 MMOL/L — SIGNIFICANT CHANGE UP (ref 22–31)
COLOR SPEC: YELLOW — SIGNIFICANT CHANGE UP
CREAT SERPL-MCNC: 0.8 MG/DL — SIGNIFICANT CHANGE UP (ref 0.5–1.3)
DIFF PNL FLD: ABNORMAL
EGFR: 84 ML/MIN/1.73M2 — SIGNIFICANT CHANGE UP
EOSINOPHIL # BLD AUTO: 0 K/UL — SIGNIFICANT CHANGE UP (ref 0–0.5)
EOSINOPHIL NFR BLD AUTO: 0 % — SIGNIFICANT CHANGE UP (ref 0–6)
FLUAV AG NPH QL: SIGNIFICANT CHANGE UP
FLUBV AG NPH QL: SIGNIFICANT CHANGE UP
GAS PNL BLDV: 129 MMOL/L — LOW (ref 136–145)
GAS PNL BLDV: 134 MMOL/L — LOW (ref 136–145)
GAS PNL BLDV: SIGNIFICANT CHANGE UP
GAS PNL BLDV: SIGNIFICANT CHANGE UP
GLUCOSE BLDV-MCNC: 164 MG/DL — HIGH (ref 70–99)
GLUCOSE BLDV-MCNC: 194 MG/DL — HIGH (ref 70–99)
GLUCOSE SERPL-MCNC: 154 MG/DL — HIGH (ref 70–99)
GLUCOSE UR QL: NEGATIVE MG/DL — SIGNIFICANT CHANGE UP
HCO3 BLDV-SCNC: 23 MMOL/L — SIGNIFICANT CHANGE UP (ref 22–29)
HCO3 BLDV-SCNC: 25 MMOL/L — SIGNIFICANT CHANGE UP (ref 22–29)
HCT VFR BLD CALC: 42.2 % — SIGNIFICANT CHANGE UP (ref 39–50)
HCT VFR BLDA CALC: 37 % — LOW (ref 39–51)
HCT VFR BLDA CALC: 43 % — SIGNIFICANT CHANGE UP (ref 39–51)
HGB BLD CALC-MCNC: 12.3 G/DL — LOW (ref 12.6–17.4)
HGB BLD CALC-MCNC: 14.2 G/DL — SIGNIFICANT CHANGE UP (ref 12.6–17.4)
HGB BLD-MCNC: 13.3 G/DL — SIGNIFICANT CHANGE UP (ref 13–17)
IMM GRANULOCYTES NFR BLD AUTO: 0.5 % — SIGNIFICANT CHANGE UP (ref 0–0.9)
INR BLD: 2.43 RATIO — HIGH (ref 0.85–1.18)
KETONES UR-MCNC: NEGATIVE MG/DL — SIGNIFICANT CHANGE UP
LACTATE BLDV-MCNC: 3.2 MMOL/L — HIGH (ref 0.5–2)
LACTATE BLDV-MCNC: 5.4 MMOL/L — CRITICAL HIGH (ref 0.5–2)
LEUKOCYTE ESTERASE UR-ACNC: ABNORMAL
LYMPHOCYTES # BLD AUTO: 0.5 K/UL — LOW (ref 1–3.3)
LYMPHOCYTES # BLD AUTO: 8.1 % — LOW (ref 13–44)
MAGNESIUM SERPL-MCNC: 1.7 MG/DL — SIGNIFICANT CHANGE UP (ref 1.6–2.6)
MCHC RBC-ENTMCNC: 31.5 GM/DL — LOW (ref 32–36)
MCHC RBC-ENTMCNC: 32.3 PG — SIGNIFICANT CHANGE UP (ref 27–34)
MCV RBC AUTO: 102.4 FL — HIGH (ref 80–100)
MONOCYTES # BLD AUTO: 0.4 K/UL — SIGNIFICANT CHANGE UP (ref 0–0.9)
MONOCYTES NFR BLD AUTO: 6.5 % — SIGNIFICANT CHANGE UP (ref 2–14)
MRSA PCR RESULT.: SIGNIFICANT CHANGE UP
NEUTROPHILS # BLD AUTO: 5.21 K/UL — SIGNIFICANT CHANGE UP (ref 1.8–7.4)
NEUTROPHILS NFR BLD AUTO: 84.9 % — HIGH (ref 43–77)
NITRITE UR-MCNC: POSITIVE
NRBC # BLD: 0 /100 WBCS — SIGNIFICANT CHANGE UP (ref 0–0)
NT-PROBNP SERPL-SCNC: 1974 PG/ML — HIGH (ref 0–300)
PCO2 BLDV: 36 MMHG — LOW (ref 42–55)
PCO2 BLDV: 50 MMHG — SIGNIFICANT CHANGE UP (ref 42–55)
PH BLDV: 7.3 — LOW (ref 7.32–7.43)
PH BLDV: 7.41 — SIGNIFICANT CHANGE UP (ref 7.32–7.43)
PH UR: 7 — SIGNIFICANT CHANGE UP (ref 5–8)
PLATELET # BLD AUTO: 147 K/UL — LOW (ref 150–400)
PO2 BLDV: 36 MMHG — SIGNIFICANT CHANGE UP (ref 25–45)
PO2 BLDV: 49 MMHG — HIGH (ref 25–45)
POTASSIUM BLDV-SCNC: 3.8 MMOL/L — SIGNIFICANT CHANGE UP (ref 3.5–5.1)
POTASSIUM BLDV-SCNC: 4.1 MMOL/L — SIGNIFICANT CHANGE UP (ref 3.5–5.1)
POTASSIUM SERPL-MCNC: 4.2 MMOL/L — SIGNIFICANT CHANGE UP (ref 3.5–5.3)
POTASSIUM SERPL-SCNC: 4.2 MMOL/L — SIGNIFICANT CHANGE UP (ref 3.5–5.3)
PROCALCITONIN SERPL-MCNC: 0.12 NG/ML — HIGH (ref 0.02–0.1)
PROT SERPL-MCNC: 5.1 G/DL — LOW (ref 6–8.3)
PROT UR-MCNC: SIGNIFICANT CHANGE UP MG/DL
PROTHROM AB SERPL-ACNC: 24.9 SEC — HIGH (ref 9.5–13)
RBC # BLD: 4.12 M/UL — LOW (ref 4.2–5.8)
RBC # FLD: 12.9 % — SIGNIFICANT CHANGE UP (ref 10.3–14.5)
RBC CASTS # UR COMP ASSIST: 1 /HPF — SIGNIFICANT CHANGE UP (ref 0–4)
REVIEW: SIGNIFICANT CHANGE UP
RSV RNA NPH QL NAA+NON-PROBE: SIGNIFICANT CHANGE UP
S AUREUS DNA NOSE QL NAA+PROBE: SIGNIFICANT CHANGE UP
SAO2 % BLDV: 49.8 % — LOW (ref 67–88)
SAO2 % BLDV: 81.2 % — SIGNIFICANT CHANGE UP (ref 67–88)
SARS-COV-2 RNA SPEC QL NAA+PROBE: SIGNIFICANT CHANGE UP
SODIUM SERPL-SCNC: 136 MMOL/L — SIGNIFICANT CHANGE UP (ref 135–145)
SP GR SPEC: 1.02 — SIGNIFICANT CHANGE UP (ref 1–1.03)
SQUAMOUS # UR AUTO: 1 /HPF — SIGNIFICANT CHANGE UP (ref 0–5)
TROPONIN T, HIGH SENSITIVITY RESULT: 24 NG/L — SIGNIFICANT CHANGE UP (ref 0–51)
TROPONIN T, HIGH SENSITIVITY RESULT: 27 NG/L — SIGNIFICANT CHANGE UP (ref 0–51)
UROBILINOGEN FLD QL: 1 MG/DL — SIGNIFICANT CHANGE UP (ref 0.2–1)
WBC # BLD: 6.14 K/UL — SIGNIFICANT CHANGE UP (ref 3.8–10.5)
WBC # FLD AUTO: 6.14 K/UL — SIGNIFICANT CHANGE UP (ref 3.8–10.5)
WBC UR QL: 364 /HPF — HIGH (ref 0–5)

## 2024-03-04 PROCEDURE — 70450 CT HEAD/BRAIN W/O DYE: CPT | Mod: 26,MC

## 2024-03-04 PROCEDURE — 99223 1ST HOSP IP/OBS HIGH 75: CPT

## 2024-03-04 PROCEDURE — 93308 TTE F-UP OR LMTD: CPT | Mod: 26

## 2024-03-04 PROCEDURE — 76937 US GUIDE VASCULAR ACCESS: CPT | Mod: 26

## 2024-03-04 PROCEDURE — 71275 CT ANGIOGRAPHY CHEST: CPT | Mod: 26,MC

## 2024-03-04 PROCEDURE — 71045 X-RAY EXAM CHEST 1 VIEW: CPT | Mod: 26

## 2024-03-04 PROCEDURE — 74177 CT ABD & PELVIS W/CONTRAST: CPT | Mod: 26,MC

## 2024-03-04 PROCEDURE — 99291 CRITICAL CARE FIRST HOUR: CPT

## 2024-03-04 PROCEDURE — 36000 PLACE NEEDLE IN VEIN: CPT

## 2024-03-04 RX ORDER — PIPERACILLIN AND TAZOBACTAM 4; .5 G/20ML; G/20ML
3.38 INJECTION, POWDER, LYOPHILIZED, FOR SOLUTION INTRAVENOUS ONCE
Refills: 0 | Status: COMPLETED | OUTPATIENT
Start: 2024-03-04 | End: 2024-03-04

## 2024-03-04 RX ORDER — DIGOXIN 250 MCG
250 TABLET ORAL DAILY
Refills: 0 | Status: DISCONTINUED | OUTPATIENT
Start: 2024-03-04 | End: 2024-03-08

## 2024-03-04 RX ORDER — ACETAMINOPHEN 500 MG
1000 TABLET ORAL ONCE
Refills: 0 | Status: COMPLETED | OUTPATIENT
Start: 2024-03-04 | End: 2024-03-04

## 2024-03-04 RX ORDER — WARFARIN SODIUM 2.5 MG/1
1 TABLET ORAL
Refills: 0 | DISCHARGE

## 2024-03-04 RX ORDER — PANTOPRAZOLE SODIUM 20 MG/1
1 TABLET, DELAYED RELEASE ORAL
Refills: 0 | DISCHARGE

## 2024-03-04 RX ORDER — SIMVASTATIN 20 MG/1
1 TABLET, FILM COATED ORAL
Refills: 0 | DISCHARGE

## 2024-03-04 RX ORDER — ACETAMINOPHEN 500 MG
650 TABLET ORAL EVERY 6 HOURS
Refills: 0 | Status: DISCONTINUED | OUTPATIENT
Start: 2024-03-04 | End: 2024-03-08

## 2024-03-04 RX ORDER — SODIUM CHLORIDE 9 MG/ML
1000 INJECTION, SOLUTION INTRAVENOUS ONCE
Refills: 0 | Status: COMPLETED | OUTPATIENT
Start: 2024-03-04 | End: 2024-03-04

## 2024-03-04 RX ORDER — VANCOMYCIN HCL 1 G
1000 VIAL (EA) INTRAVENOUS ONCE
Refills: 0 | Status: COMPLETED | OUTPATIENT
Start: 2024-03-04 | End: 2024-03-04

## 2024-03-04 RX ORDER — ASPIRIN/CALCIUM CARB/MAGNESIUM 324 MG
81 TABLET ORAL DAILY
Refills: 0 | Status: DISCONTINUED | OUTPATIENT
Start: 2024-03-04 | End: 2024-03-08

## 2024-03-04 RX ORDER — ASPIRIN/CALCIUM CARB/MAGNESIUM 324 MG
1 TABLET ORAL
Refills: 0 | DISCHARGE

## 2024-03-04 RX ORDER — PYRIDOXINE HCL (VITAMIN B6) 100 MG
1 TABLET ORAL
Refills: 0 | DISCHARGE

## 2024-03-04 RX ORDER — CHOLECALCIFEROL (VITAMIN D3) 125 MCG
1 CAPSULE ORAL
Refills: 0 | DISCHARGE

## 2024-03-04 RX ORDER — FINASTERIDE 5 MG/1
5 TABLET, FILM COATED ORAL DAILY
Refills: 0 | Status: DISCONTINUED | OUTPATIENT
Start: 2024-03-04 | End: 2024-03-08

## 2024-03-04 RX ORDER — DIGOXIN 250 MCG
1 TABLET ORAL
Refills: 0 | DISCHARGE

## 2024-03-04 RX ORDER — WARFARIN SODIUM 2.5 MG/1
1 TABLET ORAL ONCE
Refills: 0 | Status: COMPLETED | OUTPATIENT
Start: 2024-03-04 | End: 2024-03-04

## 2024-03-04 RX ORDER — SIMVASTATIN 20 MG/1
10 TABLET, FILM COATED ORAL AT BEDTIME
Refills: 0 | Status: DISCONTINUED | OUTPATIENT
Start: 2024-03-04 | End: 2024-03-08

## 2024-03-04 RX ORDER — SODIUM CHLORIDE 9 MG/ML
1000 INJECTION INTRAMUSCULAR; INTRAVENOUS; SUBCUTANEOUS ONCE
Refills: 0 | Status: COMPLETED | OUTPATIENT
Start: 2024-03-04 | End: 2024-03-04

## 2024-03-04 RX ORDER — WARFARIN SODIUM 2.5 MG/1
1.5 TABLET ORAL
Refills: 0 | DISCHARGE

## 2024-03-04 RX ORDER — AMPICILLIN TRIHYDRATE 250 MG
2 CAPSULE ORAL ONCE
Refills: 0 | Status: COMPLETED | OUTPATIENT
Start: 2024-03-04 | End: 2024-03-04

## 2024-03-04 RX ORDER — CEFEPIME 1 G/1
2000 INJECTION, POWDER, FOR SOLUTION INTRAMUSCULAR; INTRAVENOUS ONCE
Refills: 0 | Status: COMPLETED | OUTPATIENT
Start: 2024-03-04 | End: 2024-03-04

## 2024-03-04 RX ORDER — TAMSULOSIN HYDROCHLORIDE 0.4 MG/1
0.4 CAPSULE ORAL AT BEDTIME
Refills: 0 | Status: DISCONTINUED | OUTPATIENT
Start: 2024-03-04 | End: 2024-03-08

## 2024-03-04 RX ORDER — TAMSULOSIN HYDROCHLORIDE 0.4 MG/1
1 CAPSULE ORAL
Refills: 0 | DISCHARGE

## 2024-03-04 RX ORDER — FINASTERIDE 5 MG/1
1 TABLET, FILM COATED ORAL
Refills: 0 | DISCHARGE

## 2024-03-04 RX ORDER — PIPERACILLIN AND TAZOBACTAM 4; .5 G/20ML; G/20ML
3.38 INJECTION, POWDER, LYOPHILIZED, FOR SOLUTION INTRAVENOUS EVERY 8 HOURS
Refills: 0 | Status: DISCONTINUED | OUTPATIENT
Start: 2024-03-05 | End: 2024-03-07

## 2024-03-04 RX ORDER — PANTOPRAZOLE SODIUM 20 MG/1
40 TABLET, DELAYED RELEASE ORAL
Refills: 0 | Status: DISCONTINUED | OUTPATIENT
Start: 2024-03-04 | End: 2024-03-08

## 2024-03-04 RX ORDER — SODIUM CHLORIDE 9 MG/ML
1000 INJECTION, SOLUTION INTRAVENOUS
Refills: 0 | Status: DISCONTINUED | OUTPATIENT
Start: 2024-03-04 | End: 2024-03-08

## 2024-03-04 RX ADMIN — PIPERACILLIN AND TAZOBACTAM 200 GRAM(S): 4; .5 INJECTION, POWDER, LYOPHILIZED, FOR SOLUTION INTRAVENOUS at 18:43

## 2024-03-04 RX ADMIN — PIPERACILLIN AND TAZOBACTAM 25 GRAM(S): 4; .5 INJECTION, POWDER, LYOPHILIZED, FOR SOLUTION INTRAVENOUS at 21:01

## 2024-03-04 RX ADMIN — Medication 200 GRAM(S): at 11:15

## 2024-03-04 RX ADMIN — CEFEPIME 100 MILLIGRAM(S): 1 INJECTION, POWDER, FOR SOLUTION INTRAMUSCULAR; INTRAVENOUS at 12:26

## 2024-03-04 RX ADMIN — SODIUM CHLORIDE 75 MILLILITER(S): 9 INJECTION, SOLUTION INTRAVENOUS at 16:18

## 2024-03-04 RX ADMIN — Medication 400 MILLIGRAM(S): at 11:14

## 2024-03-04 RX ADMIN — SODIUM CHLORIDE 1000 MILLILITER(S): 9 INJECTION, SOLUTION INTRAVENOUS at 12:26

## 2024-03-04 RX ADMIN — Medication 250 MILLIGRAM(S): at 13:11

## 2024-03-04 RX ADMIN — SODIUM CHLORIDE 500 MILLILITER(S): 9 INJECTION INTRAMUSCULAR; INTRAVENOUS; SUBCUTANEOUS at 11:14

## 2024-03-04 RX ADMIN — SODIUM CHLORIDE 75 MILLILITER(S): 9 INJECTION, SOLUTION INTRAVENOUS at 21:43

## 2024-03-04 NOTE — H&P ADULT - HISTORY OF PRESENT ILLNESS
89M HLD, AFib (Coumadin), CHF, CVA (residual L deficit), BPH, Alzheimer's dementia (AOx0-1 bl) , sepsis secondary to UTI presented with more confusion and not eating or drinking . Here he was found to be having fever.

## 2024-03-04 NOTE — CONSULT NOTE ADULT - ASSESSMENT
89 M PMH HLD, AF, CHF, CVA (residual L deficit), BPH, Alzheimer's dementia, recent admission Jan 2024 for Covid-19 and E. Faecalis bacteremia (TTE no vegetation noted, CT A/P no obvious source, Ampicillin IV -> Amoxicillin PO total 14 day course), who presented to the ED with worsening confusion.     AMS, fever to 101.6, hypotension, UA with pyuria and bacteriuria  Sepsis  Recent E. Faecalis bacteremia Jan 2024   CT A/P negative     Suggest;  F/u urine and blood cx   Start Zosyn pending cx data

## 2024-03-04 NOTE — ED PROVIDER NOTE - PROGRESS NOTE DETAILS
Attending Corinne Forrest: pt becoming more hypotensive. additional IV's placed. pt received 1L IVF. and abx. on repeat pocus ivc less than 2cm. additional fluids ordered. will monitor pt's lactate. Spoke to patients daughter on phone who is health care proxy. Discussed plan for labs/imaging. Answered all question/concerns. Informed of plan for admission to hospital for IV ABX. Daughter stated that patient is full code. Lisa Orozco PA-C Attending Corinne Forrest: BP improved after 2L. no evidence of renal abscess. d/w dr tierney who will admit patient. reviewed prior cultures pt has grown enterococus in the urine. covered with ampicillin

## 2024-03-04 NOTE — H&P ADULT - PROBLEM SELECTOR PLAN 1
S/P Urine and Blood Cultures .  Likely secondary to UTI .  S/P IV Abxs.  ID consulted and further Abxs per them .

## 2024-03-04 NOTE — CONSULT NOTE ADULT - ATTENDING COMMENTS
89-yo M w/ PMH of CVA w/ residual L deficit, Alzhemier's disease, CHF, AF, and recent admission (1/2024) for COVID and E faecalis bacteremia s/p 14-d course, admitted with AMS    #Severe sepsis (fever, tachycardia, and elevated lactate)  #AMS  #Fever  #Hypotension  #Abnormal urinalysis  - Admitted with AMS. Per chart review, patient was "more confused" per baseline dementia  - CTAP and CTA chest w/ no identifiable source of infection  - F/u UCx and BCx  - Start empiric Zosyn 3.375 g IV Q8H  - Low threshold for MICU consult and RRT    Plan discussed with primary team ACP.  Thank you for this consult. Inpatient ID team will follow.    Klever Tse MD, PhD  Attending Physician  Division of Infectious Diseases  Department of Medicine    Please contact through Link To Media.  Office: [phone] (after 5 PM or weekend)

## 2024-03-04 NOTE — ED ADULT NURSE NOTE - NSFALLHARMRISKINTERV_ED_ALL_ED
Assistance OOB with selected safe patient handling equipment if applicable/Assistance with ambulation/Communicate risk of Fall with Harm to all staff, patient, and family/Monitor gait and stability/Monitor for mental status changes and reorient to person, place, and time, as needed/Move patient closer to nursing station/within visual sight of ED staff/Provide patient with walking aids/Provide visual cue: red socks, yellow wristband, yellow gown, etc/Reinforce activity limits and safety measures with patient and family/Toileting schedule using arm’s reach rule for commode and bathroom/Use of alarms - bed, stretcher, chair and/or video monitoring/Bed in lowest position, wheels locked, appropriate side rails in place/Call bell, personal items and telephone in reach/Instruct patient to call for assistance before getting out of bed/chair/stretcher/Non-slip footwear applied when patient is off stretcher/[place] to call system/Physically safe environment - no spills, clutter or unnecessary equipment/Purposeful Proactive Rounding/Room/bathroom lighting operational, light cord in reach

## 2024-03-04 NOTE — CONSULT NOTE ADULT - SUBJECTIVE AND OBJECTIVE BOX
Patient is a 89 Male who presents with a chief complaint of increased confusion     HPI:  89 M PMH HLD, AF, CHF, CVA (residual L deficit), BPH, Alzheimer's dementia, recent admission Jan 2024 for Covid-19 and E. Faecalis bacteremia (TTE no vegetation noted, CT A/P no obvious source, Ampicillin IV -> Amoxicillin PO total 14 day course), who presented to the ED with worsening confusion.     Here pt febrile to 101.6, hypotensive requiring IVF, UA with pyuria and bacteriuria. ID consulted for recommendations.     REVIEW OF SYSTEMS  AMS     prior hospital charts reviewed [V]  primary team notes reviewed [V]  other consultant notes reviewed [V]    PAST MEDICAL & SURGICAL HISTORY:  CVA (cerebral vascular accident)    CHF (congestive heart failure)    Atrial fibrillation    BPH (benign prostatic hyperplasia)    Dementia    Sepsis    UTI (urinary tract infection)    Chronic atrial fibrillation    Dementia    BPH without urinary obstruction    H/O hernia repair    S/P ear surgery    SOCIAL HISTORY:  Unable to obtain, per chart A&O x0 at baseline     FAMILY HISTORY:  FH: heart disease (Father, Mother)    FH: dementia (Father, Mother)    Allergies  No Known Allergies    ANTIMICROBIALS:    ANTIMICROBIALS (past 90 days):  MEDICATIONS  (STANDING):  ampicillin  IVPB   200 mL/Hr IV Intermittent (03-04-24 @ 11:15)    cefepime   IVPB   100 mL/Hr IV Intermittent (03-04-24 @ 12:26)    vancomycin  IVPB.   250 mL/Hr IV Intermittent (03-04-24 @ 13:11)    OTHER MEDS:   MEDICATIONS  (STANDING):    VITALS:  Vital Signs Last 24 Hrs  T(F): 98 (03-04-24 @ 16:24), Max: 101.6 (03-04-24 @ 10:40)    Vital Signs Last 24 Hrs  HR: 58 (03-04-24 @ 16:24) (55 - 98)  BP: 101/50 (03-04-24 @ 16:24) (91/40 - 117/84)  RR: 20 (03-04-24 @ 16:24)  SpO2: 97% (03-04-24 @ 16:24) (94% - 97%)  Wt(kg): --    EXAM:  General: Patient in no acute distress   HEENT: NCAT  CV: S1+S2  Lungs: CTAB  Abd: Soft  Ext: No cyanosis  Neuro: Calm   Skin: No rash     Labs:                        13.3   6.14  )-----------( 147      ( 04 Mar 2024 11:32 )             42.2     03-04    136  |  105  |  10  ----------------------------<  154<H>  4.2   |  24  |  0.80    Ca    7.9<L>      04 Mar 2024 13:56  Mg     1.7     03-04    TPro  5.1<L>  /  Alb  2.5<L>  /  TBili  0.8  /  DBili  x   /  AST  34  /  ALT  26  /  AlkPhos  75  03-04    WBC Trend:  WBC Count: 6.14 (03-04-24 @ 11:32)    Auto Neutrophil #: 5.21 K/uL (03-04-24 @ 11:32)  Auto Neutrophil #: 2.10 K/uL (01-08-24 @ 11:34)  Auto Neutrophil #: 2.87 K/uL (01-07-24 @ 16:52)  Auto Neutrophil #: 4.27 K/uL (12-19-23 @ 23:25)  Auto Neutrophil #: 3.23 K/uL (11-26-23 @ 07:28)    Creatine Trend:  Creatinine: 0.80 (03-04)  Creatinine: 0.71 (03-04)    Liver Biochemical Testing Trend:  Alanine Aminotransferase (ALT/SGPT): 26 (03-04)  Alanine Aminotransferase (ALT/SGPT): 38 (03-04)  Alanine Aminotransferase (ALT/SGPT): 23 (01-09)  Alanine Aminotransferase (ALT/SGPT): 25 (01-08)  Alanine Aminotransferase (ALT/SGPT): 28 (01-07)  Aspartate Aminotransferase (AST/SGOT): 34 (03-04-24 @ 13:56)  Aspartate Aminotransferase (AST/SGOT): 101 (03-04-24 @ 11:32)  Aspartate Aminotransferase (AST/SGOT): 32 (01-09-24 @ 11:10)  Aspartate Aminotransferase (AST/SGOT): 28 (01-08-24 @ 11:33)  Aspartate Aminotransferase (AST/SGOT): 31 (01-07-24 @ 16:52)  Bilirubin Total: 0.8 (03-04)  Bilirubin Total: 0.8 (03-04)  Bilirubin Total: 0.8 (01-09)  Bilirubin Total: 0.7 (01-08)  Bilirubin Total: 0.6 (01-07)    Trend LDH  01-08-24 @ 11:33  196    Auto Eosinophil %: 0.0 % (03-04-24 @ 11:32)    Urinalysis Basic - ( 04 Mar 2024 13:56 )    Color: x / Appearance: x / SG: x / pH: x  Gluc: 154 mg/dL / Ketone: x  / Bili: x / Urobili: x   Blood: x / Protein: x / Nitrite: x   Leuk Esterase: x / RBC: x / WBC x   Sq Epi: x / Non Sq Epi: x / Bacteria: x    MICROBIOLOGY:    MRSA PCR Result.: Detected (01-08-24 @ 12:19)  MRSA PCR Result.: NotDetec (11-25-23 @ 07:10)  MRSA PCR Result.: NotDetec (10-24-23 @ 06:58)    Culture - Blood (collected 10 Sonu 2024 12:55)  Source: .Blood Blood-Venous  Final Report:    No growth at 5 days    Culture - Blood (collected 10 Sonu 2024 12:50)  Source: .Blood Blood-Peripheral  Final Report:    No growth at 5 days    Culture - Blood (collected 09 Jan 2024 13:31)  Source: .Blood Blood-Venous  Final Report:    Growth in aerobic and anaerobic bottles: Enterococcus faecalis    See previous culture [id number]    Culture - Blood (collected 09 Jan 2024 13:27)  Source: .Blood Blood-Peripheral  Final Report:    Growth in aerobic and anaerobic bottles: Enterococcus faecalis    Direct identification is available within approximately 3-5    hours either by Blood Panel Multiplexed PCR or Direct    MALDI-TOF. Details: [email or web address]  Organism: Blood Culture PCR  Enterococcus faecalis  Organism: Enterococcus faecalis    Sensitivities:      Method Type: DANN      -  Ampicillin: S <=2 Predicts results to ampicillin/sulbactam, amoxacillin-clavulanate and  piperacillin-tazobactam.      -  Vancomycin: S 2      -  Gentamicin synergy: S <=500      -  Streptomycin synergy: S <=1000  Organism: Blood Culture PCR    Sensitivities:      Method Type: PCR      -  Enterococcus faecalis: Detec    Culture - Blood (collected 07 Jan 2024 23:00)  Source: .Blood Blood-Peripheral  Final Report:    No growth at 5 days    Culture - Blood (collected 07 Jan 2024 22:51)  Source: .Blood Blood-Peripheral  Final Report:    Growth in aerobic and anaerobic bottles: Staphylococcus epidermidis    Isolation of Coagulase negative Staphylococcus from single blood culture    sets may represent    contamination. Contact the Microbiology Department at [phone] if    susceptibilitytesting is    clinically indicated.    Direct identification is available within approximately 3-5    hours either by Blood Panel Multiplexed PCR or Direct    MALDI-TOF. Details: [email or web address]  Organism: Blood Culture PCR  Organism: Blood Culture PCR    Sensitivities:      Method Type: PCR      -  Staphylococcus epidermidis, Methicillin resistant: Detec    Culture - Urine (collected 07 Jan 2024 18:36)  Source: Catheterized Catheterized  Final Report:    Normal Urogenital yuli present    Culture - Urine (collected 20 Dec 2023 03:35)  Source: Clean Catch Clean Catch (Midstream)  Final Report:    No growth    Culture - Blood (collected 19 Dec 2023 23:35)  Source: .Blood Blood-Peripheral  Final Report:    No growth at 5 days    Culture - Blood (collected 19 Dec 2023 23:25)  Source: .Blood Blood-Peripheral  Final Report:    No growth at 5 days    COVID-19 PCR: Detected (01-13-24 @ 15:37)    Procalcitonin, Serum: 0.12 (03-04)    Troponin T, High Sensitivity Result: 27 (03-04)  Troponin T, High Sensitivity Result: 24 (03-04)    Blood Gas Venous - Lactate: 3.2 (03-04 @ 12:13)  Blood Gas Venous - Lactate: 5.4 (03-04 @ 11:07)    A1C with Estimated Average Glucose Result: 4.7 % (01-08-24 @ 11:34)    RADIOLOGY:  imaging below personally reviewed    < from: CT Abdomen and Pelvis w/ IV Cont (03.04.24 @ 12:51) >  IMPRESSION:    No pulmonary embolus.    Nonspecific mild groundglass in both lungs. This may be infectious in the   appropriate clinical setting.    Relative hypoattenuation of the left atrial appendage tip, which can be   seen with slow flow or thrombus. If clinically appropriate, this can be   further evaluated with a CT chest with delayed phase imaging.    --- End of Report ---    < end of copied text >    < from: CT Head No Cont (03.04.24 @ 11:51) >  IMPRESSION: Stable exam.    < end of copied text > Patient is a 89 Male who presents with a chief complaint of increased confusion     HPI:  89 M PMH HLD, AF, CHF, CVA (residual L deficit), BPH, Alzheimer's dementia, recent admission Jan 2024 for Covid-19 and E. Faecalis bacteremia (TTE no vegetation noted, CT A/P no obvious source, Ampicillin IV -> Amoxicillin PO total 14 day course), who presented to the ED with worsening confusion.     Here pt febrile to 101.6, hypotensive requiring IVF, UA with pyuria and bacteriuria. ID consulted for recommendations.     REVIEW OF SYSTEMS  AMS     prior hospital charts reviewed [V]  primary team notes reviewed [V]  other consultant notes reviewed [V]    PAST MEDICAL & SURGICAL HISTORY:  CVA (cerebral vascular accident)    CHF (congestive heart failure)    Atrial fibrillation    BPH (benign prostatic hyperplasia)    Dementia    Sepsis    UTI (urinary tract infection)    Chronic atrial fibrillation    Dementia    BPH without urinary obstruction    H/O hernia repair    S/P ear surgery    SOCIAL HISTORY:  Unable to obtain, per chart A&O x0 at baseline   Lives at home    FAMILY HISTORY:  FH: heart disease (Father, Mother)    FH: dementia (Father, Mother)    Allergies  No Known Allergies    ANTIMICROBIALS:    ANTIMICROBIALS (past 90 days):  MEDICATIONS  (STANDING):  ampicillin  IVPB   200 mL/Hr IV Intermittent (03-04-24 @ 11:15)    cefepime   IVPB   100 mL/Hr IV Intermittent (03-04-24 @ 12:26)    vancomycin  IVPB.   250 mL/Hr IV Intermittent (03-04-24 @ 13:11)    OTHER MEDS:   MEDICATIONS  (STANDING):    VITALS:  Vital Signs Last 24 Hrs  T(F): 98 (03-04-24 @ 16:24), Max: 101.6 (03-04-24 @ 10:40)    Vital Signs Last 24 Hrs  HR: 58 (03-04-24 @ 16:24) (55 - 98)  BP: 101/50 (03-04-24 @ 16:24) (91/40 - 117/84)  RR: 20 (03-04-24 @ 16:24)  SpO2: 97% (03-04-24 @ 16:24) (94% - 97%)  Wt(kg): --    EXAM:  General: Patient in no acute distress   HEENT: NCAT  CV: S1+S2  Lungs: CTAB  Abd: Soft  Ext: No cyanosis  Neuro: Calm   Skin: No rash     Labs:                        13.3   6.14  )-----------( 147      ( 04 Mar 2024 11:32 )             42.2     03-04    136  |  105  |  10  ----------------------------<  154<H>  4.2   |  24  |  0.80    Ca    7.9<L>      04 Mar 2024 13:56  Mg     1.7     03-04    TPro  5.1<L>  /  Alb  2.5<L>  /  TBili  0.8  /  DBili  x   /  AST  34  /  ALT  26  /  AlkPhos  75  03-04    WBC Trend:  WBC Count: 6.14 (03-04-24 @ 11:32)    Auto Neutrophil #: 5.21 K/uL (03-04-24 @ 11:32)  Auto Neutrophil #: 2.10 K/uL (01-08-24 @ 11:34)  Auto Neutrophil #: 2.87 K/uL (01-07-24 @ 16:52)  Auto Neutrophil #: 4.27 K/uL (12-19-23 @ 23:25)  Auto Neutrophil #: 3.23 K/uL (11-26-23 @ 07:28)    Creatine Trend:  Creatinine: 0.80 (03-04)  Creatinine: 0.71 (03-04)    Liver Biochemical Testing Trend:  Alanine Aminotransferase (ALT/SGPT): 26 (03-04)  Alanine Aminotransferase (ALT/SGPT): 38 (03-04)  Alanine Aminotransferase (ALT/SGPT): 23 (01-09)  Alanine Aminotransferase (ALT/SGPT): 25 (01-08)  Alanine Aminotransferase (ALT/SGPT): 28 (01-07)  Aspartate Aminotransferase (AST/SGOT): 34 (03-04-24 @ 13:56)  Aspartate Aminotransferase (AST/SGOT): 101 (03-04-24 @ 11:32)  Aspartate Aminotransferase (AST/SGOT): 32 (01-09-24 @ 11:10)  Aspartate Aminotransferase (AST/SGOT): 28 (01-08-24 @ 11:33)  Aspartate Aminotransferase (AST/SGOT): 31 (01-07-24 @ 16:52)  Bilirubin Total: 0.8 (03-04)  Bilirubin Total: 0.8 (03-04)  Bilirubin Total: 0.8 (01-09)  Bilirubin Total: 0.7 (01-08)  Bilirubin Total: 0.6 (01-07)    Trend LDH  01-08-24 @ 11:33  196    Auto Eosinophil %: 0.0 % (03-04-24 @ 11:32)    Urinalysis Basic - ( 04 Mar 2024 13:56 )    Color: x / Appearance: x / SG: x / pH: x  Gluc: 154 mg/dL / Ketone: x  / Bili: x / Urobili: x   Blood: x / Protein: x / Nitrite: x   Leuk Esterase: x / RBC: x / WBC x   Sq Epi: x / Non Sq Epi: x / Bacteria: x    MICROBIOLOGY:    MRSA PCR Result.: Detected (01-08-24 @ 12:19)  MRSA PCR Result.: NotDetec (11-25-23 @ 07:10)  MRSA PCR Result.: NotDetec (10-24-23 @ 06:58)    Culture - Blood (collected 10 Sonu 2024 12:55)  Source: .Blood Blood-Venous  Final Report:    No growth at 5 days    Culture - Blood (collected 10 Sonu 2024 12:50)  Source: .Blood Blood-Peripheral  Final Report:    No growth at 5 days    Culture - Blood (collected 09 Jan 2024 13:31)  Source: .Blood Blood-Venous  Final Report:    Growth in aerobic and anaerobic bottles: Enterococcus faecalis    See previous culture [id number]    Culture - Blood (collected 09 Jan 2024 13:27)  Source: .Blood Blood-Peripheral  Final Report:    Growth in aerobic and anaerobic bottles: Enterococcus faecalis    Direct identification is available within approximately 3-5    hours either by Blood Panel Multiplexed PCR or Direct    MALDI-TOF. Details: [email or web address]  Organism: Blood Culture PCR  Enterococcus faecalis  Organism: Enterococcus faecalis    Sensitivities:      Method Type: DANN      -  Ampicillin: S <=2 Predicts results to ampicillin/sulbactam, amoxacillin-clavulanate and  piperacillin-tazobactam.      -  Vancomycin: S 2      -  Gentamicin synergy: S <=500      -  Streptomycin synergy: S <=1000  Organism: Blood Culture PCR    Sensitivities:      Method Type: PCR      -  Enterococcus faecalis: Detec    Culture - Blood (collected 07 Jan 2024 23:00)  Source: .Blood Blood-Peripheral  Final Report:    No growth at 5 days    Culture - Blood (collected 07 Jan 2024 22:51)  Source: .Blood Blood-Peripheral  Final Report:    Growth in aerobic and anaerobic bottles: Staphylococcus epidermidis    Isolation of Coagulase negative Staphylococcus from single blood culture    sets may represent    contamination. Contact the Microbiology Department at [phone] if    susceptibilitytesting is    clinically indicated.    Direct identification is available within approximately 3-5    hours either by Blood Panel Multiplexed PCR or Direct    MALDI-TOF. Details: [email or web address]  Organism: Blood Culture PCR  Organism: Blood Culture PCR    Sensitivities:      Method Type: PCR      -  Staphylococcus epidermidis, Methicillin resistant: Detec    Culture - Urine (collected 07 Jan 2024 18:36)  Source: Catheterized Catheterized  Final Report:    Normal Urogenital yuli present    Culture - Urine (collected 20 Dec 2023 03:35)  Source: Clean Catch Clean Catch (Midstream)  Final Report:    No growth    Culture - Blood (collected 19 Dec 2023 23:35)  Source: .Blood Blood-Peripheral  Final Report:    No growth at 5 days    Culture - Blood (collected 19 Dec 2023 23:25)  Source: .Blood Blood-Peripheral  Final Report:    No growth at 5 days    COVID-19 PCR: Detected (01-13-24 @ 15:37)    Procalcitonin, Serum: 0.12 (03-04)    Troponin T, High Sensitivity Result: 27 (03-04)  Troponin T, High Sensitivity Result: 24 (03-04)    Blood Gas Venous - Lactate: 3.2 (03-04 @ 12:13)  Blood Gas Venous - Lactate: 5.4 (03-04 @ 11:07)    A1C with Estimated Average Glucose Result: 4.7 % (01-08-24 @ 11:34)    RADIOLOGY:  imaging below personally reviewed    < from: CT Abdomen and Pelvis w/ IV Cont (03.04.24 @ 12:51) >  IMPRESSION:    No pulmonary embolus.    Nonspecific mild groundglass in both lungs. This may be infectious in the   appropriate clinical setting.    Relative hypoattenuation of the left atrial appendage tip, which can be   seen with slow flow or thrombus. If clinically appropriate, this can be   further evaluated with a CT chest with delayed phase imaging.    --- End of Report ---    < end of copied text >    < from: CT Head No Cont (03.04.24 @ 11:51) >  IMPRESSION: Stable exam.    < end of copied text >

## 2024-03-04 NOTE — ED PROVIDER NOTE - OBJECTIVE STATEMENT
Attending Corinne Forrest: 89-year-old male from home presenting with concern for altered mental status.  Per family and aide who are at the bedside patient with a history of Alzheimer's disease awake and alert x 0 sometimes to 1, prior heart failure, CVA and BPH who started not feeling well yesterday.  Per family became more confused and not eating and drinking as much.  Normally eats puréed liquid.  No reports of any falls or injuries.  Not on any antibiotics currently.  Did have COVID last time patient was here back in January.  Also reports a nonproductive cough.

## 2024-03-04 NOTE — H&P ADULT - TIME BILLING
Admission H&P including bedside history ,examination ,reviewing test results and treatment plan. D/W daughter on phone and ACP  . Consults called.

## 2024-03-04 NOTE — ED PROVIDER NOTE - CARE PLAN
Principal Discharge DX:	Acute UTI   1 Principal Discharge DX:	Acute UTI  Secondary Diagnosis:	Sepsis

## 2024-03-04 NOTE — ED ADULT NURSE REASSESSMENT NOTE - NS ED NURSE REASSESS COMMENT FT1
Pt NPO as per MD orders until speech and swallow study completed. ED RN verified order with ELLEN Hart pt not given nighttime meds and will have an NPO order placed by TIA Hart.
pt asleep, prefers to lay on side at this time. awake, alert, calm. BP improved, fever ceased, no longer tachypneic. tolerating IV fluids and abx well. pending rpt labs, results, dispo.
pt to CT scan.
pt resting in bed on R side. attempted to reposition but pt returns to R side. mIVF infusing well no signs of infiltration noted. admitted and awaiting inpatient bed assignment. infectious disease doctor at bedside. pt had wet diaper and soft light brown BM. pt cleaned and new diaper, linens applied.

## 2024-03-04 NOTE — ED PROVIDER NOTE - CLINICAL SUMMARY MEDICAL DECISION MAKING FREE TEXT BOX
Attending Corinne Forrest: 89 yo male from home presenting with AMS> upon arrival pt ill appearing, not follwing commands. per family symptoms started yesterday. pt febrile in the ed. concern for infectious etiology of AMS. family denies any falls or trauma and no evidence of bruising or ecchymoes on exam. BP initially oft. pt with h/o cardiac diseases. pocus performed upon arrival showing IVC with respiratory variation and A line predominant field.s reviewed prior charts pt with h/o enterococcu UTI. concern for septic shock, pt pan cultured and broad spectrum abx given based on prior sensitivities. ct scan ordered to further evaluate for possible renal absces v PNA, a pt is coughing in the ed. ct without evidence of surgical pathology. pt with elevated lactate, receiving IVF and abx, with improvement in BP and decreased lactate. pt will need admission, will closely monitor hemodynamics, abx.

## 2024-03-04 NOTE — ED PROVIDER NOTE - PHYSICAL EXAMINATION
CONSTITUTIONAL: Patient is awake, alert and oriented x 0, lethargic appearing;   HEAD: NCAT  EYES: PERRL bilaterally,   ENT: Airway patent, Nasal mucosa clear. dry oral mucosa;   NECK: Supple,   LUNGS: CTA B/L,   HEART: RRR.+S1S2   ABDOMEN: Soft, non-tender to palpation throughout all four quadrants,   MSK: No edema or calf tenderness b/l,  SKIN: No rash or lesions  NEURO: No focal deficits, CONSTITUTIONAL: Patient is awake, alert and oriented x 0, lethargic appearing;   HEAD: NCAT  EYES: PERRL bilaterally,   ENT: Airway patent, Nasal mucosa clear. dry oral mucosa;   NECK: Supple,   LUNGS: CTA B/L,   HEART: RRR.+S1S2   ABDOMEN: Soft, non-tender to palpation throughout all four quadrants,   MSK: No edema or calf tenderness b/l,  SKIN: No rash or lesions  NEURO: No focal deficits,  Attennttding Corinne Forrest: Gen: frail lappearing, altered heent: atrauamtic,, perrla, mmm, op pink, uvula midline, neck;ranging neck, chest: nttp, no crepitus, cv: rrr,  lungs:bl breath sounds, abd: soft, nontender, nondistended, no peritoneal signs,no guarding, ext: wwp, neg homans, skin: no rash, neuro: awake but sleepy, moving all extremities, not following commands. AAOx0

## 2024-03-04 NOTE — ED PROVIDER NOTE - CRITICAL CARE ATTENDING CONTRIBUTION TO CARE
Attending MD Corinne Forrest:  I personally made/approved the management plan and take responsibility for the patient management.  Physician assistant note reviewed and agree on plan of care and except where noted.  See HPI, PE, and MDM for details.

## 2024-03-04 NOTE — H&P ADULT - NSICDXPASTMEDICALHX_GEN_ALL_CORE_FT
PAST MEDICAL HISTORY:  Atrial fibrillation     BPH (benign prostatic hyperplasia)     BPH without urinary obstruction     CHF (congestive heart failure)     Chronic atrial fibrillation     CVA (cerebral vascular accident)     Dementia     Dementia     Sepsis     UTI (urinary tract infection)

## 2024-03-05 LAB
ANION GAP SERPL CALC-SCNC: 10 MMOL/L — SIGNIFICANT CHANGE UP (ref 5–17)
APTT BLD: 37.6 SEC — HIGH (ref 24.5–35.6)
BASE EXCESS BLDV CALC-SCNC: -1.7 MMOL/L — SIGNIFICANT CHANGE UP (ref -2–3)
BUN SERPL-MCNC: 8 MG/DL — SIGNIFICANT CHANGE UP (ref 7–23)
CA-I SERPL-SCNC: 1.19 MMOL/L — SIGNIFICANT CHANGE UP (ref 1.15–1.33)
CALCIUM SERPL-MCNC: 8.5 MG/DL — SIGNIFICANT CHANGE UP (ref 8.4–10.5)
CHLORIDE BLDV-SCNC: 103 MMOL/L — SIGNIFICANT CHANGE UP (ref 96–108)
CHLORIDE SERPL-SCNC: 106 MMOL/L — SIGNIFICANT CHANGE UP (ref 96–108)
CO2 BLDV-SCNC: 29 MMOL/L — HIGH (ref 22–26)
CO2 SERPL-SCNC: 25 MMOL/L — SIGNIFICANT CHANGE UP (ref 22–31)
CREAT SERPL-MCNC: 0.72 MG/DL — SIGNIFICANT CHANGE UP (ref 0.5–1.3)
EGFR: 87 ML/MIN/1.73M2 — SIGNIFICANT CHANGE UP
GAS PNL BLDV: 136 MMOL/L — SIGNIFICANT CHANGE UP (ref 136–145)
GAS PNL BLDV: SIGNIFICANT CHANGE UP
GAS PNL BLDV: SIGNIFICANT CHANGE UP
GLUCOSE BLDV-MCNC: 166 MG/DL — HIGH (ref 70–99)
GLUCOSE SERPL-MCNC: 176 MG/DL — HIGH (ref 70–99)
HCO3 BLDV-SCNC: 27 MMOL/L — SIGNIFICANT CHANGE UP (ref 22–29)
HCT VFR BLD CALC: 39.5 % — SIGNIFICANT CHANGE UP (ref 39–50)
HCT VFR BLDA CALC: 41 % — SIGNIFICANT CHANGE UP (ref 39–51)
HGB BLD CALC-MCNC: 13.8 G/DL — SIGNIFICANT CHANGE UP (ref 12.6–17.4)
HGB BLD-MCNC: 12.9 G/DL — LOW (ref 13–17)
INR BLD: 2.27 RATIO — HIGH (ref 0.85–1.18)
LACTATE BLDV-MCNC: 4.2 MMOL/L — CRITICAL HIGH (ref 0.5–2)
MCHC RBC-ENTMCNC: 32.7 GM/DL — SIGNIFICANT CHANGE UP (ref 32–36)
MCHC RBC-ENTMCNC: 32.8 PG — SIGNIFICANT CHANGE UP (ref 27–34)
MCV RBC AUTO: 100.5 FL — HIGH (ref 80–100)
NRBC # BLD: 0 /100 WBCS — SIGNIFICANT CHANGE UP (ref 0–0)
PCO2 BLDV: 61 MMHG — HIGH (ref 42–55)
PH BLDV: 7.25 — LOW (ref 7.32–7.43)
PLATELET # BLD AUTO: 135 K/UL — LOW (ref 150–400)
PO2 BLDV: 20 MMHG — LOW (ref 25–45)
POTASSIUM BLDV-SCNC: 3.8 MMOL/L — SIGNIFICANT CHANGE UP (ref 3.5–5.1)
POTASSIUM SERPL-MCNC: 4 MMOL/L — SIGNIFICANT CHANGE UP (ref 3.5–5.3)
POTASSIUM SERPL-SCNC: 4 MMOL/L — SIGNIFICANT CHANGE UP (ref 3.5–5.3)
PROTHROM AB SERPL-ACNC: 24.4 SEC — HIGH (ref 9.5–13)
RBC # BLD: 3.93 M/UL — LOW (ref 4.2–5.8)
RBC # FLD: 12.9 % — SIGNIFICANT CHANGE UP (ref 10.3–14.5)
SAO2 % BLDV: 26.4 % — LOW (ref 67–88)
SODIUM SERPL-SCNC: 141 MMOL/L — SIGNIFICANT CHANGE UP (ref 135–145)
WBC # BLD: 4.91 K/UL — SIGNIFICANT CHANGE UP (ref 3.8–10.5)
WBC # FLD AUTO: 4.91 K/UL — SIGNIFICANT CHANGE UP (ref 3.8–10.5)

## 2024-03-05 PROCEDURE — 99232 SBSQ HOSP IP/OBS MODERATE 35: CPT

## 2024-03-05 RX ORDER — WARFARIN SODIUM 2.5 MG/1
1 TABLET ORAL ONCE
Refills: 0 | Status: COMPLETED | OUTPATIENT
Start: 2024-03-05 | End: 2024-03-05

## 2024-03-05 RX ADMIN — PIPERACILLIN AND TAZOBACTAM 25 GRAM(S): 4; .5 INJECTION, POWDER, LYOPHILIZED, FOR SOLUTION INTRAVENOUS at 13:26

## 2024-03-05 RX ADMIN — SODIUM CHLORIDE 75 MILLILITER(S): 9 INJECTION, SOLUTION INTRAVENOUS at 08:28

## 2024-03-05 RX ADMIN — Medication 81 MILLIGRAM(S): at 11:56

## 2024-03-05 RX ADMIN — SIMVASTATIN 10 MILLIGRAM(S): 20 TABLET, FILM COATED ORAL at 22:17

## 2024-03-05 RX ADMIN — Medication 250 MICROGRAM(S): at 11:56

## 2024-03-05 RX ADMIN — FINASTERIDE 5 MILLIGRAM(S): 5 TABLET, FILM COATED ORAL at 11:56

## 2024-03-05 RX ADMIN — SODIUM CHLORIDE 75 MILLILITER(S): 9 INJECTION, SOLUTION INTRAVENOUS at 05:10

## 2024-03-05 RX ADMIN — PIPERACILLIN AND TAZOBACTAM 25 GRAM(S): 4; .5 INJECTION, POWDER, LYOPHILIZED, FOR SOLUTION INTRAVENOUS at 06:15

## 2024-03-05 RX ADMIN — WARFARIN SODIUM 1 MILLIGRAM(S): 2.5 TABLET ORAL at 22:17

## 2024-03-05 RX ADMIN — TAMSULOSIN HYDROCHLORIDE 0.4 MILLIGRAM(S): 0.4 CAPSULE ORAL at 22:17

## 2024-03-05 RX ADMIN — PIPERACILLIN AND TAZOBACTAM 25 GRAM(S): 4; .5 INJECTION, POWDER, LYOPHILIZED, FOR SOLUTION INTRAVENOUS at 22:16

## 2024-03-05 NOTE — SWALLOW BEDSIDE ASSESSMENT ADULT - ORAL PREPARATORY PHASE
Initially did not accept bolus into oral cavity, however, improved w/ encouragement/Reduced oral grading

## 2024-03-05 NOTE — SWALLOW BEDSIDE ASSESSMENT ADULT - SLP GENERAL OBSERVATIONS
Encountered Pt laying on L-side in bed on RA. Pt is alert, minimally verbal, and does not follow simple commands despite multimodal cues or encouragement.

## 2024-03-05 NOTE — SWALLOW BEDSIDE ASSESSMENT ADULT - SLP PERTINENT HISTORY OF CURRENT PROBLEM
89y M w/ PMHx of HLD, AFib (Coumadin), CHF, CVA (residual L deficit), BPH, and Alzheimer's dementia (AOx0-1) presents with AMS and poor PO intake. Pt found to have sepsis likely 2/2 UTI. In ED, Pt febrile and hypotensive--improved w/ IVF. UA w/ pyuria and bacteriuria.

## 2024-03-05 NOTE — CONSULT NOTE ADULT - SUBJECTIVE AND OBJECTIVE BOX
Cardiovascular Disease Initial Evaluation  Date of service: 03-05-24 @ 08:53    CHIEF COMPLAINT:  Confusion    HISTORY OF PRESENT ILLNESS:  89M HLD, AFib (Coumadin), CHF, CVA (residual L deficit), BPH, Alzheimer's dementia (AOx0-1 bl) , sepsis secondary to UTI presented with more confusion and not eating or drinking . Here he was found to be having fever. Patient is more alert than he has been on previous admissions. Of note, on last admission patient was found to be bacteremic however given his advanced dementia, patient was deemed too high risk for RAHEEM.         Allergies    No Known Allergies    Intolerances    	    MEDICATIONS:  aspirin enteric coated 81 milliGRAM(s) Oral daily  digoxin     Tablet 250 MICROGram(s) Oral daily    piperacillin/tazobactam IVPB.. 3.375 Gram(s) IV Intermittent every 8 hours      acetaminophen     Tablet .. 650 milliGRAM(s) Oral every 6 hours PRN    pantoprazole    Tablet 40 milliGRAM(s) Oral before breakfast    finasteride 5 milliGRAM(s) Oral daily  simvastatin 10 milliGRAM(s) Oral at bedtime    dextrose 5% + sodium chloride 0.9%. 1000 milliLiter(s) IV Continuous <Continuous>  tamsulosin 0.4 milliGRAM(s) Oral at bedtime      PAST MEDICAL & SURGICAL HISTORY:  CVA (cerebral vascular accident)      CHF (congestive heart failure)      Atrial fibrillation      BPH (benign prostatic hyperplasia)      Dementia      Sepsis      UTI (urinary tract infection)      Chronic atrial fibrillation      Dementia      BPH without urinary obstruction      H/O hernia repair      S/P ear surgery          FAMILY HISTORY:  FH: heart disease (Father, Mother)    FH: dementia (Father, Mother)        SOCIAL HISTORY:    The patient is a nonsmoker       REVIEW OF SYSTEMS:  See HPI, otherwise complete 14 point review of systems negative    [x ] All others negative	  [ ] Unable to obtain    PHYSICAL EXAM:  T(C): 36.3 (03-05-24 @ 04:00), Max: 38.7 (03-04-24 @ 10:40)  HR: 90 (03-05-24 @ 04:00) (55 - 98)  BP: 147/76 (03-05-24 @ 04:00) (91/40 - 147/76)  RR: 18 (03-05-24 @ 04:00) (18 - 34)  SpO2: 97% (03-05-24 @ 04:00) (94% - 97%)  Wt(kg): --  I&O's Summary      Appearance: No Acute Distress; resting comfortably  HEENT:  Normal oral mucosa, PERRL, EOMI	  Cardiovascular: Normal S1 S2, No JVD, No murmurs/rubs/gallops  Respiratory: Normal respiratory effort; Lungs clear to auscultation bilaterally  Gastrointestinal:  Soft, Non-tender, + BS	  Skin: No rashes, No ecchymoses, No cyanosis	  Neurologic: Non-focal; no weakness  Extremities: No clubbing, cyanosis or edema  Vascular: Peripheral pulses palpable 2+ bilaterally  Psychiatry: A & O x 3, Mood & affect appropriate    Laboratory Data:	 	    CBC Full  -  ( 04 Mar 2024 11:32 )  WBC Count : 6.14 K/uL  Hemoglobin : 13.3 g/dL  Hematocrit : 42.2 %  Platelet Count - Automated : 147 K/uL  Mean Cell Volume : 102.4 fl  Mean Cell Hemoglobin : 32.3 pg  Mean Cell Hemoglobin Concentration : 31.5 gm/dL  Auto Neutrophil # : 5.21 K/uL  Auto Lymphocyte # : 0.50 K/uL  Auto Monocyte # : 0.40 K/uL  Auto Eosinophil # : 0.00 K/uL  Auto Basophil # : 0.00 K/uL  Auto Neutrophil % : 84.9 %  Auto Lymphocyte % : 8.1 %  Auto Monocyte % : 6.5 %  Auto Eosinophil % : 0.0 %  Auto Basophil % : 0.0 %    03-04    136  |  105  |  10  ----------------------------<  154<H>  4.2   |  24  |  0.80  03-04    135  |  102  |  11  ----------------------------<  206<H>  5.5<H>   |  19<L>  |  0.71    Ca    7.9<L>      04 Mar 2024 13:56  Ca    8.7      04 Mar 2024 11:32  Mg     1.7     03-04    TPro  5.1<L>  /  Alb  2.5<L>  /  TBili  0.8  /  DBili  x   /  AST  34  /  ALT  26  /  AlkPhos  75  03-04  TPro  6.5  /  Alb  3.0<L>  /  TBili  0.8  /  DBili  x   /  AST  101<H>  /  ALT  38  /  AlkPhos  85  03-04      proBNP:   Lipid Profile:   HgA1c:   TSH:       CARDIAC MARKERS:            Interpretation of Telemetry:AFib    ECG:  Afib  RADIOLOGY:  OTHER: 	    PREVIOUS DIAGNOSTIC TESTING:    [x ] Echocardiogram: 1/11/2024  1. Left ventricular cavity is normal. Left ventricular wall thickness is normal. Left ventricular systolic function is normal with an ejection fraction of 62 % by Clark's method of disks. There are no regional wall motion abnormalities seen.   2. The right ventricle is not well visualized.   3. Right atrium was not well visualized.   4. The leaflets are thickened at the tips of the anterior leaflet tip.   5. Fibrocalcific aortic valve sclerosis without stenosis.   6. Trace aortic regurgitation.   7. No pericardial effusion seen.  [ ] Catheterization:  [ ] Stress Test:  	    Assessment:  89M HLD, AFib (Coumadin), CHF, CVA (residual L deficit), BPH, Alzheimer's dementia (AOx0-1 bl) , sepsis secondary to UTI presented with more confusion and not eating or drinking .    Plan of Care:    #Afib  - Rate controlled on Digoxin  - Continue Coumadin with INR goal 2-3    #Encephalopathy  - 2/2 UTI   - ABx as per primary team    #Sepsis  - 2/2 UTI    #Dementia  - AAOx1 at baseline  - Management as per primary team            77 minutes spent on total encounter; more than 50% of the visit was spent counseling and/or coordinating care by the attending physician.   	  Orestes Hart DO [facility]  Cardiovascular Diseases  [phone]

## 2024-03-05 NOTE — SWALLOW BEDSIDE ASSESSMENT ADULT - SWALLOW EVAL: DIAGNOSIS
89y M w/ PMHx of CHF, CVA (residual L deficit), and Alzheimer's dementia (AOx0-1) presents with AMS and poor PO intake. Found to have sepsis likely 2/2 UTI. Pt presents w/ an oropharyngeal dysphagia characterized by poor oral grading from utensils, mildly prolonged AP transit time, and reduced hyolaryngeal excursion upon palpation. No overt s/s of laryngeal penetration/aspiration noted across PO trials. Per d/w daughter, current swallow profile is consistent w/ baseline. Pt has no skilled ST needs, this service will sign off at this time.

## 2024-03-05 NOTE — SWALLOW BEDSIDE ASSESSMENT ADULT - COMMENTS
IMAGING:  3/4 CXR: IMPRESSION: Left lower lung hazy opacity, which may represent pneumonia.  3/4 CT chest/A/P: IMPRESSION: No pulmonary embolus. Nonspecific mild groundglass in both lungs. This may be infectious in the appropriate clinical setting. Relative hypoattenuation of the left atrial appendage tip, which can be seen with slow flow or thrombus. If clinically appropriate, this can be further evaluated with a CT chest with delayed phase imaging.    ***Pt is known to this service and was last seen 1/2024. Recommendations at that time for puree/moderately thick liquids via tsp. No prior swallow studies in PACS.

## 2024-03-05 NOTE — SWALLOW BEDSIDE ASSESSMENT ADULT - DIET PRIOR TO ADMI
Per chart review, puree/moderately thick liquids via tsp; NKFA Per chart review and d/w daughter, puree/moderately thick liquids via tsp; NKFA

## 2024-03-06 LAB
-  AMOXICILLIN/CLAVULANIC ACID: SIGNIFICANT CHANGE UP
-  AMPICILLIN/SULBACTAM: SIGNIFICANT CHANGE UP
-  AMPICILLIN: SIGNIFICANT CHANGE UP
-  AZTREONAM: SIGNIFICANT CHANGE UP
-  CEFAZOLIN: SIGNIFICANT CHANGE UP
-  CEFEPIME: SIGNIFICANT CHANGE UP
-  CEFOXITIN: SIGNIFICANT CHANGE UP
-  CEFTRIAXONE: SIGNIFICANT CHANGE UP
-  CEFUROXIME: SIGNIFICANT CHANGE UP
-  CIPROFLOXACIN: SIGNIFICANT CHANGE UP
-  ERTAPENEM: SIGNIFICANT CHANGE UP
-  GENTAMICIN: SIGNIFICANT CHANGE UP
-  LEVOFLOXACIN: SIGNIFICANT CHANGE UP
-  MEROPENEM: SIGNIFICANT CHANGE UP
-  NITROFURANTOIN: SIGNIFICANT CHANGE UP
-  PIPERACILLIN/TAZOBACTAM: SIGNIFICANT CHANGE UP
-  TOBRAMYCIN: SIGNIFICANT CHANGE UP
-  TRIMETHOPRIM/SULFAMETHOXAZOLE: SIGNIFICANT CHANGE UP
CULTURE RESULTS: ABNORMAL
INR BLD: 2.03 RATIO — HIGH (ref 0.85–1.18)
METHOD TYPE: SIGNIFICANT CHANGE UP
ORGANISM # SPEC MICROSCOPIC CNT: ABNORMAL
ORGANISM # SPEC MICROSCOPIC CNT: ABNORMAL
PROTHROM AB SERPL-ACNC: 20.9 SEC — HIGH (ref 9.5–13)
SPECIMEN SOURCE: SIGNIFICANT CHANGE UP

## 2024-03-06 PROCEDURE — 99232 SBSQ HOSP IP/OBS MODERATE 35: CPT

## 2024-03-06 RX ORDER — WARFARIN SODIUM 2.5 MG/1
1.5 TABLET ORAL ONCE
Refills: 0 | Status: COMPLETED | OUTPATIENT
Start: 2024-03-06 | End: 2024-03-06

## 2024-03-06 RX ORDER — INFLUENZA VIRUS VACCINE 15; 15; 15; 15 UG/.5ML; UG/.5ML; UG/.5ML; UG/.5ML
0.7 SUSPENSION INTRAMUSCULAR ONCE
Refills: 0 | Status: DISCONTINUED | OUTPATIENT
Start: 2024-03-06 | End: 2024-03-08

## 2024-03-06 RX ADMIN — Medication 250 MICROGRAM(S): at 12:29

## 2024-03-06 RX ADMIN — Medication 81 MILLIGRAM(S): at 12:28

## 2024-03-06 RX ADMIN — PANTOPRAZOLE SODIUM 40 MILLIGRAM(S): 20 TABLET, DELAYED RELEASE ORAL at 09:59

## 2024-03-06 RX ADMIN — SIMVASTATIN 10 MILLIGRAM(S): 20 TABLET, FILM COATED ORAL at 21:19

## 2024-03-06 RX ADMIN — FINASTERIDE 5 MILLIGRAM(S): 5 TABLET, FILM COATED ORAL at 12:29

## 2024-03-06 RX ADMIN — WARFARIN SODIUM 1.5 MILLIGRAM(S): 2.5 TABLET ORAL at 21:20

## 2024-03-06 RX ADMIN — PIPERACILLIN AND TAZOBACTAM 25 GRAM(S): 4; .5 INJECTION, POWDER, LYOPHILIZED, FOR SOLUTION INTRAVENOUS at 05:02

## 2024-03-06 RX ADMIN — PIPERACILLIN AND TAZOBACTAM 25 GRAM(S): 4; .5 INJECTION, POWDER, LYOPHILIZED, FOR SOLUTION INTRAVENOUS at 13:47

## 2024-03-06 RX ADMIN — PIPERACILLIN AND TAZOBACTAM 25 GRAM(S): 4; .5 INJECTION, POWDER, LYOPHILIZED, FOR SOLUTION INTRAVENOUS at 21:19

## 2024-03-06 NOTE — PATIENT PROFILE ADULT - OVER THE PAST TWO WEEKS, HAVE YOU FELT LITTLE INTEREST OR PLEASURE IN DOING THINGS?
no Nasal Turnover Hinge Flap Text: The defect edges were debeveled with a #15 scalpel blade.  Given the size, depth, location of the defect and the defect being full thickness a nasal turnover hinge flap was deemed most appropriate. Using a sterile surgical marker, an appropriate hinge flap was drawn incorporating the defect. The area thus outlined was incised with a #15 scalpel blade. The flap was designed to recreate the nasal mucosal lining and the alar rim. The skin margins were undermined to an appropriate distance in all directions utilizing iris scissors. Following this, the designed flap was carried over into the primary defect and sutured into place

## 2024-03-06 NOTE — PATIENT PROFILE ADULT - FUNCTIONAL ASSESSMENT - BASIC MOBILITY 6.
General: Acting apropriate for age, Non toxic appearing, NAD. Crying from injury.   HEENT: Head normocephalic, atraumatic, PERRLA/EOMI, conjunctiva and sclera clear. MM moist, no nasal discharge.  Pharynx unremarkable, no erythema/exudates/vesicles.  Skin  warm and dry, no acute rash.   Heart: RRR s1s2 nl, no rub/murmur  Lungs: No retractions, BS equal, CTAB.   Abdomen: soft ntnd no r/g  Extremities: moves all normally, no cyanosis, brisk cap refill. 2nd digit right hand laceration approx 1 cm - 1.5 cm irregular through the nail bed, torn nail. No active brisk bleeding. Distal sensation intact, crying with pain. Brisk cap refill distally.  1-calculated by average/Not able to assess (calculate score using [facility] averaging method)

## 2024-03-06 NOTE — PATIENT PROFILE ADULT - FALL HARM RISK - HARM RISK INTERVENTIONS

## 2024-03-06 NOTE — ED PROVIDER NOTE - CLINICAL SUMMARY MEDICAL DECISION MAKING FREE TEXT BOX
yes  I personally reviewed imaging: yes  I personally reviewed EKG: yes  Toxic drug monitoring: Lovenox, H/H  Discussed case with: patient, daughter, nursing, CM, Dr. HAI Claire    Code Status: Full Code  DVT Prophylaxis:  Lovenox  GI Prophylaxis: protonix    Subjective:     Chief Complaint / Reason for Physician Visit  \" I'm doing ok today\"    Roxanne Hill is a 89 y.o.  female with PMHx significant for hypertension, diet-controlled hyperlipidemia, DM, PAD with L great toe ulceration, osteoporosis and GERD with CC of L great toe wound and pain. Patient recently seen at [facility] was started on IV Unasyn and discharge to complete 10 days of antibiotic treatment. Patient denies fever, chills, dizziness, appetite changes, headache, SOB, ADAN, CP, nausea, vomiting, dysuria, constipation and new onset weakness. Patient also recently underwent left lower extremity revascularization with atherectomy, balloon angioplasty, and stenting of the left SFA/popliteal/tibial arteries on February 16, 2024 with vascular surgery. Patient is from independent living facility and was sent here for further medical management . L foot Xray showed no acute abnormality. Patient receive a dose of Unasyn and Vancomycin in ED on 2/29/24 with Vascular surgery and podiatry consultation. Discussed with RN events overnight.      Objective:     VITALS:   Last 24hrs VS reviewed since prior progress note. Most recent are:  Patient Vitals for the past 24 hrs:   BP Temp Temp src Pulse Resp SpO2   03/06/24 1145 -- -- -- -- -- 98 %   03/06/24 0803 (!) 143/56 98.1 °F (36.7 °C) Oral 70 16 92 %   03/05/24 2100 (!) 138/52 98.2 °F (36.8 °C) Oral 77 16 90 %         Intake/Output Summary (Last 24 hours) at 3/6/2024 1819  Last data filed at 3/6/2024 0634  Gross per 24 hour   Intake --   Output 100 ml   Net -100 ml        I had a face to face encounter and independently examined this patient on 3/6/2024, as outlined below: Patient observed resting in chair  with eyes opened. Denies complaints. No acute distress noted.  PHYSICAL EXAM:  General: [place], Alert, cooperative  EENT:  EOMI. Anicteric sclerae.  Resp:  CTA bilaterally, no wheezing or rales.  No accessory muscle use  CV:  Regular  rhythm,  No edema  GI:  Soft, Non distended, Non tender.  +Bowel sounds. Last bowel movement on 3/4/24  Neurologic:  Alert and oriented X 3, normal speech,   Psych:   Good insight. Not anxious nor agitated  Skin:  No rashes.  No jaundice. Dressing to left foot is clean, dry and intact. Minimal erythema noted to left foot. DP pulse is weak yet palpable.    Reviewed most current lab test results and cultures  YES  Reviewed most current radiology test results   YES  Review and summation of old records today    NO  Reviewed patient's current orders and MAR    YES  PMH/SH reviewed - no change compared to H&P    Procedures: see electronic medical records for all procedures/Xrays and details which were not copied into this note but were reviewed prior to creation of Plan.      LABS:  I reviewed today's most current labs and imaging studies.  Pertinent labs include:  Recent Labs     03/05/24  0201   WBC 8.6   HGB 11.2*   HCT 36.7        Recent Labs     03/04/24  0325 03/05/24  0201    137   K 4.2 4.2   * 107   CO2 24 30   GLUCOSE 133* 194*   BUN 29* 22*   CREATININE 0.71 0.72   CALCIUM 8.4* 8.4*       Signed: RAJENDRA Montes - TARUN    30 minutes      89M Afib on Coumadin, CHF on Farxiga, CVA, HLD, on ASA, dementia, recent L clavicle fx w immobilizer here with coffee ground emesis since last night ~4 episodes. No dark or bloody stool. No abd pain or TTP. Pt tachy, normotensive, dry, dark emesis dried to face. No abd TTP. MERCADO spontaneously. Dtr reporst no colonoscopy, EGD in past due to cardiac issues. No hx of GI bleeds. Likely UGI bleed. Will send labs, transfuse PRN, PPI. Plan for admission. Abd exam is benign, no hematemesis or hematochezia, will defer imaging for now.

## 2024-03-07 LAB
INR BLD: 2.01 RATIO — HIGH (ref 0.85–1.18)
LACTATE SERPL-SCNC: 2.5 MMOL/L — HIGH (ref 0.5–2)
PROTHROM AB SERPL-ACNC: 21.7 SEC — HIGH (ref 9.5–13)

## 2024-03-07 PROCEDURE — 99232 SBSQ HOSP IP/OBS MODERATE 35: CPT

## 2024-03-07 RX ORDER — CEFTRIAXONE 500 MG/1
1000 INJECTION, POWDER, FOR SOLUTION INTRAMUSCULAR; INTRAVENOUS EVERY 24 HOURS
Refills: 0 | Status: DISCONTINUED | OUTPATIENT
Start: 2024-03-07 | End: 2024-03-08

## 2024-03-07 RX ORDER — ACETAMINOPHEN 500 MG
1000 TABLET ORAL ONCE
Refills: 0 | Status: COMPLETED | OUTPATIENT
Start: 2024-03-07 | End: 2024-03-07

## 2024-03-07 RX ORDER — WARFARIN SODIUM 2.5 MG/1
1.5 TABLET ORAL ONCE
Refills: 0 | Status: COMPLETED | OUTPATIENT
Start: 2024-03-07 | End: 2024-03-07

## 2024-03-07 RX ORDER — LANOLIN ALCOHOL/MO/W.PET/CERES
3 CREAM (GRAM) TOPICAL ONCE
Refills: 0 | Status: COMPLETED | OUTPATIENT
Start: 2024-03-07 | End: 2024-03-07

## 2024-03-07 RX ORDER — OLANZAPINE 15 MG/1
2.5 TABLET, FILM COATED ORAL ONCE
Refills: 0 | Status: COMPLETED | OUTPATIENT
Start: 2024-03-07 | End: 2024-03-07

## 2024-03-07 RX ADMIN — Medication 81 MILLIGRAM(S): at 11:53

## 2024-03-07 RX ADMIN — Medication 400 MILLIGRAM(S): at 04:03

## 2024-03-07 RX ADMIN — PIPERACILLIN AND TAZOBACTAM 25 GRAM(S): 4; .5 INJECTION, POWDER, LYOPHILIZED, FOR SOLUTION INTRAVENOUS at 05:12

## 2024-03-07 RX ADMIN — WARFARIN SODIUM 1.5 MILLIGRAM(S): 2.5 TABLET ORAL at 22:14

## 2024-03-07 RX ADMIN — Medication 3 MILLIGRAM(S): at 22:13

## 2024-03-07 RX ADMIN — CEFTRIAXONE 100 MILLIGRAM(S): 500 INJECTION, POWDER, FOR SOLUTION INTRAMUSCULAR; INTRAVENOUS at 09:56

## 2024-03-07 RX ADMIN — SIMVASTATIN 10 MILLIGRAM(S): 20 TABLET, FILM COATED ORAL at 22:13

## 2024-03-07 RX ADMIN — Medication 1000 MILLIGRAM(S): at 07:30

## 2024-03-07 RX ADMIN — PANTOPRAZOLE SODIUM 40 MILLIGRAM(S): 20 TABLET, DELAYED RELEASE ORAL at 10:21

## 2024-03-07 RX ADMIN — OLANZAPINE 2.5 MILLIGRAM(S): 15 TABLET, FILM COATED ORAL at 01:24

## 2024-03-07 RX ADMIN — Medication 250 MICROGRAM(S): at 11:53

## 2024-03-07 RX ADMIN — FINASTERIDE 5 MILLIGRAM(S): 5 TABLET, FILM COATED ORAL at 11:53

## 2024-03-07 NOTE — CHART NOTE - NSCHARTNOTEFT_GEN_A_CORE
Pt extremely agitated during the night, spitting his oral medications, getting up. Zyprexa 2.5 mg IM ordered.     Shiloh Stroud NP, #[id number]

## 2024-03-08 ENCOUNTER — TRANSCRIPTION ENCOUNTER (OUTPATIENT)
Age: 89
End: 2024-03-08

## 2024-03-08 VITALS — HEART RATE: 88 BPM | DIASTOLIC BLOOD PRESSURE: 86 MMHG | SYSTOLIC BLOOD PRESSURE: 146 MMHG

## 2024-03-08 LAB
INR BLD: 2.13 RATIO — HIGH (ref 0.85–1.18)
PROTHROM AB SERPL-ACNC: 21.9 SEC — HIGH (ref 9.5–13)

## 2024-03-08 PROCEDURE — 93308 TTE F-UP OR LMTD: CPT

## 2024-03-08 PROCEDURE — 80053 COMPREHEN METABOLIC PANEL: CPT

## 2024-03-08 PROCEDURE — 74177 CT ABD & PELVIS W/CONTRAST: CPT | Mod: MC

## 2024-03-08 PROCEDURE — 82553 CREATINE MB FRACTION: CPT

## 2024-03-08 PROCEDURE — 85014 HEMATOCRIT: CPT

## 2024-03-08 PROCEDURE — 87640 STAPH A DNA AMP PROBE: CPT

## 2024-03-08 PROCEDURE — 84145 PROCALCITONIN (PCT): CPT

## 2024-03-08 PROCEDURE — 82435 ASSAY OF BLOOD CHLORIDE: CPT

## 2024-03-08 PROCEDURE — 71045 X-RAY EXAM CHEST 1 VIEW: CPT

## 2024-03-08 PROCEDURE — 85025 COMPLETE CBC W/AUTO DIFF WBC: CPT

## 2024-03-08 PROCEDURE — 82947 ASSAY GLUCOSE BLOOD QUANT: CPT

## 2024-03-08 PROCEDURE — 85730 THROMBOPLASTIN TIME PARTIAL: CPT

## 2024-03-08 PROCEDURE — 84295 ASSAY OF SERUM SODIUM: CPT

## 2024-03-08 PROCEDURE — 83880 ASSAY OF NATRIURETIC PEPTIDE: CPT

## 2024-03-08 PROCEDURE — 87077 CULTURE AEROBIC IDENTIFY: CPT

## 2024-03-08 PROCEDURE — 82550 ASSAY OF CK (CPK): CPT

## 2024-03-08 PROCEDURE — 82330 ASSAY OF CALCIUM: CPT

## 2024-03-08 PROCEDURE — 92610 EVALUATE SWALLOWING FUNCTION: CPT

## 2024-03-08 PROCEDURE — 80048 BASIC METABOLIC PNL TOTAL CA: CPT

## 2024-03-08 PROCEDURE — 87186 SC STD MICRODIL/AGAR DIL: CPT

## 2024-03-08 PROCEDURE — 81001 URINALYSIS AUTO W/SCOPE: CPT

## 2024-03-08 PROCEDURE — 83735 ASSAY OF MAGNESIUM: CPT

## 2024-03-08 PROCEDURE — 70450 CT HEAD/BRAIN W/O DYE: CPT | Mod: MC

## 2024-03-08 PROCEDURE — 87637 SARSCOV2&INF A&B&RSV AMP PRB: CPT

## 2024-03-08 PROCEDURE — 85027 COMPLETE CBC AUTOMATED: CPT

## 2024-03-08 PROCEDURE — 99232 SBSQ HOSP IP/OBS MODERATE 35: CPT

## 2024-03-08 PROCEDURE — 99291 CRITICAL CARE FIRST HOUR: CPT

## 2024-03-08 PROCEDURE — 87641 MR-STAPH DNA AMP PROBE: CPT

## 2024-03-08 PROCEDURE — 96374 THER/PROPH/DIAG INJ IV PUSH: CPT

## 2024-03-08 PROCEDURE — 82803 BLOOD GASES ANY COMBINATION: CPT

## 2024-03-08 PROCEDURE — 85018 HEMOGLOBIN: CPT

## 2024-03-08 PROCEDURE — 84132 ASSAY OF SERUM POTASSIUM: CPT

## 2024-03-08 PROCEDURE — 76937 US GUIDE VASCULAR ACCESS: CPT

## 2024-03-08 PROCEDURE — 36415 COLL VENOUS BLD VENIPUNCTURE: CPT

## 2024-03-08 PROCEDURE — 83605 ASSAY OF LACTIC ACID: CPT

## 2024-03-08 PROCEDURE — 84484 ASSAY OF TROPONIN QUANT: CPT

## 2024-03-08 PROCEDURE — 87040 BLOOD CULTURE FOR BACTERIA: CPT

## 2024-03-08 PROCEDURE — 93005 ELECTROCARDIOGRAM TRACING: CPT

## 2024-03-08 PROCEDURE — 87086 URINE CULTURE/COLONY COUNT: CPT

## 2024-03-08 PROCEDURE — 71275 CT ANGIOGRAPHY CHEST: CPT | Mod: MC

## 2024-03-08 PROCEDURE — 85610 PROTHROMBIN TIME: CPT

## 2024-03-08 RX ORDER — METOPROLOL TARTRATE 50 MG
1 TABLET ORAL
Refills: 0 | DISCHARGE

## 2024-03-08 RX ADMIN — PANTOPRAZOLE SODIUM 40 MILLIGRAM(S): 20 TABLET, DELAYED RELEASE ORAL at 08:51

## 2024-03-08 RX ADMIN — Medication 250 MICROGRAM(S): at 13:18

## 2024-03-08 RX ADMIN — CEFTRIAXONE 100 MILLIGRAM(S): 500 INJECTION, POWDER, FOR SOLUTION INTRAMUSCULAR; INTRAVENOUS at 08:46

## 2024-03-08 RX ADMIN — FINASTERIDE 5 MILLIGRAM(S): 5 TABLET, FILM COATED ORAL at 13:18

## 2024-03-08 RX ADMIN — Medication 81 MILLIGRAM(S): at 13:18

## 2024-03-08 NOTE — DISCHARGE NOTE PROVIDER - HOSPITAL COURSE
HPI:  89M HLD, AFib (Coumadin), CHF, CVA (residual L deficit), BPH, Alzheimer's dementia (AOx0-1 bl) , sepsis secondary to UTI presented with more confusion and not eating or drinking . Here he was found to be having fever.  (04 Mar 2024 15:33)    Hospital Course:  Pt admitted for sepsis 2/2 UTI. ID consulted. UCx from 3/4 positive for Proteus mirabilis. BCx from 3/4 NGTD. Pt received Cefepime, Vancomycin and Ampicillin x1 in ED and was started on Zosyn, now Ceftriaxone per ID. Plan to transition to PO abx to complete course on discharge: PO Vantin 200mg BID x 2 days to complete on 3/10.  Cardiology consulted for Afib. CTA chest negative for PE but shows possible thrombus vs Low flow in the ELIZABETH which can occur with Afib. No change in management. Plan to c/w Coumadin and Digoxin.   Course complicated by hypotension, s/p IVF. Now resolved.  Pt w/ hx of Alzheimers' dementia and CVA, CTH neg for acute pathology. Plan to c/w supportive care. C/w home meds for BPH.   Pt now medically cleared to be discharged home. Discharge discussed with attending Dr. Jacinto.    Important Medication Changes and Reason:    Active or Pending Issues Requiring Follow-up:  F/u PCP    Advanced Directives:   [ ] Full code  [ ] DNR  [ ] Hospice    Discharge Diagnoses:  Sepsis 2/2 UTI  Afib

## 2024-03-08 NOTE — PROGRESS NOTE ADULT - SUBJECTIVE AND OBJECTIVE BOX
Follow Up:    Sepsis    Interval History/ROS:  Afebrile x24h. Remained tachycardic. Patient was seen and examined at bedside. Nonverbal. ROS unable to assess.    Allergies  No Known Allergies        ANTIMICROBIALS:  piperacillin/tazobactam IVPB.. 3.375 every 8 hours      OTHER MEDS:  MEDICATIONS  (STANDING):  acetaminophen     Tablet .. 650 every 6 hours PRN  aspirin enteric coated 81 daily  digoxin     Tablet 250 daily  finasteride 5 daily  pantoprazole    Tablet 40 before breakfast  simvastatin 10 at bedtime  tamsulosin 0.4 at bedtime  warfarin 1.5 once      Vital Signs Last 24 Hrs  T(C): 36.3 (06 Mar 2024 11:30), Max: 36.7 (06 Mar 2024 04:51)  T(F): 97.4 (06 Mar 2024 11:30), Max: 98.1 (06 Mar 2024 04:51)  HR: 101 (06 Mar 2024 11:30) (101 - 111)  BP: 120/74 (06 Mar 2024 11:30) (120/74 - 149/96)  BP(mean): --  RR: 18 (06 Mar 2024 11:30) (18 - 18)  SpO2: 96% (06 Mar 2024 11:30) (94% - 96%)    Parameters below as of 06 Mar 2024 11:30  Patient On (Oxygen Delivery Method): room air        PHYSICAL EXAM:  Constitutional: NAD  Cardiovascular:   normal S1, S2, no murmur, tachycardic  Respiratory:  clear BS bilaterally, no wheezes, no rales  GI:  soft, non-tender, normal bowel sounds  :  no brewster  Musculoskeletal:  BUE edema  Neurologic: awake, nonverbal                                12.9   4.91  )-----------( 135      ( 05 Mar 2024 14:18 )             39.5       03-05    141  |  106  |  8   ----------------------------<  176<H>  4.0   |  25  |  0.72    Ca    8.5      05 Mar 2024 14:18        Urinalysis Basic - ( 05 Mar 2024 14:18 )    Color: x / Appearance: x / SG: x / pH: x  Gluc: 176 mg/dL / Ketone: x  / Bili: x / Urobili: x   Blood: x / Protein: x / Nitrite: x   Leuk Esterase: x / RBC: x / WBC x   Sq Epi: x / Non Sq Epi: x / Bacteria: x        MICROBIOLOGY:  v  Clean Catch Clean Catch (Midstream)  03-04-24   >100,000 CFU/ml Proteus mirabilis  --  --      .Blood Blood-Peripheral  03-04-24   No growth at 24 hours  --  --      .Blood Blood-Peripheral  03-04-24   No growth at 24 hours  --  --            RADIOLOGY:  Imaging below independently reviewed.  < from: CT Angio Chest PE Protocol w/ IV Cont (03.04.24 @ 12:51) >    ACC: [id number] EXAM:  CT ABDOMEN AND PELVIS IC   ORDERED BY: FOX SAHU     ACC: [id number] EXAM:  CT ANGIO CHEST PULM ART WAWIC   ORDERED BY: FOX SAHU     PROCEDURE DATE:  03/04/2024          INTERPRETATION:  CTA CHEST AND CT ABDOMEN AND PELVIS    INDICATION: Abdominal pain, elevated lactate,    TECHNIQUE: Helical images of the chest, abdomen, and pelvis were obtained   before and after the administration of 90 mL of Omnipaque 350. Maximum   intensity projection images were generated.    COMPARISON: CT chest 11/24/2023, CT abdomen/pelvis 1/11/2024.    FINDINGS:    CT CHEST:    HEART/VASCULATURE: Normal heart size. Relative hypoattenuation of the   left atrial appendage tip. No pericardial effusion. Coronary artery   calcifications. No pulmonary embolus, with the caveat that some distal   subsegmental branches are obscured by motion.    LUNGS/AIRWAYS/PLEURA: No endobronchial lesion. Multiple small indistinct   regions of groundglass in both lungs, in a peripheral and   peribronchovascular distribution. Trace right pleural effusion. Mild   bronchiectasis in the left lower lobe.    LYMPH NODES/MEDIASTINUM: No lymphadenopathy.    BONES/SOFT TISSUES: Few old left rib fractures.        CT ABDOMEN/PELVIS:    LIVER: Unremarkable.    BILIARY SYSTEM: Cholelithiasis.    SPLEEN:Unremarkable.    PANCREAS: Unremarkable.    ADRENALS: Unremarkable.    KIDNEYS/URINARY TRACT: Symmetric size and enhancement. Unchanged too   small to characterize hypodensities within the left kidney.    GASTROINTESTINAL TRACT: No obstruction. Few uncomplicated colonic   diverticula. Normal appendix.    REPRODUCTIVE ORGANS: Unremarkable.    LYMPH NODES/PERITONEUM: No lymphadenopathy, free fluid, or free air.    VASCULATURE: Normal caliber aorta.    BONES/SOFT TISSUES: Degenerative disc disease.      IMPRESSION:    No pulmonary embolus.    Nonspecific mild groundglass in both lungs. This may be infectious in the   appropriate clinical setting.    Relative hypoattenuation of the left atrial appendage tip, which can be   seen with slow flow or thrombus. If clinically appropriate, this can be   further evaluated with a CT chest with delayed phase imaging.    --- End of Report ---            ORLY MICHELLE M.D., ATTENDING RADIOLOGIST  This document has been electronically signed. Mar  4 2024  1:31PM    < end of copied text >  
Cardiovascular Disease Progress Note  Date of service: 03-07-24 @ 07:03    Overnight events: No acute events overnight.  Patient is in no distress.   Otherwise review of systems negative    Objective Findings:  T(C): 36.3 (03-07-24 @ 04:32), Max: 36.4 (03-06-24 @ 07:27)  HR: 117 (03-07-24 @ 04:32) (100 - 119)  BP: 164/60 (03-07-24 @ 04:32) (120/74 - 167/104)  RR: 18 (03-07-24 @ 04:32) (18 - 18)  SpO2: 94% (03-07-24 @ 04:32) (94% - 96%)  Wt(kg): --  Daily     Daily       Physical Exam:  Gen: NAD; Patient resting comfortably  HEENT: EOMI, Normocephalic/ atraumatic  CV: RRR, normal S1 + S2, no m/r/g  Lungs:  Normal respiratory effort; clear to auscultation bilaterally  Abd: soft, non-tender; bowel sounds present  Ext: No edema; warm and well perfused    Telemetry: Afib    Laboratory Data:                        12.9   4.91  )-----------( 135      ( 05 Mar 2024 14:18 )             39.5     03-05    141  |  106  |  8   ----------------------------<  176<H>  4.0   |  25  |  0.72    Ca    8.5      05 Mar 2024 14:18      PT/INR - ( 06 Mar 2024 07:17 )   PT: 20.9 sec;   INR: 2.03 ratio         PTT - ( 05 Mar 2024 14:18 )  PTT:37.6 sec          Inpatient Medications:  MEDICATIONS  (STANDING):  aspirin enteric coated 81 milliGRAM(s) Oral daily  dextrose 5% + sodium chloride 0.9%. 1000 milliLiter(s) (75 mL/Hr) IV Continuous <Continuous>  digoxin     Tablet 250 MICROGram(s) Oral daily  finasteride 5 milliGRAM(s) Oral daily  influenza  Vaccine (HIGH DOSE) 0.7 milliLiter(s) IntraMuscular once  melatonin 3 milliGRAM(s) Oral once  pantoprazole    Tablet 40 milliGRAM(s) Oral before breakfast  piperacillin/tazobactam IVPB.. 3.375 Gram(s) IV Intermittent every 8 hours  simvastatin 10 milliGRAM(s) Oral at bedtime  tamsulosin 0.4 milliGRAM(s) Oral at bedtime      Assessment:  89M HLD, AFib (Coumadin), CHF, CVA (residual L deficit), BPH, Alzheimer's dementia (AOx0-1 bl) , sepsis secondary to UTI presented with more confusion and not eating or drinking .    Plan of Care:    #Afib  - on Digoxin  - Recommend adding Metoprolol Succinate 50mg to regimen for better rate control   - Continue Coumadin with INR goal 2-3  - CTA chest negative for PE but shows possible thrombus vs Low flow in the ELIZABETH which can occur with Afib. No change in management. Will continue Coumadin      #Sepsis  - 2/2 UTI    #Dementia  - AAOx1 at baseline  - Management as per primary team              Over 55 minutes spent on total encounter; more than 50% of the visit was spent counseling and/or coordinating care by the attending physician.      Orestes Hart DO [facility]  Cardiovascular Disease  [phone]
Cardiovascular Disease Progress Note  Date of service: 24 @ 08:27    Overnight events: No acute events overnight.  Patient is in no distress.   Otherwise review of systems negative    Objective Findings:  T(C): 36.3 (24 @ 04:00), Max: 36.5 (24 @ 21:49)  HR: 93 (24 @ 04:00) (80 - 93)  BP: 130/77 (24 @ 04:00) (120/69 - 162/91)  RR: 18 (24 @ 04:00) (18 - 18)  SpO2: 90% (24 @ 04:00) (90% - 98%)  Wt(kg): --  Daily     Daily Weight in k.4 (08 Mar 2024 04:00)      Physical Exam:  Gen: NAD; Patient resting comfortably  HEENT: EOMI, Normocephalic/ atraumatic  CV: RRR, normal S1 + S2, no m/r/g  Lungs:  Normal respiratory effort; clear to auscultation bilaterally  Abd: soft, non-tender; bowel sounds present  Ext: No edema; warm and well perfused    Telemetry: Afib    Laboratory Data:          PT/INR - ( 07 Mar 2024 07:29 )   PT: 21.7 sec;   INR: 2.01 ratio                   Inpatient Medications:  MEDICATIONS  (STANDING):  aspirin enteric coated 81 milliGRAM(s) Oral daily  cefTRIAXone   IVPB 1000 milliGRAM(s) IV Intermittent every 24 hours  dextrose 5% + sodium chloride 0.9%. 1000 milliLiter(s) (75 mL/Hr) IV Continuous <Continuous>  digoxin     Tablet 250 MICROGram(s) Oral daily  finasteride 5 milliGRAM(s) Oral daily  influenza  Vaccine (HIGH DOSE) 0.7 milliLiter(s) IntraMuscular once  pantoprazole    Tablet 40 milliGRAM(s) Oral before breakfast  simvastatin 10 milliGRAM(s) Oral at bedtime  tamsulosin 0.4 milliGRAM(s) Oral at bedtime      Assessment:  89M HLD, AFib (Coumadin), CHF, CVA (residual L deficit), BPH, Alzheimer's dementia (AOx0-1 bl) , sepsis secondary to UTI presented with more confusion and not eating or drinking .    Plan of Care:    #Afib  - on Digoxin  - Rate better today. Continue to monitor  - Continue Coumadin with INR goal 2-3  - CTA chest negative for PE but shows possible thrombus vs Low flow in the ELIZABETH which can occur with Afib. No change in management. Will continue Coumadin      #Sepsis  - 2/2 UTI    #Dementia  - AAOx1 at baseline  - Management as per primary team          Over 55 minutes spent on total encounter; more than 50% of the visit was spent counseling and/or coordinating care by the attending physician.      Orestes Hart,  [facility]  Cardiovascular Disease  [phone]
Date of Service  : 03-05-24     INTERVAL HPI/OVERNIGHT EVENTS: I feel ok   Vital Signs Last 24 Hrs  T(C): 36.4 (05 Mar 2024 12:02), Max: 37.1 (04 Mar 2024 23:00)  T(F): 97.6 (05 Mar 2024 12:02), Max: 98.7 (04 Mar 2024 23:00)  HR: 90 (05 Mar 2024 12:02) (58 - 90)  BP: 109/73 (05 Mar 2024 12:02) (101/50 - 147/76)  BP(mean): --  RR: 18 (05 Mar 2024 12:02) (18 - 20)  SpO2: 97% (05 Mar 2024 12:02) (95% - 97%)    Parameters below as of 05 Mar 2024 12:02  Patient On (Oxygen Delivery Method): room air      I&O's Summary    05 Mar 2024 07:01  -  05 Mar 2024 16:17  --------------------------------------------------------  IN: 440 mL / OUT: 550 mL / NET: -110 mL      MEDICATIONS  (STANDING):  aspirin enteric coated 81 milliGRAM(s) Oral daily  dextrose 5% + sodium chloride 0.9%. 1000 milliLiter(s) (75 mL/Hr) IV Continuous <Continuous>  digoxin     Tablet 250 MICROGram(s) Oral daily  finasteride 5 milliGRAM(s) Oral daily  pantoprazole    Tablet 40 milliGRAM(s) Oral before breakfast  piperacillin/tazobactam IVPB.. 3.375 Gram(s) IV Intermittent every 8 hours  simvastatin 10 milliGRAM(s) Oral at bedtime  tamsulosin 0.4 milliGRAM(s) Oral at bedtime  warfarin 1 milliGRAM(s) Oral once    MEDICATIONS  (PRN):  acetaminophen     Tablet .. 650 milliGRAM(s) Oral every 6 hours PRN Temp greater or equal to 38.5C (101.3F), Moderate Pain (4 - 6)    LABS:                        12.9   4.91  )-----------( 135      ( 05 Mar 2024 14:18 )             39.5     03-05    141  |  106  |  8   ----------------------------<  176<H>  4.0   |  25  |  0.72    Ca    8.5      05 Mar 2024 14:18  Mg     1.7     03-04    TPro  5.1<L>  /  Alb  2.5<L>  /  TBili  0.8  /  DBili  x   /  AST  34  /  ALT  26  /  AlkPhos  75  03-04    PT/INR - ( 05 Mar 2024 14:18 )   PT: 24.4 sec;   INR: 2.27 ratio         PTT - ( 05 Mar 2024 14:18 )  PTT:37.6 sec  Urinalysis Basic - ( 05 Mar 2024 14:18 )    Color: x / Appearance: x / SG: x / pH: x  Gluc: 176 mg/dL / Ketone: x  / Bili: x / Urobili: x   Blood: x / Protein: x / Nitrite: x   Leuk Esterase: x / RBC: x / WBC x   Sq Epi: x / Non Sq Epi: x / Bacteria: x      CAPILLARY BLOOD GLUCOSE            Urinalysis Basic - ( 05 Mar 2024 14:18 )    Color: x / Appearance: x / SG: x / pH: x  Gluc: 176 mg/dL / Ketone: x  / Bili: x / Urobili: x   Blood: x / Protein: x / Nitrite: x   Leuk Esterase: x / RBC: x / WBC x   Sq Epi: x / Non Sq Epi: x / Bacteria: x          Consultant(s) Notes Reviewed:  [x ] YES  [ ] NO    PHYSICAL EXAM:  GENERAL: NAD, well-groomed, well-developed,not in any distress ,  HEAD:  Atraumatic, Normocephalic  NECK: Supple, No JVD, Normal thyroid  NERVOUS SYSTEM:  Alert & , No focal deficit   CHEST/LUNG: Good air entry bilateral with no  rales, rhonchi, wheezing, or rubs  HEART: Regular rate and rhythm; No murmurs, rubs, or gallops  ABDOMEN: Soft, Nontender, Nondistended; Bowel sounds present  EXTREMITIES:  2+ Peripheral Pulses, No clubbing, cyanosis, or edema    Care Discussed with Consultants/Other Providers [ x] YES  [ ] NO
  Follow Up:    Sepsis    Interval History/ROS:  Tmax 101.6. Patient was seen and examined at bedside. Awake, nonverbal. ROS unable to assess.    Allergies  No Known Allergies        ANTIMICROBIALS:  piperacillin/tazobactam IVPB.. 3.375 every 8 hours      OTHER MEDS:  MEDICATIONS  (STANDING):  acetaminophen     Tablet .. 650 every 6 hours PRN  aspirin enteric coated 81 daily  digoxin     Tablet 250 daily  finasteride 5 daily  pantoprazole    Tablet 40 before breakfast  simvastatin 10 at bedtime  tamsulosin 0.4 at bedtime      Vital Signs Last 24 Hrs  T(C): 36.3 (05 Mar 2024 04:00), Max: 38.7 (04 Mar 2024 15:33)  T(F): 97.4 (05 Mar 2024 04:00), Max: 101.6 (04 Mar 2024 15:33)  HR: 90 (05 Mar 2024 04:00) (55 - 98)  BP: 147/76 (05 Mar 2024 04:00) (97/50 - 147/76)  BP(mean): 65 (04 Mar 2024 15:33) (65 - 65)  RR: 18 (05 Mar 2024 04:00) (18 - 22)  SpO2: 97% (05 Mar 2024 04:00) (95% - 97%)    Parameters below as of 05 Mar 2024 04:00  Patient On (Oxygen Delivery Method): room air        PHYSICAL EXAM:  Constitutional: NAD at rest  Cardiovascular:   normal S1, S2, no murmur, RRR  Respiratory:  clear BS bilaterally, no wheezes, no rales  Neurologic: awake   Skin:  +edematous BUE                                13.3   6.14  )-----------( 147      ( 04 Mar 2024 11:32 )             42.2       03-04    136  |  105  |  10  ----------------------------<  154<H>  4.2   |  24  |  0.80    Ca    7.9<L>      04 Mar 2024 13:56  Mg     1.7     03-04    TPro  5.1<L>  /  Alb  2.5<L>  /  TBili  0.8  /  DBili  x   /  AST  34  /  ALT  26  /  AlkPhos  75  03-04      Urinalysis Basic - ( 04 Mar 2024 13:56 )    Color: x / Appearance: x / SG: x / pH: x  Gluc: 154 mg/dL / Ketone: x  / Bili: x / Urobili: x   Blood: x / Protein: x / Nitrite: x   Leuk Esterase: x / RBC: x / WBC x   Sq Epi: x / Non Sq Epi: x / Bacteria: x        MICROBIOLOGY:  v  Flu With COVID-19 By SAIDA (03.04.24 @ 11:31)   SARS-CoV-2 Result: NotDetec: This Respiratory Panel uses polymerase chain reaction (PCR) to detect for   influenza A; influenza B; respiratory syncytial virus; and SARS-CoV-2.   This test was validated by [facility] and is in use under the FDA   Emergency Use Authorization (EUA) for clinical labs CLIA-certified to   perform high complexity testing. Test results should be correlated with   clinical presentation, patient history, and epidemiology.  Influenza A Result: NotDetec  Influenza B Result: NotDetec  Resp Syn Virus Result: NotDetec      RADIOLOGY:  Imaging below independently reviewed.  < from: CT Angio Chest PE Protocol w/ IV Cont (03.04.24 @ 12:51) >    ACC: [id number] EXAM:  CT ABDOMEN AND PELVIS IC   ORDERED BY: FOX SAHU     ACC: [id number] EXAM:  CT ANGIO CHEST PULM ART WAWIC   ORDERED BY: FOX SAHU     PROCEDURE DATE:  03/04/2024          INTERPRETATION:  CTA CHEST AND CT ABDOMEN AND PELVIS    INDICATION: Abdominal pain, elevated lactate,    TECHNIQUE: Helical images of the chest, abdomen, and pelvis were obtained   before and after the administration of 90 mL of Omnipaque 350. Maximum   intensity projection images were generated.    COMPARISON: CT chest 11/24/2023, CT abdomen/pelvis 1/11/2024.    FINDINGS:    CT CHEST:    HEART/VASCULATURE: Normal heart size. Relative hypoattenuation of the   left atrial appendage tip. No pericardial effusion. Coronary artery   calcifications. No pulmonary embolus, with the caveat that some distal   subsegmental branches are obscured by motion.    LUNGS/AIRWAYS/PLEURA: No endobronchial lesion. Multiple small indistinct   regions of groundglass in both lungs, in a peripheral and   peribronchovascular distribution. Trace right pleural effusion. Mild   bronchiectasis in the left lower lobe.    LYMPH NODES/MEDIASTINUM: No lymphadenopathy.    BONES/SOFT TISSUES: Few old left rib fractures.        CT ABDOMEN/PELVIS:    LIVER: Unremarkable.    BILIARY SYSTEM: Cholelithiasis.    SPLEEN:Unremarkable.    PANCREAS: Unremarkable.    ADRENALS: Unremarkable.    KIDNEYS/URINARY TRACT: Symmetric size and enhancement. Unchanged too   small to characterize hypodensities within the left kidney.    GASTROINTESTINAL TRACT: No obstruction. Few uncomplicated colonic   diverticula. Normal appendix.    REPRODUCTIVE ORGANS: Unremarkable.    LYMPH NODES/PERITONEUM: No lymphadenopathy, free fluid, or free air.    VASCULATURE: Normal caliber aorta.    BONES/SOFT TISSUES: Degenerative disc disease.      IMPRESSION:    No pulmonary embolus.    Nonspecific mild groundglass in both lungs. This may be infectious in the   appropriate clinical setting.    Relative hypoattenuation of the left atrial appendage tip, which can be   seen with slow flow or thrombus. If clinically appropriate, this can be   further evaluated with a CT chest with delayed phase imaging.    --- End of Report ---            ORLY MICHELLE M.D., ATTENDING RADIOLOGIST  This document has been electronically signed. Mar  4 2024  1:31PM    < end of copied text >  
  Follow Up:    UTI    Interval History/ROS:  VSS ON. Patient was seen and examined at bedside. Nonverbal, ROS unable to assess.    Allergies  No Known Allergies        ANTIMICROBIALS:  cefTRIAXone   IVPB 1000 every 24 hours      OTHER MEDS:  MEDICATIONS  (STANDING):  acetaminophen     Tablet .. 650 every 6 hours PRN  aspirin enteric coated 81 daily  digoxin     Tablet 250 daily  finasteride 5 daily  influenza  Vaccine (HIGH DOSE) 0.7 once  pantoprazole    Tablet 40 before breakfast  simvastatin 10 at bedtime  tamsulosin 0.4 at bedtime      Vital Signs Last 24 Hrs  T(C): 36.6 (08 Mar 2024 17:17), Max: 36.6 (08 Mar 2024 17:17)  T(F): 97.8 (08 Mar 2024 17:17), Max: 97.8 (08 Mar 2024 17:17)  HR: 96 (08 Mar 2024 17:17) (80 - 96)  BP: 158/89 (08 Mar 2024 17:17) (120/69 - 158/89)  BP(mean): --  RR: 16 (08 Mar 2024 17:17) (16 - 18)  SpO2: 96% (08 Mar 2024 17:17) (90% - 98%)    Parameters below as of 08 Mar 2024 17:17  Patient On (Oxygen Delivery Method): room air        PHYSICAL EXAM:  Constitutional: NAD  Cardiovascular:   normal S1, S2, no murmur, tachycardic  Respiratory:  clear BS bilaterally, no wheezes, no rales  GI:  soft, non-tender, normal bowel sounds  :  no brewster  Musculoskeletal:  BUE edema  Neurologic: awake, nonverbal                       MICROBIOLOGY:  v  Clean Catch Clean Catch (Midstream)  03-04-24   >100,000 CFU/ml Proteus mirabilis  --  Proteus mirabilis      .Blood Blood-Peripheral  03-04-24   No growth at 72 Hours  --  --      .Blood Blood-Peripheral  03-04-24   No growth at 72 Hours  --  --                RADIOLOGY:  Imaging below independently reviewed.  < from: CT Angio Chest PE Protocol w/ IV Cont (03.04.24 @ 12:51) >    IMPRESSION:    No pulmonary embolus.    Nonspecific mild groundglass in both lungs. This may be infectious in the   appropriate clinical setting.    Relative hypoattenuation of the left atrial appendage tip, which can be   seen with slow flow or thrombus. If clinically appropriate, this can be   further evaluated with a CT chest with delayed phase imaging.    < end of copied text >  
Cardiovascular Disease Progress Note  Date of service: 03-06-24 @ 07:55    Overnight events: No acute events overnight.  Patient is in no distress.   Otherwise review of systems negative    Objective Findings:  T(C): 36.7 (03-06-24 @ 04:51), Max: 36.7 (03-06-24 @ 04:51)  HR: 111 (03-06-24 @ 04:51) (90 - 111)  BP: 149/96 (03-06-24 @ 04:51) (109/73 - 149/96)  RR: 18 (03-06-24 @ 04:51) (18 - 18)  SpO2: 96% (03-06-24 @ 04:51) (94% - 97%)  Wt(kg): --  Daily     Daily       Physical Exam:  Gen: NAD; Patient resting comfortably  HEENT: EOMI, Normocephalic/ atraumatic  CV: Irregular rhythm  Lungs:  Normal respiratory effort; clear to auscultation bilaterally  Abd: soft, non-tender; bowel sounds present  Ext: No edema; warm and well perfused    Telemetry: Afib with HR in low 100s.     Laboratory Data:                        12.9   4.91  )-----------( 135      ( 05 Mar 2024 14:18 )             39.5     03-05    141  |  106  |  8   ----------------------------<  176<H>  4.0   |  25  |  0.72    Ca    8.5      05 Mar 2024 14:18  Mg     1.7     03-04    TPro  5.1<L>  /  Alb  2.5<L>  /  TBili  0.8  /  DBili  x   /  AST  34  /  ALT  26  /  AlkPhos  75  03-04    PT/INR - ( 06 Mar 2024 07:17 )   PT: 20.9 sec;   INR: 2.03 ratio         PTT - ( 05 Mar 2024 14:18 )  PTT:37.6 sec  CARDIAC MARKERS ( 04 Mar 2024 13:56 )  x     / x     / 53 U/L / x     / 1.7 ng/mL  CARDIAC MARKERS ( 04 Mar 2024 11:32 )  x     / x     / 93 U/L / x     / 1.6 ng/mL          Inpatient Medications:  MEDICATIONS  (STANDING):  aspirin enteric coated 81 milliGRAM(s) Oral daily  dextrose 5% + sodium chloride 0.9%. 1000 milliLiter(s) (75 mL/Hr) IV Continuous <Continuous>  digoxin     Tablet 250 MICROGram(s) Oral daily  finasteride 5 milliGRAM(s) Oral daily  pantoprazole    Tablet 40 milliGRAM(s) Oral before breakfast  piperacillin/tazobactam IVPB.. 3.375 Gram(s) IV Intermittent every 8 hours  simvastatin 10 milliGRAM(s) Oral at bedtime  tamsulosin 0.4 milliGRAM(s) Oral at bedtime      Assessment:  89M HLD, AFib (Coumadin), CHF, CVA (residual L deficit), BPH, Alzheimer's dementia (AOx0-1 bl) , sepsis secondary to UTI presented with more confusion and not eating or drinking .    Plan of Care:    #Afib  - Rate controlled on Digoxin  - Continue Coumadin with INR goal 2-3  - CTA chest negative for PE but shows possible thrombus vs Low flow in the ELIZABETH which can occur with Afib. No change in management. Will continue Coumadin      #Sepsis  - 2/2 UTI    #Dementia  - AAOx1 at baseline  - Management as per primary team      #ACP (advance care planning)-  Advanced care planning was discussed.  Risks, benefits and alternatives of medical treatment and procedures were addressed. 30 additional minutes spent addressing advance care plans.          Over 55 minutes spent on total encounter; more than 50% of the visit was spent counseling and/or coordinating care by the attending physician.      Orestes Hart DO [facility]  Cardiovascular Disease  [phone]
Date of Service  : 03-07-24    INTERVAL HPI/OVERNIGHT EVENTS: Feeling better.   Vital Signs Last 24 Hrs  T(C): 36.3 (07 Mar 2024 11:53), Max: 36.3 (06 Mar 2024 21:57)  T(F): 97.3 (07 Mar 2024 11:53), Max: 97.4 (07 Mar 2024 03:08)  HR: 83 (07 Mar 2024 15:59) (83 - 119)  BP: 146/76 (07 Mar 2024 15:59) (146/76 - 167/104)  BP(mean): --  RR: 18 (07 Mar 2024 11:53) (18 - 18)  SpO2: 98% (07 Mar 2024 11:53) (94% - 98%)    Parameters below as of 07 Mar 2024 11:53  Patient On (Oxygen Delivery Method): room air      I&O's Summary    06 Mar 2024 07:01  -  07 Mar 2024 07:00  --------------------------------------------------------  IN: 1060 mL / OUT: 1000 mL / NET: 60 mL    07 Mar 2024 07:01  -  07 Mar 2024 16:22  --------------------------------------------------------  IN: 60 mL / OUT: 0 mL / NET: 60 mL      MEDICATIONS  (STANDING):  aspirin enteric coated 81 milliGRAM(s) Oral daily  cefTRIAXone   IVPB 1000 milliGRAM(s) IV Intermittent every 24 hours  dextrose 5% + sodium chloride 0.9%. 1000 milliLiter(s) (75 mL/Hr) IV Continuous <Continuous>  digoxin     Tablet 250 MICROGram(s) Oral daily  finasteride 5 milliGRAM(s) Oral daily  influenza  Vaccine (HIGH DOSE) 0.7 milliLiter(s) IntraMuscular once  melatonin 3 milliGRAM(s) Oral once  pantoprazole    Tablet 40 milliGRAM(s) Oral before breakfast  simvastatin 10 milliGRAM(s) Oral at bedtime  tamsulosin 0.4 milliGRAM(s) Oral at bedtime  warfarin 1.5 milliGRAM(s) Oral once    MEDICATIONS  (PRN):  acetaminophen     Tablet .. 650 milliGRAM(s) Oral every 6 hours PRN Temp greater or equal to 38.5C (101.3F), Moderate Pain (4 - 6)    LABS:          PT/INR - ( 07 Mar 2024 07:29 )   PT: 21.7 sec;   INR: 2.01 ratio             CAPILLARY BLOOD GLUCOSE              REVIEW OF SYSTEMS:  CONSTITUTIONAL: No fever, weight loss, or fatigue  EYES: No eye pain, visual disturbances, or discharge  ENMT:  No difficulty hearing, tinnitus, vertigo; No sinus or throat pain  NECK: No pain or stiffness  RESPIRATORY: No cough, wheezing, chills or hemoptysis; No shortness of breath  CARDIOVASCULAR: No chest pain, palpitations, dizziness, or leg swelling  GASTROINTESTINAL: No abdominal or epigastric pain. No nausea, vomiting, or hematemesis; No diarrhea or constipation. No melena or hematochezia.  GENITOURINARY: No dysuria, frequency, hematuria, or incontinence  NEUROLOGICAL: No headaches, memory loss, loss of strength, numbness, or tremors  SKIN: No itching, burning, rashes, or lesions   ENDOCRINE: No heat or cold intolerance; No hair loss  MUSCULOSKELETAL: No joint pain or swelling; No muscle, back, or extremity pain  PSYCHIATRIC: No depression, anxiety, mood swings, or difficulty sleeping  HEME/LYMPH: No easy bruising, or bleeding gums  ALLERY AND IMMUNOLOGIC: No hives or eczema    RADIOLOGY & ADDITIONAL TESTS:    Consultant(s) Notes Reviewed:  [x ] YES  [ ] NO    PHYSICAL EXAM:  GENERAL: NAD, well-groomed, well-developed,not in any distress ,  HEAD:  Atraumatic, Normocephalic  EYES: EOMI, PERRLA, conjunctiva and sclera clear  ENMT: No tonsillar erythema, exudates, or enlargement; Moist mucous membranes, Good dentition, No lesions  NECK: Supple, No JVD, Normal thyroid  NERVOUS SYSTEM:  Alert & Oriented X3, No focal deficit   CHEST/LUNG: Good air entry bilateral with no  rales, rhonchi, wheezing, or rubs  HEART: Regular rate and rhythm; No murmurs, rubs, or gallops  ABDOMEN: Soft, Nontender, Nondistended; Bowel sounds present  EXTREMITIES:  2+ Peripheral Pulses, No clubbing, cyanosis, or edema  SKIN: No rashes or lesions    Care Discussed with Consultants/Other Providers [ x] YES  [ ] NO
Date of Service  : 03-08-24     INTERVAL HPI/OVERNIGHT EVENTS: I feel ok.   Vital Signs Last 24 Hrs  T(C): 36.6 (08 Mar 2024 17:17), Max: 36.6 (08 Mar 2024 17:17)  T(F): 97.8 (08 Mar 2024 17:17), Max: 97.8 (08 Mar 2024 17:17)  HR: 96 (08 Mar 2024 17:17) (80 - 96)  BP: 158/89 (08 Mar 2024 17:17) (120/69 - 158/89)  BP(mean): --  RR: 16 (08 Mar 2024 17:17) (16 - 18)  SpO2: 96% (08 Mar 2024 17:17) (90% - 98%)    Parameters below as of 08 Mar 2024 17:17  Patient On (Oxygen Delivery Method): room air      I&O's Summary    07 Mar 2024 07:01  -  08 Mar 2024 07:00  --------------------------------------------------------  IN: 660 mL / OUT: 0 mL / NET: 660 mL    08 Mar 2024 07:01  -  08 Mar 2024 18:51  --------------------------------------------------------  IN: 125 mL / OUT: 0 mL / NET: 125 mL      MEDICATIONS  (STANDING):  aspirin enteric coated 81 milliGRAM(s) Oral daily  cefTRIAXone   IVPB 1000 milliGRAM(s) IV Intermittent every 24 hours  dextrose 5% + sodium chloride 0.9%. 1000 milliLiter(s) (75 mL/Hr) IV Continuous <Continuous>  digoxin     Tablet 250 MICROGram(s) Oral daily  finasteride 5 milliGRAM(s) Oral daily  influenza  Vaccine (HIGH DOSE) 0.7 milliLiter(s) IntraMuscular once  pantoprazole    Tablet 40 milliGRAM(s) Oral before breakfast  simvastatin 10 milliGRAM(s) Oral at bedtime  tamsulosin 0.4 milliGRAM(s) Oral at bedtime    MEDICATIONS  (PRN):  acetaminophen     Tablet .. 650 milliGRAM(s) Oral every 6 hours PRN Temp greater or equal to 38.5C (101.3F), Moderate Pain (4 - 6)    LABS:          PT/INR - ( 08 Mar 2024 10:02 )   PT: 21.9 sec;   INR: 2.13 ratio             CAPILLARY BLOOD GLUCOSE                  Consultant(s) Notes Reviewed:  [x ] YES  [ ] NO    PHYSICAL EXAM:  GENERAL: NAD, well-groomed, well-developed,not in any distress ,  HEAD:  Atraumatic, Normocephalic  NECK: Supple, No JVD, Normal thyroid  NERVOUS SYSTEM:  Alert &  No focal deficit   CHEST/LUNG: Good air entry bilateral with no  rales, rhonchi, wheezing, or rubs  HEART: Regular rate and rhythm; No murmurs, rubs, or gallops  ABDOMEN: Soft, Nontender, Nondistended; Bowel sounds present  EXTREMITIES:  2+ Peripheral Pulses, No clubbing, cyanosis, or edema    Care Discussed with Consultants/Other Providers [ x] YES  [ ] NO
  Follow Up:    Sepsis    Interval History/ROS:  Afebrile ON. Remained tachycardic. Agitation overnight reported, however calm in the AM. Patient was seen and examined at bedside. Calm. Nonverbal. ROS unable to assess.    Allergies  No Known Allergies        ANTIMICROBIALS:  cefTRIAXone   IVPB 1000 every 24 hours      OTHER MEDS:  MEDICATIONS  (STANDING):  acetaminophen     Tablet .. 650 every 6 hours PRN  aspirin enteric coated 81 daily  digoxin     Tablet 250 daily  finasteride 5 daily  influenza  Vaccine (HIGH DOSE) 0.7 once  melatonin 3 once  pantoprazole    Tablet 40 before breakfast  simvastatin 10 at bedtime  tamsulosin 0.4 at bedtime  warfarin 1.5 once      Vital Signs Last 24 Hrs  T(C): 36.3 (07 Mar 2024 04:32), Max: 36.3 (06 Mar 2024 11:30)  T(F): 97.4 (07 Mar 2024 04:32), Max: 97.4 (06 Mar 2024 11:30)  HR: 117 (07 Mar 2024 04:32) (101 - 119)  BP: 164/60 (07 Mar 2024 04:32) (120/74 - 167/104)  BP(mean): --  RR: 18 (07 Mar 2024 04:32) (18 - 18)  SpO2: 94% (07 Mar 2024 04:32) (94% - 96%)    Parameters below as of 07 Mar 2024 04:32  Patient On (Oxygen Delivery Method): room air      PHYSICAL EXAM:  Constitutional: NAD, calm  Cardiovascular:   normal S1, S2, no murmur, tachycardic  Respiratory:  clear BS bilaterally, no wheezes, no rales  GI:  soft, non-tender, normal bowel sounds  :  no brewster  Musculoskeletal:  BUE edema  Neurologic: awake, nonverbal                        12.9   4.91  )-----------( 135      ( 05 Mar 2024 14:18 )             39.5       03-05    141  |  106  |  8   ----------------------------<  176<H>  4.0   |  25  |  0.72    Ca    8.5      05 Mar 2024 14:18        Urinalysis Basic - ( 05 Mar 2024 14:18 )    Color: x / Appearance: x / SG: x / pH: x  Gluc: 176 mg/dL / Ketone: x  / Bili: x / Urobili: x   Blood: x / Protein: x / Nitrite: x   Leuk Esterase: x / RBC: x / WBC x   Sq Epi: x / Non Sq Epi: x / Bacteria: x        MICROBIOLOGY:  v  Clean Catch Clean Catch (Midstream)  03-04-24   >100,000 CFU/ml Proteus mirabilis  --  Proteus mirabilis      .Blood Blood-Peripheral  03-04-24   No growth at 48 Hours  --  --      .Blood Blood-Peripheral  03-04-24   No growth at 48 Hours  --  --                RADIOLOGY:  Imaging below independently reviewed.  < from: CT Angio Chest PE Protocol w/ IV Cont (03.04.24 @ 12:51) >  IMPRESSION:    No pulmonary embolus.    Nonspecific mild groundglass in both lungs. This may be infectious in the   appropriate clinical setting.    Relative hypoattenuation of the left atrial appendage tip, which can be   seen with slow flow or thrombus. If clinically appropriate, this can be   further evaluated with a CT chest with delayed phase imaging.    < end of copied text >  
Date of Service  : 03-06-24     INTERVAL HPI/OVERNIGHT EVENTS: I feel fine.   Vital Signs Last 24 Hrs  T(C): 36.3 (06 Mar 2024 11:30), Max: 36.7 (06 Mar 2024 04:51)  T(F): 97.4 (06 Mar 2024 11:30), Max: 98.1 (06 Mar 2024 04:51)  HR: 101 (06 Mar 2024 11:30) (100 - 111)  BP: 120/74 (06 Mar 2024 11:30) (120/74 - 149/96)  BP(mean): --  RR: 18 (06 Mar 2024 11:30) (18 - 18)  SpO2: 96% (06 Mar 2024 11:30) (94% - 96%)    Parameters below as of 06 Mar 2024 11:30  Patient On (Oxygen Delivery Method): room air      I&O's Summary    05 Mar 2024 07:01  -  06 Mar 2024 07:00  --------------------------------------------------------  IN: 440 mL / OUT: 550 mL / NET: -110 mL    06 Mar 2024 07:01  -  06 Mar 2024 18:29  --------------------------------------------------------  IN: 580 mL / OUT: 700 mL / NET: -120 mL      MEDICATIONS  (STANDING):  aspirin enteric coated 81 milliGRAM(s) Oral daily  dextrose 5% + sodium chloride 0.9%. 1000 milliLiter(s) (75 mL/Hr) IV Continuous <Continuous>  digoxin     Tablet 250 MICROGram(s) Oral daily  finasteride 5 milliGRAM(s) Oral daily  pantoprazole    Tablet 40 milliGRAM(s) Oral before breakfast  piperacillin/tazobactam IVPB.. 3.375 Gram(s) IV Intermittent every 8 hours  simvastatin 10 milliGRAM(s) Oral at bedtime  tamsulosin 0.4 milliGRAM(s) Oral at bedtime  warfarin 1.5 milliGRAM(s) Oral once    MEDICATIONS  (PRN):  acetaminophen     Tablet .. 650 milliGRAM(s) Oral every 6 hours PRN Temp greater or equal to 38.5C (101.3F), Moderate Pain (4 - 6)    LABS:                        12.9   4.91  )-----------( 135      ( 05 Mar 2024 14:18 )             39.5     03-05    141  |  106  |  8   ----------------------------<  176<H>  4.0   |  25  |  0.72    Ca    8.5      05 Mar 2024 14:18      PT/INR - ( 06 Mar 2024 07:17 )   PT: 20.9 sec;   INR: 2.03 ratio         PTT - ( 05 Mar 2024 14:18 )  PTT:37.6 sec  Urinalysis Basic - ( 05 Mar 2024 14:18 )    Color: x / Appearance: x / SG: x / pH: x  Gluc: 176 mg/dL / Ketone: x  / Bili: x / Urobili: x   Blood: x / Protein: x / Nitrite: x   Leuk Esterase: x / RBC: x / WBC x   Sq Epi: x / Non Sq Epi: x / Bacteria: x      CAPILLARY BLOOD GLUCOSE            Urinalysis Basic - ( 05 Mar 2024 14:18 )    Color: x / Appearance: x / SG: x / pH: x  Gluc: 176 mg/dL / Ketone: x  / Bili: x / Urobili: x   Blood: x / Protein: x / Nitrite: x   Leuk Esterase: x / RBC: x / WBC x   Sq Epi: x / Non Sq Epi: x / Bacteria: x          Consultant(s) Notes Reviewed:  [x ] YES  [ ] NO    PHYSICAL EXAM:  GENERAL: NAD, well-groomed, well-developed,not in any distress ,  HEAD:  Atraumatic, Normocephalic  NECK: Supple, No JVD, Normal thyroid  NERVOUS SYSTEM:  Alert & , No focal deficit   CHEST/LUNG: Good air entry bilateral with no  rales, rhonchi, wheezing, or rubs  HEART: Regular rate and rhythm; No murmurs, rubs, or gallops  ABDOMEN: Soft, Nontender, Nondistended; Bowel sounds present  EXTREMITIES:  2+ Peripheral Pulses, No clubbing, cyanosis, or edema    Care Discussed with Consultants/Other Providers [ x] YES  [ ] NO

## 2024-03-08 NOTE — DISCHARGE NOTE PROVIDER - CARE PROVIDER_API CALL
Shiraz Nguyễn  Cardiovascular Disease  [address], [address]  [place] [address]  Phone: [phone]  Fax: [phone]  Established Patient  Follow Up Time: 1 week

## 2024-03-08 NOTE — DISCHARGE NOTE NURSING/CASE MANAGEMENT/SOCIAL WORK - PATIENT PORTAL LINK FT
You can access the FollowMyHealth Patient Portal offered by [facility] by registering at the following website: [email or web address]. By joining [email or web address] FollowMyHealth portal, you will also be able to view your health information using other applications (apps) compatible with our system.

## 2024-03-08 NOTE — DISCHARGE NOTE PROVIDER - PROVIDER TOKENS
[Fair] : ~his/her~ current health as fair  [Good] : ~his/her~  mood as  good [No] : In the past 12 months have you used drugs other than those required for medical reasons? No [Any fall with injury in past year] : Patient reported fall with injury in the past year [0] : 2) Feeling down, depressed, or hopeless: Not at all (0) [HIV Test offered] : HIV Test offered [Hepatitis C test offered] : Hepatitis C test offered [With Significant Other] : lives with significant other [Retired] : retired [] :  [Feels Safe at Home] : Feels safe at home [Reports changes in hearing] : Reports changes in hearing [Reports changes in vision] : Reports changes in vision [Smoke Detector] : smoke detector [Carbon Monoxide Detector] : carbon monoxide detector [Safety elements used in home] : safety elements used in home [Seat Belt] :  uses seat belt [Former] : Former [> 15 Years] : > 15 Years [PHQ-2 Negative - No further assessment needed] : PHQ-2 Negative - No further assessment needed [FreeTextEntry1] : no [de-identified] : no [de-identified] : Took MRI in 08/2023 said had arthritis  [de-identified] : none [de-identified] : very good [[id number]] : 0 PROVIDER:[TOKEN:[[id number]],FOLLOWUP:[1 week],ESTABLISHEDPATIENT:[T]] [Change in mental status noted] : No change in mental status noted [Language] : denies difficulty with language [Behavior] : denies difficulty with behavior [Learning/Retaining New Information] : denies difficulty learning/retaining new information [Handling Complex Tasks] : denies difficulty handling complex tasks [Reasoning] : denies difficulty with reasoning [Spatial Ability and Orientation] : denies difficulty with spatial ability and orientation [Sexually Active] : not sexually active [Reports changes in dental health] : Reports no changes in dental health [Sunscreen] : does not use sunscreen [MammogramDate] : 12/2023 [MammogramComments] : results not available as yet presently [BoneDensityComments] : never had one done [ColonoscopyComments] : never had one done [de-identified] : hearing is a bit difficult [de-identified] : cataract removed in 02/2024

## 2024-03-08 NOTE — DISCHARGE NOTE PROVIDER - NSDCCPCAREPLAN_GEN_ALL_CORE_FT
PRINCIPAL DISCHARGE DIAGNOSIS  Diagnosis: Acute UTI  Assessment and Plan of Treatment: You had a bladder and/or kidney infection.  If you are discharged on antibiotics, you will need to finish the medication as prescribed to reduced the likelihood that the infection will recur.  Avoid medications that will cause urinary retention such as benadryl whenever possible.  Drink adequate fluids - there is no harm in drinking cranberry juice.  Females should urinate right after sex.  Contact your doctor if you experience new symptoms or continued symptoms after treatment, such as pain or burning with urination, frequent urination, urinary urgency, blood in the urine, fever, back pain, and/or nausea vomiting.        SECONDARY DISCHARGE DIAGNOSES  Diagnosis: Chronic atrial fibrillation  Assessment and Plan of Treatment: Atrial fibrillation is a common heart rhythm problem which increases the risk of stroke and heat attack.  It helps if you control your blood pressure, avoid alcohol, cut down on caffeine, get treatment for your thyroid if it is overactive, and perform moderate exercise in consultation with your Primary Care Provider.  Call your doctor if you experience chest tightness/pain, lightheadedness, loss of consciousness, shortness of breath (especially with exercise), feel your heart racing or beating unusually, frequent or abnormal bleeding.  It is important to take all your heart medications as prescribed.

## 2024-03-08 NOTE — DISCHARGE NOTE NURSING/CASE MANAGEMENT/SOCIAL WORK - NSDCPEFALRISK_GEN_ALL_CORE
For information on Fall & Injury Prevention, visit: [email or web address] OR  [email or web address] OR  https://www.cdc.gov/steadi/patient.html

## 2024-03-08 NOTE — PROGRESS NOTE ADULT - PROVIDER SPECIALTY LIST ADULT
Infectious Disease
Internal Medicine
Infectious Disease
Cardiology
Infectious Disease
Internal Medicine
Internal Medicine
Infectious Disease
Internal Medicine

## 2024-03-08 NOTE — DISCHARGE NOTE NURSING/CASE MANAGEMENT/SOCIAL WORK - NSDCVIVACCINE_GEN_ALL_CORE_FT
Tdap; 20-Apr-2022 17:15; Claudy Norris (RN); [employer]; [id number] (Exp. Date: 18-Jan-2024); IntraMuscular; Deltoid Right.; 0.5 milliLiter(s); VIS (VIS Published: 09-May-2013, VIS Presented: 20-Apr-2022);

## 2024-03-08 NOTE — PROGRESS NOTE ADULT - ASSESSMENT
89-yo M w/ PMH of CVA w/ residual L deficit, Alzhemier's disease, CHF, AF, and recent admission (1/2024) for COVID and E faecalis bacteremia s/p 14-d course, admitted with AMS. Urine culture w/ >100k Proteus spp. BCx NGTD.     #UTI  #AMS  #Fever  #Hypotension  #Abnormal urinalysis  - Admitted with AMS. Per chart review, patient was "more confused" per baseline dementia  - CTAP and CTA chest w/ no identifiable source of infection  - UCx w/ Proteus, grossly susceptible.  - Improved on Zosyn. Switched to ceftriaxone following susceptibility pattern. Can switch to cefpodoxime 200 mg PO Q12H if discharged. Can treat until 3/10.   - Monitor for VS and WBC      Plan discussed with primary team attending Dr. Jacinto.  Thank you for this consult.     Klever Tse MD, PhD  Attending Physician  Division of Infectious Diseases  Department of Medicine    Please contact through MS Teams message.  Office: [phone] (after 5 PM or weekend) .    
89M HLD, AFib (Coumadin), CHF, CVA (residual L deficit), BPH, Alzheimer's dementia (AOx0-1 bl) , sepsis secondary to UTI presented with more confusion and not eating or drinking . Here he was found to be having fever.        Problem/Plan - 1:  ·  Problem: Sepsis.   ·  Plan: S/P Urine and Blood Cultures  noted .  Likely secondary to UTI from proteus Mirabilis .  S/P IV Abxs. On IV Rocephin and PO Abxs per ID .   ID help appreciated  and further Abxs per them .     Problem/Plan - 2:  ·  Problem: Hypotension.   ·  Plan: S/P IVF .   BP readings better.     Problem/Plan - 3:  ·  Problem: Chronic atrial fibrillation.   ·  Plan: On AC, Cardiology following      Problem/Plan - 4:  ·  Problem: BPH without urinary obstruction.   ·  Plan: Continuing home meds.     Problem/Plan - 5:  ·  Problem: Alzheimer's dementia.   ·  Plan: Supportive care.     Problem/Plan - 6:  ·  Problem: History of CVA (cerebrovascular accident).   ·  Plan: Supportive care.      D/W daughters in detail .       
89M HLD, AFib (Coumadin), CHF, CVA (residual L deficit), BPH, Alzheimer's dementia (AOx0-1 bl) , sepsis secondary to UTI presented with more confusion and not eating or drinking . Here he was found to be having fever.        Problem/Plan - 1:  ·  Problem: Sepsis.   ·  Plan: S/P Urine and Blood Cultures .  Likely secondary to UTI .  S/P IV Abxs.  ID help appreciated  and further Abxs per them .     Problem/Plan - 2:  ·  Problem: Hypotension.   ·  Plan: S/P IVF .   BP readings better.     Problem/Plan - 3:  ·  Problem: Chronic atrial fibrillation.   ·  Plan: On AC, Cardiology following      Problem/Plan - 4:  ·  Problem: BPH without urinary obstruction.   ·  Plan: Continuing home meds.     Problem/Plan - 5:  ·  Problem: Alzheimer's dementia.   ·  Plan: Supportive care.     Problem/Plan - 6:  ·  Problem: History of CVA (cerebrovascular accident).   ·  Plan: Supportive care.      D/W daughter in detail yesterday.     
89M HLD, AFib (Coumadin), CHF, CVA (residual L deficit), BPH, Alzheimer's dementia (AOx0-1 bl) , sepsis secondary to UTI presented with more confusion and not eating or drinking . Here he was found to be having fever.        Problem/Plan - 1:  ·  Problem: Sepsis.   ·  Plan: S/P Urine and Blood Cultures .  Likely secondary to UTI .  S/P IV Abxs.  ID help appreciated  and further Abxs per them .     Problem/Plan - 2:  ·  Problem: Hypotension.   ·  Plan: S/P IVF .   BP readings better.     Problem/Plan - 3:  ·  Problem: Chronic atrial fibrillation.   ·  Plan: On AC, Cardiology following      Problem/Plan - 4:  ·  Problem: BPH without urinary obstruction.   ·  Plan: Continuing home meds.     Problem/Plan - 5:  ·  Problem: Alzheimer's dementia.   ·  Plan: Supportive care.     Problem/Plan - 6:  ·  Problem: History of CVA (cerebrovascular accident).   ·  Plan: Supportive care.      D/W daughter in detail. 
  89-yo M w/ PMH of CVA w/ residual L deficit, Alzhemier's disease, CHF, AF, and recent admission (1/2024) for COVID and E faecalis bacteremia s/p 14-d course, admitted with AMS    #Severe sepsis (fever, tachycardia, and elevated lactate)  #AMS  #Fever  #Hypotension  #Abnormal urinalysis  - Admitted with AMS. Per chart review, patient was "more confused" per baseline dementia  - CTAP and CTA chest w/ no identifiable source of infection  - F/u UCx and BCx  - C/w Zosyn 3.375 g IV Q8H  - Monitor for VS and WBC  - Low threshold for MICU consult and RRT    Plan discussed with primary team ACP.  Thank you for this consult. Inpatient ID team will follow.    Klever Tse MD, PhD  Attending Physician  Division of Infectious Diseases  Department of Medicine    Please contact through MS Teams message.  Office: [phone] (after 5 PM or weekend) .    
89-yo M w/ PMH of CVA w/ residual L deficit, Alzhemier's disease, CHF, AF, and recent admission (1/2024) for COVID and E faecalis bacteremia s/p 14-d course, admitted with AMS. Urine culture w/ >100k Proteus spp. BCx NGTD.     #UTI  #AMS  #Fever  #Hypotension  #Abnormal urinalysis  - Admitted with AMS. Per chart review, patient was "more confused" per baseline dementia  - CTAP and CTA chest w/ no identifiable source of infection  - UCx w/ Proteus, grossly susceptible.  - Currently improving on Zosyn. Would switch Zosyn to ceftriaxone 1g IV Q24H. Tentatively target 5-7d treatment for pneumonia vs UTI. Currently afebrile.  - Monitor for VS and WBC      Plan discussed with primary team ACP.  Thank you for this consult. Inpatient ID team will follow.    Klever Tse MD, PhD  Attending Physician  Division of Infectious Diseases  Department of Medicine    Please contact through MS Teams message.  Office: [phone] (after 5 PM or weekend) .    
89-yo M w/ PMH of CVA w/ residual L deficit, Alzhemier's disease, CHF, AF, and recent admission (1/2024) for COVID and E faecalis bacteremia s/p 14-d course, admitted with AMS. Urine culture w/ >100k Proteus spp. BCx NGTD.     #UTI  #AMS  #Fever  #Hypotension  #Abnormal urinalysis  - Admitted with AMS. Per chart review, patient was "more confused" per baseline dementia  - CTAP and CTA chest w/ no identifiable source of infection  - UCx w/ Proteus. BCx NGTD.  - Currently improving on Zosyn. Possible urinary source? F/u Proteus susceptibility.  - C/w Zosyn 3.375 g IV Q8H  - Monitor for VS and WBC      Plan discussed with primary team ACP.  Thank you for this consult. Inpatient ID team will follow.    Klever Tse MD, PhD  Attending Physician  Division of Infectious Diseases  Department of Medicine    Please contact through MS Teams message.  Office: [phone] (after 5 PM or weekend) .    
89M HLD, AFib (Coumadin), CHF, CVA (residual L deficit), BPH, Alzheimer's dementia (AOx0-1 bl) , sepsis secondary to UTI presented with more confusion and not eating or drinking . Here he was found to be having fever.        Problem/Plan - 1:  ·  Problem: Sepsis.   ·  Plan: S/P Urine and Blood Cultures  noted .  Likely secondary to UTI from proteus Mirabilis .  S/P IV Abxs. On IV Rocephin x 5 days   ID help appreciated  and further Abxs per them .     Problem/Plan - 2:  ·  Problem: Hypotension.   ·  Plan: S/P IVF .   BP readings better.     Problem/Plan - 3:  ·  Problem: Chronic atrial fibrillation.   ·  Plan: On AC, Cardiology following      Problem/Plan - 4:  ·  Problem: BPH without urinary obstruction.   ·  Plan: Continuing home meds.     Problem/Plan - 5:  ·  Problem: Alzheimer's dementia.   ·  Plan: Supportive care.     Problem/Plan - 6:  ·  Problem: History of CVA (cerebrovascular accident).   ·  Plan: Supportive care.      D/W daughters in detail .

## 2024-03-08 NOTE — PROGRESS NOTE ADULT - REASON FOR ADMISSION
More confused and not eating.

## 2024-03-08 NOTE — DISCHARGE NOTE PROVIDER - NSDCMRMEDTOKEN_GEN_ALL_CORE_FT
aspirin 81 mg oral delayed release tablet: 1 tab(s) orally once a day  cholecalciferol 25 mcg (1000 intl units) oral tablet: 1 tab(s) orally once a day  Digox 250 mcg (0.25 mg) oral tablet: 1 tab(s) orally once a day  finasteride 5 mg oral tablet: 1 tab(s) orally once a day  metoprolol tartrate 50 mg oral tablet: 1 tab(s) orally 2 times a day  pantoprazole 40 mg oral delayed release tablet: 1 tab(s) orally 2 times a day  simvastatin 10 mg oral tablet: 1 tab(s) orally once a day (at bedtime)  tamsulosin 0.4 mg oral capsule: 1 cap(s) orally once a day (at bedtime)  Vitamin B6 100 mg oral tablet: 1 tab(s) orally once a day  warfarin 1 mg oral tablet: 1.5 tab(s) orally 2 times a week Monday and Wednesday  warfarin 1 mg oral tablet: 1 tab(s) orally 5 times a week Tuesday,  Thursday, Friday, Saturday, Sunday   aspirin 81 mg oral delayed release tablet: 1 tab(s) orally once a day  cholecalciferol 25 mcg (1000 intl units) oral tablet: 1 tab(s) orally once a day  Digox 250 mcg (0.25 mg) oral tablet: 1 tab(s) orally once a day  finasteride 5 mg oral tablet: 1 tab(s) orally once a day  pantoprazole 40 mg oral delayed release tablet: 1 tab(s) orally 2 times a day  simvastatin 10 mg oral tablet: 1 tab(s) orally once a day (at bedtime)  tamsulosin 0.4 mg oral capsule: 1 cap(s) orally once a day (at bedtime)  Vitamin B6 100 mg oral tablet: 1 tab(s) orally once a day  warfarin 1 mg oral tablet: 1.5 tab(s) orally 2 times a week Monday and Wednesday  warfarin 1 mg oral tablet: 1 tab(s) orally 5 times a week Tuesday,  Thursday, Friday, Saturday, Sunday   aspirin 81 mg oral delayed release tablet: 1 tab(s) orally once a day  cefpodoxime 200 mg oral tablet: 1 tab(s) orally 2 times a day  cholecalciferol 25 mcg (1000 intl units) oral tablet: 1 tab(s) orally once a day  Digox 250 mcg (0.25 mg) oral tablet: 1 tab(s) orally once a day  finasteride 5 mg oral tablet: 1 tab(s) orally once a day  pantoprazole 40 mg oral delayed release tablet: 1 tab(s) orally 2 times a day  simvastatin 10 mg oral tablet: 1 tab(s) orally once a day (at bedtime)  tamsulosin 0.4 mg oral capsule: 1 cap(s) orally once a day (at bedtime)  Vitamin B6 100 mg oral tablet: 1 tab(s) orally once a day  warfarin 1 mg oral tablet: 1.5 tab(s) orally 2 times a week Monday and Wednesday  warfarin 1 mg oral tablet: 1 tab(s) orally 5 times a week Tuesday,  Thursday, Friday, Saturday, Sunday

## 2024-03-09 RX ORDER — CEFPODOXIME PROXETIL 100 MG
1 TABLET ORAL
Qty: 4 | Refills: 0
Start: 2024-03-09 | End: 2024-03-10

## 2024-03-19 NOTE — ED PROVIDER NOTE - DISCHARGE DATE
20-Apr-2022

## 2024-04-16 PROBLEM — Z00.00 ENCOUNTER FOR PREVENTIVE HEALTH EXAMINATION: Status: ACTIVE | Noted: 2024-04-16

## 2024-07-15 NOTE — PROGRESS NOTE ADULT - ASSESSMENT
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 Weakness  -likely 2/2 infectious process  -on abx for UTI   -t/t per primary team  - ? h/o CHF get echo     2. Afib  -INR 2.53  -rec dose 2mg coumadin tonight    3. HTN  -controlled  -c/w enalapril and metoprolol  -continue to monitor BP     4. DVT prophylaxis  -INR >2
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 Weakness  -likely 2/2 infectious process  -on abx for UTI   -t/t per primary team  - ? h/o CHF get echo     2. Afib  -INR 2.2  -rec dose 2mg coumadin tonight    3. HTN  -controlled  -c/w enalapril and metoprolol  -continue to monitor BP     4. DVT prophylaxis  -INR >2
asu preop handoff tool
patient is a very poor historian called abdiel douglass in the chart no answer.  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 in the ED he was foudn ot have elevated lactate, positive UA nad ? pnemonia, broad spectrum IV antibitoics were given. on one ot one for agitation and dementia 
patient is a very poor historian called abdiel douglass in the chart no answer.  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 in the ED he was foudn ot have elevated lactate, positive UA nad ? pnemonia, broad spectrum IV antibitoics were given. on one ot one for agitation and dementia 
patient is a very poor historian called abdiel douglass in the chart no answer.  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 in the ED he was foudn ot have elevated lactate, positive UA nad ? pnemonia, broad spectrum IV antibitoics were given. on one ot one for agitation and dementia

## 2024-07-20 PROBLEM — N40.0 BENIGN PROSTATIC HYPERPLASIA WITHOUT LOWER URINARY TRACT SYMPTOMS: Chronic | Status: ACTIVE | Noted: 2024-03-04

## 2024-07-20 PROBLEM — I48.20 CHRONIC ATRIAL FIBRILLATION, UNSPECIFIED: Chronic | Status: ACTIVE | Noted: 2024-03-04

## 2024-07-20 PROBLEM — N39.0 URINARY TRACT INFECTION, SITE NOT SPECIFIED: Chronic | Status: ACTIVE | Noted: 2024-03-04

## 2024-07-20 PROBLEM — F03.90 UNSPECIFIED DEMENTIA, UNSPECIFIED SEVERITY, WITHOUT BEHAVIORAL DISTURBANCE, PSYCHOTIC DISTURBANCE, MOOD DISTURBANCE, AND ANXIETY: Chronic | Status: ACTIVE | Noted: 2024-03-04

## 2024-07-20 PROBLEM — A41.9 SEPSIS, UNSPECIFIED ORGANISM: Chronic | Status: ACTIVE | Noted: 2024-03-04

## 2024-07-25 ENCOUNTER — TRANSCRIPTION ENCOUNTER (OUTPATIENT)
Age: 89
End: 2024-07-25

## 2024-07-26 ENCOUNTER — TRANSCRIPTION ENCOUNTER (OUTPATIENT)
Age: 89
End: 2024-07-26

## 2024-11-20 ENCOUNTER — EMERGENCY (EMERGENCY)
Facility: HOSPITAL | Age: 88
LOS: 1 days | End: 2024-11-20
Attending: EMERGENCY MEDICINE
Payer: MEDICARE

## 2024-11-20 VITALS
HEART RATE: 92 BPM | TEMPERATURE: 98 F | RESPIRATION RATE: 20 BRPM | SYSTOLIC BLOOD PRESSURE: 106 MMHG | OXYGEN SATURATION: 98 % | DIASTOLIC BLOOD PRESSURE: 66 MMHG

## 2024-11-20 DIAGNOSIS — Z98.890 OTHER SPECIFIED POSTPROCEDURAL STATES: Chronic | ICD-10-CM

## 2024-11-20 LAB
ALBUMIN SERPL ELPH-MCNC: 3.6 G/DL — SIGNIFICANT CHANGE UP (ref 3.3–5)
ALP SERPL-CCNC: 80 U/L — SIGNIFICANT CHANGE UP (ref 40–120)
ALT FLD-CCNC: 18 U/L — SIGNIFICANT CHANGE UP (ref 10–45)
ANION GAP SERPL CALC-SCNC: 13 MMOL/L — SIGNIFICANT CHANGE UP (ref 5–17)
APTT BLD: 39.3 SEC — HIGH (ref 24.5–35.6)
AST SERPL-CCNC: 38 U/L — SIGNIFICANT CHANGE UP (ref 10–40)
BASOPHILS # BLD AUTO: 0.01 K/UL — SIGNIFICANT CHANGE UP (ref 0–0.2)
BASOPHILS NFR BLD AUTO: 0.2 % — SIGNIFICANT CHANGE UP (ref 0–2)
BILIRUB SERPL-MCNC: 0.7 MG/DL — SIGNIFICANT CHANGE UP (ref 0.2–1.2)
BUN SERPL-MCNC: 14 MG/DL — SIGNIFICANT CHANGE UP (ref 7–23)
CALCIUM SERPL-MCNC: 8.8 MG/DL — SIGNIFICANT CHANGE UP (ref 8.4–10.5)
CHLORIDE SERPL-SCNC: 101 MMOL/L — SIGNIFICANT CHANGE UP (ref 96–108)
CO2 SERPL-SCNC: 22 MMOL/L — SIGNIFICANT CHANGE UP (ref 22–31)
CREAT SERPL-MCNC: 0.72 MG/DL — SIGNIFICANT CHANGE UP (ref 0.5–1.3)
EGFR: 86 ML/MIN/1.73M2 — SIGNIFICANT CHANGE UP
EGFR: 86 ML/MIN/1.73M2 — SIGNIFICANT CHANGE UP
EOSINOPHIL # BLD AUTO: 0 K/UL — SIGNIFICANT CHANGE UP (ref 0–0.5)
EOSINOPHIL NFR BLD AUTO: 0 % — SIGNIFICANT CHANGE UP (ref 0–6)
GLUCOSE SERPL-MCNC: 132 MG/DL — HIGH (ref 70–99)
HCT VFR BLD CALC: 46 % — SIGNIFICANT CHANGE UP (ref 39–50)
HGB BLD-MCNC: 14.6 G/DL — SIGNIFICANT CHANGE UP (ref 13–17)
IMM GRANULOCYTES NFR BLD AUTO: 0.2 % — SIGNIFICANT CHANGE UP (ref 0–0.9)
INR BLD: 2.76 RATIO — HIGH (ref 0.85–1.16)
LYMPHOCYTES # BLD AUTO: 0.95 K/UL — LOW (ref 1–3.3)
LYMPHOCYTES # BLD AUTO: 20.1 % — SIGNIFICANT CHANGE UP (ref 13–44)
MCHC RBC-ENTMCNC: 31.7 G/DL — LOW (ref 32–36)
MCHC RBC-ENTMCNC: 31.8 PG — SIGNIFICANT CHANGE UP (ref 27–34)
MCV RBC AUTO: 100.2 FL — HIGH (ref 80–100)
MONOCYTES # BLD AUTO: 0.51 K/UL — SIGNIFICANT CHANGE UP (ref 0–0.9)
MONOCYTES NFR BLD AUTO: 10.8 % — SIGNIFICANT CHANGE UP (ref 2–14)
NEUTROPHILS # BLD AUTO: 3.25 K/UL — SIGNIFICANT CHANGE UP (ref 1.8–7.4)
NEUTROPHILS NFR BLD AUTO: 68.7 % — SIGNIFICANT CHANGE UP (ref 43–77)
NRBC # BLD: 0 /100 WBCS — SIGNIFICANT CHANGE UP (ref 0–0)
NRBC BLD-RTO: 0 /100 WBCS — SIGNIFICANT CHANGE UP (ref 0–0)
PLATELET # BLD AUTO: 148 K/UL — LOW (ref 150–400)
POTASSIUM SERPL-MCNC: 4.8 MMOL/L — SIGNIFICANT CHANGE UP (ref 3.5–5.3)
POTASSIUM SERPL-SCNC: 4.8 MMOL/L — SIGNIFICANT CHANGE UP (ref 3.5–5.3)
PROT SERPL-MCNC: 6.7 G/DL — SIGNIFICANT CHANGE UP (ref 6–8.3)
PROTHROM AB SERPL-ACNC: 31.1 SEC — HIGH (ref 9.9–13.4)
RBC # BLD: 4.59 M/UL — SIGNIFICANT CHANGE UP (ref 4.2–5.8)
RBC # FLD: 12.6 % — SIGNIFICANT CHANGE UP (ref 10.3–14.5)
SODIUM SERPL-SCNC: 136 MMOL/L — SIGNIFICANT CHANGE UP (ref 135–145)
WBC # BLD: 4.73 K/UL — SIGNIFICANT CHANGE UP (ref 3.8–10.5)
WBC # FLD AUTO: 4.73 K/UL — SIGNIFICANT CHANGE UP (ref 3.8–10.5)

## 2024-11-20 PROCEDURE — 99284 EMERGENCY DEPT VISIT MOD MDM: CPT

## 2024-11-21 VITALS
DIASTOLIC BLOOD PRESSURE: 82 MMHG | SYSTOLIC BLOOD PRESSURE: 166 MMHG | TEMPERATURE: 97 F | OXYGEN SATURATION: 98 % | HEART RATE: 91 BPM | RESPIRATION RATE: 16 BRPM

## 2024-11-21 LAB
APPEARANCE UR: ABNORMAL
BACTERIA # UR AUTO: ABNORMAL /HPF
BILIRUB UR-MCNC: NEGATIVE — SIGNIFICANT CHANGE UP
CAST: 1 /LPF — SIGNIFICANT CHANGE UP (ref 0–4)
COLOR SPEC: YELLOW — SIGNIFICANT CHANGE UP
DIFF PNL FLD: NEGATIVE — SIGNIFICANT CHANGE UP
GLUCOSE UR QL: NEGATIVE MG/DL — SIGNIFICANT CHANGE UP
KETONES UR-MCNC: NEGATIVE MG/DL — SIGNIFICANT CHANGE UP
LEUKOCYTE ESTERASE UR-ACNC: ABNORMAL
NITRITE UR-MCNC: NEGATIVE — SIGNIFICANT CHANGE UP
PH UR: 7 — SIGNIFICANT CHANGE UP (ref 5–8)
PROT UR-MCNC: NEGATIVE MG/DL — SIGNIFICANT CHANGE UP
RBC CASTS # UR COMP ASSIST: 0 /HPF — SIGNIFICANT CHANGE UP (ref 0–4)
SP GR SPEC: 1.01 — SIGNIFICANT CHANGE UP (ref 1–1.03)
SQUAMOUS # UR AUTO: 2 /HPF — SIGNIFICANT CHANGE UP (ref 0–5)
UROBILINOGEN FLD QL: 1 MG/DL — SIGNIFICANT CHANGE UP (ref 0.2–1)
WBC UR QL: 67 /HPF — HIGH (ref 0–5)

## 2024-11-21 PROCEDURE — 73610 X-RAY EXAM OF ANKLE: CPT | Mod: 26,LT

## 2024-11-21 PROCEDURE — 87077 CULTURE AEROBIC IDENTIFY: CPT

## 2024-11-21 PROCEDURE — 71045 X-RAY EXAM CHEST 1 VIEW: CPT | Mod: 26

## 2024-11-21 PROCEDURE — 81001 URINALYSIS AUTO W/SCOPE: CPT

## 2024-11-21 PROCEDURE — 87186 SC STD MICRODIL/AGAR DIL: CPT

## 2024-11-21 PROCEDURE — 85730 THROMBOPLASTIN TIME PARTIAL: CPT

## 2024-11-21 PROCEDURE — 87086 URINE CULTURE/COLONY COUNT: CPT

## 2024-11-21 PROCEDURE — 85025 COMPLETE CBC W/AUTO DIFF WBC: CPT

## 2024-11-21 PROCEDURE — 93005 ELECTROCARDIOGRAM TRACING: CPT

## 2024-11-21 PROCEDURE — 85610 PROTHROMBIN TIME: CPT

## 2024-11-21 PROCEDURE — 73610 X-RAY EXAM OF ANKLE: CPT

## 2024-11-21 PROCEDURE — 71045 X-RAY EXAM CHEST 1 VIEW: CPT

## 2024-11-21 PROCEDURE — 99285 EMERGENCY DEPT VISIT HI MDM: CPT | Mod: 25

## 2024-11-21 PROCEDURE — 82962 GLUCOSE BLOOD TEST: CPT

## 2024-11-21 PROCEDURE — 96374 THER/PROPH/DIAG INJ IV PUSH: CPT

## 2024-11-21 PROCEDURE — 80053 COMPREHEN METABOLIC PANEL: CPT

## 2024-11-21 PROCEDURE — 70450 CT HEAD/BRAIN W/O DYE: CPT | Mod: MC

## 2024-11-21 PROCEDURE — 70450 CT HEAD/BRAIN W/O DYE: CPT | Mod: 26,MC

## 2024-11-21 RX ORDER — CEFTRIAXONE 500 MG/1
1000 INJECTION, POWDER, FOR SOLUTION INTRAMUSCULAR; INTRAVENOUS ONCE
Refills: 0 | Status: COMPLETED | OUTPATIENT
Start: 2024-11-21 | End: 2024-11-21

## 2024-11-21 RX ORDER — CEFPODOXIME PROXETIL 200 MG/1
1 TABLET, FILM COATED ORAL
Qty: 20 | Refills: 0
Start: 2024-11-21 | End: 2024-11-30

## 2024-11-21 RX ADMIN — CEFTRIAXONE 100 MILLIGRAM(S): 500 INJECTION, POWDER, FOR SOLUTION INTRAMUSCULAR; INTRAVENOUS at 02:01

## 2024-11-23 LAB
-  AMOXICILLIN/CLAVULANIC ACID: SIGNIFICANT CHANGE UP
-  AMPICILLIN/SULBACTAM: SIGNIFICANT CHANGE UP
-  AMPICILLIN: SIGNIFICANT CHANGE UP
-  AZTREONAM: SIGNIFICANT CHANGE UP
-  CEFAZOLIN: SIGNIFICANT CHANGE UP
-  CEFEPIME: SIGNIFICANT CHANGE UP
-  CEFOXITIN: SIGNIFICANT CHANGE UP
-  CEFTRIAXONE: SIGNIFICANT CHANGE UP
-  CEFUROXIME: SIGNIFICANT CHANGE UP
-  CIPROFLOXACIN: SIGNIFICANT CHANGE UP
-  ERTAPENEM: SIGNIFICANT CHANGE UP
-  GENTAMICIN: SIGNIFICANT CHANGE UP
-  LEVOFLOXACIN: SIGNIFICANT CHANGE UP
-  MEROPENEM: SIGNIFICANT CHANGE UP
-  NITROFURANTOIN: SIGNIFICANT CHANGE UP
-  PIPERACILLIN/TAZOBACTAM: SIGNIFICANT CHANGE UP
-  TOBRAMYCIN: SIGNIFICANT CHANGE UP
-  TRIMETHOPRIM/SULFAMETHOXAZOLE: SIGNIFICANT CHANGE UP
CULTURE RESULTS: ABNORMAL
METHOD TYPE: SIGNIFICANT CHANGE UP
ORGANISM # SPEC MICROSCOPIC CNT: ABNORMAL
ORGANISM # SPEC MICROSCOPIC CNT: ABNORMAL
SPECIMEN SOURCE: SIGNIFICANT CHANGE UP

## 2024-12-28 ENCOUNTER — INPATIENT (INPATIENT)
Facility: HOSPITAL | Age: 88
LOS: 3 days | Discharge: HOME | DRG: 690 | End: 2025-01-01
Attending: HOSPITALIST | Admitting: HOSPITALIST
Payer: MEDICARE

## 2024-12-28 VITALS
HEIGHT: 63 IN | RESPIRATION RATE: 18 BRPM | OXYGEN SATURATION: 98 % | DIASTOLIC BLOOD PRESSURE: 93 MMHG | WEIGHT: 134.92 LBS | SYSTOLIC BLOOD PRESSURE: 188 MMHG | HEART RATE: 115 BPM | TEMPERATURE: 96 F

## 2024-12-28 DIAGNOSIS — Z98.890 OTHER SPECIFIED POSTPROCEDURAL STATES: Chronic | ICD-10-CM

## 2024-12-28 LAB
ALBUMIN SERPL ELPH-MCNC: 3.8 G/DL — SIGNIFICANT CHANGE UP (ref 3.3–5)
ALP SERPL-CCNC: 89 U/L — SIGNIFICANT CHANGE UP (ref 40–120)
ALT FLD-CCNC: 17 U/L — SIGNIFICANT CHANGE UP (ref 10–45)
ANION GAP SERPL CALC-SCNC: 12 MMOL/L — SIGNIFICANT CHANGE UP (ref 5–17)
APPEARANCE UR: CLEAR — SIGNIFICANT CHANGE UP
APTT BLD: 34.6 SEC — SIGNIFICANT CHANGE UP (ref 24.5–35.6)
AST SERPL-CCNC: 23 U/L — SIGNIFICANT CHANGE UP (ref 10–40)
BACTERIA # UR AUTO: ABNORMAL /HPF
BILIRUB SERPL-MCNC: 0.8 MG/DL — SIGNIFICANT CHANGE UP (ref 0.2–1.2)
BILIRUB UR-MCNC: NEGATIVE — SIGNIFICANT CHANGE UP
BUN SERPL-MCNC: 9 MG/DL — SIGNIFICANT CHANGE UP (ref 7–23)
CALCIUM SERPL-MCNC: 9.1 MG/DL — SIGNIFICANT CHANGE UP (ref 8.4–10.5)
CAST: 0 /LPF — SIGNIFICANT CHANGE UP (ref 0–4)
CHLORIDE SERPL-SCNC: 95 MMOL/L — LOW (ref 96–108)
CO2 SERPL-SCNC: 27 MMOL/L — SIGNIFICANT CHANGE UP (ref 22–31)
COLOR SPEC: YELLOW — SIGNIFICANT CHANGE UP
CREAT SERPL-MCNC: 0.81 MG/DL — SIGNIFICANT CHANGE UP (ref 0.5–1.3)
DIFF PNL FLD: ABNORMAL
EGFR: 83 ML/MIN/1.73M2 — SIGNIFICANT CHANGE UP
GAS PNL BLDV: SIGNIFICANT CHANGE UP
GLUCOSE SERPL-MCNC: 137 MG/DL — HIGH (ref 70–99)
GLUCOSE UR QL: NEGATIVE MG/DL — SIGNIFICANT CHANGE UP
HCT VFR BLD CALC: 48.1 % — SIGNIFICANT CHANGE UP (ref 39–50)
HGB BLD-MCNC: 15.3 G/DL — SIGNIFICANT CHANGE UP (ref 13–17)
INR BLD: 1.83 RATIO — HIGH (ref 0.85–1.16)
KETONES UR-MCNC: NEGATIVE MG/DL — SIGNIFICANT CHANGE UP
LEUKOCYTE ESTERASE UR-ACNC: ABNORMAL
MCHC RBC-ENTMCNC: 31.2 PG — SIGNIFICANT CHANGE UP (ref 27–34)
MCHC RBC-ENTMCNC: 31.8 G/DL — LOW (ref 32–36)
MCV RBC AUTO: 98.2 FL — SIGNIFICANT CHANGE UP (ref 80–100)
NITRITE UR-MCNC: NEGATIVE — SIGNIFICANT CHANGE UP
PH UR: 7.5 — SIGNIFICANT CHANGE UP (ref 5–8)
PLATELET # BLD AUTO: 157 K/UL — SIGNIFICANT CHANGE UP (ref 150–400)
POTASSIUM SERPL-MCNC: 4.6 MMOL/L — SIGNIFICANT CHANGE UP (ref 3.5–5.3)
POTASSIUM SERPL-SCNC: 4.6 MMOL/L — SIGNIFICANT CHANGE UP (ref 3.5–5.3)
PROT SERPL-MCNC: 7.3 G/DL — SIGNIFICANT CHANGE UP (ref 6–8.3)
PROT UR-MCNC: NEGATIVE MG/DL — SIGNIFICANT CHANGE UP
PROTHROM AB SERPL-ACNC: 20.7 SEC — HIGH (ref 9.9–13.4)
RBC # BLD: 4.9 M/UL — SIGNIFICANT CHANGE UP (ref 4.2–5.8)
RBC # FLD: 12.5 % — SIGNIFICANT CHANGE UP (ref 10.3–14.5)
RBC CASTS # UR COMP ASSIST: 4 /HPF — SIGNIFICANT CHANGE UP (ref 0–4)
SODIUM SERPL-SCNC: 134 MMOL/L — LOW (ref 135–145)
SP GR SPEC: 1.01 — SIGNIFICANT CHANGE UP (ref 1–1.03)
SQUAMOUS # UR AUTO: 1 /HPF — SIGNIFICANT CHANGE UP (ref 0–5)
TROPONIN T, HIGH SENSITIVITY RESULT: 28 NG/L — SIGNIFICANT CHANGE UP (ref 0–51)
UROBILINOGEN FLD QL: 1 MG/DL — SIGNIFICANT CHANGE UP (ref 0.2–1)
WBC # BLD: 5.26 K/UL — SIGNIFICANT CHANGE UP (ref 3.8–10.5)
WBC # FLD AUTO: 5.26 K/UL — SIGNIFICANT CHANGE UP (ref 3.8–10.5)
WBC UR QL: 27 /HPF — HIGH (ref 0–5)

## 2024-12-28 PROCEDURE — 93308 TTE F-UP OR LMTD: CPT | Mod: 26

## 2024-12-28 PROCEDURE — 71045 X-RAY EXAM CHEST 1 VIEW: CPT | Mod: 26

## 2024-12-28 PROCEDURE — 99285 EMERGENCY DEPT VISIT HI MDM: CPT

## 2024-12-28 RX ORDER — ACETAMINOPHEN 80 MG/.8ML
1000 SOLUTION/ DROPS ORAL ONCE
Refills: 0 | Status: COMPLETED | OUTPATIENT
Start: 2024-12-28 | End: 2024-12-28

## 2024-12-28 RX ORDER — SODIUM CHLORIDE 9 MG/ML
1000 INJECTION, SOLUTION INTRAMUSCULAR; INTRAVENOUS; SUBCUTANEOUS ONCE
Refills: 0 | Status: COMPLETED | OUTPATIENT
Start: 2024-12-28 | End: 2024-12-28

## 2024-12-28 RX ORDER — VANCOMYCIN HYDROCHLORIDE 5 G/100ML
1000 INJECTION, POWDER, LYOPHILIZED, FOR SOLUTION INTRAVENOUS ONCE
Refills: 0 | Status: COMPLETED | OUTPATIENT
Start: 2024-12-28 | End: 2024-12-28

## 2024-12-28 RX ORDER — PIPERACILLIN AND TAZOBACTAM 3; .375 G/15ML; G/15ML
3.38 INJECTION, POWDER, LYOPHILIZED, FOR SOLUTION INTRAVENOUS ONCE
Refills: 0 | Status: COMPLETED | OUTPATIENT
Start: 2024-12-28 | End: 2024-12-28

## 2024-12-28 RX ADMIN — ACETAMINOPHEN 400 MILLIGRAM(S): 80 SOLUTION/ DROPS ORAL at 23:07

## 2024-12-28 RX ADMIN — SODIUM CHLORIDE 1000 MILLILITER(S): 9 INJECTION, SOLUTION INTRAMUSCULAR; INTRAVENOUS; SUBCUTANEOUS at 23:08

## 2024-12-28 NOTE — ED ADULT NURSE NOTE - NSFALLHARMRISKINTERV_ED_ALL_ED

## 2024-12-28 NOTE — ED PROVIDER NOTE - CLINICAL SUMMARY MEDICAL DECISION MAKING FREE TEXT BOX
89 hx HTN CVA L sided residual weakness, dementia A&OX0 bedbound non verbal lives w his wife full time home health aid p/w lethargy rigors, on arrival hypertensive rectally febrile 103, , physical exam unremarkable, high suspicion for sepsis-prior history of UTI w klebsiella and enterococcus sensitive to zosyn. sepsis labs, xr, ua, adm. 89 hx HTN CVA L sided residual weakness, dementia A&OX0 bedbound non verbal lives w his wife full time home health aid p/w lethargy rigors, on arrival hypertensive rectally febrile 103, , physical exam unremarkable, high suspicion for sepsis-prior history of UTI w klebsiella and enterococcus sensitive to zosyn. sepsis labs, xr, ua, adm.  Attending Corinne Forrest: 89 yo male ith multiple medical issues from home, has full time aid presenting with concern for rigors and lethartgy. upon arrival pt febrile and tachycardic. pt with h/o frerquent uti. reviewedprior cultures. pt started on IVF, and given abx. no known h/o ureteral stones. lungs ctab making pna less likely. pt awake and moving all extremities. ranging neck making meningitis less likely. will obtain labs, cultures, start abx. pt will need admission for iv abx

## 2024-12-28 NOTE — ED PROVIDER NOTE - OBJECTIVE STATEMENT
89 hx HTN CVA L sided residual weakness, dementia A&OX0 bedbound non verbal lives w his wife full time home health aid p/w lethargy as per family and episode of rigors. daughter states he looked liek this in the past when he had a UTI. family denies fever, vomiting, diarrhea, rashes. on arrival hypertensive rectal temp 103, 95% on RA .   daughter confirmed pt is full code.

## 2024-12-28 NOTE — ED PROVIDER NOTE - ATTENDING CONTRIBUTION TO CARE
Attending MD Corinne Forrest:  I personally have seen and examined this patient.  Resident note reviewed and agree on plan of care and except where noted.  See HPI, PE, and MDM for details.

## 2024-12-28 NOTE — ED ADULT NURSE REASSESSMENT NOTE - NS ED NURSE REASSESS COMMENT FT1
PT straight catheterized under sterile technique with 2 RN's at the bedside as per MD order. PT tolerated well. Approximately 100mL clear, yellow urine drained. UA/UC sent as per MD order. Safety and comfort maintained. Call bell within reach.

## 2024-12-28 NOTE — ED PROVIDER NOTE - PROGRESS NOTE DETAILS
Attending Corinne Forrest: Patient febrile rectally.  Reviewed prior charts patient with a history of Klebsiella in the urine as well as Enterococcus that was sensitive to Zosyn and Vanco.  Will cover with broad-spectrum antibiotics.  Abdomen is soft on exam.  Will give Tylenol and IV fluids.

## 2024-12-28 NOTE — ED ADULT NURSE NOTE - OBJECTIVE STATEMENT
pt is a 89yo male PMH CVA, Afib on warfarin, dementia, CHF BIBEMS complaining of altered mental status. per pt family and home health aide at bedside, pt began experiencing tremors and became increasingly lethargic pt is a 89yo male PMH CVA, Afib on warfarin, dementia, CHF BIBEMS complaining of altered mental status. per pt family and home health aide at bedside, pt began experiencing tremors and became increasingly lethargic around 2000 tonight. pt family reports frequent UTIs on behalf of pt, states that symptom presentation is similar to current presentation. pt undressed, placed in gown, rectal temp of 103F obtained, MD Forrest aware. pt nonverbal and wheelchair bound at baseline, pt opens eyes to verbal and withdraws from pain. respirations even, unlabored.

## 2024-12-28 NOTE — ED ADULT TRIAGE NOTE - CHIEF COMPLAINT QUOTE
Family endorses lethargy on set three hours ago. Pt is nonverbal baseline, "normally is able to track movement with eyes, is now moving slower" Family endorses lethargy on set three hours ago. Pt is nonverbal/bedbound baseline, Family states pt presents with generalized weakness- similar s/sx  to pervious UTI infections.

## 2024-12-28 NOTE — ED ADULT NURSE NOTE - CHIEF COMPLAINT QUOTE
Family endorses lethargy on set three hours ago. Pt is nonverbal/bedbound baseline, Family states pt presents with generalized weakness- similar s/sx  to pervious UTI infections.

## 2024-12-28 NOTE — ED ADULT TRIAGE NOTE - WEIGHT METHOD
Remote Alert Monitoring Note  Rpm alert to be reviewed by the provider   red alert   weight (5.0+# increase in last 7 days and 3.0+# increase in last day)   Additional needs to be addressed by N/A: No                    Date/Time:  10/30/2023 10:37 AM  Patient Current Location: Home: [address] [place] [address] Dr Roxy Beasley [id number]  LPN contacted patient by telephone. Verified patients name and  as identifiers. Pt gave verbal permission to speak with caregiver, Kari Horton. 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 HTN  Refer to 911 immediately if:  Patient unresponsive or unable to provide history  Change in cognition or sudden confusion  Patient unable to respond in complete sentences  Intense chest pain/tightness  Any concern for any clinical emergency  Red Alert: Provider response time of 1 hr required for any red alert requiring intervention  Yellow Alert: Provider response time of 3hr required for any escalated yellow alert      Clinical Interventions: Reviewed and followed up on alerts and treatments- Caregiver unable to answer Weight Scale Triage questions due to not being with pt when multiple weights were obtained today. Pt weighed self x 8 today, has since eaten, and is fully dressed. Per caregiver, pt has no SOB/dyspnea or new/increasing edema. RPM weight instructions reviewed with caregiver; Kari Horton v/u and requested tech support assistance with scale. RPM HRS tech support contact information provided. Caregiver v/u of when to notify provider(s) of changes / concerns and when to seek emergent medical care for pt. Pt scheduled for PCP office visit on 23. Unable to provide accurate HRS weights to PCP at this time. Escalated alert to RN-Update provided   Plan/Follow Up: Will continue to review, monitor and address alerts with follow up based on severity of symptoms and risk factors.
stated

## 2024-12-28 NOTE — ED PROVIDER NOTE - PHYSICAL EXAMINATION
GENERAL: well appearing in no acute distress, non-toxic appearing  HEAD: normocephalic, atraumatic  HEENT: normal conjunctiva, oral mucosa moist, uvula midline, no tonsilar exudates, neck supple, no JVD  CARDIAC: tachycardic, irregular no mrg  PULM: normal breath sounds, clear to ascultation bilaterally, no rales, rhonchi, wheezing  GI: abdomen nondistended, soft, nontender, no guarding, rebound tenderness  : +suprapubic tenderness   NEURO: l sided weakness, responsive to pain all four extremities, A&OX0  SKIN: well-perfused, extremities warm, no visible rashes GENERAL: well appearing in no acute distress, non-toxic appearing  HEAD: normocephalic, atraumatic  HEENT: normal conjunctiva, oral mucosa moist, uvula midline, no tonsilar exudates, neck supple, no JVD  CARDIAC: tachycardic, irregular no mrg  PULM: normal breath sounds, clear to ascultation bilaterally, no rales, rhonchi, wheezing  GI: abdomen nondistended, soft, nontender, no guarding, rebound tenderness  : +suprapubic tenderness   NEURO: l sided weakness, responsive to pain all four extremities, A&OX0  SKIN: well-perfused, extremities warm, no visible rashes  Attending Corinne Forrest: Gen: NAD, heent: atrauamtic,dry mucous memrbranes, op pink, uvula midline, neck; nttp, no nuchal rigidity, , cv:tachcyardic lungs: ctab, abd: soft, mild suprapubic ttp, no pulsatile mass, ext: wwp,  skin: no rash, neuro: awake and moving extremities

## 2024-12-29 DIAGNOSIS — R53.2 FUNCTIONAL QUADRIPLEGIA: ICD-10-CM

## 2024-12-29 DIAGNOSIS — I48.20 CHRONIC ATRIAL FIBRILLATION, UNSPECIFIED: ICD-10-CM

## 2024-12-29 DIAGNOSIS — N40.0 BENIGN PROSTATIC HYPERPLASIA WITHOUT LOWER URINARY TRACT SYMPTOMS: ICD-10-CM

## 2024-12-29 DIAGNOSIS — F03.90 UNSPECIFIED DEMENTIA, UNSPECIFIED SEVERITY, WITHOUT BEHAVIORAL DISTURBANCE, PSYCHOTIC DISTURBANCE, MOOD DISTURBANCE, AND ANXIETY: ICD-10-CM

## 2024-12-29 DIAGNOSIS — K21.9 GASTRO-ESOPHAGEAL REFLUX DISEASE WITHOUT ESOPHAGITIS: ICD-10-CM

## 2024-12-29 DIAGNOSIS — A41.9 SEPSIS, UNSPECIFIED ORGANISM: ICD-10-CM

## 2024-12-29 DIAGNOSIS — N39.0 URINARY TRACT INFECTION, SITE NOT SPECIFIED: ICD-10-CM

## 2024-12-29 DIAGNOSIS — Z86.73 PERSONAL HISTORY OF TRANSIENT ISCHEMIC ATTACK (TIA), AND CEREBRAL INFARCTION WITHOUT RESIDUAL DEFICITS: ICD-10-CM

## 2024-12-29 LAB
ALBUMIN SERPL ELPH-MCNC: 3.2 G/DL — LOW (ref 3.3–5)
ALP SERPL-CCNC: 69 U/L — SIGNIFICANT CHANGE UP (ref 40–120)
ALT FLD-CCNC: 20 U/L — SIGNIFICANT CHANGE UP (ref 10–45)
ANION GAP SERPL CALC-SCNC: 10 MMOL/L — SIGNIFICANT CHANGE UP (ref 5–17)
ANION GAP SERPL CALC-SCNC: 9 MMOL/L — SIGNIFICANT CHANGE UP (ref 5–17)
ANISOCYTOSIS BLD QL: SIGNIFICANT CHANGE UP
AST SERPL-CCNC: 63 U/L — HIGH (ref 10–40)
BASOPHILS # BLD AUTO: 0 K/UL — SIGNIFICANT CHANGE UP (ref 0–0.2)
BASOPHILS NFR BLD AUTO: 0 % — SIGNIFICANT CHANGE UP (ref 0–2)
BILIRUB SERPL-MCNC: 0.7 MG/DL — SIGNIFICANT CHANGE UP (ref 0.2–1.2)
BUN SERPL-MCNC: 9 MG/DL — SIGNIFICANT CHANGE UP (ref 7–23)
BUN SERPL-MCNC: 9 MG/DL — SIGNIFICANT CHANGE UP (ref 7–23)
CALCIUM SERPL-MCNC: 8.2 MG/DL — LOW (ref 8.4–10.5)
CALCIUM SERPL-MCNC: 8.6 MG/DL — SIGNIFICANT CHANGE UP (ref 8.4–10.5)
CHLORIDE SERPL-SCNC: 102 MMOL/L — SIGNIFICANT CHANGE UP (ref 96–108)
CHLORIDE SERPL-SCNC: 99 MMOL/L — SIGNIFICANT CHANGE UP (ref 96–108)
CO2 SERPL-SCNC: 23 MMOL/L — SIGNIFICANT CHANGE UP (ref 22–31)
CO2 SERPL-SCNC: 24 MMOL/L — SIGNIFICANT CHANGE UP (ref 22–31)
CREAT SERPL-MCNC: 0.71 MG/DL — SIGNIFICANT CHANGE UP (ref 0.5–1.3)
CREAT SERPL-MCNC: 0.79 MG/DL — SIGNIFICANT CHANGE UP (ref 0.5–1.3)
EGFR: 84 ML/MIN/1.73M2 — SIGNIFICANT CHANGE UP
EGFR: 87 ML/MIN/1.73M2 — SIGNIFICANT CHANGE UP
EOSINOPHIL # BLD AUTO: 0 K/UL — SIGNIFICANT CHANGE UP (ref 0–0.5)
EOSINOPHIL NFR BLD AUTO: 0 % — SIGNIFICANT CHANGE UP (ref 0–6)
GAS PNL BLDV: SIGNIFICANT CHANGE UP
GLUCOSE SERPL-MCNC: 118 MG/DL — HIGH (ref 70–99)
GLUCOSE SERPL-MCNC: 134 MG/DL — HIGH (ref 70–99)
HCT VFR BLD CALC: 44.3 % — SIGNIFICANT CHANGE UP (ref 39–50)
HGB BLD-MCNC: 14.4 G/DL — SIGNIFICANT CHANGE UP (ref 13–17)
INR BLD: 1.93 RATIO — HIGH (ref 0.85–1.16)
LACTATE SERPL-SCNC: 2.2 MMOL/L — HIGH (ref 0.5–2)
LYMPHOCYTES # BLD AUTO: 0.41 K/UL — LOW (ref 1–3.3)
LYMPHOCYTES # BLD AUTO: 7.7 % — LOW (ref 13–44)
MACROCYTES BLD QL: SIGNIFICANT CHANGE UP
MANUAL SMEAR VERIFICATION: SIGNIFICANT CHANGE UP
MCHC RBC-ENTMCNC: 32.3 PG — SIGNIFICANT CHANGE UP (ref 27–34)
MCHC RBC-ENTMCNC: 32.5 G/DL — SIGNIFICANT CHANGE UP (ref 32–36)
MCV RBC AUTO: 99.3 FL — SIGNIFICANT CHANGE UP (ref 80–100)
METAMYELOCYTES # FLD: 0.8 % — HIGH (ref 0–0)
MONOCYTES # BLD AUTO: 0.41 K/UL — SIGNIFICANT CHANGE UP (ref 0–0.9)
MONOCYTES NFR BLD AUTO: 7.7 % — SIGNIFICANT CHANGE UP (ref 2–14)
NEUTROPHILS # BLD AUTO: 4.41 K/UL — SIGNIFICANT CHANGE UP (ref 1.8–7.4)
NEUTROPHILS NFR BLD AUTO: 83.8 % — HIGH (ref 43–77)
NRBC # BLD: 0 /100 WBCS — SIGNIFICANT CHANGE UP (ref 0–0)
OVALOCYTES BLD QL SMEAR: SLIGHT — SIGNIFICANT CHANGE UP
PLAT MORPH BLD: NORMAL — SIGNIFICANT CHANGE UP
PLATELET # BLD AUTO: 137 K/UL — LOW (ref 150–400)
PLATELET COUNT - ESTIMATE: ABNORMAL
POTASSIUM SERPL-MCNC: 4.3 MMOL/L — SIGNIFICANT CHANGE UP (ref 3.5–5.3)
POTASSIUM SERPL-MCNC: 5 MMOL/L — SIGNIFICANT CHANGE UP (ref 3.5–5.3)
POTASSIUM SERPL-SCNC: 4.3 MMOL/L — SIGNIFICANT CHANGE UP (ref 3.5–5.3)
POTASSIUM SERPL-SCNC: 5 MMOL/L — SIGNIFICANT CHANGE UP (ref 3.5–5.3)
PROCALCITONIN SERPL-MCNC: 0.07 NG/ML — SIGNIFICANT CHANGE UP (ref 0.02–0.1)
PROT SERPL-MCNC: 5.9 G/DL — LOW (ref 6–8.3)
PROTHROM AB SERPL-ACNC: 21.9 SEC — HIGH (ref 9.9–13.4)
RBC # BLD: 4.46 M/UL — SIGNIFICANT CHANGE UP (ref 4.2–5.8)
RBC # FLD: 12.4 % — SIGNIFICANT CHANGE UP (ref 10.3–14.5)
RBC BLD AUTO: ABNORMAL
SODIUM SERPL-SCNC: 131 MMOL/L — LOW (ref 135–145)
SODIUM SERPL-SCNC: 136 MMOL/L — SIGNIFICANT CHANGE UP (ref 135–145)
TROPONIN T, HIGH SENSITIVITY RESULT: 28 NG/L — SIGNIFICANT CHANGE UP (ref 0–51)
WBC # BLD: 5.11 K/UL — SIGNIFICANT CHANGE UP (ref 3.8–10.5)
WBC # FLD AUTO: 5.11 K/UL — SIGNIFICANT CHANGE UP (ref 3.8–10.5)

## 2024-12-29 PROCEDURE — 93010 ELECTROCARDIOGRAM REPORT: CPT

## 2024-12-29 PROCEDURE — 99223 1ST HOSP IP/OBS HIGH 75: CPT | Mod: 25

## 2024-12-29 PROCEDURE — 99497 ADVNCD CARE PLAN 30 MIN: CPT | Mod: 25

## 2024-12-29 RX ORDER — FINASTERIDE 5 MG
5 TABLET ORAL DAILY
Refills: 0 | Status: DISCONTINUED | OUTPATIENT
Start: 2024-12-29 | End: 2025-01-01

## 2024-12-29 RX ORDER — WARFARIN SODIUM 10 MG/1
1.5 TABLET ORAL ONCE
Refills: 0 | Status: COMPLETED | OUTPATIENT
Start: 2024-12-29 | End: 2024-12-29

## 2024-12-29 RX ORDER — VANCOMYCIN HYDROCHLORIDE 5 G/100ML
750 INJECTION, POWDER, LYOPHILIZED, FOR SOLUTION INTRAVENOUS EVERY 12 HOURS
Refills: 0 | Status: DISCONTINUED | OUTPATIENT
Start: 2024-12-29 | End: 2024-12-29

## 2024-12-29 RX ORDER — SODIUM CHLORIDE 9 MG/ML
500 INJECTION, SOLUTION INTRAMUSCULAR; INTRAVENOUS; SUBCUTANEOUS ONCE
Refills: 0 | Status: COMPLETED | OUTPATIENT
Start: 2024-12-29 | End: 2024-12-29

## 2024-12-29 RX ORDER — PANTOPRAZOLE 40 MG/1
40 TABLET, DELAYED RELEASE ORAL
Refills: 0 | Status: DISCONTINUED | OUTPATIENT
Start: 2024-12-29 | End: 2025-01-01

## 2024-12-29 RX ORDER — GINKGO BILOBA 40 MG
3 CAPSULE ORAL AT BEDTIME
Refills: 0 | Status: DISCONTINUED | OUTPATIENT
Start: 2024-12-29 | End: 2025-01-01

## 2024-12-29 RX ORDER — WARFARIN SODIUM 10 MG/1
1 TABLET ORAL
Refills: 0 | DISCHARGE

## 2024-12-29 RX ORDER — CHOLECALCIFEROL (VITAMIN D3) 10 MCG
1000 TABLET ORAL DAILY
Refills: 0 | Status: DISCONTINUED | OUTPATIENT
Start: 2024-12-29 | End: 2025-01-01

## 2024-12-29 RX ORDER — TAMSULOSIN HYDROCHLORIDE 0.4 MG/1
0.4 CAPSULE ORAL AT BEDTIME
Refills: 0 | Status: DISCONTINUED | OUTPATIENT
Start: 2024-12-29 | End: 2025-01-01

## 2024-12-29 RX ORDER — ATORVASTATIN CALCIUM 40 MG/1
10 TABLET, FILM COATED ORAL AT BEDTIME
Refills: 0 | Status: DISCONTINUED | OUTPATIENT
Start: 2024-12-29 | End: 2025-01-01

## 2024-12-29 RX ORDER — PIPERACILLIN AND TAZOBACTAM 3; .375 G/15ML; G/15ML
3.38 INJECTION, POWDER, LYOPHILIZED, FOR SOLUTION INTRAVENOUS EVERY 8 HOURS
Refills: 0 | Status: DISCONTINUED | OUTPATIENT
Start: 2024-12-29 | End: 2024-12-31

## 2024-12-29 RX ORDER — PANTOPRAZOLE 40 MG/1
40 TABLET, DELAYED RELEASE ORAL EVERY 12 HOURS
Refills: 0 | Status: DISCONTINUED | OUTPATIENT
Start: 2024-12-29 | End: 2024-12-29

## 2024-12-29 RX ORDER — ACETAMINOPHEN 80 MG/.8ML
650 SOLUTION/ DROPS ORAL EVERY 6 HOURS
Refills: 0 | Status: DISCONTINUED | OUTPATIENT
Start: 2024-12-29 | End: 2025-01-01

## 2024-12-29 RX ORDER — METOPROLOL TARTRATE 50 MG
0.5 TABLET ORAL
Refills: 0 | DISCHARGE

## 2024-12-29 RX ORDER — WARFARIN SODIUM 10 MG/1
1.5 TABLET ORAL
Refills: 0 | DISCHARGE

## 2024-12-29 RX ORDER — DIGOXIN 250 MCG
250 TABLET ORAL DAILY
Refills: 0 | Status: DISCONTINUED | OUTPATIENT
Start: 2024-12-29 | End: 2025-01-01

## 2024-12-29 RX ORDER — ASPIRIN 81 MG
81 TABLET, DELAYED RELEASE (ENTERIC COATED) ORAL DAILY
Refills: 0 | Status: DISCONTINUED | OUTPATIENT
Start: 2024-12-29 | End: 2024-12-30

## 2024-12-29 RX ORDER — VANCOMYCIN HYDROCHLORIDE 5 G/100ML
750 INJECTION, POWDER, LYOPHILIZED, FOR SOLUTION INTRAVENOUS EVERY 12 HOURS
Refills: 0 | Status: DISCONTINUED | OUTPATIENT
Start: 2024-12-29 | End: 2024-12-30

## 2024-12-29 RX ORDER — PYRIDOXINE HCL (VITAMIN B6) 100 MG
100 TABLET ORAL DAILY
Refills: 0 | Status: DISCONTINUED | OUTPATIENT
Start: 2024-12-29 | End: 2025-01-01

## 2024-12-29 RX ORDER — METOPROLOL TARTRATE 50 MG
12.5 TABLET ORAL DAILY
Refills: 0 | Status: DISCONTINUED | OUTPATIENT
Start: 2024-12-29 | End: 2025-01-01

## 2024-12-29 RX ADMIN — PIPERACILLIN AND TAZOBACTAM 200 GRAM(S): 3; .375 INJECTION, POWDER, LYOPHILIZED, FOR SOLUTION INTRAVENOUS at 00:46

## 2024-12-29 RX ADMIN — ATORVASTATIN CALCIUM 10 MILLIGRAM(S): 40 TABLET, FILM COATED ORAL at 22:20

## 2024-12-29 RX ADMIN — PIPERACILLIN AND TAZOBACTAM 25 GRAM(S): 3; .375 INJECTION, POWDER, LYOPHILIZED, FOR SOLUTION INTRAVENOUS at 22:06

## 2024-12-29 RX ADMIN — PANTOPRAZOLE 40 MILLIGRAM(S): 40 TABLET, DELAYED RELEASE ORAL at 17:36

## 2024-12-29 RX ADMIN — Medication 12.5 MILLIGRAM(S): at 11:50

## 2024-12-29 RX ADMIN — ACETAMINOPHEN 1000 MILLIGRAM(S): 80 SOLUTION/ DROPS ORAL at 01:30

## 2024-12-29 RX ADMIN — VANCOMYCIN HYDROCHLORIDE 250 MILLIGRAM(S): 5 INJECTION, POWDER, LYOPHILIZED, FOR SOLUTION INTRAVENOUS at 13:03

## 2024-12-29 RX ADMIN — Medication 250 MICROGRAM(S): at 11:50

## 2024-12-29 RX ADMIN — SODIUM CHLORIDE 500 MILLILITER(S): 9 INJECTION, SOLUTION INTRAMUSCULAR; INTRAVENOUS; SUBCUTANEOUS at 10:01

## 2024-12-29 RX ADMIN — VANCOMYCIN HYDROCHLORIDE 250 MILLIGRAM(S): 5 INJECTION, POWDER, LYOPHILIZED, FOR SOLUTION INTRAVENOUS at 02:21

## 2024-12-29 RX ADMIN — Medication 100 MILLIGRAM(S): at 14:18

## 2024-12-29 RX ADMIN — PIPERACILLIN AND TAZOBACTAM 25 GRAM(S): 3; .375 INJECTION, POWDER, LYOPHILIZED, FOR SOLUTION INTRAVENOUS at 14:18

## 2024-12-29 RX ADMIN — WARFARIN SODIUM 1.5 MILLIGRAM(S): 10 TABLET ORAL at 22:21

## 2024-12-29 RX ADMIN — Medication 1000 UNIT(S): at 11:50

## 2024-12-29 RX ADMIN — PIPERACILLIN AND TAZOBACTAM 25 GRAM(S): 3; .375 INJECTION, POWDER, LYOPHILIZED, FOR SOLUTION INTRAVENOUS at 05:04

## 2024-12-29 RX ADMIN — TAMSULOSIN HYDROCHLORIDE 0.4 MILLIGRAM(S): 0.4 CAPSULE ORAL at 22:15

## 2024-12-29 RX ADMIN — Medication 81 MILLIGRAM(S): at 11:50

## 2024-12-29 RX ADMIN — Medication 5 MILLIGRAM(S): at 11:50

## 2024-12-29 NOTE — H&P ADULT - ASSESSMENT
89 w/hx of advanced dementia (non-verbal, alert, bedbound at baseline), A-fib on warfarin, CVA w/L sided residual weakness, HTN, BPH, HLD, GERD presents due to worsening lethargy and rigors seen at home.    Admit for Sepsis 2/2 UTI.

## 2024-12-29 NOTE — PROVIDER CONTACT NOTE (CRITICAL VALUE NOTIFICATION) - BACKGROUND
PMH of htn, CVA-left sided residual weakness, dementia. Pt is AOx0 and bedbound. Pt admitted for UTI.

## 2024-12-29 NOTE — H&P ADULT - CONVERSATION DETAILS
Discussed GOC in setting of patient's age, comorbidities, and functional status. Discussed with patient's daughter, who has demonstrated capacity and is A&Ox3 at this time. MOLST in chart. Patient is to remain FULL CODE. Per daughter, patient would want to take every measure to continue his life.

## 2024-12-29 NOTE — H&P ADULT - PROBLEM SELECTOR PLAN 4
- Continue digoxin, metoprolol, warfarin  - F/u PT/INR  - F/u digoxin level  - Order warfarin daily based on INR  - Mildly subtherapeutic INR of 1.83

## 2024-12-29 NOTE — H&P ADULT - PROBLEM SELECTOR PLAN 1
Patient with UTI, tachycardia, fever  - Previous cultures with enterococcus with poor sensitivity to vanco  - Cover empirically with vancomycin and zosyn for now  - Follow urine and blood cultures

## 2024-12-29 NOTE — H&P ADULT - PARTICIPANTS
Family

## 2024-12-29 NOTE — H&P ADULT - HISTORY OF PRESENT ILLNESS
Patient is non-verbal, hx obtained via daughter, Ada Kimbrough and chart review.    89 w/hx of advanced dementia (non-verbal, alert, bedbound at baseline), A-fib on warfarin, CVA w/L sided residual weakness, HTN, BPH, HLD, GERD presents due to worsening lethargy and rigors seen at home. Daughter reports patient had similar symptoms during a previous UTI. Patient has had multiple UTIs previously. No known fevers at home. Unclear if any changes in urination. No suprapubic tenderness. Patient is non-verbal, hx obtained via daughter, Ada Kimbrough and chart review.    89 w/hx of advanced dementia (non-verbal, alert, bedbound at baseline), A-fib on warfarin, CVA w/L sided residual weakness, HTN, BPH, HLD, GERD presents due to worsening lethargy and rigors seen at home. Daughter reports patient had similar symptoms during a previous UTI. Patient has had multiple UTIs previously. Unclear if any changes in urination. No suprapubic tenderness. Febrile and tachycardic in ED.

## 2024-12-29 NOTE — H&P ADULT - TIME BILLING
Independently obtaining a history, performing a physical examination, discussing the plan with the patient, ordering medications/tests, documenting clinical information, and coordinating care.    In addition to time of total encounter, spent time discussing goals of care as recorded in documented note above.  84 minutes spent on total encounter which excludes time spent discussing goc.

## 2024-12-29 NOTE — H&P ADULT - NSHPPHYSICALEXAM_GEN_ALL_CORE
Vital Signs Last 24 Hrs  T(C): 36.4 (29 Dec 2024 04:34), Max: 39.4 (28 Dec 2024 22:48)  T(F): 97.5 (29 Dec 2024 04:34), Max: 103 (28 Dec 2024 22:48)  HR: 73 (29 Dec 2024 04:34) (73 - 115)  BP: 133/84 (29 Dec 2024 04:34) (120/70 - 188/93)  BP(mean): --  RR: 18 (29 Dec 2024 04:34) (18 - 18)  SpO2: 98% (29 Dec 2024 04:34) (97% - 98%)    Parameters below as of 29 Dec 2024 04:34  Patient On (Oxygen Delivery Method): room air        CONSTITUTIONAL: No apparent distress  EYES: PERRLA and symmetric, EOMI, No conjunctival or scleral injection, non-icteric  ENMT: Oral mucosa with moist membranes.  NECK: Supple, symmetric. No JVD.  RESP: No respiratory distress, no use of accessory muscles; CTA b/l, no WRR  CV: Irregularly irregular, +S1S2, no MRG  GI: Soft, NT, ND, no rebound, no guarding  : No suprapubic tenderness. No CVA tenderness.  LYMPH: No cervical LAD or tenderness  MSK: No spinal tenderness.  EXTREMITIES: No pedal edema  SKIN: No rashes or ulcers noted  NEURO: Alert, responsive. No tremor.

## 2024-12-29 NOTE — H&P ADULT - PROBLEM SELECTOR PLAN 3
Patient with advanced dementia  - Pureed diet w/honey thick liquids on previous swallow eval  - Aspiration risk, fall risk

## 2024-12-29 NOTE — CHART NOTE - NSCHARTNOTEFT_GEN_A_CORE
89 w/hx of advanced dementia (non-verbal, alert, bedbound at baseline), A-fib on warfarin, CVA w/L sided residual weakness, HTN, BPH, HLD, GERD presents due to worsening lethargy and rigors seen at home.    Admit for Sepsis 2/2 UTI.      Seen this AM, resting in bed, rousable but nonverbal, smiles, shows no nonverbal signs of pain. Vitals mildly tachy and elevated BP to 160s, on room air.     Plan:   -c/w abx pending cx, check vanco level and de-escalate per culture data  -check MRSA swab  -monitor INR, dose coumadin     Rest of plan as per H&P, d/w NP Lizeth Adames MD  Division of Hospital Medicine  Reachable on MS Teams

## 2024-12-30 DIAGNOSIS — Z29.9 ENCOUNTER FOR PROPHYLACTIC MEASURES, UNSPECIFIED: ICD-10-CM

## 2024-12-30 LAB
A1C WITH ESTIMATED AVERAGE GLUCOSE RESULT: 4.9 % — SIGNIFICANT CHANGE UP (ref 4–5.6)
ALBUMIN SERPL ELPH-MCNC: 3.2 G/DL — LOW (ref 3.3–5)
ALP SERPL-CCNC: 71 U/L — SIGNIFICANT CHANGE UP (ref 40–120)
ALT FLD-CCNC: 14 U/L — SIGNIFICANT CHANGE UP (ref 10–45)
ANION GAP SERPL CALC-SCNC: 11 MMOL/L — SIGNIFICANT CHANGE UP (ref 5–17)
AST SERPL-CCNC: 21 U/L — SIGNIFICANT CHANGE UP (ref 10–40)
BASOPHILS # BLD AUTO: 0 K/UL — SIGNIFICANT CHANGE UP (ref 0–0.2)
BASOPHILS NFR BLD AUTO: 0 % — SIGNIFICANT CHANGE UP (ref 0–2)
BILIRUB SERPL-MCNC: 0.9 MG/DL — SIGNIFICANT CHANGE UP (ref 0.2–1.2)
BUN SERPL-MCNC: 10 MG/DL — SIGNIFICANT CHANGE UP (ref 7–23)
CALCIUM SERPL-MCNC: 8.6 MG/DL — SIGNIFICANT CHANGE UP (ref 8.4–10.5)
CHLORIDE SERPL-SCNC: 101 MMOL/L — SIGNIFICANT CHANGE UP (ref 96–108)
CO2 SERPL-SCNC: 26 MMOL/L — SIGNIFICANT CHANGE UP (ref 22–31)
CREAT SERPL-MCNC: 0.79 MG/DL — SIGNIFICANT CHANGE UP (ref 0.5–1.3)
DIGOXIN SERPL-MCNC: 1 NG/ML — SIGNIFICANT CHANGE UP (ref 0.8–2)
EGFR: 84 ML/MIN/1.73M2 — SIGNIFICANT CHANGE UP
EOSINOPHIL # BLD AUTO: 0 K/UL — SIGNIFICANT CHANGE UP (ref 0–0.5)
EOSINOPHIL NFR BLD AUTO: 0 % — SIGNIFICANT CHANGE UP (ref 0–6)
ESTIMATED AVERAGE GLUCOSE: 94 MG/DL — SIGNIFICANT CHANGE UP (ref 68–114)
GLUCOSE SERPL-MCNC: 111 MG/DL — HIGH (ref 70–99)
HCT VFR BLD CALC: 45.7 % — SIGNIFICANT CHANGE UP (ref 39–50)
HGB BLD-MCNC: 14.6 G/DL — SIGNIFICANT CHANGE UP (ref 13–17)
IMM GRANULOCYTES NFR BLD AUTO: 0.3 % — SIGNIFICANT CHANGE UP (ref 0–0.9)
INR BLD: 1.55 RATIO — HIGH (ref 0.85–1.16)
LACTATE SERPL-SCNC: 1.5 MMOL/L — SIGNIFICANT CHANGE UP (ref 0.5–2)
LYMPHOCYTES # BLD AUTO: 0.73 K/UL — LOW (ref 1–3.3)
LYMPHOCYTES # BLD AUTO: 22.5 % — SIGNIFICANT CHANGE UP (ref 13–44)
MAGNESIUM SERPL-MCNC: 2 MG/DL — SIGNIFICANT CHANGE UP (ref 1.6–2.6)
MCHC RBC-ENTMCNC: 31.5 PG — SIGNIFICANT CHANGE UP (ref 27–34)
MCHC RBC-ENTMCNC: 31.9 G/DL — LOW (ref 32–36)
MCV RBC AUTO: 98.5 FL — SIGNIFICANT CHANGE UP (ref 80–100)
MONOCYTES # BLD AUTO: 0.45 K/UL — SIGNIFICANT CHANGE UP (ref 0–0.9)
MONOCYTES NFR BLD AUTO: 13.8 % — SIGNIFICANT CHANGE UP (ref 2–14)
MRSA PCR RESULT.: SIGNIFICANT CHANGE UP
NEUTROPHILS # BLD AUTO: 2.06 K/UL — SIGNIFICANT CHANGE UP (ref 1.8–7.4)
NEUTROPHILS NFR BLD AUTO: 63.4 % — SIGNIFICANT CHANGE UP (ref 43–77)
NRBC # BLD: 0 /100 WBCS — SIGNIFICANT CHANGE UP (ref 0–0)
PHOSPHATE SERPL-MCNC: 2.8 MG/DL — SIGNIFICANT CHANGE UP (ref 2.5–4.5)
PLATELET # BLD AUTO: 120 K/UL — LOW (ref 150–400)
POTASSIUM SERPL-MCNC: 3.9 MMOL/L — SIGNIFICANT CHANGE UP (ref 3.5–5.3)
POTASSIUM SERPL-SCNC: 3.9 MMOL/L — SIGNIFICANT CHANGE UP (ref 3.5–5.3)
PROT SERPL-MCNC: 6 G/DL — SIGNIFICANT CHANGE UP (ref 6–8.3)
PROTHROM AB SERPL-ACNC: 17.6 SEC — HIGH (ref 9.9–13.4)
RBC # BLD: 4.64 M/UL — SIGNIFICANT CHANGE UP (ref 4.2–5.8)
RBC # FLD: 12.1 % — SIGNIFICANT CHANGE UP (ref 10.3–14.5)
S AUREUS DNA NOSE QL NAA+PROBE: SIGNIFICANT CHANGE UP
SODIUM SERPL-SCNC: 138 MMOL/L — SIGNIFICANT CHANGE UP (ref 135–145)
VANCOMYCIN FLD-MCNC: 15.8 UG/ML — SIGNIFICANT CHANGE UP
WBC # BLD: 3.25 K/UL — LOW (ref 3.8–10.5)
WBC # FLD AUTO: 3.25 K/UL — LOW (ref 3.8–10.5)

## 2024-12-30 PROCEDURE — 99233 SBSQ HOSP IP/OBS HIGH 50: CPT

## 2024-12-30 RX ORDER — WARFARIN SODIUM 10 MG/1
2 TABLET ORAL ONCE
Refills: 0 | Status: COMPLETED | OUTPATIENT
Start: 2024-12-30 | End: 2024-12-30

## 2024-12-30 RX ORDER — ASPIRIN 81 MG
81 TABLET, DELAYED RELEASE (ENTERIC COATED) ORAL DAILY
Refills: 0 | Status: DISCONTINUED | OUTPATIENT
Start: 2024-12-30 | End: 2025-01-01

## 2024-12-30 RX ORDER — SODIUM CHLORIDE 9 MG/ML
1000 INJECTION, SOLUTION INTRAMUSCULAR; INTRAVENOUS; SUBCUTANEOUS
Refills: 0 | Status: DISCONTINUED | OUTPATIENT
Start: 2024-12-30 | End: 2024-12-31

## 2024-12-30 RX ADMIN — Medication 81 MILLIGRAM(S): at 13:47

## 2024-12-30 RX ADMIN — PIPERACILLIN AND TAZOBACTAM 25 GRAM(S): 3; .375 INJECTION, POWDER, LYOPHILIZED, FOR SOLUTION INTRAVENOUS at 13:54

## 2024-12-30 RX ADMIN — Medication 100 MILLIGRAM(S): at 13:48

## 2024-12-30 RX ADMIN — Medication 12.5 MILLIGRAM(S): at 05:30

## 2024-12-30 RX ADMIN — WARFARIN SODIUM 2 MILLIGRAM(S): 10 TABLET ORAL at 20:21

## 2024-12-30 RX ADMIN — PIPERACILLIN AND TAZOBACTAM 25 GRAM(S): 3; .375 INJECTION, POWDER, LYOPHILIZED, FOR SOLUTION INTRAVENOUS at 05:28

## 2024-12-30 RX ADMIN — PIPERACILLIN AND TAZOBACTAM 25 GRAM(S): 3; .375 INJECTION, POWDER, LYOPHILIZED, FOR SOLUTION INTRAVENOUS at 23:01

## 2024-12-30 RX ADMIN — TAMSULOSIN HYDROCHLORIDE 0.4 MILLIGRAM(S): 0.4 CAPSULE ORAL at 20:20

## 2024-12-30 RX ADMIN — ATORVASTATIN CALCIUM 10 MILLIGRAM(S): 40 TABLET, FILM COATED ORAL at 20:20

## 2024-12-30 RX ADMIN — PANTOPRAZOLE 40 MILLIGRAM(S): 40 TABLET, DELAYED RELEASE ORAL at 17:00

## 2024-12-30 RX ADMIN — PANTOPRAZOLE 40 MILLIGRAM(S): 40 TABLET, DELAYED RELEASE ORAL at 05:29

## 2024-12-30 RX ADMIN — Medication 5 MILLIGRAM(S): at 13:58

## 2024-12-30 RX ADMIN — SODIUM CHLORIDE 50 MILLILITER(S): 9 INJECTION, SOLUTION INTRAMUSCULAR; INTRAVENOUS; SUBCUTANEOUS at 13:51

## 2024-12-30 RX ADMIN — Medication 250 MICROGRAM(S): at 05:30

## 2024-12-30 RX ADMIN — VANCOMYCIN HYDROCHLORIDE 250 MILLIGRAM(S): 5 INJECTION, POWDER, LYOPHILIZED, FOR SOLUTION INTRAVENOUS at 02:01

## 2024-12-30 RX ADMIN — Medication 1000 UNIT(S): at 13:48

## 2024-12-31 ENCOUNTER — TRANSCRIPTION ENCOUNTER (OUTPATIENT)
Age: 88
End: 2024-12-31

## 2024-12-31 LAB
-  GENTAMICIN: SIGNIFICANT CHANGE UP
-  NITROFURANTOIN: SIGNIFICANT CHANGE UP
-  OXACILLIN: SIGNIFICANT CHANGE UP
-  RIFAMPIN: SIGNIFICANT CHANGE UP
-  TETRACYCLINE: SIGNIFICANT CHANGE UP
-  TRIMETHOPRIM/SULFAMETHOXAZOLE: SIGNIFICANT CHANGE UP
-  VANCOMYCIN: SIGNIFICANT CHANGE UP
ANION GAP SERPL CALC-SCNC: 10 MMOL/L — SIGNIFICANT CHANGE UP (ref 5–17)
BUN SERPL-MCNC: 12 MG/DL — SIGNIFICANT CHANGE UP (ref 7–23)
CALCIUM SERPL-MCNC: 8.8 MG/DL — SIGNIFICANT CHANGE UP (ref 8.4–10.5)
CHLORIDE SERPL-SCNC: 106 MMOL/L — SIGNIFICANT CHANGE UP (ref 96–108)
CO2 SERPL-SCNC: 26 MMOL/L — SIGNIFICANT CHANGE UP (ref 22–31)
CREAT SERPL-MCNC: 0.84 MG/DL — SIGNIFICANT CHANGE UP (ref 0.5–1.3)
CULTURE RESULTS: ABNORMAL
EGFR: 82 ML/MIN/1.73M2 — SIGNIFICANT CHANGE UP
GLUCOSE BLDC GLUCOMTR-MCNC: 111 MG/DL — HIGH (ref 70–99)
GLUCOSE SERPL-MCNC: 105 MG/DL — HIGH (ref 70–99)
HCT VFR BLD CALC: 43.7 % — SIGNIFICANT CHANGE UP (ref 39–50)
HGB BLD-MCNC: 14.2 G/DL — SIGNIFICANT CHANGE UP (ref 13–17)
INR BLD: 1.66 RATIO — HIGH (ref 0.85–1.16)
MCHC RBC-ENTMCNC: 32.1 PG — SIGNIFICANT CHANGE UP (ref 27–34)
MCHC RBC-ENTMCNC: 32.5 G/DL — SIGNIFICANT CHANGE UP (ref 32–36)
MCV RBC AUTO: 98.6 FL — SIGNIFICANT CHANGE UP (ref 80–100)
METHOD TYPE: SIGNIFICANT CHANGE UP
NRBC # BLD: 0 /100 WBCS — SIGNIFICANT CHANGE UP (ref 0–0)
ORGANISM # SPEC MICROSCOPIC CNT: ABNORMAL
ORGANISM # SPEC MICROSCOPIC CNT: ABNORMAL
PLATELET # BLD AUTO: 128 K/UL — LOW (ref 150–400)
POTASSIUM SERPL-MCNC: 3.9 MMOL/L — SIGNIFICANT CHANGE UP (ref 3.5–5.3)
POTASSIUM SERPL-SCNC: 3.9 MMOL/L — SIGNIFICANT CHANGE UP (ref 3.5–5.3)
PROTHROM AB SERPL-ACNC: 19 SEC — HIGH (ref 9.9–13.4)
RBC # BLD: 4.43 M/UL — SIGNIFICANT CHANGE UP (ref 4.2–5.8)
RBC # FLD: 12.1 % — SIGNIFICANT CHANGE UP (ref 10.3–14.5)
SODIUM SERPL-SCNC: 142 MMOL/L — SIGNIFICANT CHANGE UP (ref 135–145)
SPECIMEN SOURCE: SIGNIFICANT CHANGE UP
WBC # BLD: 3.31 K/UL — LOW (ref 3.8–10.5)
WBC # FLD AUTO: 3.31 K/UL — LOW (ref 3.8–10.5)

## 2024-12-31 PROCEDURE — 99233 SBSQ HOSP IP/OBS HIGH 50: CPT

## 2024-12-31 RX ORDER — AMOXICILLIN/POTASSIUM CLAV 875-125 MG
10 TABLET ORAL
Qty: 1 | Refills: 0
Start: 2024-12-31 | End: 2025-01-04

## 2024-12-31 RX ORDER — WARFARIN SODIUM 10 MG/1
2 TABLET ORAL ONCE
Refills: 0 | Status: COMPLETED | OUTPATIENT
Start: 2024-12-31 | End: 2024-12-31

## 2024-12-31 RX ORDER — NITROFURANTOIN MONOHYDRATE/MACROCRYSTALLINE 25; 75 MG/1; MG/1
100 CAPSULE ORAL
Refills: 0 | Status: DISCONTINUED | OUTPATIENT
Start: 2024-12-31 | End: 2024-12-31

## 2024-12-31 RX ORDER — AMOXICILLIN/POTASSIUM CLAV 875-125 MG
500 TABLET ORAL
Refills: 0 | Status: DISCONTINUED | OUTPATIENT
Start: 2024-12-31 | End: 2025-01-01

## 2024-12-31 RX ADMIN — Medication 250 MICROGRAM(S): at 06:21

## 2024-12-31 RX ADMIN — Medication 500 MILLIGRAM(S): at 22:24

## 2024-12-31 RX ADMIN — WARFARIN SODIUM 2 MILLIGRAM(S): 10 TABLET ORAL at 21:39

## 2024-12-31 RX ADMIN — TAMSULOSIN HYDROCHLORIDE 0.4 MILLIGRAM(S): 0.4 CAPSULE ORAL at 21:37

## 2024-12-31 RX ADMIN — Medication 500 MILLIGRAM(S): at 11:50

## 2024-12-31 RX ADMIN — ATORVASTATIN CALCIUM 10 MILLIGRAM(S): 40 TABLET, FILM COATED ORAL at 21:37

## 2024-12-31 RX ADMIN — Medication 1000 UNIT(S): at 11:51

## 2024-12-31 RX ADMIN — Medication 100 MILLIGRAM(S): at 11:51

## 2024-12-31 RX ADMIN — PIPERACILLIN AND TAZOBACTAM 25 GRAM(S): 3; .375 INJECTION, POWDER, LYOPHILIZED, FOR SOLUTION INTRAVENOUS at 06:20

## 2024-12-31 RX ADMIN — PANTOPRAZOLE 40 MILLIGRAM(S): 40 TABLET, DELAYED RELEASE ORAL at 06:22

## 2024-12-31 RX ADMIN — PANTOPRAZOLE 40 MILLIGRAM(S): 40 TABLET, DELAYED RELEASE ORAL at 17:00

## 2024-12-31 RX ADMIN — Medication 5 MILLIGRAM(S): at 11:51

## 2024-12-31 RX ADMIN — Medication 81 MILLIGRAM(S): at 11:50

## 2024-12-31 RX ADMIN — Medication 12.5 MILLIGRAM(S): at 06:22

## 2024-12-31 NOTE — DIETITIAN INITIAL EVALUATION ADULT - ENERGY INTAKE
- Per team, reports pt with fluctuating poor appetite/PO intake; when family present and assisting with meals, pt can consume ~75% of meals. However, when staff aiding in meals, pt with lack of interest for PO intake, consuming </= 25% of meals.   - Trial Ensure Plus High Protein (Provides 20g PRO, 350 Dio per serving) and Gelatein Plus (per serving provides 20 g PRO, 160 dio) to aid in PO intake while on texture modified diet - Per team, reports pt with fluctuating poor appetite/PO intake; when family present and assisting with meals, pt can consume ~75% of meals. However, when staff aiding in meals, pt with lack of interest for PO intake, consuming </= 25% of meals.   - Trial Ensure Plus High Protein (Provides 20g PRO, 350 Dio per serving) and Magic Cup (per serving provides 9 g PRO, 290 kcal) to aid in PO intake while on texture modified diet

## 2024-12-31 NOTE — DIETITIAN INITIAL EVALUATION ADULT - SIGNS/SYMPTOMS
sudden decrease in appetite/PO intake, wt loss 10% x 6 mos, mild edema wt loss 10% x 6 mos, mild edema

## 2024-12-31 NOTE — DISCHARGE NOTE PROVIDER - HOSPITAL COURSE
HPI:  Patient is non-verbal, hx obtained via daughter, Ada Kimbrough and chart review.    89 w/hx of advanced dementia (non-verbal, alert, bedbound at baseline), A-fib on warfarin, CVA w/L sided residual weakness, HTN, BPH, HLD, GERD presents due to worsening lethargy and rigors seen at home. Daughter reports patient had similar symptoms during a previous UTI. Patient has had multiple UTIs previously. Unclear if any changes in urination. No suprapubic tenderness. Febrile and tachycardic in ED. (29 Dec 2024 07:29)    Hospital Course: 89 yo male with PMH of of advanced dementia (minimally verbal), Afib on warfarin, CVA w/L sided residual weakness, HTN, BPH, HLD, and GERD who presents with worsening lethargy and rigors admitted for sepsis 2/2 UTI. Patient has hx of recurrent UTI with most recent urine cx with Proteus mirabilis in 11/2024.12/28 urine cx growing staph lugdunensis. 12/28 blood cultures negative. c/w IV zosyn started on 12/29 changed to macrobid to complete 5 days. Stop vanco given MRSA PCR negative  Dispo: home with resumption of home care/HHA.    Important Medication Changes and Reason:    Active or Pending Issues Requiring Follow-up:  Follow-up with PCP    Advanced Directives:   [X ] Full code  [ ] DNR  [ ] Hospice    Discharge Diagnoses:  UTI  Afib  dementia          HPI:  Patient is non-verbal, hx obtained via daughter, Ada Kimbrough and chart review.    89 w/hx of advanced dementia (non-verbal, alert, bedbound at baseline), A-fib on warfarin, CVA w/L sided residual weakness, HTN, BPH, HLD, GERD presents due to worsening lethargy and rigors seen at home. Daughter reports patient had similar symptoms during a previous UTI. Patient has had multiple UTIs previously. Unclear if any changes in urination. No suprapubic tenderness. Febrile and tachycardic in ED. (29 Dec 2024 07:29)    Hospital Course: 89 yo male with PMH of of advanced dementia (minimally verbal), Afib on warfarin, CVA w/L sided residual weakness, HTN, BPH, HLD, and GERD who presents with worsening lethargy and rigors admitted for sepsis 2/2 UTI. Patient has hx of recurrent UTI with most recent urine cx with Proteus mirabilis in 11/2024.12/28 urine cx growing staph lugdunensis. 12/28 blood cultures negative. c/w IV zosyn started on 12/29 changed to Augmentin to complete 5 days. Stop vanco given MRSA PCR negative  Dispo: home with resumption of home care/HHA.    Important Medication Changes and Reason:    Active or Pending Issues Requiring Follow-up:  Follow-up with PCP    Advanced Directives:   [X ] Full code  [ ] DNR  [ ] Hospice    Discharge Diagnoses:  UTI  Afib  dementia          HPI:  Patient is non-verbal, hx obtained via daughter, Ada Kimbrough and chart review.    89 w/hx of advanced dementia (non-verbal, alert, bedbound at baseline), A-fib on warfarin, CVA w/L sided residual weakness, HTN, BPH, HLD, GERD presents due to worsening lethargy and rigors seen at home. Daughter reports patient had similar symptoms during a previous UTI. Patient has had multiple UTIs previously. Unclear if any changes in urination. No suprapubic tenderness. Febrile and tachycardic in ED. (29 Dec 2024 07:29)    Hospital Course: 89 yo male with PMH of of advanced dementia (minimally verbal), Afib on warfarin, CVA w/L sided residual weakness, HTN, BPH, HLD, and GERD who presents with worsening lethargy and rigors admitted for sepsis 2/2 UTI. Patient has hx of recurrent UTI with most recent urine cx with Proteus mirabilis in 11/2024.12/28 urine cx growing staph lugdunensis. 12/28 blood cultures negative. c/w IV zosyn started on 12/29 changed to Augmentin to complete 5 days. Stop vanco given MRSA PCR negative  Dispo: home with resumption of home care/HHA.    Important Medication Changes and Reason:  increased coumadin 2mg for next 2 days    Active or Pending Issues Requiring Follow-up:  Follow-up with PCP, INR check in 2 days    Advanced Directives:   [X] Full code  [ ] DNR  [ ] Hospice    Discharge Diagnoses:  UTI  Afib  dementia

## 2024-12-31 NOTE — DISCHARGE NOTE PROVIDER - NSDCCPCAREPLAN_GEN_ALL_CORE_FT
PRINCIPAL DISCHARGE DIAGNOSIS  Diagnosis: Urinary tract infection  Assessment and Plan of Treatment: Complete antibiotics as directed  Urine culture came back with Lo thompsonsis  Follow-up with your Primary Care Doctor     PRINCIPAL DISCHARGE DIAGNOSIS  Diagnosis: Urinary tract infection  Assessment and Plan of Treatment: Complete antibiotics as directed (Augmentin 5 day course)  Urine culture came back with Staph lugdunesis  Follow-up with your Primary Care Doctor     PRINCIPAL DISCHARGE DIAGNOSIS  Diagnosis: Sepsis secondary to UTI  Assessment and Plan of Treatment: Complete antibiotics as directed (Augmentin 5 day course)  Urine culture came back with Lo farrell  Follow-up with your Primary Care Doctor      SECONDARY DISCHARGE DIAGNOSES  Diagnosis: Chronic atrial fibrillation  Assessment and Plan of Treatment: Your INR was subtherapeutic and you were given higher doses of coumadin 2mg while admitted starting 12/31  Please continue coumadin 2mg for 2 more days then follow-up with PCP for INR check and futher coumadin adjustments

## 2024-12-31 NOTE — DIETITIAN INITIAL EVALUATION ADULT - ORAL INTAKE PTA/DIET HISTORY
Unable to obtain subjective dietary hx at this time iso pt with hx of dementia, nonverbal. Partial information obtained from previous RD notes/chart. Noted pt with hx of chewing/swallowing difficulties; noted pt was recommended for pureed and moderately thickened liquids via tsp by speech language pathologist (7/24/24). Per H&P, no food allergies/intolerances documented; micronutrient supplementation: cholecalciferol, vitamin B6.

## 2024-12-31 NOTE — PROGRESS NOTE ADULT - PROBLEM SELECTOR PLAN 2
Patient with advanced dementia  - Pureed diet w/honey thick liquids on previous swallow eval  - Aspiration risk, fall risk
Patient with advanced dementia  - Pureed diet w/honey thick liquids on previous swallow eval  - Aspiration risk, fall risk

## 2024-12-31 NOTE — PROGRESS NOTE ADULT - PROBLEM SELECTOR PLAN 3
- Continue digoxin, metoprolol, warfarin  - digoxin level wnl  - check daily INR and dose coumadin accordingly for goal INR 2-3
- Continue digoxin, metoprolol, warfarin  - digoxin level wnl  - check daily INR and dose coumadin accordingly for goal INR 2-3

## 2024-12-31 NOTE — DIETITIAN INITIAL EVALUATION ADULT - NSFNSGIIOFT_GEN_A_CORE
I&O's Detail    30 Dec 2024 07:01  -  31 Dec 2024 07:00  --------------------------------------------------------  IN:    Oral Fluid: 360 mL    sodium chloride 0.9%: 500 mL  Total IN: 860 mL    OUT:    Voided (mL): 550 mL  Total OUT: 550 mL    Total NET: 310 mL      31 Dec 2024 07:01  -  31 Dec 2024 15:18  --------------------------------------------------------  IN:    Oral Fluid: 240 mL  Total IN: 240 mL    OUT:    Voided (mL): 500 mL  Total OUT: 500 mL    Total NET: -260 mL

## 2024-12-31 NOTE — DIETITIAN INITIAL EVALUATION ADULT - OTHER INFO
- Sepsis secondary to UTI  - GERD  - IVF: NS @ 60 mL/hr   - Vitamin D3, Vitamin B6 ordered     Wt Hx:  - Per chart, dosing wt of 61.2 kg (12/28) - noted pt with edema which may skew true current wts/appearance and mask wt loss.   - Wt hx in kg ([facility] HIE) as follows: 68 (7/20), 65.3 (1/10), 72.7 (11/24/23), 54.5 (10/20/23), 66 (9/10/22). Noted hx of wt fluctuations with downward wt trend of 10% x 5 mos (clinically significant, based on wts from 7/20 and 12/28); RD will continue to monitor weight trends as available/able.   - IBW: 56.4 kg (based on ht of 63 in )

## 2024-12-31 NOTE — PROGRESS NOTE ADULT - PROBLEM SELECTOR PLAN 8
DVT ppx: coumadin for afib    Dispo: home with resumption of home care/HHA
DVT ppx: coumadin for afib    Dispo: home with resumption of home care/HHA

## 2024-12-31 NOTE — DISCHARGE NOTE PROVIDER - NSDCMRMEDTOKEN_GEN_ALL_CORE_FT
aspirin 81 mg oral delayed release tablet: 1 tab(s) orally once a day  cholecalciferol 25 mcg (1000 intl units) oral tablet: 1 tab(s) orally once a day  Digox 250 mcg (0.25 mg) oral tablet: 1 tab(s) orally once a day  finasteride 5 mg oral tablet: 1 tab(s) orally once a day  metoprolol succinate 25 mg oral tablet, extended release: 0.5 tab(s) orally once a day  pantoprazole 40 mg oral delayed release tablet: 1 tab(s) orally 2 times a day  simvastatin 10 mg oral tablet: 1 tab(s) orally once a day (at bedtime)  tamsulosin 0.4 mg oral capsule: 1 cap(s) orally once a day (at bedtime)  Vitamin B6 100 mg oral tablet: 1 tab(s) orally once a day  warfarin: 1.5 milligram(s) orally Tues, Thurs, Sat, Sun  warfarin 1 mg oral tablet: 1 tab(s) orally Monday, wednesday, friday   aspirin 81 mg oral delayed release tablet: 1 tab(s) orally once a day  Augmentin 250 mg-62.5 mg/5 mL oral liquid: 10 milliliter(s) orally 2 times a day  cholecalciferol 25 mcg (1000 intl units) oral tablet: 1 tab(s) orally once a day  Digox 250 mcg (0.25 mg) oral tablet: 1 tab(s) orally once a day  finasteride 5 mg oral tablet: 1 tab(s) orally once a day  metoprolol succinate 25 mg oral tablet, extended release: 0.5 tab(s) orally once a day  pantoprazole 40 mg oral delayed release tablet: 1 tab(s) orally 2 times a day  simvastatin 10 mg oral tablet: 1 tab(s) orally once a day (at bedtime)  tamsulosin 0.4 mg oral capsule: 1 cap(s) orally once a day (at bedtime)  Vitamin B6 100 mg oral tablet: 1 tab(s) orally once a day  warfarin: 1.5 milligram(s) orally Tues, Thurs, Sat, Sun  warfarin 1 mg oral tablet: 1 tab(s) orally Monday, wednesday, friday

## 2024-12-31 NOTE — PROGRESS NOTE ADULT - ASSESSMENT
89 yo male with PMH of of advanced dementia (minimally verbal), Afib on warfarin, CVA w/L sided residual weakness, HTN, BPH, HLD, and GERD who presents with worsening lethargy and rigors admitted for sepsis 2/2 UTI.
89 yo male with PMH of advanced dementia (minimally verbal), Afib on warfarin, CVA w/L sided residual weakness, HTN, BPH, HLD, and GERD who presents with worsening lethargy and rigors admitted for sepsis 2/2 UTI now clinically improved.

## 2024-12-31 NOTE — PROGRESS NOTE ADULT - PROBLEM SELECTOR PLAN 1
met sepsis criteria on admission with tachycardia, fever.  - has hx of recurrent UTI with most recent urine cx with Proteus mirabilis in 11/2024  - 12/28 urine cx growing staph lugdunensis, sensivities pending  - 12/28 blood cultures negative  - c/w IV zosyn started on 12/29 pending sensivities  - stop vanco given MRSA PCR negative  - hypovolemic on exam. start NS for IV hydration
met sepsis criteria on admission with tachycardia, fever.  - has hx of recurrent UTI with most recent urine cx with Proteus mirabilis in 11/2024  - 12/28 urine cx growing staph lugdunensis, sensivities reviewed  - 12/28 blood cultures negative  - stopped vanco given MRSA PCR negative  - s/p IV zosyn started on 12/29-12/31. transition to PO augmentin x 5 days through 1/4/25 to complete total 7 day course.   - volume status improved with gentle IVF

## 2024-12-31 NOTE — DIETITIAN INITIAL EVALUATION ADULT - PERTINENT MEDS FT
MEDICATIONS  (STANDING):  amoxicillin   80 mG/mL/clavulanate Suspension 500 milliGRAM(s) Oral two times a day  aspirin  chewable 81 milliGRAM(s) Oral daily  atorvastatin 10 milliGRAM(s) Oral at bedtime  cholecalciferol 1000 Unit(s) Oral daily  digoxin     Tablet 250 MICROGram(s) Oral daily  finasteride 5 milliGRAM(s) Oral daily  metoprolol succinate ER 12.5 milliGRAM(s) Oral daily  pantoprazole  Injectable 40 milliGRAM(s) IV Push two times a day  pyridoxine 100 milliGRAM(s) Oral daily  sodium chloride 0.9%. 1000 milliLiter(s) (50 mL/Hr) IV Continuous <Continuous>  tamsulosin 0.4 milliGRAM(s) Oral at bedtime    MEDICATIONS  (PRN):  acetaminophen     Tablet .. 650 milliGRAM(s) Oral every 6 hours PRN Temp greater or equal to 38C (100.4F), Mild Pain (1 - 3)  melatonin 3 milliGRAM(s) Oral at bedtime PRN Insomnia

## 2024-12-31 NOTE — PHYSICAL THERAPY INITIAL EVALUATION ADULT - LEVEL OF INDEPENDENCE: SIT/STAND, REHAB EVAL
Attempt to perform sit to stand transfers, pt unable to follow commands, BL knees bucking, unable to lift buttocks >10% off bed/unable to perform

## 2024-12-31 NOTE — DISCHARGE NOTE PROVIDER - PROVIDER TOKENS
PROVIDER:[TOKEN:[[id number]],FOLLOWUP:[1 week]] PROVIDER:[TOKEN:[[id number]],FOLLOWUP:[1 week],ESTABLISHEDPATIENT:[T]]

## 2024-12-31 NOTE — DISCHARGE NOTE PROVIDER - NSDCFUADDAPPT_GEN_ALL_CORE_FT
APPTS ARE READY TO BE MADE: [X ] YES    Best Family or Patient Contact (if needed):    Additional Information about above appointments (if needed):    1: Dr. Nguyễn  2:   3:     Other comments or requests:    Take coumadin 2mg on 1/1 and 1/2. Follow-up home regimen after.   Please have INR rechecked wirh your Primary Care Doctor     APPTS ARE READY TO BE MADE: [X ] YES    Best Family or Patient Contact (if needed):    Additional Information about above appointments (if needed):    1: Dr. Nguyễn  2:   3:     Other comments or requests:    Take coumadin 2mg on 1/1 and 1/2. Follow-up home regimen after.   Please have INR rechecked with your Primary Care Doctor     APPTS ARE READY TO BE MADE: [X] YES    Best Family or Patient Contact (if needed):    Additional Information about above appointments (if needed):    1: Dr. Nguyễn  2:   3:     Other comments or requests:

## 2024-12-31 NOTE — PROGRESS NOTE ADULT - SUBJECTIVE AND OBJECTIVE BOX
Theresa Sanchez MD  Division of Hospital Medicine  [facility]   Available on [employer] Teams (Mon-Fri 8am-5pm)    * messages preferred prior to calls  Other Times:  [phone]      Patient is a 89y old  Male who presents with a chief complaint of UTI (29 Dec 2024 07:29)      SUBJECTIVE / OVERNIGHT EVENTS: no acute events overnight. more alert and awake today but unable to obtain ROS as answers yes to all questions asked.  ADDITIONAL REVIEW OF SYSTEMS:    MEDICATIONS  (STANDING):  aspirin  chewable 81 milliGRAM(s) Oral daily  atorvastatin 10 milliGRAM(s) Oral at bedtime  cholecalciferol 1000 Unit(s) Oral daily  digoxin     Tablet 250 MICROGram(s) Oral daily  finasteride 5 milliGRAM(s) Oral daily  metoprolol succinate ER 12.5 milliGRAM(s) Oral daily  pantoprazole  Injectable 40 milliGRAM(s) IV Push two times a day  piperacillin/tazobactam IVPB.. 3.375 Gram(s) IV Intermittent every 8 hours  pyridoxine 100 milliGRAM(s) Oral daily  sodium chloride 0.9%. 1000 milliLiter(s) (50 mL/Hr) IV Continuous <Continuous>  tamsulosin 0.4 milliGRAM(s) Oral at bedtime  warfarin 2 milliGRAM(s) Oral once    MEDICATIONS  (PRN):  acetaminophen     Tablet .. 650 milliGRAM(s) Oral every 6 hours PRN Temp greater or equal to 38C (100.4F), Mild Pain (1 - 3)  melatonin 3 milliGRAM(s) Oral at bedtime PRN Insomnia      CAPILLARY BLOOD GLUCOSE        I&O's Summary    29 Dec 2024 07:01  -  30 Dec 2024 07:00  --------------------------------------------------------  IN: 540 mL / OUT: 950 mL / NET: -410 mL    30 Dec 2024 07:01  -  30 Dec 2024 19:21  --------------------------------------------------------  IN: 0 mL / OUT: 200 mL / NET: -200 mL        PHYSICAL EXAM:  Vital Signs Last 24 Hrs  T(C): 36.3 (30 Dec 2024 11:26), Max: 36.6 (29 Dec 2024 20:27)  T(F): 97.3 (30 Dec 2024 11:26), Max: 97.8 (29 Dec 2024 20:27)  HR: 79 (30 Dec 2024 11:26) (79 - 96)  BP: 122/79 (30 Dec 2024 11:26) (122/79 - 162/71)  BP(mean): --  RR: 18 (30 Dec 2024 11:26) (16 - 18)  SpO2: 97% (30 Dec 2024 11:26) (97% - 99%)    Parameters below as of 30 Dec 2024 11:26  Patient On (Oxygen Delivery Method): room air      CONSTITUTIONAL: elderly, frail-appearing male in NAD  EYES: PERRLA; conjunctiva and sclera clear  ENMT: Moist oral mucosa, no pharyngeal injection or exudates; normal dentition  NECK: Supple, no palpable masses; no thyromegaly  RESPIRATORY: Normal respiratory effort; lungs are clear to auscultation bilaterally  CARDIOVASCULAR: Regular rate and rhythm, normal S1 and S2, no murmur/rub/gallop; No lower extremity edema  ABDOMEN: Soft, Nondistended, Nontender to palpation, normoactive bowel sounds  MUSCULOSKELETAL: No clubbing or cyanosis of digits; no joint swelling or tenderness to palpation  PSYCH: Alert, answers to name but unable to assess orientation as not answering questions appropriately  NEUROLOGY: minimally verbal, does not follow simple commands, moving extremities spontaneously  SKIN: No rashes; no palpable lesions    LABS:                        14.6   3.25  )-----------( 120      ( 30 Dec 2024 06:38 )             45.7     12-30    138  |  101  |  10  ----------------------------<  111[H]  3.9   |  26  |  0.79    Ca    8.6      30 Dec 2024 06:38  Phos  2.8     12-30  Mg     2.0     12-30    TPro  6.0  /  Alb  3.2[L]  /  TBili  0.9  /  DBili  x   /  AST  21  /  ALT  14  /  AlkPhos  71  12-30    PT/INR - ( 30 Dec 2024 08:46 )   PT: 17.6 sec;   INR: 1.55 ratio         PTT - ( 28 Dec 2024 22:48 )  PTT:34.6 sec      Urinalysis Basic - ( 30 Dec 2024 06:38 )    Color: x / Appearance: x / SG: x / pH: x  Gluc: 111 mg/dL / Ketone: x  / Bili: x / Urobili: x   Blood: x / Protein: x / Nitrite: x   Leuk Esterase: x / RBC: x / WBC x   Sq Epi: x / Non Sq Epi: x / Bacteria: x      Culture - Urine (collected 28 Dec 2024 22:47)  Source: Clean Catch Clean Catch (Midstream)  Preliminary Report (30 Dec 2024 09:37):    >100,000 CFU/ml Staphylococcus lugdunensis    Culture - Blood (collected 28 Dec 2024 22:30)  Source: .Blood BLOOD  Preliminary Report (30 Dec 2024 01:03):    No growth at 24 hours    Culture - Blood (collected 28 Dec 2024 22:15)  Source: .Blood BLOOD  Preliminary Report (30 Dec 2024 01:03):    No growth at 24 hours      RADIOLOGY & ADDITIONAL TESTS:  Results Reviewed: blood cx with NGTD, urine cx >staph lugdunensis  Imaging Personally Reviewed:  Electrocardiogram Personally Reviewed:    COORDINATION OF CARE:  Care Discussed with Consultants/Other Providers [Y]: medicine NP Cee  Prior or Outpatient Records Reviewed [Y/N]:  
Theresa Sanchez MD  Division of Hospital Medicine  [facility]   Available on Microsoft Teams (Mon-Fri 8am-5pm)    * messages preferred prior to calls  Other Times:  [phone]      Patient is a 89y old  Male who presents with a chief complaint of Urinary tract infection     (31 Dec 2024 15:03)      SUBJECTIVE / OVERNIGHT EVENTS:  no acute events overnight. limited ROS as patient minimally verbal but states "better" when asked how he is feeling today  ADDITIONAL REVIEW OF SYSTEMS:    MEDICATIONS  (STANDING):  amoxicillin   80 mG/mL/clavulanate Suspension 500 milliGRAM(s) Oral two times a day  aspirin  chewable 81 milliGRAM(s) Oral daily  atorvastatin 10 milliGRAM(s) Oral at bedtime  cholecalciferol 1000 Unit(s) Oral daily  digoxin     Tablet 250 MICROGram(s) Oral daily  finasteride 5 milliGRAM(s) Oral daily  metoprolol succinate ER 12.5 milliGRAM(s) Oral daily  pantoprazole  Injectable 40 milliGRAM(s) IV Push two times a day  pyridoxine 100 milliGRAM(s) Oral daily  sodium chloride 0.9%. 1000 milliLiter(s) (50 mL/Hr) IV Continuous <Continuous>  tamsulosin 0.4 milliGRAM(s) Oral at bedtime  warfarin 2 milliGRAM(s) Oral once    MEDICATIONS  (PRN):  acetaminophen     Tablet .. 650 milliGRAM(s) Oral every 6 hours PRN Temp greater or equal to 38C (100.4F), Mild Pain (1 - 3)  melatonin 3 milliGRAM(s) Oral at bedtime PRN Insomnia      CAPILLARY BLOOD GLUCOSE        I&O's Summary    30 Dec 2024 07:01  -  31 Dec 2024 07:00  --------------------------------------------------------  IN: 860 mL / OUT: 550 mL / NET: 310 mL    31 Dec 2024 07:01  -  31 Dec 2024 19:52  --------------------------------------------------------  IN: 240 mL / OUT: 500 mL / NET: -260 mL        PHYSICAL EXAM:  Vital Signs Last 24 Hrs  T(C): 36.2 (31 Dec 2024 10:23), Max: 36.4 (31 Dec 2024 00:12)  T(F): 97.2 (31 Dec 2024 10:23), Max: 97.6 (31 Dec 2024 00:12)  HR: 68 (31 Dec 2024 11:25) (68 - 91)  BP: 118/78 (31 Dec 2024 11:25) (118/78 - 158/82)  BP(mean): --  RR: 18 (31 Dec 2024 10:23) (18 - 18)  SpO2: 98% (31 Dec 2024 11:25) (96% - 99%)    Parameters below as of 31 Dec 2024 11:25  Patient On (Oxygen Delivery Method): room air    CONSTITUTIONAL: elderly, frail-appearing male in NAD  EYES: PERRLA; conjunctiva and sclera clear  ENMT: Moist oral mucosa, no pharyngeal injection or exudates; normal dentition  NECK: Supple, no palpable masses; no thyromegaly  RESPIRATORY: Normal respiratory effort; lungs are clear to auscultation bilaterally  CARDIOVASCULAR: Regular rate and rhythm, normal S1 and S2, no murmur/rub/gallop; No lower extremity edema  ABDOMEN: Soft, Nondistended, Nontender to palpation, normoactive bowel sounds  MUSCULOSKELETAL: No clubbing or cyanosis of digits; no joint swelling or tenderness to palpation  PSYCH: Alert, answers to name but unable to assess orientation as not answering questions appropriately  NEUROLOGY: minimally verbal, does not follow simple commands, moving extremities spontaneously  SKIN: No rashes; no palpable lesions    LABS:                        14.2   3.31  )-----------( 128      ( 31 Dec 2024 07:19 )             43.7     12-31    142  |  106  |  12  ----------------------------<  105[H]  3.9   |  26  |  0.84    Ca    8.8      31 Dec 2024 07:18  Phos  2.8     12-30  Mg     2.0     12-30    TPro  6.0  /  Alb  3.2[L]  /  TBili  0.9  /  DBili  x   /  AST  21  /  ALT  14  /  AlkPhos  71  12-30    PT/INR - ( 31 Dec 2024 07:20 )   PT: 19.0 sec;   INR: 1.66 ratio           Urinalysis Basic - ( 31 Dec 2024 07:18 )    Color: x / Appearance: x / SG: x / pH: x  Gluc: 105 mg/dL / Ketone: x  / Bili: x / Urobili: x   Blood: x / Protein: x / Nitrite: x   Leuk Esterase: x / RBC: x / WBC x   Sq Epi: x / Non Sq Epi: x / Bacteria: x        Culture - Urine (collected 28 Dec 2024 22:47)  Source: Clean Catch Clean Catch (Midstream)  Final Report (31 Dec 2024 07:17):    >100,000 CFU/ml Staphylococcus lugdunensis  Organism: Staphylococcus lugdunensis (31 Dec 2024 07:17)  Organism: Staphylococcus lugdunensis (31 Dec 2024 07:17)    Culture - Blood (collected 28 Dec 2024 22:30)  Source: .Blood BLOOD  Preliminary Report (31 Dec 2024 01:02):    No growth at 48 Hours    Culture - Blood (collected 28 Dec 2024 22:15)  Source: .Blood BLOOD  Preliminary Report (31 Dec 2024 01:02):    No growth at 48 Hours        RADIOLOGY & ADDITIONAL TESTS:  Results Reviewed: INR subtherapeutic, urine cx sensivities reviewed  Imaging Personally Reviewed:  Electrocardiogram Personally Reviewed:    COORDINATION OF CARE:  Care Discussed with Consultants/Other Providers [Y]: medicine NP Cee  Prior or Outpatient Records Reviewed [Y/N]:

## 2024-12-31 NOTE — PHYSICAL THERAPY INITIAL EVALUATION ADULT - GENERAL OBSERVATIONS, REHAB EVAL
Pt rec'd semisupine in bed, +bed alarm, +telemetry, +condom catheter, in NAD.  Pt cleared for PT session by RACHELE Bautista.

## 2024-12-31 NOTE — DIETITIAN INITIAL EVALUATION ADULT - ADD RECOMMEND
[X] Continue with diet free of therapeutic restrictions to optimize PO intake; Defer texture/consistency to Speech Language Pathologist prn.   [X] Consider adding multivitamin; continue with vitamin D3, vitamin B6 for micronutrient coverage as medically feasible  [X] Malnutrition sticker placed in chart   [X] RD will continue to monitor PO intake, GI tolerance, weight trends, skin integrity, BMs, labs/electrolytes prn.   RD remains available upon request.

## 2024-12-31 NOTE — PROGRESS NOTE ADULT - NUTRITIONAL ASSESSMENT
This patient has been assessed with a concern for Malnutrition and has been determined to have a diagnosis/diagnoses of Moderate protein-calorie malnutrition.    This patient is being managed with:   Diet Pureed-  Moderately Thick Liquids (MODTHICKLIQS)  Entered: Dec 29 2024  7:18AM

## 2024-12-31 NOTE — PHYSICAL THERAPY INITIAL EVALUATION ADULT - FOLLOWS COMMANDS/ANSWERS QUESTIONS, REHAB EVAL
Pt non-verbal at baseline, unable to follow commands/unable to follow commands/unable to answer questions

## 2024-12-31 NOTE — PROGRESS NOTE ADULT - PROBLEM SELECTOR PLAN 7
per family, patient not bedbound at baseline; but is dependent with ADLs. with assist, able to get out of bed to chair with rolling walker  plan was to start home PT this week but patient presented to the hospital  - PT eval for home PT
per family, patient not bedbound at baseline; but is dependent with ADLs. with assist, able to get out of bed to chair with rolling walker  plan was to start home PT this week but patient presented to the hospital  - PT eval for home PT

## 2024-12-31 NOTE — PROGRESS NOTE ADULT - NSPROGADDITIONALINFOA_GEN_ALL_CORE
.  Theresa Sanchez MD  Division of Hospital Medicine  [facility]   Available on [employer] Teams - messages preferred prior to calls.    Medically clear for discharge home 1/1/25 AM when HHA scheduled to be resumed.    Plan discussed with patient, daughter Ada via phone, HILARIA Cruz, and medicine NP Cee.
.  Theresa Sanchez MD  Division of Hospital Medicine  [facility]   Available on Microsoft Teams - messages preferred prior to calls.    Anticipate d/c home with resumption of home care/HHA in next 24-48 hours pending urine cx sensivities.    Plan discussed with patient, daughter Ada via phone, and medicine NP Cee.

## 2024-12-31 NOTE — PHYSICAL THERAPY INITIAL EVALUATION ADULT - ADDITIONAL COMMENTS
Pt lives in an apartment with spouse with 10 VIDAL.  Per daughter, pt requires ambulance transport in/out of the house at baseline, does not leave home unless for medical necessity.  Pt transfers from bed to chair via total assist lift.  Pt owns hospital bed, WC and shower chair.  Per daughter, pt has 24/7 aides.  Pt is dependent with ADLs.  Per daughter, Home PT was set up, pt was planning to start prior to admission to hospital.

## 2024-12-31 NOTE — PHYSICAL THERAPY INITIAL EVALUATION ADULT - LEVEL OF INDEPENDENCE, REHAB EVAL
"Caller: Prashant Avila \"Timur\"    Relationship: Self    Best call back number: [phone]    What is the medical concern/diagnosis: Chronic intractable pain     What specialty or service is being requested: PHYSICAL THERAPY    What is the provider, practice or medical service name: [facility]      What is the office location: [facility] [place] [address]    What is the office phone number: [phone]    Any additional details:   FAX: [phone]    REQUESTING A CALL BACK WHEN THIS HAS BEEN SENT OVER. PLEASE ADVISE.  "
Faxed PT order to [phone]     Patient notified.   
Surgery/Procedure:  Diagnostic and therapeutic left L5-S1 and left S1 transforaminal epidural steroid injections   Surgery/Procedure Date:  02/28/2024  Last visit:   Office Visit with Inez Regan APRN (02/05/2024)   Next visit: N/A      Reason for call:    Patient is requesting an order for physical therapy. PT order pending.   
dependent (less than 25% patients effort)

## 2024-12-31 NOTE — DISCHARGE NOTE PROVIDER - CARE PROVIDER_API CALL
Shiraz Nguyễn  Cardiovascular Disease  [address]  [place] [address]  Phone: [phone]  Fax: [phone]  Follow Up Time: 1 week   Shiraz Nguyễn  Cardiovascular Disease  [address]  [place] [address]  Phone: [phone]  Fax: [phone]  Established Patient  Follow Up Time: 1 week

## 2024-12-31 NOTE — DIETITIAN INITIAL EVALUATION ADULT - ORAL NUTRITION SUPPLEMENTS
Add Ensure Plus High Protein (Provides 20g PRO, 350 Dio per serving) and Gelatein Plus (per serving provides 20 g PRO, 160 dio) to aid in PO intake while on texture modified diet  Add Ensure Plus High Protein (Provides 20g PRO, 350 Dio per serving) 1x/day; RD to add Magic Cup (per serving provides 9 g PRO, 290 kcal) 2x/day to aid in PO intake while on texture modified diet

## 2024-12-31 NOTE — PHYSICAL THERAPY INITIAL EVALUATION ADULT - NSPTDISCHREC_GEN_A_CORE
Sepsis due to anaerobes continue current home PT, d/c to home with transportation into home, with continued 24/7 aides.  Pt unable to follow commands at this time, dependent at baseline, to be taken off PT program at this time, please reconsult this discipline if appropriate./No skilled PT needs

## 2024-12-31 NOTE — DISCHARGE NOTE PROVIDER - DETAILS OF MALNUTRITION DIAGNOSIS/DIAGNOSES
This patient has been assessed with a concern for Malnutrition and was treated during this hospitalization for the following Nutrition diagnosis/diagnoses:     -  12/31/2024: Moderate protein-calorie malnutrition

## 2024-12-31 NOTE — DIETITIAN INITIAL EVALUATION ADULT - PERTINENT LABORATORY DATA
12-31    142  |  106  |  12  ----------------------------<  105[H]  3.9   |  26  |  0.84    Ca    8.8      31 Dec 2024 07:18  Phos  2.8     12-30  Mg     2.0     12-30    TPro  6.0  /  Alb  3.2[L]  /  TBili  0.9  /  DBili  x   /  AST  21  /  ALT  14  /  AlkPhos  71  12-30  A1C with Estimated Average Glucose Result: 4.9 % (12-29-24 @ 06:17)  A1C with Estimated Average Glucose Result: 4.7 % (01-08-24 @ 11:34)

## 2024-12-31 NOTE — DIETITIAN INITIAL EVALUATION ADULT - REASON INDICATOR FOR ASSESSMENT
RD Consult Indicated for: MST Score 2 or >  Source:  Team, Electronic Medical Record   Chart reviewed, events noted.

## 2025-01-01 ENCOUNTER — TRANSCRIPTION ENCOUNTER (OUTPATIENT)
Age: 89
End: 2025-01-01

## 2025-01-01 VITALS
HEART RATE: 84 BPM | DIASTOLIC BLOOD PRESSURE: 89 MMHG | OXYGEN SATURATION: 98 % | SYSTOLIC BLOOD PRESSURE: 151 MMHG | TEMPERATURE: 97 F | RESPIRATION RATE: 18 BRPM

## 2025-01-01 LAB
GLUCOSE BLDC GLUCOMTR-MCNC: 95 MG/DL — SIGNIFICANT CHANGE UP (ref 70–99)
INR BLD: 1.68 RATIO — HIGH (ref 0.85–1.16)
PROTHROM AB SERPL-ACNC: 19.2 SEC — HIGH (ref 9.9–13.4)

## 2025-01-01 PROCEDURE — 83036 HEMOGLOBIN GLYCOSYLATED A1C: CPT

## 2025-01-01 PROCEDURE — 84145 PROCALCITONIN (PCT): CPT

## 2025-01-01 PROCEDURE — 84100 ASSAY OF PHOSPHORUS: CPT

## 2025-01-01 PROCEDURE — 80202 ASSAY OF VANCOMYCIN: CPT

## 2025-01-01 PROCEDURE — 85018 HEMOGLOBIN: CPT

## 2025-01-01 PROCEDURE — 85027 COMPLETE CBC AUTOMATED: CPT

## 2025-01-01 PROCEDURE — 85025 COMPLETE CBC W/AUTO DIFF WBC: CPT

## 2025-01-01 PROCEDURE — 82947 ASSAY GLUCOSE BLOOD QUANT: CPT

## 2025-01-01 PROCEDURE — 99239 HOSP IP/OBS DSCHRG MGMT >30: CPT

## 2025-01-01 PROCEDURE — 82803 BLOOD GASES ANY COMBINATION: CPT

## 2025-01-01 PROCEDURE — 82330 ASSAY OF CALCIUM: CPT

## 2025-01-01 PROCEDURE — 87186 SC STD MICRODIL/AGAR DIL: CPT

## 2025-01-01 PROCEDURE — 99285 EMERGENCY DEPT VISIT HI MDM: CPT

## 2025-01-01 PROCEDURE — 93005 ELECTROCARDIOGRAM TRACING: CPT

## 2025-01-01 PROCEDURE — 80162 ASSAY OF DIGOXIN TOTAL: CPT

## 2025-01-01 PROCEDURE — 85610 PROTHROMBIN TIME: CPT

## 2025-01-01 PROCEDURE — 84295 ASSAY OF SERUM SODIUM: CPT

## 2025-01-01 PROCEDURE — 81001 URINALYSIS AUTO W/SCOPE: CPT

## 2025-01-01 PROCEDURE — 82550 ASSAY OF CK (CPK): CPT

## 2025-01-01 PROCEDURE — 85730 THROMBOPLASTIN TIME PARTIAL: CPT

## 2025-01-01 PROCEDURE — 87640 STAPH A DNA AMP PROBE: CPT

## 2025-01-01 PROCEDURE — 97162 PT EVAL MOD COMPLEX 30 MIN: CPT

## 2025-01-01 PROCEDURE — 93308 TTE F-UP OR LMTD: CPT

## 2025-01-01 PROCEDURE — 71045 X-RAY EXAM CHEST 1 VIEW: CPT

## 2025-01-01 PROCEDURE — 87086 URINE CULTURE/COLONY COUNT: CPT

## 2025-01-01 PROCEDURE — 84132 ASSAY OF SERUM POTASSIUM: CPT

## 2025-01-01 PROCEDURE — 80053 COMPREHEN METABOLIC PANEL: CPT

## 2025-01-01 PROCEDURE — 84484 ASSAY OF TROPONIN QUANT: CPT

## 2025-01-01 PROCEDURE — 85014 HEMATOCRIT: CPT

## 2025-01-01 PROCEDURE — 82962 GLUCOSE BLOOD TEST: CPT

## 2025-01-01 PROCEDURE — 36415 COLL VENOUS BLD VENIPUNCTURE: CPT

## 2025-01-01 PROCEDURE — 82435 ASSAY OF BLOOD CHLORIDE: CPT

## 2025-01-01 PROCEDURE — 87077 CULTURE AEROBIC IDENTIFY: CPT

## 2025-01-01 PROCEDURE — 87040 BLOOD CULTURE FOR BACTERIA: CPT

## 2025-01-01 PROCEDURE — 83735 ASSAY OF MAGNESIUM: CPT

## 2025-01-01 PROCEDURE — 80048 BASIC METABOLIC PNL TOTAL CA: CPT

## 2025-01-01 PROCEDURE — 87641 MR-STAPH DNA AMP PROBE: CPT

## 2025-01-01 PROCEDURE — 83605 ASSAY OF LACTIC ACID: CPT

## 2025-01-01 RX ADMIN — Medication 12.5 MILLIGRAM(S): at 05:36

## 2025-01-01 RX ADMIN — Medication 500 MILLIGRAM(S): at 09:59

## 2025-01-01 RX ADMIN — Medication 250 MICROGRAM(S): at 05:36

## 2025-01-01 RX ADMIN — PANTOPRAZOLE 40 MILLIGRAM(S): 40 TABLET, DELAYED RELEASE ORAL at 05:35

## 2025-01-01 NOTE — DISCHARGE NOTE NURSING/CASE MANAGEMENT/SOCIAL WORK - PATIENT PORTAL LINK FT
You can access the FollowMyHealth Patient Portal offered by [facility] by registering at the following website: [email or web address]. By joining [employer]’s FollowMyHealth portal, you will also be able to view your health information using other applications (apps) compatible with our system. Cardiac

## 2025-01-01 NOTE — DISCHARGE NOTE NURSING/CASE MANAGEMENT/SOCIAL WORK - NSDCFUADDAPPT_GEN_ALL_CORE_FT
Take coumadin 2mg on 1/1 and 1/2. Follow-up home regimen after.   Please have INR rechecked wirh your Primary Care Doctor     APPTS ARE READY TO BE MADE: [X ] YES    Best Family or Patient Contact (if needed):    Additional Information about above appointments (if needed):    1: Dr. Nguyễn  2:   3:     Other comments or requests:

## 2025-01-01 NOTE — DISCHARGE NOTE NURSING/CASE MANAGEMENT/SOCIAL WORK - NSDCVIVACCINE_GEN_ALL_CORE_FT
Tdap; 20-Apr-2022 17:15; Claudy Norris (RN); [employer]; [id number] (Exp. Date: 18-Jan-2024); IntraMuscular; Deltoid Right.; 0.5 milliLiter(s); VIS (VIS Published: 09-May-2013, VIS Presented: 20-Apr-2022);

## 2025-01-01 NOTE — CHART NOTE - NSCHARTNOTEFT_GEN_A_CORE
Hospitalist Discharge Day Note    Interval History: no acute events overnight. unable to obtain ROS due demtnai.    Physical Exam:  CONSTITUTIONAL: elderly, frail-appearing male in NAD  EYES: PERRLA; conjunctiva and sclera clear  ENMT: Moist oral mucosa, no pharyngeal injection or exudates; normal dentition  NECK: Supple, no palpable masses; no thyromegaly  RESPIRATORY: Normal respiratory effort; lungs are clear to auscultation bilaterally  CARDIOVASCULAR: Regular rate and rhythm, normal S1 and S2, no murmur/rub/gallop; No lower extremity edema  ABDOMEN: Soft, Nondistended, Nontender to palpation, normoactive bowel sounds  MUSCULOSKELETAL: No clubbing or cyanosis of digits; no joint swelling or tenderness to palpation  PSYCH: Alert, answers to name but unable to assess orientation as not answering questions appropriately  NEUROLOGY: minimally verbal, does not follow simple commands, moving extremities spontaneously  SKIN: No rashes; no palpable lesions    Assessment: 89 yo male with PMH of advanced dementia (minimally verbal), Afib on warfarin, CVA w/L sided residual weakness, HTN, BPH, HLD, and GERD who presents with worsening lethargy and rigors admitted for sepsis 2/2 UTI now clinically improved.    Plan:  - c/w augmentin to complete abx course on discharge  - increase coumadin to 2mg for next 2 nights and repeat INR check in 2 days with PCP for further coumadin adjustments    Medically clear for discharge home today 1/1/25.  Discharge planning time spent: 36 minutes.    Plan discussed with patient, daughter Ada yesterday via phone, and medicine NP Cee.    Theresa Sanchez MD  Division of Hospital Medicine     Available on [employer] Teams - messages preferred prior to calls.

## 2025-01-01 NOTE — DISCHARGE NOTE NURSING/CASE MANAGEMENT/SOCIAL WORK - FINANCIAL ASSISTANCE
[facility] provides services at a reduced cost to those who are determined to be eligible through [facility]’s financial assistance program. Information regarding [facility]’s financial assistance program can be found by going to [email or web address] or by calling [phone].

## 2025-01-01 NOTE — DISCHARGE NOTE NURSING/CASE MANAGEMENT/SOCIAL WORK - NSDCPEPTSTROKESIGNS_GEN_ALL_CORE
Sudden numbness or weakness of the face, arm, or leg, especially on one side of the body. Confusion, trouble speaking or understanding. Trouble seeing in one or both eyes. Trouble walking, dizziness, loss of balance or coordination. Severe headache. Lung nodule

## 2025-09-12 ENCOUNTER — TRANSCRIPTION ENCOUNTER (OUTPATIENT)
Age: 89
End: 2025-09-12

## 2025-09-13 ENCOUNTER — TRANSCRIPTION ENCOUNTER (OUTPATIENT)
Age: 89
End: 2025-09-13